# Patient Record
Sex: MALE | Race: WHITE | NOT HISPANIC OR LATINO | Employment: FULL TIME | URBAN - METROPOLITAN AREA
[De-identification: names, ages, dates, MRNs, and addresses within clinical notes are randomized per-mention and may not be internally consistent; named-entity substitution may affect disease eponyms.]

---

## 2017-01-06 ENCOUNTER — ANTICOAGULATION THERAPY VISIT (OUTPATIENT)
Dept: NURSING | Facility: CLINIC | Age: 57
End: 2017-01-06
Payer: COMMERCIAL

## 2017-01-06 DIAGNOSIS — I48.91 NEW ONSET ATRIAL FIBRILLATION (H): ICD-10-CM

## 2017-01-06 DIAGNOSIS — Z79.01 LONG-TERM (CURRENT) USE OF ANTICOAGULANTS: Primary | ICD-10-CM

## 2017-01-06 LAB — INR POINT OF CARE: 2.4 (ref 0.86–1.14)

## 2017-01-06 PROCEDURE — 99207 ZZC NO CHARGE NURSE ONLY: CPT

## 2017-01-06 PROCEDURE — 36416 COLLJ CAPILLARY BLOOD SPEC: CPT

## 2017-01-06 PROCEDURE — 85610 PROTHROMBIN TIME: CPT | Mod: QW

## 2017-01-06 NOTE — PROGRESS NOTES
ANTICOAGULATION FOLLOW-UP CLINIC VISIT    Patient Name:  Stef Mosher  Date:  1/6/2017  Contact Type:  Face to Face    SUBJECTIVE:     Patient Findings     Positives No Problem Findings           OBJECTIVE    INR PROTIME   Date Value Ref Range Status   01/06/2017 2.4* 0.86 - 1.14 Final       ASSESSMENT / PLAN  INR assessment THER    Recheck INR In: 6 WEEKS    INR Location Clinic      Anticoagulation Summary as of 1/6/2017     INR goal 2.0-3.0   Selected INR 2.4 (1/6/2017)   Maintenance plan 5 mg (2.5 mg x 2) on Mon, Fri; 2.5 mg (2.5 mg x 1) all other days   Full instructions 5 mg on Mon, Fri; 2.5 mg all other days   Weekly total 22.5 mg   No change documented Linnea Brooks RN   Plan last modified Dahiana Barrera (6/20/2016)   Next INR check 2/13/2017   Target end date     Indications   Long-term (current) use of anticoagulants [Z79.01] [Z79.01]  New onset atrial fibrillation (H) [I48.91]         Anticoagulation Episode Summary     INR check location     Preferred lab     Send INR reminders to [facility]    Comments       Anticoagulation Care Providers     Provider Role Specialty Phone number    Sivakumar Kang PA-C Responsible Physician Assistant [phone]            See the Encounter Report to view Anticoagulation Flowsheet and Dosing Calendar (Go to Encounters tab in chart review, and find the Anticoagulation Therapy Visit)        Linnea Brooks RN

## 2017-01-06 NOTE — MR AVS SNAPSHOT
Stef NEFF Nomi   1/6/2017 11:00 AM   Anticoagulation Therapy Visit    Description:  56 year old male   Provider:  VANESA ANTI COAG   Department:  [facility] Nurse           INR as of 1/6/2017     Selected INR 2.4 (1/6/2017)      Anticoagulation Summary as of 1/6/2017     INR goal 2.0-3.0   Selected INR 2.4 (1/6/2017)   Full instructions 5 mg on Mon, Fri; 2.5 mg all other days   Next INR check 2/13/2017    Indications   Long-term (current) use of anticoagulants [Z79.01] [Z79.01]  New onset atrial fibrillation (H) [I48.91]         Your next Anticoagulation Clinic appointment(s)     Feb 13, 2017 11:00 AM   Anticoagulation Visit with VANESA ANTI DIONNA   [place] [facility] ([facility] [place])    [address] [facility]  [place] [id number] [phone]              Contact Numbers     [facility]  Please call [phone] with any problems or questions regarding your therapy, or to cancel or reschedule your appointment.          January 2017 Details    Sun Mon Tue Wed Thu Fri Sat     1               2               3               4               5               6      5 mg   See details      7      2.5 mg           8      2.5 mg         9      5 mg         10      2.5 mg         11      2.5 mg         12      2.5 mg         13      5 mg         14      2.5 mg           15      2.5 mg         16      5 mg         17      2.5 mg         18      2.5 mg         19      2.5 mg         20      5 mg         21      2.5 mg           22      2.5 mg         23      5 mg         24      2.5 mg         25      2.5 mg         26      2.5 mg         27      5 mg         28      2.5 mg           29      2.5 mg         30      5 mg         31      2.5 mg              Date Details   01/06 This INR check               How to take your warfarin dose     To take:  2.5 mg Take 1 of the 2.5 mg tablets.    To take:  5 mg Take 2 of the 2.5 mg tablets.           February 2017 Details    Sun Mon Tue Wed Thu Fri Sat        1      2.5 mg          2      2.5 mg         3      5 mg         4      2.5 mg           5      2.5 mg         6      5 mg         7      2.5 mg         8      2.5 mg         9      2.5 mg         10      5 mg         11      2.5 mg           12      2.5 mg         13            14               15               16               17               18                 19               20               21               22               23               24               25                 26               27               28                    Date Details   No additional details    Date of next INR:  2/13/2017         How to take your warfarin dose     To take:  2.5 mg Take 1 of the 2.5 mg tablets.    To take:  5 mg Take 2 of the 2.5 mg tablets.

## 2017-01-09 DIAGNOSIS — I48.91 NEW ONSET ATRIAL FIBRILLATION (H): Primary | ICD-10-CM

## 2017-01-09 RX ORDER — WARFARIN SODIUM 2.5 MG/1
2.5 TABLET ORAL DAILY
Qty: 140 TABLET | Refills: 0 | Status: SHIPPED | OUTPATIENT
Start: 2017-01-09 | End: 2017-04-24

## 2017-01-09 NOTE — TELEPHONE ENCOUNTER
Looks like last filled date was actually 12/29/16    Note from pharmacy:  Could you please send patient a 90 day supply, not enough on script to make a full 90 day fill.  Thanks in advance.    Warfarin    Last Written Prescription Date: 09/02/16  Last Fill Qty: 140, # refills: 0  Last Office Visit with [facility], [facility] or [facility] prescribing provider: 04/07/14       Date and Result of Last PT/INR:   INR      2.4   1/6/2017  INR      2.5   10/28/2016  INR     2.40   8/3/2010  INR     3.60   7/8/2010

## 2017-02-15 ENCOUNTER — ANTICOAGULATION THERAPY VISIT (OUTPATIENT)
Dept: NURSING | Facility: CLINIC | Age: 57
End: 2017-02-15
Payer: COMMERCIAL

## 2017-02-15 DIAGNOSIS — I48.91 NEW ONSET ATRIAL FIBRILLATION (H): ICD-10-CM

## 2017-02-15 DIAGNOSIS — Z79.01 LONG-TERM (CURRENT) USE OF ANTICOAGULANTS: ICD-10-CM

## 2017-02-15 LAB — INR POINT OF CARE: 2.6 (ref 0.86–1.14)

## 2017-02-15 PROCEDURE — 36416 COLLJ CAPILLARY BLOOD SPEC: CPT

## 2017-02-15 PROCEDURE — 85610 PROTHROMBIN TIME: CPT | Mod: QW

## 2017-02-15 PROCEDURE — 99207 ZZC NO CHARGE NURSE ONLY: CPT

## 2017-02-15 NOTE — MR AVS SNAPSHOT
Stef Leetino   2/15/2017 11:45 AM   Anticoagulation Therapy Visit    Description:  56 year old male   Provider:  VANESA ANTI COAG   Department:  Be Nurse           INR as of 2/15/2017     Today's INR 2.6      Anticoagulation Summary as of 2/15/2017     INR goal 2.0-3.0   Today's INR 2.6   Full instructions 5 mg on Mon, Fri; 2.5 mg all other days   Next INR check 3/24/2017    Indications   Long-term (current) use of anticoagulants [Z79.01] [Z79.01]  New onset atrial fibrillation (H) [I48.91]         Your next Anticoagulation Clinic appointment(s)     Feb 15, 2017 11:45 AM CST   Anticoagulation Visit with BE ANTI COAG   [facility] [place] [facility] [place]    [address] [facility]  [place] [address]   [phone]            Mar 24, 2017 11:00 AM CDT   Anticoagulation Visit with BE ANTI COAG   [place] [facility] ([facility])    [address] [facility]  [place] [address]   [phone]              Contact Numbers     [facility]  Please call [phone] with any problems or questions regarding your therapy, or to cancel or reschedule your appointment.          February 2017 Details    Sun Mon Tue Wed Thu Fri Sat        1               2               3               4                 5               6               7               8               9               10               11                 12               13               14               15      2.5 mg   See details      16      2.5 mg         17      5 mg         18      2.5 mg           19      2.5 mg         20      5 mg         21      2.5 mg         22      2.5 mg         23      2.5 mg         24      5 mg         25      2.5 mg           26      2.5 mg         27      5 mg         28      2.5 mg              Date Details   02/15 This INR check               How to take your warfarin dose     To take:  2.5 mg Take 1 of the 2.5 mg tablets.    To take:  5 mg Take 2 of the 2.5 mg tablets.           March  2017 Details    Sun Mon Tue Wed Thu Fri Sat        1      2.5 mg         2      2.5 mg         3      5 mg         4      2.5 mg           5      2.5 mg         6      5 mg         7      2.5 mg         8      2.5 mg         9      2.5 mg         10      5 mg         11      2.5 mg           12      2.5 mg         13      5 mg         14      2.5 mg         15      2.5 mg         16      2.5 mg         17      5 mg         18      2.5 mg           19      2.5 mg         20      5 mg         21      2.5 mg         22      2.5 mg         23      2.5 mg         24            25                 26               27               28               29               30               31                 Date Details   No additional details    Date of next INR:  3/24/2017         How to take your warfarin dose     To take:  2.5 mg Take 1 of the 2.5 mg tablets.    To take:  5 mg Take 2 of the 2.5 mg tablets.

## 2017-02-15 NOTE — PROGRESS NOTES
ANTICOAGULATION FOLLOW-UP CLINIC VISIT    Patient Name:  Stef Mosher  Date:  2/15/2017  Contact Type:  Face to Face    SUBJECTIVE:     Patient Findings     Positives No Problem Findings           OBJECTIVE    INR Protime   Date Value Ref Range Status   02/15/2017 2.6 (A) 0.86 - 1.14 Final       ASSESSMENT / PLAN  INR assessment THER    Recheck INR In: 6 WEEKS    INR Location Clinic      Anticoagulation Summary as of 2/15/2017     INR goal 2.0-3.0   Today's INR 2.6   Maintenance plan 5 mg (2.5 mg x 2) on Mon, Fri; 2.5 mg (2.5 mg x 1) all other days   Full instructions 5 mg on Mon, Fri; 2.5 mg all other days   Weekly total 22.5 mg   No change documented Linnea Brooks RN   Plan last modified Dahiana Barrera (6/20/2016)   Next INR check 3/24/2017   Target end date     Indications   Long-term (current) use of anticoagulants [Z79.01] [Z79.01]  New onset atrial fibrillation (H) [I48.91]         Anticoagulation Episode Summary     INR check location     Preferred lab     Send INR reminders to [facility]    Comments       Anticoagulation Care Providers     Provider Role Specialty Phone number    Sivakumar Kang PA-C Responsible Physician Assistant [phone]            See the Encounter Report to view Anticoagulation Flowsheet and Dosing Calendar (Go to Encounters tab in chart review, and find the Anticoagulation Therapy Visit)        Linnea Brooks RN

## 2017-03-03 DIAGNOSIS — I48.91 ATRIAL FIBRILLATION (H): ICD-10-CM

## 2017-03-06 RX ORDER — LISINOPRIL 20 MG/1
20 TABLET ORAL DAILY
Qty: 90 TABLET | Refills: 1 | Status: SHIPPED | OUTPATIENT
Start: 2017-03-06 | End: 2017-04-24

## 2017-03-27 ENCOUNTER — ANTICOAGULATION THERAPY VISIT (OUTPATIENT)
Dept: NURSING | Facility: CLINIC | Age: 57
End: 2017-03-27
Payer: COMMERCIAL

## 2017-03-27 DIAGNOSIS — I48.91 NEW ONSET ATRIAL FIBRILLATION (H): ICD-10-CM

## 2017-03-27 DIAGNOSIS — Z79.01 LONG-TERM (CURRENT) USE OF ANTICOAGULANTS: ICD-10-CM

## 2017-03-27 LAB — INR POINT OF CARE: 2.4 (ref 0.86–1.14)

## 2017-03-27 PROCEDURE — 36416 COLLJ CAPILLARY BLOOD SPEC: CPT

## 2017-03-27 PROCEDURE — 85610 PROTHROMBIN TIME: CPT | Mod: QW

## 2017-03-27 PROCEDURE — 99207 ZZC NO CHARGE NURSE ONLY: CPT

## 2017-03-27 NOTE — PROGRESS NOTES
ANTICOAGULATION FOLLOW-UP CLINIC VISIT    Patient Name:  Stef Mosher  Date:  3/27/2017  Contact Type:  Face to Face    SUBJECTIVE:     Patient Findings     Positives No Problem Findings           OBJECTIVE    INR Protime   Date Value Ref Range Status   03/27/2017 2.4 (A) 0.86 - 1.14 Final       ASSESSMENT / PLAN  INR assessment THER    Recheck INR In: 2 WEEKS starting new diet, increasing vit K intake   INR Location Clinic      Anticoagulation Summary as of 3/27/2017     INR goal 2.0-3.0   Today's INR 2.4   Maintenance plan 5 mg (2.5 mg x 2) on Mon, Fri; 2.5 mg (2.5 mg x 1) all other days   Full instructions 5 mg on Mon, Fri; 2.5 mg all other days   Weekly total 22.5 mg   No change documented Lefty Shepherd   Plan last modified Lefty Shepherd (6/20/2016)   Next INR check 4/10/2017   Target end date     Indications   Long-term (current) use of anticoagulants [Z79.01] [Z79.01]  New onset atrial fibrillation (H) [I48.91]         Anticoagulation Episode Summary     INR check location     Preferred lab     Send INR reminders to [facility]    Comments       Anticoagulation Care Providers     Provider Role Specialty Phone number    Sivakumar Kang PA-C Responsible Physician Assistant [phone]            See the Encounter Report to view Anticoagulation Flowsheet and Dosing Calendar (Go to Encounters tab in chart review, and find the Anticoagulation Therapy Visit)        LEFTY SHEPHERD

## 2017-03-27 NOTE — MR AVS SNAPSHOT
Stef TAWANDA Mosher   3/27/2017 10:45 AM   Anticoagulation Therapy Visit    Description:  57 year old male   Provider:  VANESA ANTI COAG   Department:  [facility] Nurse           INR as of 3/27/2017     Today's INR 2.4      Anticoagulation Summary as of 3/27/2017     INR goal 2.0-3.0   Today's INR 2.4   Full instructions 5 mg on Mon, Fri; 2.5 mg all other days   Next INR check 4/10/2017    Indications   Long-term (current) use of anticoagulants [Z79.01] [Z79.01]  New onset atrial fibrillation (H) [I48.91]         Your next Anticoagulation Clinic appointment(s)     Apr 14, 2017 10:30 AM CDT   Anticoagulation Visit with BE ANTI COAG   [place] [facility] ([facility] [place])    [address] [facility]  [place] [address] [phone]              Contact Numbers     [facility]  Please call [phone] with any problems or questions regarding your therapy, or to cancel or reschedule your appointment.          March 2017 Details    Sun Mon Tue Wed Thu Fri Sat        1               2               3               4                 5               6               7               8               9               10               11                 12               13               14               15               16               17               18                 19               20               21               22               23               24               25                 26               27      5 mg   See details      28      2.5 mg         29      2.5 mg         30      2.5 mg         31      5 mg           Date Details   03/27 This INR check               How to take your warfarin dose     To take:  2.5 mg Take 1 of the 2.5 mg tablets.    To take:  5 mg Take 2 of the 2.5 mg tablets.           April 2017 Details    Sun Mon Tue Wed Thu Fri Sat           1      2.5 mg           2      2.5 mg         3      5 mg         4      2.5 mg         5      2.5 mg         6      2.5 mg         7      5 mg          8      2.5 mg           9      2.5 mg         10            11               12               13               14               15                 16               17               18               19               20               21               22                 23               24               25               26               27               28               29                 30                      Date Details   No additional details    Date of next INR:  4/10/2017         How to take your warfarin dose     To take:  2.5 mg Take 1 of the 2.5 mg tablets.    To take:  5 mg Take 2 of the 2.5 mg tablets.

## 2017-04-05 DIAGNOSIS — E78.5 HYPERLIPIDEMIA LDL GOAL <130: ICD-10-CM

## 2017-04-05 DIAGNOSIS — Z79.01 LONG-TERM (CURRENT) USE OF ANTICOAGULANTS: Primary | ICD-10-CM

## 2017-04-05 NOTE — TELEPHONE ENCOUNTER
ATORVASTATIN     Last Written Prescription Date: 1-7-17  Last Fill Quantity: 90, # refills: 3  [place] [facility] or  [facility] prescribing provider: ?       Lab Results   Component Value Date    CHOL 217 04/07/2014     Lab Results   Component Value Date    HDL 34 04/07/2014     Lab Results   Component Value Date     04/07/2014     Lab Results   Component Value Date    TRIG 164 04/07/2014     Lab Results   Component Value Date    CHOLHDLRATIO 6.4 04/07/2014     ASPIRIN      Last Written Prescription Date: ?  Last Fill Quantity: 90,  # refills: 3   Last [place] Visit [place] [facility] or [facility] prescribing provider: ?

## 2017-04-06 RX ORDER — ATORVASTATIN CALCIUM 10 MG/1
TABLET, FILM COATED ORAL
Qty: 30 TABLET | Refills: 0 | Status: SHIPPED | OUTPATIENT
Start: 2017-04-06 | End: 2017-04-24

## 2017-04-07 NOTE — TELEPHONE ENCOUNTER
Patient would like to schedule fasting appt with Arelis. Gave him pod hotline to call back and schedule.

## 2017-04-24 ENCOUNTER — OFFICE VISIT (OUTPATIENT)
Dept: FAMILY MEDICINE | Facility: CLINIC | Age: 57
End: 2017-04-24
Payer: COMMERCIAL

## 2017-04-24 VITALS
BODY MASS INDEX: 36.45 KG/M2 | SYSTOLIC BLOOD PRESSURE: 130 MMHG | TEMPERATURE: 98.1 F | RESPIRATION RATE: 16 BRPM | HEIGHT: 78 IN | WEIGHT: 315 LBS | OXYGEN SATURATION: 98 % | HEART RATE: 80 BPM | DIASTOLIC BLOOD PRESSURE: 82 MMHG

## 2017-04-24 DIAGNOSIS — E03.9 HYPOTHYROIDISM, UNSPECIFIED TYPE: ICD-10-CM

## 2017-04-24 DIAGNOSIS — I42.0 DILATED CARDIOMYOPATHY (H): ICD-10-CM

## 2017-04-24 DIAGNOSIS — N52.9 ERECTILE DYSFUNCTION, UNSPECIFIED ERECTILE DYSFUNCTION TYPE: ICD-10-CM

## 2017-04-24 DIAGNOSIS — Z79.01 LONG-TERM (CURRENT) USE OF ANTICOAGULANTS: ICD-10-CM

## 2017-04-24 DIAGNOSIS — I48.91 NEW ONSET ATRIAL FIBRILLATION (H): ICD-10-CM

## 2017-04-24 DIAGNOSIS — Z11.59 NEED FOR HEPATITIS C SCREENING TEST: ICD-10-CM

## 2017-04-24 DIAGNOSIS — I48.20 CHRONIC ATRIAL FIBRILLATION (H): ICD-10-CM

## 2017-04-24 DIAGNOSIS — I10 ESSENTIAL HYPERTENSION: ICD-10-CM

## 2017-04-24 DIAGNOSIS — I10 BENIGN ESSENTIAL HYPERTENSION: Primary | ICD-10-CM

## 2017-04-24 DIAGNOSIS — E78.5 HYPERLIPIDEMIA LDL GOAL <130: ICD-10-CM

## 2017-04-24 LAB
ALBUMIN SERPL-MCNC: 3.8 G/DL (ref 3.4–5)
ALP SERPL-CCNC: 96 U/L (ref 40–150)
ALT SERPL W P-5'-P-CCNC: 53 U/L (ref 0–70)
ANION GAP SERPL CALCULATED.3IONS-SCNC: 5 MMOL/L (ref 3–14)
AST SERPL W P-5'-P-CCNC: 24 U/L (ref 0–45)
BASOPHILS # BLD AUTO: 0 10E9/L (ref 0–0.2)
BASOPHILS NFR BLD AUTO: 0.3 %
BILIRUB SERPL-MCNC: 1.3 MG/DL (ref 0.2–1.3)
BUN SERPL-MCNC: 17 MG/DL (ref 7–30)
CALCIUM SERPL-MCNC: 8.8 MG/DL (ref 8.5–10.1)
CHLORIDE SERPL-SCNC: 109 MMOL/L (ref 94–109)
CHOLEST SERPL-MCNC: 157 MG/DL
CO2 SERPL-SCNC: 26 MMOL/L (ref 20–32)
CREAT SERPL-MCNC: 0.78 MG/DL (ref 0.66–1.25)
DIFFERENTIAL METHOD BLD: NORMAL
EOSINOPHIL # BLD AUTO: 0.2 10E9/L (ref 0–0.7)
EOSINOPHIL NFR BLD AUTO: 2.8 %
ERYTHROCYTE [DISTWIDTH] IN BLOOD BY AUTOMATED COUNT: 13.6 % (ref 10–15)
GFR SERPL CREATININE-BSD FRML MDRD: NORMAL ML/MIN/1.7M2
GLUCOSE SERPL-MCNC: 99 MG/DL (ref 70–99)
HCT VFR BLD AUTO: 45.5 % (ref 40–53)
HDLC SERPL-MCNC: 48 MG/DL
HGB BLD-MCNC: 15.4 G/DL (ref 13.3–17.7)
LDLC SERPL CALC-MCNC: 90 MG/DL
LYMPHOCYTES # BLD AUTO: 1 10E9/L (ref 0.8–5.3)
LYMPHOCYTES NFR BLD AUTO: 15.2 %
MCH RBC QN AUTO: 32.6 PG (ref 26.5–33)
MCHC RBC AUTO-ENTMCNC: 33.8 G/DL (ref 31.5–36.5)
MCV RBC AUTO: 96 FL (ref 78–100)
MONOCYTES # BLD AUTO: 0.8 10E9/L (ref 0–1.3)
MONOCYTES NFR BLD AUTO: 11.9 %
NEUTROPHILS # BLD AUTO: 4.7 10E9/L (ref 1.6–8.3)
NEUTROPHILS NFR BLD AUTO: 69.8 %
NONHDLC SERPL-MCNC: 109 MG/DL
PLATELET # BLD AUTO: 213 10E9/L (ref 150–450)
POTASSIUM SERPL-SCNC: 4.3 MMOL/L (ref 3.4–5.3)
PROT SERPL-MCNC: 7.2 G/DL (ref 6.8–8.8)
RBC # BLD AUTO: 4.73 10E12/L (ref 4.4–5.9)
SODIUM SERPL-SCNC: 140 MMOL/L (ref 133–144)
TRIGL SERPL-MCNC: 97 MG/DL
TSH SERPL DL<=0.005 MIU/L-ACNC: 0.54 MU/L (ref 0.4–4)
WBC # BLD AUTO: 6.8 10E9/L (ref 4–11)

## 2017-04-24 PROCEDURE — 80061 LIPID PANEL: CPT | Performed by: NURSE PRACTITIONER

## 2017-04-24 PROCEDURE — 99214 OFFICE O/P EST MOD 30 MIN: CPT | Performed by: NURSE PRACTITIONER

## 2017-04-24 PROCEDURE — 36415 COLL VENOUS BLD VENIPUNCTURE: CPT | Performed by: NURSE PRACTITIONER

## 2017-04-24 PROCEDURE — 80050 GENERAL HEALTH PANEL: CPT | Performed by: NURSE PRACTITIONER

## 2017-04-24 RX ORDER — ATORVASTATIN CALCIUM 10 MG/1
10 TABLET, FILM COATED ORAL DAILY
Qty: 90 TABLET | Refills: 1 | Status: SHIPPED | OUTPATIENT
Start: 2017-04-24 | End: 2017-10-29

## 2017-04-24 RX ORDER — WARFARIN SODIUM 2.5 MG/1
2.5 TABLET ORAL DAILY
Qty: 140 TABLET | Refills: 0 | Status: SHIPPED | OUTPATIENT
Start: 2017-04-24 | End: 2017-07-18

## 2017-04-24 RX ORDER — LISINOPRIL 20 MG/1
20 TABLET ORAL DAILY
Qty: 90 TABLET | Refills: 1 | Status: SHIPPED | OUTPATIENT
Start: 2017-04-24 | End: 2018-02-14

## 2017-04-24 RX ORDER — SILDENAFIL 50 MG/1
25-50 TABLET, FILM COATED ORAL DAILY PRN
Qty: 6 TABLET | Refills: 1 | Status: SHIPPED | OUTPATIENT
Start: 2017-04-24 | End: 2018-06-29

## 2017-04-24 RX ORDER — METOPROLOL TARTRATE 100 MG
100 TABLET ORAL 2 TIMES DAILY
Qty: 180 TABLET | Refills: 3 | Status: SHIPPED | OUTPATIENT
Start: 2017-04-24 | End: 2018-06-03

## 2017-04-24 NOTE — PATIENT INSTRUCTIONS
Follow up if you have any health care questions or concerns.  I will let you know your lab results.  Schedule with INR clinic.   Established High Blood Pressure    High blood pressure (hypertension) is a chronic disease. Often health care providers don t know what causes it. But it can be caused by certain health conditions and medicines.  If you have high blood pressure, you may not have any symptoms. If you do have symptoms, they may include headache, dizziness, changes in your vision, chest pain, and shortness of breath. But even without symptoms, high blood pressure that s not treated raises your risk for heart attack and stroke. High blood pressure is a serious health risk and shouldn t be ignored.  A blood pressure reading is made up of two numbers: a higher number over a lower number. The top number is the systolic pressure. The bottom number is the diastolic pressure. A normal blood pressure is less than 120 over less than 80.  High blood pressure is when either the top number is 140 or higher, or the bottom number is 90 or higher. This must be the result when taking your blood pressure a number of times. The blood pressures between normal and high are called prehypertension.  Home care  If you have high blood pressure, you should do what is listed below to lower your blood pressure. If you are taking medicines for high blood pressure, these methods may reduce or end your need for medicines in the future.    Begin a weight-loss program if you are overweight.    Cut back on how much salt you get in your diet. Here s how to do this:    Don t eat foods that have a lot of salt. These include olives, pickles, smoked meats, and salted potato chips.    Don t add salt to your food at the table.    Use only small amounts of salt when cooking.    Begin an exercise program. Talk with your health care provider about the type of exercise program that would be best for you. It doesn't have to be hard. Even brisk walking  for 20 minutes 3 times a week is a good form of exercise.    Don t take medicines that have heart stimulants. This includes many cold and sinus decongestant pills and sprays, as well as diet pills. Check the warnings about hypertension on the label. Stimulants such as amphetamine or cocaine could be lethal for someone with high blood pressure. Never take these.    Limit how much caffeine you get in your diet. Switch to caffeine-free products.    Stop smoking. If you are a long-time smoker, this can be hard. Enroll in a stop-smoking program to make it more likely that you will quit for good.    Learn how to handle stress. This is an important part of any program to lower blood pressure. Learn about relaxation methods like meditation, yoga, or biofeedback.    If your provider prescribed medicines, take them exactly as directed. Missing doses may cause your blood pressure get out of control.    Consider buying an automatic blood pressure machine. You can get one of these at most pharmacies. Use this to watch your blood pressure at home. Give the results to your provider.  Follow-up care  You will need to make regular visits to your health care provider. This is to check your blood pressure and to make changes to your medicines. Make a follow-up appointment as directed.  When to seek medical advice  Call your health care provider right away if any of these occur:    Chest pain or shortness of breath    Severe headache    Throbbing or rushing sound in the ears    Nosebleed    Sudden severe pain in your belly (abdomen)    Extreme drowsiness, confusion, or fainting    Dizziness or dizziness with a spinning sensation (vertigo)    Weakness of an arm or leg or one side of the face    You have problems speaking or seeing     1501-1903 [employer]. [address] [place] [address], [place] [address]. All rights reserved. This information is not intended as a substitute for professional medical care. Always follow your  healthcare professional's instructions.        Understanding Erectile Dysfunction  Erectile dysfunction (ED) is a problem getting an erection firm enough or keeping it long enough for intercourse. The problem can happen to any man at any age. But health problems that can lead to ED become more common as a man ages. Up to half of men over age 40 experience ED at some point.    Causes of ED  ED can have many causes. Most are physical. Some are emotional issues. Often, a combination of causes is involved. Causes of ED may include:    Medical conditions such as diabetes or depression    Smoking tobacco or marijuana    Drinking too much alcohol    Side effects of medications    Injury to nerves or blood vessels    Emotional issues such as stress or relationship problems  ED can be treated  Prescription medications for ED are available. They help many men who try them. Depending upon the cause of the ED, though, medications may not be enough. In these cases, other treatment options are available. These include erectile aids and surgery. Your health care provider can tell you more about the treatment that is right for you. And new treatments for ED are being studied. No matter what the treatment you decide on, stay in touch with your doctor. If your symptoms persist, he or she may be able to adjust your current treatment or try something new.    9553-0745 [employer]. [address] [place] [address], [place] [address]. All rights reserved. This information is not intended as a substitute for professional medical care. Always follow your healthcare professional's instructions.

## 2017-04-24 NOTE — MR AVS SNAPSHOT
After Visit Summary   4/24/2017    Stef Mosher    MRN: [id number]           Patient Information     Date Of Birth          1960        Visit Information        Provider Department      4/24/2017 10:20 AM Josephine Roberts NP [facility]        Today's Diagnoses     Need for hepatitis C screening test    -  1    Hypothyroidism        Essential hypertension        Long-term (current) use of anticoagulants [Z79.01]        Dilated cardiomyopathy (H)        Hyperlipidemia LDL goal <130        Atrial fibrillation (H)        New onset atrial fibrillation (H)        Hypothyroidism, unspecified type        Benign essential hypertension        Chronic atrial fibrillation (H)        Erectile dysfunction, unspecified erectile dysfunction type          Care Instructions    Follow up if you have any health care questions or concerns.  I will let you know your lab results.  Schedule with INR clinic.   Established High Blood Pressure    High blood pressure (hypertension) is a chronic disease. Often health care providers don t know what causes it. But it can be caused by certain health conditions and medicines.  If you have high blood pressure, you may not have any symptoms. If you do have symptoms, they may include headache, dizziness, changes in your vision, chest pain, and shortness of breath. But even without symptoms, high blood pressure that s not treated raises your risk for heart attack and stroke. High blood pressure is a serious health risk and shouldn t be ignored.  A blood pressure reading is made up of two numbers: a higher number over a lower number. The top number is the systolic pressure. The bottom number is the diastolic pressure. A normal blood pressure is less than 120 over less than 80.  High blood pressure is when either the top number is 140 or higher, or the bottom number is 90 or higher. This must be the result when taking your blood pressure a number of times. The blood  pressures between normal and high are called prehypertension.  Home care  If you have high blood pressure, you should do what is listed below to lower your blood pressure. If you are taking medicines for high blood pressure, these methods may reduce or end your need for medicines in the future.    Begin a weight-loss program if you are overweight.    Cut back on how much salt you get in your diet. Here s how to do this:    Don t eat foods that have a lot of salt. These include olives, pickles, smoked meats, and salted potato chips.    Don t add salt to your food at the table.    Use only small amounts of salt when cooking.    Begin an exercise program. Talk with your health care provider about the type of exercise program that would be best for you. It doesn't have to be hard. Even brisk walking for 20 minutes 3 times a week is a good form of exercise.    Don t take medicines that have heart stimulants. This includes many cold and sinus decongestant pills and sprays, as well as diet pills. Check the warnings about hypertension on the label. Stimulants such as amphetamine or cocaine could be lethal for someone with high blood pressure. Never take these.    Limit how much caffeine you get in your diet. Switch to caffeine-free products.    Stop smoking. If you are a long-time smoker, this can be hard. Enroll in a stop-smoking program to make it more likely that you will quit for good.    Learn how to handle stress. This is an important part of any program to lower blood pressure. Learn about relaxation methods like meditation, yoga, or biofeedback.    If your provider prescribed medicines, take them exactly as directed. Missing doses may cause your blood pressure get out of control.    Consider buying an automatic blood pressure machine. You can get one of these at most pharmacies. Use this to watch your blood pressure at home. Give the results to your provider.  Follow-up care  You will need to make regular visits to  your health care provider. This is to check your blood pressure and to make changes to your medicines. Make a follow-up appointment as directed.  When to seek medical advice  Call your health care provider right away if any of these occur:    Chest pain or shortness of breath    Severe headache    Throbbing or rushing sound in the ears    Nosebleed    Sudden severe pain in your belly (abdomen)    Extreme drowsiness, confusion, or fainting    Dizziness or dizziness with a spinning sensation (vertigo)    Weakness of an arm or leg or one side of the face    You have problems speaking or seeing     4358-5397 [employer]. [address] [place] [address]. All rights reserved. This information is not intended as a substitute for professional medical care. Always follow your healthcare professional's instructions.        Understanding Erectile Dysfunction  Erectile dysfunction (ED) is a problem getting an erection firm enough or keeping it long enough for intercourse. The problem can happen to any man at any age. But health problems that can lead to ED become more common as a man ages. Up to half of men over age 40 experience ED at some point.    Causes of ED  ED can have many causes. Most are physical. Some are emotional issues. Often, a combination of causes is involved. Causes of ED may include:    Medical conditions such as diabetes or depression    Smoking tobacco or marijuana    Drinking too much alcohol    Side effects of medications    Injury to nerves or blood vessels    Emotional issues such as stress or relationship problems  ED can be treated  Prescription medications for ED are available. They help many men who try them. Depending upon the cause of the ED, though, medications may not be enough. In these cases, other treatment options are available. These include erectile aids and surgery. Your health care provider can tell you more about the treatment that is right for you. And new  treatments for ED are being studied. No matter what the treatment you decide on, stay in touch with your doctor. If your symptoms persist, he or she may be able to adjust your current treatment or try something new.    8101-6752 [employer]. [address] [place] [address], [place] [address]. All rights reserved. This information is not intended as a substitute for professional medical care. Always follow your healthcare professional's instructions.              Follow-ups after your visit        Additional Services     INR CLINIC REFERRAL       Your provider has referred you to INR Services.    Please be aware that coverage of these services is subject to the terms and limitations of your health insurance plan.  Call member services at your health plan with any benefit or coverage questions.    Indication for Anticoagulation: Atrial Fibrillation  If nonstandard INR is desired, indicate goal range and explanation:   Expected Duration of Therapy: Lifetime                  Follow-up notes from your care team     Return if symptoms worsen or fail to improve.      Future tests that were ordered for you today     Open Future Orders        Priority Expected Expires Ordered    **Hepatitis C Screen Reflex to RNA FUTURE anytime Routine 4/24/2017 4/24/2018 4/24/2017            Who to contact     Normal or non-critical lab and imaging results will be communicated to you by MyChart, letter or phone within 4 business days after the clinic has received the results. If you do not hear from us within 7 days, please contact the clinic through [employer] or phone. If you have a critical or abnormal lab result, we will notify you by phone as soon as possible.  Submit refill requests through [employer] or call your pharmacy and they will forward the refill request to us. Please allow 3 business days for your refill to be completed.          If you need to speak with a  for additional information , please call:  "[phone]             Additional Information About Your Visit        [employer] Information     [employer] gives you secure access to your electronic health record. If you see a primary care provider, you can also send messages to your care team and make appointments. If you have questions, please call your primary care clinic.  If you do not have a primary care provider, please call [phone] and they will assist you.        Care EveryWhere ID     This is your Care EveryWhere ID. This could be used by other organizations to access your [facility] medical records  [id number]        Your Vitals Were     Pulse Temperature Respirations Height Pulse Oximetry BMI (Body Mass Index)    80 98.1  F (36.7  C) (Tympanic) 16 6' 5.5\" (1.969 m) 98% 40.27 kg/m2       Blood Pressure from Last 3 Encounters:   04/24/17 130/82   07/25/16 (!) 140/97   03/09/15 133/84    Weight from Last 3 Encounters:   04/24/17 (!) 344 lb (156 kg)   07/25/16 (!) 336 lb (152.4 kg)   03/09/15 (!) 329 lb (149.2 kg)              We Performed the Following     CBC with platelets and differential     Comprehensive metabolic panel (BMP + Alb, Alk Phos, ALT, AST, Total. Bili, TP)     HEART FAILURE ACTION PLAN     INR CLINIC REFERRAL     Lipid panel reflex to direct LDL     TSH with free T4 reflex          Today's Medication Changes          These changes are accurate as of: 4/24/17 10:37 AM.  If you have any questions, ask your nurse or doctor.               Start taking these medicines.        Dose/Directions    sildenafil 50 MG cap/tab   Commonly known as:  REVATIO/VIAGRA   Used for:  Erectile dysfunction, unspecified erectile dysfunction type   Started by:  Josephine Roberts NP        Dose:  25-50 mg   Take 0.5-1 tablets (25-50 mg) by mouth daily as needed for erectile dysfunction Take 30 min to 4 hours before intercourse.  Never use with nitroglycerin, terazosin or doxazosin.   Quantity:  6 tablet   Refills:  1         These medicines have changed or have " updated prescriptions.        Dose/Directions    aspirin 81 MG EC tablet   This may have changed:  Another medication with the same name was removed. Continue taking this medication, and follow the directions you see here.   Used for:  Long-term (current) use of anticoagulants   Changed by:  Sivakumar Kang PA-C        TAKE ONE TABLET BY MOUTH ONE TIME DAILY   Quantity:  90 tablet   Refills:  3       atorvastatin 10 MG tablet   Commonly known as:  LIPITOR   This may have changed:  See the new instructions.   Used for:  Hyperlipidemia LDL goal <130   Changed by:  Josephine Roberts NP        Dose:  10 mg   Take 1 tablet (10 mg) by mouth daily   Quantity:  90 tablet   Refills:  1         Stop taking these medicines if you haven't already. Please contact your care team if you have questions.     diphenhydrAMINE 25 MG capsule   Commonly known as:  BENADRYL   Stopped by:  Josephine Roberts NP                Where to get your medicines      These medications were sent to [place] [address] [facility] [place] - [address]  [address] [place] [address]     Phone:  [phone]     atorvastatin 10 MG tablet    lisinopril 20 MG tablet    metoprolol 100 MG tablet    warfarin 2.5 MG tablet         Some of these will need a paper prescription and others can be bought over the counter.  Ask your nurse if you have questions.     Bring a paper prescription for each of these medications     sildenafil 50 MG cap/tab                Primary Care Provider Office Phone # Fax #    Sivakumar Kang PA-C [phone]       [facility] [address]  [place] [address]        Thank you!     Thank you for choosing [facility]  for your care. Our goal is always to provide you with excellent care. Hearing back from our patients is one way we can continue to improve our services. Please take a few minutes to complete the written survey that you may receive in the mail after your visit with us. Thank  you!             Your Updated Medication List - Protect others around you: Learn how to safely use, store and throw away your medicines at www.disposemymeds.org.          This list is accurate as of: 4/24/17 10:37 AM.  Always use your most recent med list.                   Brand Name Dispense Instructions for use    aspirin 81 MG EC tablet     90 tablet    TAKE ONE TABLET BY MOUTH ONE TIME DAILY       atorvastatin 10 MG tablet    LIPITOR    90 tablet    Take 1 tablet (10 mg) by mouth daily       lisinopril 20 MG tablet    PRINIVIL/ZESTRIL    90 tablet    Take 1 tablet (20 mg) by mouth daily Follow up appt needed       metoprolol 100 MG tablet    LOPRESSOR    180 tablet    Take 1 tablet (100 mg) by mouth 2 times daily       OMEGA-3 FISH OIL PO      Take 1,200 mg by mouth 2 times daily (with meals)       sildenafil 50 MG cap/tab    REVATIO/VIAGRA    6 tablet    Take 0.5-1 tablets (25-50 mg) by mouth daily as needed for erectile dysfunction Take 30 min to 4 hours before intercourse.  Never use with nitroglycerin, terazosin or doxazosin.       warfarin 2.5 MG tablet    COUMADIN    140 tablet    Take 1 tablet (2.5 mg) by mouth daily OR AS DIRECTED BY INR CLINIC. Current dose is 5 mg Mon and Fri, 2.5 mg daily rest of week

## 2017-04-24 NOTE — PROGRESS NOTES
"  SUBJECTIVE:                                                    Stef Mosher is a 57 year old male who presents to clinic today for the following health issues:      Heart Failure Follow-up    Symptoms:    Shortness of breath: none    Lower extremity edema: right knee swelling with extremely busy days; hx osteoarthrits    Chest pain: No    Using more pillows than normal: No    Cough at night: No    Weight:    Checking weight daily: No    Weight change: none    Cardiology visits, ER/UC, or hospital admissions since last visit: None    Medication side effects: none         Amount of exercise or physical activity: None    Problems taking medications regularly: No    Medication side effects: none    Diet: regular (no restrictions)      Additional health concerns: requesting viagra due to ED. No chest pain or other sx.     Problem list and histories reviewed & adjusted, as indicated.  Additional history: as documented        Reviewed and updated as needed this visit by clinical staff  Tobacco  Allergies  Meds  Problems  Med Hx  Surg Hx  Fam Hx  Soc Hx        Reviewed and updated as needed this visit by Provider  Allergies  Meds  Problems         ROS:  Constitutional, HEENT, cardiovascular, pulmonary, GI, , musculoskeletal, neuro, skin, endocrine and psych systems are negative, except as otherwise noted.    OBJECTIVE:                                                    /82  Pulse 80  Temp 98.1  F (36.7  C) (Tympanic)  Resp 16  Ht 6' 5.5\" (1.969 m)  Wt (!) 344 lb (156 kg)  SpO2 98%  BMI 40.27 kg/m2  Body mass index is 40.27 kg/(m^2).  GENERAL: healthy, alert and no distress  EYES: Eyes grossly normal to inspection, PERRL and conjunctivae and sclerae normal  NECK: no adenopathy, no asymmetry, masses, or scars and thyroid normal to palpation  RESP: lungs clear to auscultation - no rales, rhonchi or wheezes  CV: regular rate and rhythm, normal S1 S2, no S3 or S4, no murmur, click or rub, no peripheral " edema and peripheral pulses strong   (male): exam deferred per pt- normal male genitalia without lesions or urethral discharge, no hernia  NEURO: A&O, mentation intact and speech normal    Diagnostic Test Results:    See orders     ASSESSMENT/PLAN:                                                          ICD-10-CM    1. Benign essential hypertension I10 Comprehensive metabolic panel (BMP + Alb, Alk Phos, ALT, AST, Total. Bili, TP)   2. Need for hepatitis C screening test Z11.59 **Hepatitis C Screen Reflex to RNA FUTURE anytime   3. Essential hypertension I10 HEART FAILURE ACTION PLAN     metoprolol (LOPRESSOR) 100 MG tablet   4. Long-term (current) use of anticoagulants [Z79.01] Z79.01 INR CLINIC REFERRAL   5. Dilated cardiomyopathy (H) I42.0 CBC with platelets and differential   6. Hyperlipidemia LDL goal <130 E78.5 Lipid panel reflex to direct LDL     atorvastatin (LIPITOR) 10 MG tablet   7. New onset atrial fibrillation (H) I48.91 warfarin (COUMADIN) 2.5 MG tablet   8. Hypothyroidism, unspecified type E03.9 TSH with free T4 reflex   9. Chronic atrial fibrillation (H) I48.2 HEART FAILURE ACTION PLAN     lisinopril (PRINIVIL/ZESTRIL) 20 MG tablet   10. Erectile dysfunction, unspecified erectile dysfunction type N52.9 sildenafil (REVATIO/VIAGRA) 50 MG cap/tab       See Patient Instructions:Follow up if you have any health care questions or concerns.  I will let you know your lab results.  Schedule with INR clinic.     Josephine Roberts, JANET  [facility] [place]

## 2017-04-24 NOTE — LETTER
"My Heart Failure Action Plan   Name: Stef Mosher    YOB: 1960   Date: 4/24/2017    My doctor: Sivakumar Kang     [address]  [place] [id number] [phone]  My Diagnosis: {HF TYPE:421727::\"Systolic Heart Failure\"}   My Ejection Fraction: {EF%:467390}    My Exercise Goal: 30 minutes daily  .     My Weight Goal: ***  Wt Readings from Last 2 Encounters:   07/25/16 (!) 336 lb (152.4 kg)   03/09/15 (!) 329 lb (149.2 kg)     Weigh yourself daily using the same scale. If you gain more than 2 pounds in 24 hours or 5 pounds in a week {HF WEIGHT GOAL:544486}    My Diet Goal: {HF DIET GOAL:946817::\"No added salt\"}    Emergency Room Visits:    Our goal is to improve your quality of life and help you avoid a visit to the emergency room or hospital.  If we work together, we can achieve this goal. But, if you feel you need to call 911 or go to the emergency room, please do so.  If you go to the emergency room, please bring your list of medicines and your daily weight chart with you.       GREEN ZONE     Doing well today    Weight gained is no more than 2 pounds a day or 5 pounds a week.    No swelling in feet, ankles, legs or stomach.    No more swelling than usual.    No more trouble breathing than usual.    No change in my sleep.    No other problems. Actions:    I am doing fine.  I will take my medicine, follow my diet, see my doctor, exercise, and watch for symptoms.           YELLOW ZONE         Having a bad day or flare up    Weight gain of more than 2 pounds in one day or 5 pounds in one week.    New swelling in ankle, leg, knee or thigh.    Bloating in belly, pants feel tighter.    Swelling in hands or face.    Coughing or trouble breathing while walking or talking.    Harder to breathe last night.    Have trouble sleeping, wake up short of breath.    Much more tired than usual.    Not eating.    Pain in my chest or bad leg cramps.    Feel weak or dizzy. " Actions:    I need to take action and call my doctor or nurse today.                 RED ZONE         Need medical care now    Weight gain of 5 pounds overnight.    Chest pain or pressure that does not go away.    Feel less alert.    Wheezing or have trouble breathing when at rest.    Cannot sleep lying down.    Cannot take my water pill.    Pass out or faint. Actions:    I need to call my doctor or nurse now!    Call 911 if I have chest pain or cannot breathe.        Electronically signed by: Vikki Rasmussen, April 24, 2017

## 2017-04-25 NOTE — PROGRESS NOTES
Ross Finch,    Thank you for your recent office visit.    Here are your recent results.  Labs look great.     Feel free to contact me via [employer] or call the clinic at [phone].    Sincerely,    VENECIA Yang, FNP-BC

## 2017-05-11 ENCOUNTER — TELEPHONE (OUTPATIENT)
Dept: FAMILY MEDICINE | Facility: CLINIC | Age: 57
End: 2017-05-11

## 2017-05-11 NOTE — TELEPHONE ENCOUNTER
Panel Management Review      Patient has the following on his problem list:     Hypertension   Last three blood pressure readings:  BP Readings from Last 3 Encounters:   04/24/17 130/82   07/25/16 (!) 140/97   03/09/15 133/84     Blood pressure: Passed    HTN Guidelines:  Age 18-59 BP range:  Less than 140/90  Age 60-85 with Diabetes:  Less than 140/90  Age 60-85 without Diabetes:  less than 150/90      Composite cancer screening  Chart review shows that this patient is due/due soon for the following Colonoscopy  Summary:    Patient is due/failing the following:   COLONOSCOPY    Type of outreach:    Sent [employer] message.    Questions for provider review:    None                                                                                                                                    Kira Dan MA       Chart routed to Care Team .

## 2017-05-18 NOTE — TELEPHONE ENCOUNTER
Left message for patient to call back pod 1 hotline([phone])  Patient also has INR appt belia for 5/19/17-added to appt note.    Patient is due/failing the following:   COLONOSCOPY

## 2017-05-19 ENCOUNTER — ANTICOAGULATION THERAPY VISIT (OUTPATIENT)
Dept: NURSING | Facility: CLINIC | Age: 57
End: 2017-05-19
Payer: COMMERCIAL

## 2017-05-19 DIAGNOSIS — I48.91 NEW ONSET ATRIAL FIBRILLATION (H): ICD-10-CM

## 2017-05-19 DIAGNOSIS — Z79.01 LONG-TERM (CURRENT) USE OF ANTICOAGULANTS: ICD-10-CM

## 2017-05-19 LAB — INR POINT OF CARE: 2.6 (ref 0.86–1.14)

## 2017-05-19 PROCEDURE — 99207 ZZC NO CHARGE NURSE ONLY: CPT

## 2017-05-19 PROCEDURE — 36416 COLLJ CAPILLARY BLOOD SPEC: CPT

## 2017-05-19 PROCEDURE — 85610 PROTHROMBIN TIME: CPT | Mod: QW

## 2017-05-19 NOTE — MR AVS SNAPSHOT
Stef TAWANDA Mosher   5/19/2017 11:00 AM   Anticoagulation Therapy Visit    Description:  57 year old male   Provider:  [facility]   Department:  [facility]           INR as of 5/19/2017     Today's INR 2.6      Anticoagulation Summary as of 5/19/2017     INR goal 2.0-3.0   Today's INR 2.6   Full instructions 5 mg on Mon, Fri; 2.5 mg all other days   Next INR check 6/26/2017    Indications   Long-term (current) use of anticoagulants [Z79.01] [Z79.01]  New onset atrial fibrillation (H) [I48.91]         Your next Anticoagulation Clinic appointment(s)     Jun 26, 2017 11:00 AM CDT   Anticoagulation Visit with [facility]   [facility] Kassie Huggins ([facility] [place])    [address] [facility]  [place] [address] [phone]              Contact Numbers     [facility]  Please call [phone] with any problems or questions regarding your therapy, or to cancel or reschedule your appointment.          May 2017 Details    Sun Mon Tue Wed Thu Fri Sat      1               2               3               4               5               6                 7               8               9               10               11               12               13                 14               15               16               17               18               19      5 mg   See details      20      2.5 mg           21      2.5 mg         22      5 mg         23      2.5 mg         24      2.5 mg         25      2.5 mg         26      5 mg         27      2.5 mg           28      2.5 mg         29      5 mg         30      2.5 mg         31      2.5 mg             Date Details   05/19 This INR check               How to take your warfarin dose     To take:  2.5 mg Take 1 of the 2.5 mg tablets.    To take:  5 mg Take 2 of the 2.5 mg tablets.           June 2017 Details    Sun Mon Tue Wed Thu Fri Sat         1      2.5 mg         2      5 mg         3      2.5 mg           4      2.5 mg         5      5 mg         6       2.5 mg         7      2.5 mg         8      2.5 mg         9      5 mg         10      2.5 mg           11      2.5 mg         12      5 mg         13      2.5 mg         14      2.5 mg         15      2.5 mg         16      5 mg         17      2.5 mg           18      2.5 mg         19      5 mg         20      2.5 mg         21      2.5 mg         22      2.5 mg         23      5 mg         24      2.5 mg           25      2.5 mg         26            27               28               29               30                 Date Details   No additional details    Date of next INR:  6/26/2017         How to take your warfarin dose     To take:  2.5 mg Take 1 of the 2.5 mg tablets.    To take:  5 mg Take 2 of the 2.5 mg tablets.

## 2017-05-19 NOTE — PROGRESS NOTES
ANTICOAGULATION FOLLOW-UP CLINIC VISIT    Patient Name:  Stef Mosher  Date:  5/19/2017  Contact Type:  Face to Face    SUBJECTIVE:     Patient Findings     Comments Patient's INR is consistent even with the increase in diet Vit K.             OBJECTIVE    INR Protime   Date Value Ref Range Status   05/19/2017 2.6 (A) 0.86 - 1.14 Final       ASSESSMENT / PLAN  INR assessment THER    Recheck INR In: 6 WEEKS    INR Location Clinic      Anticoagulation Summary as of 5/19/2017     INR goal 2.0-3.0   Today's INR 2.6   Maintenance plan 5 mg (2.5 mg x 2) on Mon, Fri; 2.5 mg (2.5 mg x 1) all other days   Full instructions 5 mg on Mon, Fri; 2.5 mg all other days   Weekly total 22.5 mg   No change documented Linnea Brooks RN   Plan last modified Dahiana Barrera (6/20/2016)   Next INR check 6/26/2017   Target end date     Indications   Long-term (current) use of anticoagulants [Z79.01] [Z79.01]  New onset atrial fibrillation (H) [I48.91]         Anticoagulation Episode Summary     INR check location     Preferred lab     Send INR reminders to [facility]    Comments       Anticoagulation Care Providers     Provider Role Specialty Phone number    Sivakumar Kang PA-C Responsible Physician Assistant [phone]            See the Encounter Report to view Anticoagulation Flowsheet and Dosing Calendar (Go to Encounters tab in chart review, and find the Anticoagulation Therapy Visit)        Linnea Brooks RN

## 2017-06-26 ENCOUNTER — ANTICOAGULATION THERAPY VISIT (OUTPATIENT)
Dept: NURSING | Facility: CLINIC | Age: 57
End: 2017-06-26
Payer: COMMERCIAL

## 2017-06-26 DIAGNOSIS — Z79.01 LONG-TERM (CURRENT) USE OF ANTICOAGULANTS: ICD-10-CM

## 2017-06-26 DIAGNOSIS — I48.91 NEW ONSET ATRIAL FIBRILLATION (H): ICD-10-CM

## 2017-06-26 LAB — INR POINT OF CARE: 2.1 (ref 0.86–1.14)

## 2017-06-26 PROCEDURE — 99207 ZZC NO CHARGE NURSE ONLY: CPT

## 2017-06-26 PROCEDURE — 85610 PROTHROMBIN TIME: CPT | Mod: QW

## 2017-06-26 PROCEDURE — 36416 COLLJ CAPILLARY BLOOD SPEC: CPT

## 2017-06-26 NOTE — PROGRESS NOTES
ANTICOAGULATION FOLLOW-UP CLINIC VISIT    Patient Name:  Stef Mosher  Date:  6/26/2017  Contact Type:  Face to Face    SUBJECTIVE:     Patient Findings     Positives No Problem Findings           OBJECTIVE    INR Protime   Date Value Ref Range Status   06/26/2017 2.1 (A) 0.86 - 1.14 Final       ASSESSMENT / PLAN  INR assessment THER    Recheck INR In: 6 WEEKS    INR Location Clinic      Anticoagulation Summary as of 6/26/2017     INR goal 2.0-3.0   Today's INR 2.1   Maintenance plan 5 mg (2.5 mg x 2) on Mon, Fri; 2.5 mg (2.5 mg x 1) all other days   Full instructions 5 mg on Mon, Fri; 2.5 mg all other days   Weekly total 22.5 mg   No change documented Lefty Shepherd   Plan last modified Lefty Shepherd (6/20/2016)   Next INR check 8/11/2017   Target end date     Indications   Long-term (current) use of anticoagulants [Z79.01] [Z79.01]  New onset atrial fibrillation (H) [I48.91]         Anticoagulation Episode Summary     INR check location     Preferred lab     Send INR reminders to [facility]    Comments       Anticoagulation Care Providers     Provider Role Specialty Phone number    Sivakumar Kang PA-C Responsible Physician Assistant [phone]            See the Encounter Report to view Anticoagulation Flowsheet and Dosing Calendar (Go to Encounters tab in chart review, and find the Anticoagulation Therapy Visit)        LEFTY SHEPHERD

## 2017-06-26 NOTE — MR AVS SNAPSHOT
Stef TAWANDA Mosher   6/26/2017 11:00 AM   Anticoagulation Therapy Visit    Description:  57 year old male   Provider:  VANESA ANTI COAG   Department:  Be Nurse           INR as of 6/26/2017     Today's INR 2.1      Anticoagulation Summary as of 6/26/2017     INR goal 2.0-3.0   Today's INR 2.1   Full instructions 5 mg on Mon, Fri; 2.5 mg all other days   Next INR check 8/11/2017    Indications   Long-term (current) use of anticoagulants [Z79.01] [Z79.01]  New onset atrial fibrillation (H) [I48.91]         Your next Anticoagulation Clinic appointment(s)     Aug 11, 2017 11:00 AM CDT   Anticoagulation Visit with [facility] ANTI [facility]   [place] Kassie Huggins ([facility] [place])    [address]  [place] [address] [phone]              Contact Numbers     [facility]  Please call [phone] with any problems or questions regarding your therapy, or to cancel or reschedule your appointment.          June 2017 Details    Sun Mon Tue Wed Thu Fri Sat         1               2               3                 4               5               6               7               8               9               10                 11               12               13               14               15               16               17                 18               19               20               21               22               23               24                 25               26      5 mg   See details      27      2.5 mg         28      2.5 mg         29      2.5 mg         30      5 mg           Date Details   06/26 This INR check               How to take your warfarin dose     To take:  2.5 mg Take 1 of the 2.5 mg tablets.    To take:  5 mg Take 2 of the 2.5 mg tablets.           July 2017 Details    Sun Mon Tue Wed Thu Fri Sat           1      2.5 mg           2      2.5 mg         3      5 mg         4      2.5 mg         5      2.5 mg         6      2.5 mg         7      5 mg         8      2.5 mg            9      2.5 mg         10      5 mg         11      2.5 mg         12      2.5 mg         13      2.5 mg         14      5 mg         15      2.5 mg           16      2.5 mg         17      5 mg         18      2.5 mg         19      2.5 mg         20      2.5 mg         21      5 mg         22      2.5 mg           23      2.5 mg         24      5 mg         25      2.5 mg         26      2.5 mg         27      2.5 mg         28      5 mg         29      2.5 mg           30      2.5 mg         31      5 mg               Date Details   No additional details            How to take your warfarin dose     To take:  2.5 mg Take 1 of the 2.5 mg tablets.    To take:  5 mg Take 2 of the 2.5 mg tablets.           August 2017 Details    Sun Mon Tue Wed Thu Fri Sat       1      2.5 mg         2      2.5 mg         3      2.5 mg         4      5 mg         5      2.5 mg           6      2.5 mg         7      5 mg         8      2.5 mg         9      2.5 mg         10      2.5 mg         11            12                 13               14               15               16               17               18               19                 20               21               22               23               24               25               26                 27               28               29               30               31                  Date Details   No additional details    Date of next INR:  8/11/2017         How to take your warfarin dose     To take:  2.5 mg Take 1 of the 2.5 mg tablets.    To take:  5 mg Take 2 of the 2.5 mg tablets.

## 2017-07-18 DIAGNOSIS — I48.91 NEW ONSET ATRIAL FIBRILLATION (H): ICD-10-CM

## 2017-07-18 RX ORDER — WARFARIN SODIUM 2.5 MG/1
TABLET ORAL
Qty: 140 TABLET | Refills: 0 | Status: SHIPPED | OUTPATIENT
Start: 2017-07-18 | End: 2017-10-12

## 2017-07-18 NOTE — TELEPHONE ENCOUNTER
WARFARIN    Last Written Prescription Date: 7-18-77  Last Fill Qty: 140, # refills: 0  Last Office Visit with [facility], [facility] or [facility] prescribing provider: 4-24-17       Date and Result of Last PT/INR:   Lab Results   Component Value Date    INR 2.1 06/26/2017    INR 2.6 05/19/2017    INR 2.40 08/03/2010    INR 3.60 07/08/2010

## 2017-08-11 ENCOUNTER — ANTICOAGULATION THERAPY VISIT (OUTPATIENT)
Dept: NURSING | Facility: CLINIC | Age: 57
End: 2017-08-11
Payer: COMMERCIAL

## 2017-08-11 DIAGNOSIS — I48.91 NEW ONSET ATRIAL FIBRILLATION (H): ICD-10-CM

## 2017-08-11 DIAGNOSIS — Z79.01 LONG-TERM (CURRENT) USE OF ANTICOAGULANTS: ICD-10-CM

## 2017-08-11 LAB — INR POINT OF CARE: 2.6 (ref 0.86–1.14)

## 2017-08-11 PROCEDURE — 36416 COLLJ CAPILLARY BLOOD SPEC: CPT

## 2017-08-11 PROCEDURE — 85610 PROTHROMBIN TIME: CPT | Mod: QW

## 2017-08-11 PROCEDURE — 99207 ZZC NO CHARGE NURSE ONLY: CPT

## 2017-08-11 NOTE — PROGRESS NOTES
ANTICOAGULATION FOLLOW-UP CLINIC VISIT    Patient Name:  Stef Mosher  Date:  8/11/2017  Contact Type:  Face to Face    SUBJECTIVE:     Patient Findings     Positives No Problem Findings           OBJECTIVE    INR Protime   Date Value Ref Range Status   08/11/2017 2.6 (A) 0.86 - 1.14 Final       ASSESSMENT / PLAN  INR assessment THER    Recheck INR In: 6 WEEKS    INR Location Clinic      Anticoagulation Summary as of 8/11/2017     INR goal 2.0-3.0   Today's INR 2.6   Maintenance plan 5 mg (2.5 mg x 2) on Mon, Fri; 2.5 mg (2.5 mg x 1) all other days   Full instructions 5 mg on Mon, Fri; 2.5 mg all other days   Weekly total 22.5 mg   No change documented Lefty Shepherd   Plan last modified Lefty Shepherd (6/20/2016)   Next INR check 9/25/2017   Target end date     Indications   Long-term (current) use of anticoagulants [Z79.01] [Z79.01]  New onset atrial fibrillation (H) [I48.91]         Anticoagulation Episode Summary     INR check location     Preferred lab     Send INR reminders to [facility]    Comments       Anticoagulation Care Providers     Provider Role Specialty Phone number    Sivakumar Kang PA-C Responsible Physician Assistant [phone]            See the Encounter Report to view Anticoagulation Flowsheet and Dosing Calendar (Go to Encounters tab in chart review, and find the Anticoagulation Therapy Visit)        LEFTY SHEPHERD

## 2017-08-11 NOTE — MR AVS SNAPSHOT
Stef NEFF Nomi   8/11/2017 1:30 PM   Anticoagulation Therapy Visit    Description:  57 year old male   Provider:  VANESA ANTI COAG   Department:  Be Nurse           INR as of 8/11/2017     Today's INR 2.6      Anticoagulation Summary as of 8/11/2017     INR goal 2.0-3.0   Today's INR 2.6   Full instructions 5 mg on Mon, Fri; 2.5 mg all other days   Next INR check 9/25/2017    Indications   Long-term (current) use of anticoagulants [Z79.01] [Z79.01]  New onset atrial fibrillation (H) [I48.91]         Your next Anticoagulation Clinic appointment(s)     Sep 25, 2017 11:00 AM CDT   Anticoagulation Visit with BE ANTI COAG   [place] Kassie Huggins ([facility] [place])    [address]  [place] [address] [phone]              Contact Numbers     [facility]  Please call [phone] with any problems or questions regarding your therapy, or to cancel or reschedule your appointment.          August 2017 Details    Sun Mon Tue Wed Thu Fri Sat       1               2               3               4               5                 6               7               8               9               10               11      5 mg   See details      12      2.5 mg           13      2.5 mg         14      5 mg         15      2.5 mg         16      2.5 mg         17      2.5 mg         18      5 mg         19      2.5 mg           20      2.5 mg         21      5 mg         22      2.5 mg         23      2.5 mg         24      2.5 mg         25      5 mg         26      2.5 mg           27      2.5 mg         28      5 mg         29      2.5 mg         30      2.5 mg         31      2.5 mg            Date Details   08/11 This INR check               How to take your warfarin dose     To take:  2.5 mg Take 1 of the 2.5 mg tablets.    To take:  5 mg Take 2 of the 2.5 mg tablets.           September 2017 Details    Sun Mon Tue Wed Thu Fri Sat          1      5 mg         2      2.5 mg           3      2.5 mg          4      5 mg         5      2.5 mg         6      2.5 mg         7      2.5 mg         8      5 mg         9      2.5 mg           10      2.5 mg         11      5 mg         12      2.5 mg         13      2.5 mg         14      2.5 mg         15      5 mg         16      2.5 mg           17      2.5 mg         18      5 mg         19      2.5 mg         20      2.5 mg         21      2.5 mg         22      5 mg         23      2.5 mg           24      2.5 mg         25            26               27               28               29               30                Date Details   No additional details    Date of next INR:  9/25/2017         How to take your warfarin dose     To take:  2.5 mg Take 1 of the 2.5 mg tablets.    To take:  5 mg Take 2 of the 2.5 mg tablets.

## 2017-08-22 ENCOUNTER — TELEPHONE (OUTPATIENT)
Dept: FAMILY MEDICINE | Facility: CLINIC | Age: 57
End: 2017-08-22

## 2017-08-22 NOTE — TELEPHONE ENCOUNTER
Panel Management Review      Patient has the following on his problem list:     Hypertension   Last three blood pressure readings:  BP Readings from Last 3 Encounters:   04/24/17 130/82   07/25/16 (!) 140/97   03/09/15 133/84     Blood pressure: Passed    HTN Guidelines:  Age 18-59 BP range:  Less than 140/90  Age 60-85 with Diabetes:  Less than 140/90  Age 60-85 without Diabetes:  less than 150/90        Composite cancer screening  Chart review shows that this patient is due/due soon for the following Colonoscopy  Summary:    Patient is due/failing the following:   COLONOSCOPY    Type of outreach:    Sent [employer] message.    Questions for provider review:    None                                                                                                                                    Kira Dan MA       Chart routed to Care Team .

## 2017-08-22 NOTE — LETTER
September 25, 2017      Stef Mosher  [address] [address]        Dear Stef,       We at [facility] are trying to provide the best care for our patients.     Upon your doctor reviewing your chart, it appears that you are due for your colonoscopy.    We have taken the liberty of ordering this for you.      Please call the clinic at [phone] to schedule your colonoscopy appointment    If you have already had the above completed, please contact the clinic to have your chart updated.      Thank You,    [facility]

## 2017-08-27 ENCOUNTER — TRANSFERRED RECORDS (OUTPATIENT)
Dept: HEALTH INFORMATION MANAGEMENT | Facility: CLINIC | Age: 57
End: 2017-08-27

## 2017-08-27 LAB — EJECTION FRACTION: 33

## 2017-08-28 ENCOUNTER — TELEPHONE (OUTPATIENT)
Dept: FAMILY MEDICINE | Facility: CLINIC | Age: 57
End: 2017-08-28

## 2017-08-28 NOTE — TELEPHONE ENCOUNTER
Note from pharmacy:  Would it be possible for patient to do the sildenafil 20 MG tablets?  This would be a much cheaper choice over Viagra.  Please advise

## 2017-09-06 ENCOUNTER — ANTICOAGULATION THERAPY VISIT (OUTPATIENT)
Dept: NURSING | Facility: CLINIC | Age: 57
End: 2017-09-06
Payer: COMMERCIAL

## 2017-09-06 DIAGNOSIS — Z79.01 LONG-TERM (CURRENT) USE OF ANTICOAGULANTS: ICD-10-CM

## 2017-09-06 DIAGNOSIS — I48.91 NEW ONSET ATRIAL FIBRILLATION (H): ICD-10-CM

## 2017-09-06 LAB — INR POINT OF CARE: 2.6 (ref 0.86–1.14)

## 2017-09-06 PROCEDURE — 85610 PROTHROMBIN TIME: CPT | Mod: QW

## 2017-09-06 PROCEDURE — 99207 ZZC NO CHARGE NURSE ONLY: CPT

## 2017-09-06 PROCEDURE — 36416 COLLJ CAPILLARY BLOOD SPEC: CPT

## 2017-09-06 NOTE — MR AVS SNAPSHOT
Stef NEFF Nomi   9/6/2017 2:30 PM   Anticoagulation Therapy Visit    Description:  57 year old male   Provider:  VANESA ANTI COAG   Department:  Be Nurse           INR as of 9/6/2017     Today's INR 2.6      Anticoagulation Summary as of 9/6/2017     INR goal 2.0-3.0   Today's INR 2.6   Full instructions 5 mg on Mon, Fri; 2.5 mg all other days   Next INR check 10/13/2017    Indications   Long-term (current) use of anticoagulants [Z79.01] [Z79.01]  New onset atrial fibrillation (H) [I48.91]         Your next Anticoagulation Clinic appointment(s)     Oct 13, 2017 11:00 AM CDT   Anticoagulation Visit with BE ANTI COAG   [place] Kassie Huggins ([facility] [place])    [address]  [place] [address] [phone]              Contact Numbers     [facility]  Please call [phone] with any problems or questions regarding your therapy, or to cancel or reschedule your appointment.          September 2017 Details    Sun Mon Tue Wed Thu Fri Sat          1               2                 3               4               5               6      2.5 mg   See details      7      2.5 mg         8      5 mg         9      2.5 mg           10      2.5 mg         11      5 mg         12      2.5 mg         13      2.5 mg         14      2.5 mg         15      5 mg         16      2.5 mg           17      2.5 mg         18      5 mg         19      2.5 mg         20      2.5 mg         21      2.5 mg         22      5 mg         23      2.5 mg           24      2.5 mg         25      5 mg         26      2.5 mg         27      2.5 mg         28      2.5 mg         29      5 mg         30      2.5 mg          Date Details   09/06 This INR check               How to take your warfarin dose     To take:  2.5 mg Take 1 of the 2.5 mg tablets.    To take:  5 mg Take 2 of the 2.5 mg tablets.           October 2017 Details    Sun Mon Tue Wed Thu Fri Sat     1      2.5 mg         2      5 mg         3      2.5 mg          4      2.5 mg         5      2.5 mg         6      5 mg         7      2.5 mg           8      2.5 mg         9      5 mg         10      2.5 mg         11      2.5 mg         12      2.5 mg         13            14                 15               16               17               18               19               20               21                 22               23               24               25               26               27               28                 29               30               31                    Date Details   No additional details    Date of next INR:  10/13/2017         How to take your warfarin dose     To take:  2.5 mg Take 1 of the 2.5 mg tablets.    To take:  5 mg Take 2 of the 2.5 mg tablets.

## 2017-09-06 NOTE — PROGRESS NOTES
ANTICOAGULATION FOLLOW-UP CLINIC VISIT    Patient Name:  Stef Mosher  Date:  9/6/2017  Contact Type:  Face to Face    SUBJECTIVE:     Patient Findings     Positives No Problem Findings           OBJECTIVE    INR Protime   Date Value Ref Range Status   09/06/2017 2.6 (A) 0.86 - 1.14 Final       ASSESSMENT / PLAN  INR assessment THER    Recheck INR In: 5 WEEKS    INR Location Clinic      Anticoagulation Summary as of 9/6/2017     INR goal 2.0-3.0   Today's INR 2.6   Maintenance plan 5 mg (2.5 mg x 2) on Mon, Fri; 2.5 mg (2.5 mg x 1) all other days   Full instructions 5 mg on Mon, Fri; 2.5 mg all other days   Weekly total 22.5 mg   No change documented Linnea Brooks RN   Plan last modified Dahiana Barrera (6/20/2016)   Next INR check 10/13/2017   Target end date     Indications   Long-term (current) use of anticoagulants [Z79.01] [Z79.01]  New onset atrial fibrillation (H) [I48.91]         Anticoagulation Episode Summary     INR check location     Preferred lab     Send INR reminders to [facility]    Comments       Anticoagulation Care Providers     Provider Role Specialty Phone number    Sivakumar Kang PA-C Responsible Physician Assistant [phone]            See the Encounter Report to view Anticoagulation Flowsheet and Dosing Calendar (Go to Encounters tab in chart review, and find the Anticoagulation Therapy Visit)        Linnea Brooks RN

## 2017-09-14 ENCOUNTER — PRE VISIT (OUTPATIENT)
Dept: CARDIOLOGY | Facility: CLINIC | Age: 57
End: 2017-09-14

## 2017-09-14 DIAGNOSIS — I48.91 NEW ONSET ATRIAL FIBRILLATION (H): Primary | ICD-10-CM

## 2017-09-14 DIAGNOSIS — I10 ESSENTIAL HYPERTENSION: ICD-10-CM

## 2017-09-14 NOTE — TELEPHONE ENCOUNTER
Prior Appointments:  None at this time    Labs( lisinopril):  Sent task to schedule patient for lab appointment and added orders.

## 2017-09-18 ENCOUNTER — OFFICE VISIT (OUTPATIENT)
Dept: CARDIOLOGY | Facility: CLINIC | Age: 57
End: 2017-09-18
Attending: INTERNAL MEDICINE
Payer: COMMERCIAL

## 2017-09-18 VITALS
BODY MASS INDEX: 36.45 KG/M2 | HEIGHT: 78 IN | SYSTOLIC BLOOD PRESSURE: 146 MMHG | DIASTOLIC BLOOD PRESSURE: 102 MMHG | OXYGEN SATURATION: 96 % | WEIGHT: 315 LBS | HEART RATE: 98 BPM

## 2017-09-18 DIAGNOSIS — Z11.59 NEED FOR HEPATITIS C SCREENING TEST: ICD-10-CM

## 2017-09-18 DIAGNOSIS — I42.9 CARDIOMYOPATHY, UNSPECIFIED (H): Primary | ICD-10-CM

## 2017-09-18 DIAGNOSIS — I48.91 NEW ONSET ATRIAL FIBRILLATION (H): ICD-10-CM

## 2017-09-18 DIAGNOSIS — I10 ESSENTIAL HYPERTENSION: ICD-10-CM

## 2017-09-18 DIAGNOSIS — I48.21 PERMANENT ATRIAL FIBRILLATION (H): ICD-10-CM

## 2017-09-18 PROBLEM — I48.0 PAROXYSMAL ATRIAL FIBRILLATION (H): Status: ACTIVE | Noted: 2017-09-18

## 2017-09-18 LAB
ANION GAP SERPL CALCULATED.3IONS-SCNC: 6 MMOL/L (ref 3–14)
BUN SERPL-MCNC: 16 MG/DL (ref 7–30)
CALCIUM SERPL-MCNC: 8.8 MG/DL (ref 8.5–10.1)
CHLORIDE SERPL-SCNC: 107 MMOL/L (ref 94–109)
CO2 SERPL-SCNC: 26 MMOL/L (ref 20–32)
CREAT SERPL-MCNC: 0.77 MG/DL (ref 0.66–1.25)
GFR SERPL CREATININE-BSD FRML MDRD: >90 ML/MIN/1.7M2
GLUCOSE SERPL-MCNC: 91 MG/DL (ref 70–99)
POTASSIUM SERPL-SCNC: 4.5 MMOL/L (ref 3.4–5.3)
SODIUM SERPL-SCNC: 140 MMOL/L (ref 133–144)

## 2017-09-18 PROCEDURE — 99214 OFFICE O/P EST MOD 30 MIN: CPT | Mod: GC | Performed by: INTERNAL MEDICINE

## 2017-09-18 PROCEDURE — 93005 ELECTROCARDIOGRAM TRACING: CPT | Mod: ZF

## 2017-09-18 PROCEDURE — 80048 BASIC METABOLIC PNL TOTAL CA: CPT | Performed by: NURSE PRACTITIONER

## 2017-09-18 PROCEDURE — 99213 OFFICE O/P EST LOW 20 MIN: CPT | Mod: ZF

## 2017-09-18 PROCEDURE — 36415 COLL VENOUS BLD VENIPUNCTURE: CPT | Performed by: NURSE PRACTITIONER

## 2017-09-18 PROCEDURE — 86803 HEPATITIS C AB TEST: CPT | Performed by: NURSE PRACTITIONER

## 2017-09-18 RX ORDER — GLUCOSAM/CHONDRO/HERB 149/HYAL 750-100 MG
1 TABLET ORAL 2 TIMES DAILY
COMMUNITY
End: 2021-07-09

## 2017-09-18 RX ORDER — FUROSEMIDE 20 MG
20 TABLET ORAL DAILY
Qty: 7 TABLET | Refills: 0 | Status: SHIPPED | OUTPATIENT
Start: 2017-09-18 | End: 2017-10-23

## 2017-09-18 ASSESSMENT — PAIN SCALES - GENERAL: PAINLEVEL: NO PAIN (0)

## 2017-09-18 NOTE — LETTER
"9/18/2017      RE: Stef Mosher  [address] [facility] [address]       Dear Colleague,    Thank you for the opportunity to participate in the care of your patient, Stef Mosher, at the [facility]. Please see a copy of my visit note below.    HPI: Stef Mosher is a 57 y.o. male with a history of atrial fibrillation, HTN and hyperlipidemia who presented to the emergency department on 8/26 for evaluation of a confusional state and slurred speech. The patient's wife reports that around 12:45pm, they went to [facility] and while he was trying to order, his wife states that he was whispering and had difficulty forming words. His wife states that after she ordered for him, she asked him how old he was, to which he responded \"18 years old.\" She states that he was \"totally disoriented,\" and that he could not remember how many children they had, where he worked, or where they were going. She says that this resolved around 1:00pm, however he had another episode of this confusional state and slurred speech at 1:15pm that lasted for 30 minutes. His wife denies observing any focal weaknesses. Of note, his wife also states that he has had two similar episodes in the past six months that were very brief in duration, however this episode was more severe and prolonged. By the time he got back to the ED, patient had gotten better.    EKG in ED showed atrial fibrillation with rapid ventricular response. Moderate intraventricular conduction delay. Nonspecific ST & T-wave abnormality. MRI showed no evidence of acute ischemic injury, extensive old ischemic changes in both cerebral and cerebellar hemispheres. EEG done the next day was negative.     Of note, the morning of his ED visit he did not take any medicines.       PAST MEDICAL HISTORY:  Past Medical History:   Diagnosis Date     Arthritis      Atrial fibrillation (H)      Hayfever      Hypercholesterolemia      " Unspecified hypothyroidism        CURRENT MEDICATIONS:  Current Outpatient Prescriptions   Medication Sig Dispense Refill     Misc Natural Products (GLUCOSAMINE CHOND COMPLEX/MSM) TABS Take 1 tablet by mouth 2 times daily       warfarin (COUMADIN) 2.5 MG tablet TAKE 2 TABS BY MOUTH DAILY ON MON. AND FRI. TAKE 1 TAB DAILY ON ALL OTHER DAYS AS DIRECTED BY  tablet 0     atorvastatin (LIPITOR) 10 MG tablet Take 1 tablet (10 mg) by mouth daily 90 tablet 1     lisinopril (PRINIVIL/ZESTRIL) 20 MG tablet Take 1 tablet (20 mg) by mouth daily Follow up appt needed 90 tablet 1     metoprolol (LOPRESSOR) 100 MG tablet Take 1 tablet (100 mg) by mouth 2 times daily 180 tablet 3     aspirin 81 MG EC tablet TAKE ONE TABLET BY MOUTH ONE TIME DAILY 90 tablet 3     Omega-3 Fatty Acids (OMEGA-3 FISH OIL PO) Take 1,200 mg by mouth 2 times daily (with meals)       sildenafil (REVATIO/VIAGRA) 50 MG cap/tab Take 0.5-1 tablets (25-50 mg) by mouth daily as needed for erectile dysfunction Take 30 min to 4 hours before intercourse.  Never use with nitroglycerin, terazosin or doxazosin. (Patient not taking: Reported on 9/18/2017) 6 tablet 1       PAST SURGICAL HISTORY:  Past Surgical History:   Procedure Laterality Date     C THYROIDECTOMY         ALLERGIES:   No Known Allergies    FAMILY HISTORY:  - Premature coronary artery disease  - Atrial fibrillation  - Sudden cardiac death     SOCIAL HISTORY:  Social History   Substance Use Topics     Smoking status: Never Smoker     Smokeless tobacco: Not on file      Comment: Lives in smoke free household     Alcohol use No      Comment: None       ROS:   Constitutional: No fever, chills, or sweats. Weight stable.   ENT: No visual disturbance, ear ache, epistaxis, sore throat.   Cardiovascular: As per HPI.   Respiratory: No cough, hemoptysis.    GI: No nausea, vomiting, hematemesis, melena, or hematochezia.   : No hematuria.   Integument: Negative.   Psychiatric: Negative.   Hematologic:  Easy  "bruising, no easy bleeding.  Neuro: Negative.   Endocrinology: No significant heat or cold intolerance   Musculoskeletal: No myalgia.    Exam:  BP (!) 146/102 (BP Location: Left arm, Patient Position: Chair, Cuff Size: Adult Large)  Pulse 98  Ht 1.981 m (6' 6\")  Wt (!) 155.1 kg (342 lb)  SpO2 96%  BMI 39.52 kg/m2  GENERAL APPEARANCE: healthy, alert and no distress  HEENT: no icterus, no xanthelasmas, normal pupil size and reaction, normal palate, mucosa moist, no central cyanosis  NECK: no adenopathy, no asymmetry, masses, or scars, thyroid normal to palpation and no bruits, JVP not elevated  RESPIRATORY: lungs clear to auscultation - no rales, rhonchi or wheezes, no use of accessory muscles, no retractions, respirations are unlabored, normal respiratory rate  CARDIOVASCULAR: irregular rhythm.  ABDOMEN: soft, non tender, without hepatosplenomegaly, no masses palpable, bowel sounds normal, aorta not enlarged by palpation, no abdominal bruits  EXTREMITIES: peripheral pulses normal, 2+ edema, no bruits  NEURO: alert and oriented to person/place/time, normal speech, gait and affect  VASC: Radial, femoral, dorsalis pedis and posterior tibialis pulses are normal in volumes and symmetric bilaterally. No bruits are heard.  SKIN: no ecchymoses, no rashes    Labs:  CBC RESULTS:   Lab Results   Component Value Date    WBC 6.8 04/24/2017    RBC 4.73 04/24/2017    HGB 15.4 04/24/2017    HCT 45.5 04/24/2017    MCV 96 04/24/2017    MCH 32.6 04/24/2017    MCHC 33.8 04/24/2017    RDW 13.6 04/24/2017     04/24/2017       BMP RESULTS:  Lab Results   Component Value Date     09/18/2017    POTASSIUM 4.5 09/18/2017    CHLORIDE 107 09/18/2017    CO2 26 09/18/2017    ANIONGAP 6 09/18/2017    GLC 91 09/18/2017    BUN 16 09/18/2017    CR 0.77 09/18/2017    GFRESTIMATED >90 09/18/2017    GFRESTBLACK >90 09/18/2017    EUGENIA 8.8 09/18/2017        INR RESULTS:  Lab Results   Component Value Date    INR 2.6 (A) 09/06/2017    INR " 2.6 (A) 08/11/2017    INR 2.1 (A) 06/26/2017    INR 2.6 (A) 05/19/2017    INR 2.40 (H) 08/03/2010    INR 3.60 (H) 07/08/2010    INR 2.90 (H) 07/06/2010       Procedures:    Assessment and Plan: Stef Mosher is a 57 y.o. male with a history of atrial fibrillation, HTN and hyperlipidemia who presented to the emergency department on 8/26 for evaluation of a confusional state and slurred speech. This event was concerning for a TIA    #Afib  -Zio Patch to assess ventricular rate control .   -will also order echocardiogram for Friday to see if systolic function has changed. If patient's EF has indeed fallen from 45% to 30-35%, will need to consider more advanced therapies for controlling atrial fibrillation.  -Patient currently appears somewhat volume overloaded (although not having any symptoms currently) - will order lasix 20 mg daily for 7 days.   -bmp lab draw on Friday.      Thank you for allowing me to care for this patient. This has been discussed with the attending physician.    Adrián Henry MD  Cardiology Fellow, PGY-4    I have seen, interviewed, and examined patient. I have reviewed the laboratory tests, imaging, and other investigations. I have reviewed the management plan with the patient. I discussed with the team and agree with the findings and plan in this resident/fellow/nurse practitioner's note. In addition, changes in the physical examination, assessment and plan have been incorporated into the note by myself, as to make it a single cohesive document.     Leanna Turner MD, MS  Cardiology/Cardiac EP Attending Staff        CC  Patient Care Team:  Sivakumar Kang PA-C as PCP - General (Physician Assistant)  Leanna Turner MD as MD (Cardiology)  Stella Allison, HOLLY as Nurse Coordinator (Clinical Cardiac Electrophysiology)  SELF, REFERRED

## 2017-09-18 NOTE — NURSING NOTE
Chief Complaint   Patient presents with     Follow Up For     year follow up .Permanent atrial fibrillation and also a history of a nonischemic cardiomyopathy. EKG     Vitals were taken and medication were reconciled. EKG performed.    Dorcas Zhao CMA    3:30 PM

## 2017-09-18 NOTE — PATIENT INSTRUCTIONS
Cardiology Provider you saw in clinic today: Dr. Turner    Medication Changes:     1. Lasix 20 mg daily for 1 week    Labs/Tests needed:     1. Echocardiogram on Friday in [place]    2. BMP on Friday in [place]   3. Ziopatch placed in [place] after echocardiogram     Follow-up Visit:  4 weeks in [place]            Please contact us via [employer] for questions or concerns.    Stella Allison RN   Electrophysiology Nurse Coordinator.  [phone]    If your question concerns the schedule/appointment times, contact:  [facility]   [phone]    Device Clinic (Pacemakers, ICD, Loop Recorders)   [phone]      You will receive all normal lab and testing results via [employer] or mail if not reviewed in clinic today.   If you need a medication refill please contact your pharmacy.    As always, thank you for trusting us with your health care needs!

## 2017-09-18 NOTE — PROGRESS NOTES
"HPI: Stef Mosher is a 57 y.o. male with a history of atrial fibrillation, HTN and hyperlipidemia who presented to the emergency department on 8/26 for evaluation of a confusional state and slurred speech. The patient's wife reports that around 12:45pm, they went to [facility] and while he was trying to order, his wife states that he was whispering and had difficulty forming words. His wife states that after she ordered for him, she asked him how old he was, to which he responded \"18 years old.\" She states that he was \"totally disoriented,\" and that he could not remember how many children they had, where he worked, or where they were going. She says that this resolved around 1:00pm, however he had another episode of this confusional state and slurred speech at 1:15pm that lasted for 30 minutes. His wife denies observing any focal weaknesses. Of note, his wife also states that he has had two similar episodes in the past six months that were very brief in duration, however this episode was more severe and prolonged. By the time he got back to the ED, patient had gotten better.    EKG in ED showed atrial fibrillation with rapid ventricular response. Moderate intraventricular conduction delay. Nonspecific ST & T-wave abnormality. MRI showed no evidence of acute ischemic injury, extensive old ischemic changes in both cerebral and cerebellar hemispheres. EEG done the next day was negative.     Of note, the morning of his ED visit he did not take any medicines.       PAST MEDICAL HISTORY:  Past Medical History:   Diagnosis Date     Arthritis      Atrial fibrillation (H)      Hayfever      Hypercholesterolemia      Unspecified hypothyroidism        CURRENT MEDICATIONS:  Current Outpatient Prescriptions   Medication Sig Dispense Refill     Misc Natural Products (GLUCOSAMINE CHOND COMPLEX/MSM) TABS Take 1 tablet by mouth 2 times daily       warfarin (COUMADIN) 2.5 MG tablet TAKE 2 TABS BY MOUTH DAILY ON MON. AND FRI. TAKE 1 " "TAB DAILY ON ALL OTHER DAYS AS DIRECTED BY  tablet 0     atorvastatin (LIPITOR) 10 MG tablet Take 1 tablet (10 mg) by mouth daily 90 tablet 1     lisinopril (PRINIVIL/ZESTRIL) 20 MG tablet Take 1 tablet (20 mg) by mouth daily Follow up appt needed 90 tablet 1     metoprolol (LOPRESSOR) 100 MG tablet Take 1 tablet (100 mg) by mouth 2 times daily 180 tablet 3     aspirin 81 MG EC tablet TAKE ONE TABLET BY MOUTH ONE TIME DAILY 90 tablet 3     Omega-3 Fatty Acids (OMEGA-3 FISH OIL PO) Take 1,200 mg by mouth 2 times daily (with meals)       sildenafil (REVATIO/VIAGRA) 50 MG cap/tab Take 0.5-1 tablets (25-50 mg) by mouth daily as needed for erectile dysfunction Take 30 min to 4 hours before intercourse.  Never use with nitroglycerin, terazosin or doxazosin. (Patient not taking: Reported on 9/18/2017) 6 tablet 1       PAST SURGICAL HISTORY:  Past Surgical History:   Procedure Laterality Date     C THYROIDECTOMY         ALLERGIES:   No Known Allergies    FAMILY HISTORY:  - Premature coronary artery disease  - Atrial fibrillation  - Sudden cardiac death     SOCIAL HISTORY:  Social History   Substance Use Topics     Smoking status: Never Smoker     Smokeless tobacco: Not on file      Comment: Lives in smoke free household     Alcohol use No      Comment: None       ROS:   Constitutional: No fever, chills, or sweats. Weight stable.   ENT: No visual disturbance, ear ache, epistaxis, sore throat.   Cardiovascular: As per HPI.   Respiratory: No cough, hemoptysis.    GI: No nausea, vomiting, hematemesis, melena, or hematochezia.   : No hematuria.   Integument: Negative.   Psychiatric: Negative.   Hematologic:  Easy bruising, no easy bleeding.  Neuro: Negative.   Endocrinology: No significant heat or cold intolerance   Musculoskeletal: No myalgia.    Exam:  BP (!) 146/102 (BP Location: Left arm, Patient Position: Chair, Cuff Size: Adult Large)  Pulse 98  Ht 1.981 m (6' 6\")  Wt (!) 155.1 kg (342 lb)  SpO2 96%  BMI " 39.52 kg/m2  GENERAL APPEARANCE: healthy, alert and no distress  HEENT: no icterus, no xanthelasmas, normal pupil size and reaction, normal palate, mucosa moist, no central cyanosis  NECK: no adenopathy, no asymmetry, masses, or scars, thyroid normal to palpation and no bruits, JVP not elevated  RESPIRATORY: lungs clear to auscultation - no rales, rhonchi or wheezes, no use of accessory muscles, no retractions, respirations are unlabored, normal respiratory rate  CARDIOVASCULAR: irregular rhythm.  ABDOMEN: soft, non tender, without hepatosplenomegaly, no masses palpable, bowel sounds normal, aorta not enlarged by palpation, no abdominal bruits  EXTREMITIES: peripheral pulses normal, 2+ edema, no bruits  NEURO: alert and oriented to person/place/time, normal speech, gait and affect  VASC: Radial, femoral, dorsalis pedis and posterior tibialis pulses are normal in volumes and symmetric bilaterally. No bruits are heard.  SKIN: no ecchymoses, no rashes    Labs:  CBC RESULTS:   Lab Results   Component Value Date    WBC 6.8 04/24/2017    RBC 4.73 04/24/2017    HGB 15.4 04/24/2017    HCT 45.5 04/24/2017    MCV 96 04/24/2017    MCH 32.6 04/24/2017    MCHC 33.8 04/24/2017    RDW 13.6 04/24/2017     04/24/2017       BMP RESULTS:  Lab Results   Component Value Date     09/18/2017    POTASSIUM 4.5 09/18/2017    CHLORIDE 107 09/18/2017    CO2 26 09/18/2017    ANIONGAP 6 09/18/2017    GLC 91 09/18/2017    BUN 16 09/18/2017    CR 0.77 09/18/2017    GFRESTIMATED >90 09/18/2017    GFRESTBLACK >90 09/18/2017    EUGENIA 8.8 09/18/2017        INR RESULTS:  Lab Results   Component Value Date    INR 2.6 (A) 09/06/2017    INR 2.6 (A) 08/11/2017    INR 2.1 (A) 06/26/2017    INR 2.6 (A) 05/19/2017    INR 2.40 (H) 08/03/2010    INR 3.60 (H) 07/08/2010    INR 2.90 (H) 07/06/2010       Procedures:      Assessment and Plan: Stef Mosher is a 57 y.o. male with a history of atrial fibrillation, HTN and hyperlipidemia who presented to the  emergency department on 8/26 for evaluation of a confusional state and slurred speech. This event was concerning for a TIA    #Afib  -Zio Patch to assess ventricular rate control .   -will also order echocardiogram for Friday to see if systolic function has changed. If patient's EF has indeed fallen from 45% to 30-35%, will need to consider more advanced therapies for controlling atrial fibrillation.  -Patient currently appears somewhat volume overloaded (although not having any symptoms currently) - will order lasix 20 mg daily for 7 days.   -bmp lab draw on Friday.      Thank you for allowing me to care for this patient. This has been discussed with the attending physician.    Adrián Henry MD  Cardiology Fellow, PGY-4    I have seen, interviewed, and examined patient. I have reviewed the laboratory tests, imaging, and other investigations. I have reviewed the management plan with the patient. I discussed with the team and agree with the findings and plan in this resident/fellow/nurse practitioner's note. In addition, changes in the physical examination, assessment and plan have been incorporated into the note by myself, as to make it a single cohesive document.       Leanna Turner MD, MS  Cardiology/Cardiac EP Attending Staff        CC  Patient Care Team:  Sivakumar Kang PA-C as PCP - General (Physician Assistant)  Leanna Turner MD as MD (Cardiology)  Stella Allison, HOLLY as Nurse Coordinator (Clinical Cardiac Electrophysiology)  SELF, REFERRED

## 2017-09-18 NOTE — MR AVS SNAPSHOT
After Visit Summary   9/18/2017    Stef Mosher    MRN: [id number]           Patient Information     Date Of Birth          1960        Visit Information        Provider Department      9/18/2017 3:40 PM Leanna Turner MD [facility]        Today's Diagnoses     Paroxysmal atrial fibrillation (H)    -  1    Cardiomyopathy, unspecified (H)          Care Instructions    Cardiology Provider you saw in clinic today: Dr. Turner    Medication Changes:     1. Lasix 20 mg daily for 1 week    Labs/Tests needed:     1. Echocardiogram on Friday in [place]    2. BMP on Friday in [place]   3. Ziopatch placed in [place] after echocardiogram     Follow-up Visit:  4 weeks in [place]            Please contact us via [employer] for questions or concerns.    Stella Allison RN   Electrophysiology Nurse Coordinator.  [phone]    If your question concerns the schedule/appointment times, contact:  [facility]   [phone]    Device Clinic (Pacemakers, ICD, Loop Recorders)   [phone]      You will receive all normal lab and testing results via [employer] or mail if not reviewed in clinic today.   If you need a medication refill please contact your pharmacy.    As always, thank you for trusting us with your health care needs!            Follow-ups after your visit        Follow-up notes from your care team     Return in about 4 weeks (around 10/16/2017) for Dr. Turner.      Your next 10 appointments already scheduled     Oct 09, 2017  2:00 PM CDT   Return Visit with Leanna Turner MD   [facility] ([facility])    [address] [place] [id number] [phone]            Oct 13, 2017 11:00 AM CDT   Anticoagulation Visit with VANESA ANTI COAG   [facility] [place] [facility] ([facility])    [id number] [place] [id number] [phone]              Future tests that were ordered for you today      "Open Future Orders        Priority Expected Expires Ordered    Basic metabolic panel STAT 9/22/2017 9/18/2018 9/18/2017    Zio Patch Holter Routine 9/22/2017 9/18/2018 9/18/2017    Echocardiogram Routine 9/22/2017 9/18/2018 9/18/2017            Who to contact     If you have questions or need follow up information about today's clinic visit or your schedule please contact [facility] directly at [phone].  Normal or non-critical lab and imaging results will be communicated to you by [employer], letter or phone within 4 business days after the clinic has received the results. If you do not hear from us within 7 days, please contact the clinic through [employer] or phone. If you have a critical or abnormal lab result, we will notify you by phone as soon as possible.  Submit refill requests through [employer] or call your pharmacy and they will forward the refill request to us. Please allow 3 business days for your refill to be completed.          Additional Information About Your Visit        [employer] Information     [employer] gives you secure access to your electronic health record. If you see a primary care provider, you can also send messages to your care team and make appointments. If you have questions, please call your primary care clinic.  If you do not have a primary care provider, please call [phone] and they will assist you.        Care EveryWhere ID     This is your Care EveryWhere ID. This could be used by other organizations to access your [facility] medical records  [id number]        Your Vitals Were     Pulse Height Pulse Oximetry BMI (Body Mass Index)          98 1.981 m (6' 6\") 96% 39.52 kg/m2         Blood Pressure from Last 3 Encounters:   09/18/17 (!) 146/102   04/24/17 130/82   07/25/16 (!) 140/97    Weight from Last 3 Encounters:   09/18/17 (!) 155.1 kg (342 lb)   04/24/17 (!) 156 kg (344 lb)   07/25/16 (!) 152.4 kg (336 lb)                 Today's Medication Changes          These changes " are accurate as of: 9/18/17  4:32 PM.  If you have any questions, ask your nurse or doctor.               Start taking these medicines.        Dose/Directions    furosemide 20 MG tablet   Commonly known as:  LASIX   Used for:  Cardiomyopathy, unspecified (H)   Started by:  Leanna Turner MD        Dose:  20 mg   Take 1 tablet (20 mg) by mouth daily   Quantity:  7 tablet   Refills:  0            Where to get your medicines      These medications were sent to [place] [address] [facility] [place] - [address]  [address] [place] [address]     Phone:  [phone]     furosemide 20 MG tablet                Primary Care Provider Office Phone # Fax #    Sivakumar Kang PA-C [phone]       [address] [place] [address]        Equal Access to Services     [facility]: Hadii maye ku hadasho Soomaali, waaxda luqadaha, qaybta kaalmada adeegyada, syeda blum haychristelle terrazas . So [facility] [phone].    ATENCIÓN: Si habla español, tiene a bill disposición servicios gratuitos de asistencia lingüística. [facility] [phone].    We comply with applicable federal civil rights laws and [place] laws. We do not discriminate on the basis of race, color, national origin, age, disability sex, sexual orientation or gender identity.            Thank you!     Thank you for choosing [facility]  for your care. Our goal is always to provide you with excellent care. Hearing back from our patients is one way we can continue to improve our services. Please take a few minutes to complete the written survey that you may receive in the mail after your visit with us. Thank you!             Your Updated Medication List - Protect others around you: Learn how to safely use, store and throw away your medicines at www.disposemymeds.org.          This list is accurate as of: 9/18/17  4:32 PM.  Always use your most recent med list.                   Brand Name Dispense Instructions for use Diagnosis     aspirin 81 MG EC tablet     90 tablet    TAKE ONE TABLET BY MOUTH ONE TIME DAILY    Long-term (current) use of anticoagulants       atorvastatin 10 MG tablet    LIPITOR    90 tablet    Take 1 tablet (10 mg) by mouth daily    Hyperlipidemia LDL goal <130       furosemide 20 MG tablet    LASIX    7 tablet    Take 1 tablet (20 mg) by mouth daily    Cardiomyopathy, unspecified (H)       GLUCOSAMINE CHOND COMPLEX/MSM Tabs      Take 1 tablet by mouth 2 times daily        lisinopril 20 MG tablet    PRINIVIL/ZESTRIL    90 tablet    Take 1 tablet (20 mg) by mouth daily Follow up appt needed    Chronic atrial fibrillation (H)       metoprolol 100 MG tablet    LOPRESSOR    180 tablet    Take 1 tablet (100 mg) by mouth 2 times daily    Essential hypertension       OMEGA-3 FISH OIL PO      Take 1,200 mg by mouth 2 times daily (with meals)        sildenafil 50 MG tablet    VIAGRA    6 tablet    Take 0.5-1 tablets (25-50 mg) by mouth daily as needed for erectile dysfunction Take 30 min to 4 hours before intercourse.  Never use with nitroglycerin, terazosin or doxazosin.    Erectile dysfunction, unspecified erectile dysfunction type       warfarin 2.5 MG tablet    COUMADIN    140 tablet    TAKE 2 TABS BY MOUTH DAILY ON MON. AND FRI. TAKE 1 TAB DAILY ON ALL OTHER DAYS AS DIRECTED BY INR    New onset atrial fibrillation (H)

## 2017-09-19 ENCOUNTER — TELEPHONE (OUTPATIENT)
Dept: CARDIOLOGY | Facility: CLINIC | Age: 57
End: 2017-09-19

## 2017-09-19 LAB — HCV AB SERPL QL IA: NONREACTIVE

## 2017-09-19 NOTE — TELEPHONE ENCOUNTER
----- Message from Stella Allison RN sent at 9/18/2017  4:30 PM CDT -----  Regarding: Can you help with [place] scheduling? Yue Barnett,    [place] patient that we saw today. Here is the plan... Wondering if you can help to schedule these things for him since it is all out in [place].    Thanks!  Stella    Medication Changes:     1. Lasix 20 mg daily for 1 week    Labs/Tests needed:     1. Echocardiogram on Friday 9/22 in [place]    2. BMP on Friday in [place]   3. Ziopatch placed in [place] after echocardiogram     Follow-up Visit:  4 weeks in [place]

## 2017-09-19 NOTE — TELEPHONE ENCOUNTER
Left message for patient to call clinic to schedule echo, labs, zio for 9/22 and a 4 week f/u with Dr. Turner.      Mahnaz Reynoso, RN  Care Coordinator  [facility]  [phone]

## 2017-09-20 NOTE — TELEPHONE ENCOUNTER
Patient is scheduled for the following appointments on 9/22/2017:    1. Labs @ 12:45pm. [address] lab.    2. ECHO @ 1:00pm. [address].    3. 2 week Zio patch (heart Monitor) after the ECHO. 2:15pm [address].      Kate Vasquez CMA.

## 2017-09-22 ENCOUNTER — RADIANT APPOINTMENT (OUTPATIENT)
Dept: CARDIOLOGY | Facility: CLINIC | Age: 57
End: 2017-09-22
Attending: INTERNAL MEDICINE
Payer: COMMERCIAL

## 2017-09-22 ENCOUNTER — ALLIED HEALTH/NURSE VISIT (OUTPATIENT)
Dept: CARDIOLOGY | Facility: CLINIC | Age: 57
End: 2017-09-22
Payer: COMMERCIAL

## 2017-09-22 DIAGNOSIS — I48.91 NEW ONSET ATRIAL FIBRILLATION (H): ICD-10-CM

## 2017-09-22 DIAGNOSIS — I10 ESSENTIAL HYPERTENSION: ICD-10-CM

## 2017-09-22 DIAGNOSIS — I42.9 CARDIOMYOPATHY, UNSPECIFIED (H): ICD-10-CM

## 2017-09-22 DIAGNOSIS — I48.20 CHRONIC ATRIAL FIBRILLATION (H): Primary | ICD-10-CM

## 2017-09-22 LAB
ANION GAP SERPL CALCULATED.3IONS-SCNC: 4 MMOL/L (ref 3–14)
BUN SERPL-MCNC: 14 MG/DL (ref 7–30)
CALCIUM SERPL-MCNC: 8.9 MG/DL (ref 8.5–10.1)
CHLORIDE SERPL-SCNC: 106 MMOL/L (ref 94–109)
CO2 SERPL-SCNC: 29 MMOL/L (ref 20–32)
CREAT SERPL-MCNC: 0.73 MG/DL (ref 0.66–1.25)
GFR SERPL CREATININE-BSD FRML MDRD: >90 ML/MIN/1.7M2
GLUCOSE SERPL-MCNC: 121 MG/DL (ref 70–99)
POTASSIUM SERPL-SCNC: 4.1 MMOL/L (ref 3.4–5.3)
SODIUM SERPL-SCNC: 139 MMOL/L (ref 133–144)

## 2017-09-22 PROCEDURE — 0296T ZZHC  EXT ECG > 48HR TO 21 DAY RCRD W/CONECT INTL RCRD: CPT

## 2017-09-22 PROCEDURE — 40000264 ZZHC STATISTIC IV PUSH SINGLE INITIAL SUBSTANCE: Performed by: INTERNAL MEDICINE

## 2017-09-22 PROCEDURE — 0296T ZIO PATCH HOLTER: CPT | Performed by: INTERNAL MEDICINE

## 2017-09-22 PROCEDURE — 0298T ZZC EXT ECG > 48HR TO 21 DAY REVIEW AND INTERPRETATN: CPT | Performed by: INTERNAL MEDICINE

## 2017-09-22 PROCEDURE — 93306 TTE W/DOPPLER COMPLETE: CPT | Performed by: INTERNAL MEDICINE

## 2017-09-22 PROCEDURE — 36415 COLL VENOUS BLD VENIPUNCTURE: CPT | Performed by: INTERNAL MEDICINE

## 2017-09-22 PROCEDURE — 80048 BASIC METABOLIC PNL TOTAL CA: CPT | Performed by: INTERNAL MEDICINE

## 2017-09-22 RX ADMIN — Medication 3 ML: at 13:45

## 2017-09-22 NOTE — MR AVS SNAPSHOT
After Visit Summary   9/22/2017    Stef Mosher    MRN: [id number]           Patient Information     Date Of Birth          1960        Visit Information        Provider Department      9/22/2017 2:15 PM FK ANCILLARY CARDS [facility]        Today's Diagnoses     Chronic atrial fibrillation (H)    -  1       Follow-ups after your visit        Your next 10 appointments already scheduled     Sep 22, 2017  2:15 PM CDT   Nurse Only with FK ANCILLARY CARDS   [facility] AT [facility] ([facility])    [address] [place] [address]   [phone]            Oct 13, 2017 11:00 AM CDT   Anticoagulation Visit with BE ANTI COAG   [facility] [place] ([facility])    [address]  [place] [id number] [phone]            Oct 23, 2017  2:20 PM CDT   Return Visit with Leanna Turner MD   [facility] ([facility])    [address] [place] [address]   [phone]              Who to contact     If you have questions or need follow up information about today's clinic visit or your schedule please contact [facility] directly at [phone].  Normal or non-critical lab and imaging results will be communicated to you by MyChart, letter or phone within 4 business days after the clinic has received the results. If you do not hear from us within 7 days, please contact the clinic through MyChart or phone. If you have a critical or abnormal lab result, we will notify you by phone as soon as possible.  Submit refill requests through [employer] or call your pharmacy and they will forward the refill request to us. Please allow 3 business days for your refill to be completed.          Additional Information About Your Visit        MyChart Information      2 Minutes gives you secure access to your electronic health record. If you see a primary care provider, you can also send messages to your care team and make appointments. If you have questions, please call your primary care clinic.  If you do not have a primary care provider, please call [phone] and they will assist you.        Care EveryWhere ID     This is your Care EveryWhere ID. This could be used by other organizations to access your [facility] medical records  [id number]         Blood Pressure from Last 3 Encounters:   09/18/17 (!) 146/102   04/24/17 130/82   07/25/16 (!) 140/97    Weight from Last 3 Encounters:   09/18/17 (!) 155.1 kg (342 lb)   04/24/17 (!) 156 kg (344 lb)   07/25/16 (!) 152.4 kg (336 lb)              We Performed the Following     Zio Patch Holter        Primary Care Provider Office Phone # Fax #    Sivakumar Kang PA-C [phone]       [address]  [place] [address]        Equal Access to Services     [facility]: Hadii aad ku hadasho Soomaali, waaxda luqadaha, qaybta kaalmada adeegyada, syeda terrazas . So [facility] [phone].    ATENCIÓN: Si habla español, tiene a bill disposición servicios gratuitos de asistencia lingüística. [facility] [phone].    We comply with applicable federal civil rights laws and [place] laws. We do not discriminate on the basis of race, color, national origin, age, disability sex, sexual orientation or gender identity.            Thank you!     Thank you for choosing [facility] AT [facility]  for your care. Our goal is always to provide you with excellent care. Hearing back from our patients is one way we can continue to improve our services. Please take a few minutes to complete the written survey that you may receive in the mail after your visit with us. Thank you!             Your Updated Medication List - Protect others around you: Learn how to safely use, store and  throw away your medicines at www.disposemymeds.org.          This list is accurate as of: 9/22/17  1:57 PM.  Always use your most recent med list.                   Brand Name Dispense Instructions for use Diagnosis    aspirin 81 MG EC tablet     90 tablet    TAKE ONE TABLET BY MOUTH ONE TIME DAILY    Long-term (current) use of anticoagulants       atorvastatin 10 MG tablet    LIPITOR    90 tablet    Take 1 tablet (10 mg) by mouth daily    Hyperlipidemia LDL goal <130       furosemide 20 MG tablet    LASIX    7 tablet    Take 1 tablet (20 mg) by mouth daily    Cardiomyopathy, unspecified (H)       GLUCOSAMINE CHOND COMPLEX/MSM Tabs      Take 1 tablet by mouth 2 times daily        lisinopril 20 MG tablet    PRINIVIL/ZESTRIL    90 tablet    Take 1 tablet (20 mg) by mouth daily Follow up appt needed    Chronic atrial fibrillation (H)       metoprolol 100 MG tablet    LOPRESSOR    180 tablet    Take 1 tablet (100 mg) by mouth 2 times daily    Essential hypertension       OMEGA-3 FISH OIL PO      Take 1,200 mg by mouth 2 times daily (with meals)        sildenafil 50 MG tablet    VIAGRA    6 tablet    Take 0.5-1 tablets (25-50 mg) by mouth daily as needed for erectile dysfunction Take 30 min to 4 hours before intercourse.  Never use with nitroglycerin, terazosin or doxazosin.    Erectile dysfunction, unspecified erectile dysfunction type       warfarin 2.5 MG tablet    COUMADIN    140 tablet    TAKE 2 TABS BY MOUTH DAILY ON MON. AND FRI. TAKE 1 TAB DAILY ON ALL OTHER DAYS AS DIRECTED BY INR    New onset atrial fibrillation (H)

## 2017-09-22 NOTE — NURSING NOTE
2 week ZioXT applied to patient today. Instructions on use and removal given and mail back instructions discussed. All questions answered. Consent form signed. Device registered. Form faxed to [email or web address].  Device number: [id number].    Kate Vasquez CMA.

## 2017-09-22 NOTE — NURSING NOTE
Patient presents today for resting echo ordered by MD.     Echo Tech provided patient education about resting echo. IV started in right hand.   IV start documented in  [employer].  Echo technician completed resting echo. Patient monitored according to Optison protocol. Data transferred to [facility] for final interpretation through [employer].     Optison medication:  Amount used 3ml Optison mixed with 6ml Saline - [id number].  6ml Wasted.   After completion of resting echo, IV removed.    Patient education provided about cardiology interpretation and primary provider will be notified of results.    Cecilia Kilgore RDCS

## 2017-10-12 DIAGNOSIS — I48.91 NEW ONSET ATRIAL FIBRILLATION (H): ICD-10-CM

## 2017-10-12 RX ORDER — WARFARIN SODIUM 2.5 MG/1
TABLET ORAL
Qty: 140 TABLET | Refills: 0 | Status: SHIPPED | OUTPATIENT
Start: 2017-10-12 | End: 2018-01-07

## 2017-10-12 NOTE — TELEPHONE ENCOUNTER
WARFARIN    Last Written Prescription Date: 10-12-17  Last Fill Qty: 140, # refills: 0  [facility] Office [facility] with [facility] or [facility] prescribing provider: 9-18-17  Next 5 appointments (look out 90 days)     Oct 23, 2017  2:20 PM CDT   Return Visit with Leanna Turner MD   [facility] AT [facility] ([facility])    [address] [place] [id number] [phone]                   Date and Result of Last PT/INR:   Lab Results   Component Value Date    INR 2.6 09/06/2017    INR 2.6 08/11/2017    INR 2.40 08/03/2010    INR 3.60 07/08/2010

## 2017-10-13 ENCOUNTER — TELEPHONE (OUTPATIENT)
Dept: CARDIOLOGY | Facility: CLINIC | Age: 57
End: 2017-10-13

## 2017-10-13 ENCOUNTER — ANTICOAGULATION THERAPY VISIT (OUTPATIENT)
Dept: NURSING | Facility: CLINIC | Age: 57
End: 2017-10-13
Payer: COMMERCIAL

## 2017-10-13 DIAGNOSIS — Z79.01 LONG-TERM (CURRENT) USE OF ANTICOAGULANTS: ICD-10-CM

## 2017-10-13 DIAGNOSIS — I48.91 NEW ONSET ATRIAL FIBRILLATION (H): ICD-10-CM

## 2017-10-13 LAB — INR POINT OF CARE: 2.3 (ref 0.86–1.14)

## 2017-10-13 PROCEDURE — 85610 PROTHROMBIN TIME: CPT | Mod: QW

## 2017-10-13 PROCEDURE — 36416 COLLJ CAPILLARY BLOOD SPEC: CPT

## 2017-10-13 PROCEDURE — 99207 ZZC NO CHARGE NURSE ONLY: CPT

## 2017-10-13 NOTE — MR AVS SNAPSHOT
Stef NEFF Nomi   10/13/2017 1:30 PM   Anticoagulation Therapy Visit    Description:  57 year old male   Provider:  VANESA ANTI COAG   Department:  Be Nurse           INR as of 10/13/2017     Today's INR 2.3      Anticoagulation Summary as of 10/13/2017     INR goal 2.0-3.0   Today's INR 2.3   Full instructions 5 mg on Mon, Fri; 2.5 mg all other days   Next INR check 11/27/2017    Indications   Long-term (current) use of anticoagulants [Z79.01] [Z79.01]  New onset atrial fibrillation (H) [I48.91]         Your next Anticoagulation Clinic appointment(s)     Nov 27, 2017 11:00 AM CST   Anticoagulation Visit with BE ANTI COAG   [facility] [place] ([facility] [place])    [address]  [place] [address] [phone]              Contact Numbers     [facility]  Please call [phone] with any problems or questions regarding your therapy, or to cancel or reschedule your appointment.          October 2017 Details    Sun Mon Tue Wed Thu Fri Sat     1               2               3               4               5               6               7                 8               9               10               11               12               13      5 mg   See details      14      2.5 mg           15      2.5 mg         16      5 mg         17      2.5 mg         18      2.5 mg         19      2.5 mg         20      5 mg         21      2.5 mg           22      2.5 mg         23      5 mg         24      2.5 mg         25      2.5 mg         26      2.5 mg         27      5 mg         28      2.5 mg           29      2.5 mg         30      5 mg         31      2.5 mg              Date Details   10/13 This INR check               How to take your warfarin dose     To take:  2.5 mg Take 1 of the 2.5 mg tablets.    To take:  5 mg Take 2 of the 2.5 mg tablets.           November 2017 Details    Sun Mon Tue Wed Thu Fri Sat        1      2.5 mg         2      2.5 mg         3      5 mg         4       2.5 mg           5      2.5 mg         6      5 mg         7      2.5 mg         8      2.5 mg         9      2.5 mg         10      5 mg         11      2.5 mg           12      2.5 mg         13      5 mg         14      2.5 mg         15      2.5 mg         16      2.5 mg         17      5 mg         18      2.5 mg           19      2.5 mg         20      5 mg         21      2.5 mg         22      2.5 mg         23      2.5 mg         24      5 mg         25      2.5 mg           26      2.5 mg         27            28               29               30                  Date Details   No additional details    Date of next INR:  11/27/2017         How to take your warfarin dose     To take:  2.5 mg Take 1 of the 2.5 mg tablets.    To take:  5 mg Take 2 of the 2.5 mg tablets.

## 2017-10-13 NOTE — PROGRESS NOTES
ANTICOAGULATION FOLLOW-UP CLINIC VISIT    Patient Name:  Stef Mosher  Date:  10/13/2017  Contact Type:  Face to Face    SUBJECTIVE:     Patient Findings     Positives No Problem Findings           OBJECTIVE    INR Protime   Date Value Ref Range Status   10/13/2017 2.3 (A) 0.86 - 1.14 Final       ASSESSMENT / PLAN  INR assessment THER    Recheck INR In: 6 WEEKS    INR Location Clinic      Anticoagulation Summary as of 10/13/2017     INR goal 2.0-3.0   Today's INR 2.3   Maintenance plan 5 mg (2.5 mg x 2) on Mon, Fri; 2.5 mg (2.5 mg x 1) all other days   Full instructions 5 mg on Mon, Fri; 2.5 mg all other days   Weekly total 22.5 mg   No change documented Linnea Brooks RN   Plan last modified Dahiana Barrera (6/20/2016)   Next INR check 11/27/2017   Target end date     Indications   Long-term (current) use of anticoagulants [Z79.01] [Z79.01]  New onset atrial fibrillation (H) [I48.91]         Anticoagulation Episode Summary     INR check location     Preferred lab     Send INR reminders to [facility] CL[facility]IC    Comments       Anticoagulation Care Providers     Provider Role Specialty Phone number    Sivakumar Kang PA-C Responsible Physician Assistant [phone]            See the Encounter Report to view Anticoagulation Flowsheet and Dosing Calendar (Go to Encounters tab in chart review, and find the Anticoagulation Therapy Visit)        Linnea Brooks RN

## 2017-10-14 NOTE — TELEPHONE ENCOUNTER
TELEPHONE ENCOUNTER  October 13, 2017   8:00 PM    Received a call from [employer] monitoring company regarding this patient who has recently worn a 2-week Ziopatch requested by Dr. Turner to assess Afib burden. Ziopatch report was generated today, and revealed 100% AFib burden. On 9/30/17 an automated alert was generated for heart rate >180 bpm, lasting for >60 seconds. The remainder of the time HR <180 bpm.  A full report will be available for Dr. Turner tomorrow. Given the chronicity of this finding, it is not necessary to alert the patient overnight tonight.     FYI to Dr. Turner to expect full Ziopatch report.    Bee Hernandez MD  Cardiology Fellow  [phone]

## 2017-10-23 ENCOUNTER — OFFICE VISIT (OUTPATIENT)
Dept: CARDIOLOGY | Facility: CLINIC | Age: 57
End: 2017-10-23
Payer: COMMERCIAL

## 2017-10-23 VITALS — SYSTOLIC BLOOD PRESSURE: 140 MMHG | HEART RATE: 78 BPM | OXYGEN SATURATION: 96 % | DIASTOLIC BLOOD PRESSURE: 99 MMHG

## 2017-10-23 DIAGNOSIS — I42.9 CARDIOMYOPATHY, UNSPECIFIED TYPE (H): ICD-10-CM

## 2017-10-23 DIAGNOSIS — R94.30 EJECTION FRACTION < 50%: ICD-10-CM

## 2017-10-23 DIAGNOSIS — I48.21 PERMANENT ATRIAL FIBRILLATION (H): Primary | ICD-10-CM

## 2017-10-23 PROCEDURE — 99214 OFFICE O/P EST MOD 30 MIN: CPT | Mod: GC | Performed by: INTERNAL MEDICINE

## 2017-10-23 RX ORDER — FUROSEMIDE 20 MG
20 TABLET ORAL DAILY
Qty: 30 TABLET | Refills: 1 | Status: SHIPPED | OUTPATIENT
Start: 2017-10-23 | End: 2017-12-18

## 2017-10-23 ASSESSMENT — PAIN SCALES - GENERAL: PAINLEVEL: NO PAIN (0)

## 2017-10-23 NOTE — PATIENT INSTRUCTIONS
1.  Dr. Leanna Turner has ordered a nuclear stress test to be performed on you.  We have scheduled this test for Friday, November 3rd at 9:00 am at the [address], \[facility]\"" Imaging Department.  Please arrive 15 minutes early (8:45 am) to the Radiology Department to allow enough time for registration.      The Cardiology Nurse will contact you regarding the results of your stress test via (please see result notification details at bottom of summary).  Please see attached Nuclear Stress Test informational sheet regarding instructions for your nuclear stress test.    2.  Restart Lasix 20 mg daily.  This prescription was sent to your preferred pharmacy.    3.  Labs to check potassium and kidneys-this is scheduled for Monday 10/30 at 11:00 am.    . Dr. Leanna Turner would like for you to follow up in 6 months with Bee. We will call you to schedule this appointment as time draws closer to the date.            [facility] - [place] Location    If you have any questions regarding to your visit please contact your care team:     Cardiology  Telephone Number   Lorie Catalan  Cardiology RN's.    Judith Vasquez CMA [phone]    After hours: [phone].  [phone]   For scheduling appts:     [phone] or  [phone]    After hours: [phone]   For the Device Clinic (Pacemakers and ICD's)  RN's :  Vale Cartagena   During business hours: [phone]  After business hours:  [phone]- select option 4.      If you need a medication refill please contact your pharmacy.  Please allow 3 business days for your refill to be completed.    As always, Thank you for trusting us with your health care needs!  _____________________________________________________________________

## 2017-10-23 NOTE — NURSING NOTE
Cardiac Testing: Patient given instructions regarding  nuclear pharmacologic thallium . Discussed purpose, preparation, procedure and when to expect results reported back to the patient. Patient demonstrated understanding of this information and agreed to call with further questions or concerns.  lexiscan  Labs: BMP in 7-10 days . Patient demonstrated understanding of this information and agreed to call with further questions or concerns.   Med Reconcile: Reviewed and verified all current medications with the patient. The updated medication list was printed and given to the patient.  New Medication: Patient was educated regarding newly prescribed medication, including discussion of  the indication, administration, side effects, and when to report to MD or RN. Patient demonstrated understanding of this information and agreed to call with further questions or concerns.  Lasix 20 mg daily    Mahnaz Reynoso RN  Care Coordinator  [facility] [place] [facility]  [phone]

## 2017-10-23 NOTE — NURSING NOTE
"Chief Complaint   Patient presents with     RECHECK     1 month follow up with Dr. Leanna Turner for HTN, Cardiomyopathy, Afib, edema. ECHO and Zio 9/22/17. States his edema is still there and worse on days when at work and on feet all day, will need a refill. States he does not have any  cp, sob, fluttering, syncope or abnormal fatigue.       Initial BP (!) 140/99 (BP Location: Left arm, Patient Position: Chair, Cuff Size: Adult Large)  Pulse 78  SpO2 96% Estimated body mass index is 39.52 kg/(m^2) as calculated from the following:    Height as of 9/18/17: 1.981 m (6' 6\").    Weight as of 9/18/17: 155.1 kg (342 lb)..  BP completed using cuff size: shell Vasquez CMA    "

## 2017-10-23 NOTE — LETTER
10/23/2017      RE: Stef Mosher  [address] [place] [address]       Dear Colleague,    Thank you for the opportunity to participate in the care of your patient, Stef Mosher, at the [facility] HEART AT [facility] at [facility]. Please see a copy of my visit note below.    HPI: Stef Mosher is a 57 y.o. male with a history of atrial fibrillation, HTN and hyperlipidemia who presented to the emergency department on 8/26 for evaluation of a confusional state and slurred speech. His wife denies observing any focal weaknesses. Of note, his wife also states that he has had two similar episodes in the past six months that were very brief in duration, however this episode was more severe and prolonged. By the time he got back to the ED, patient had gotten better.     EKG in ED showed atrial fibrillation with rapid ventricular response. Moderate intraventricular conduction delay. Nonspecific ST & T-wave abnormality. MRI showed no evidence of acute ischemic injury, extensive old ischemic changes in both cerebral and cerebellar hemispheres. EEG done the next day was negative. Of note, the morning of his ED visit he did not take any medicines.     We saw him on 9/18 - at that time, we ordered a Ziopatch who shows he is in atrial fibrillation 100% of the time. His average HR was 88. Also of note, his echocardiogram was also unchanged - Mildly (EF 45-50%) reduced left ventricular function is present, biplane traced at 46%.    He would like some more lasix for his lower extremity edema, as the 20 mg of PO lasix really helped him last time.       PAST MEDICAL HISTORY:  Past Medical History:   Diagnosis Date     Arthritis      Atrial fibrillation (H)      Hayfever      Hypercholesterolemia      Unspecified hypothyroidism        CURRENT MEDICATIONS:  Current Outpatient Prescriptions   Medication Sig Dispense Refill     warfarin (COUMADIN) 2.5 MG tablet TAKE 2  TABS BY MOUTH DAILY ON MON. AND FRI. TAKE 1 TAB DAILY ON ALL OTHER DAYS AS DIRECTED BY  tablet 0     Misc Natural Products (GLUCOSAMINE CHOND COMPLEX/MSM) TABS Take 1 tablet by mouth 2 times daily       atorvastatin (LIPITOR) 10 MG tablet Take 1 tablet (10 mg) by mouth daily 90 tablet 1     lisinopril (PRINIVIL/ZESTRIL) 20 MG tablet Take 1 tablet (20 mg) by mouth daily Follow up appt needed 90 tablet 1     metoprolol (LOPRESSOR) 100 MG tablet Take 1 tablet (100 mg) by mouth 2 times daily 180 tablet 3     aspirin 81 MG EC tablet TAKE ONE TABLET BY MOUTH ONE TIME DAILY 90 tablet 3     Omega-3 Fatty Acids (OMEGA-3 FISH OIL PO) Take 1,200 mg by mouth 2 times daily (with meals)       furosemide (LASIX) 20 MG tablet Take 1 tablet (20 mg) by mouth daily (Patient not taking: Reported on 10/23/2017) 7 tablet 0     sildenafil (REVATIO/VIAGRA) 50 MG cap/tab Take 0.5-1 tablets (25-50 mg) by mouth daily as needed for erectile dysfunction Take 30 min to 4 hours before intercourse.  Never use with nitroglycerin, terazosin or doxazosin. (Patient not taking: Reported on 9/18/2017) 6 tablet 1       PAST SURGICAL HISTORY:  Past Surgical History:   Procedure Laterality Date     C THYROIDECTOMY         ALLERGIES:   No Known Allergies    FAMILY HISTORY:  - Premature coronary artery disease  - Atrial fibrillation  - Sudden cardiac death     SOCIAL HISTORY:  Social History   Substance Use Topics     Smoking status: Never Smoker     Smokeless tobacco: Never Used      Comment: Lives in smoke free household     Alcohol use No      Comment: None       ROS:   Constitutional: No fever, chills, or sweats. Weight stable.   ENT: No visual disturbance, ear ache, epistaxis, sore throat.   Cardiovascular: As per HPI.   Respiratory: No cough, hemoptysis.    GI: No nausea, vomiting, hematemesis, melena, or hematochezia.   : No hematuria.   Integument: Negative.   Psychiatric: Negative.   Hematologic:  Easy bruising, no easy bleeding.  Neuro:  Negative.   Endocrinology: No significant heat or cold intolerance   Musculoskeletal: No myalgia.    Exam:  BP (!) 140/99 (BP Location: Left arm, Patient Position: Chair, Cuff Size: Adult Large)  Pulse 78  SpO2 96%  GENERAL APPEARANCE: healthy, alert and no distress  HEENT: no icterus, no xanthelasmas, normal pupil size and reaction, normal palate, mucosa moist, no central cyanosis  NECK: no adenopathy, no asymmetry, masses, or scars, thyroid normal to palpation and no bruits, JVP not elevated  RESPIRATORY: lungs clear to auscultation - no rales, rhonchi or wheezes, no use of accessory muscles, no retractions, respirations are unlabored, normal respiratory rate  CARDIOVASCULAR: irregular rhythm.  ABDOMEN: soft, non tender, without hepatosplenomegaly, no masses palpable, bowel sounds normal, aorta not enlarged by palpation, no abdominal bruits  EXTREMITIES: peripheral pulses normal, no edema, no bruits  NEURO: alert and oriented to person/place/time, normal speech, gait and affect  VASC: Radial, femoral, dorsalis pedis and posterior tibialis pulses are normal in volumes and symmetric bilaterally. No bruits are heard.  SKIN: no ecchymoses, no rashes    Labs:  CBC RESULTS:   Lab Results   Component Value Date    WBC 6.8 04/24/2017    RBC 4.73 04/24/2017    HGB 15.4 04/24/2017    HCT 45.5 04/24/2017    MCV 96 04/24/2017    MCH 32.6 04/24/2017    MCHC 33.8 04/24/2017    RDW 13.6 04/24/2017     04/24/2017       BMP RESULTS:  Lab Results   Component Value Date     09/22/2017    POTASSIUM 4.1 09/22/2017    CHLORIDE 106 09/22/2017    CO2 29 09/22/2017    ANIONGAP 4 09/22/2017     (H) 09/22/2017    BUN 14 09/22/2017    CR 0.73 09/22/2017    GFRESTIMATED >90 09/22/2017    GFRESTBLACK >90 09/22/2017    EUGENIA 8.9 09/22/2017        INR RESULTS:  Lab Results   Component Value Date    INR 2.3 (A) 10/13/2017    INR 2.6 (A) 09/06/2017    INR 2.6 (A) 08/11/2017    INR 2.1 (A) 06/26/2017    INR 2.40 (H) 08/03/2010     INR 3.60 (H) 07/08/2010    INR 2.90 (H) 07/06/2010       Procedures:    Assessment and Plan:   Stef Mosher is a 57 y.o. male with a history of atrial fibrillation, HTN and hyperlipidemia who presented to the emergency department on 8/26 for evaluation of a confusional state and slurred speech. This event was concerning for a TIA. Now s/p Ziopatch that showed 100% afib burden with a controlled HR (88 bpm), and his LV function remain unchanged from 2016.    In light of these findings, we recommend the following:    -continue rate control with metop 100 BID   -continue anticoagulation with coumadin  -will prescribe 20 mg of lasix PO daily, with labs (bmp) checked in one week. He can indefinitely stay on this lasix provided his K doesn't become too low. This will help better control his lower extremity edema and help his blood pressures as well.  -will order nuclear lexiscan as the cause of his mild cardiomyopathy has never been investigated.     Thank you for allowing me to care for this patient. This has been discussed with the attending physician.    Adrián Henry MD  Cardiology Fellow, PGY-4    I have seen, interviewed, and examined patient. I have reviewed the laboratory tests, imaging, and other investigations. I have reviewed the management plan with the patient. I discussed with the team and agree with the findings and plan in this resident/fellow/nurse practitioner's note. In addition, changes in the physical examination, assessment and plan have been incorporated into the note by myself, as to make it a single cohesive document.     Leanna Turner MD, MS  Cardiology/Cardiac EP Attending Staff    CC  Patient Care Team:  Sivakumar Kang PA-C as PCP - General (Physician Assistant)  Leanna Turner MD as MD (Cardiology)  Stella Allison, HOLLY as Nurse Coordinator (Clinical Cardiac Electrophysiology)

## 2017-10-23 NOTE — MR AVS SNAPSHOT
After Visit Summary   10/23/2017    Stef Mosher    MRN: [id number]           Patient Information     Date Of Birth          1960        Visit Information        Provider Department      10/23/2017 2:20 PM Leanna Turner MD [facility] PHYSICIANS HEART AT [facility]        Today's Diagnoses     Permanent atrial fibrillation (H)    -  1    Cardiomyopathy, unspecified type (H)          Care Instructions    1.  Dr. Leanna Turner has ordered a nuclear stress test to be performed on you.  We have scheduled this test for Friday, November 3rd at 9:00 am at the [address], [facility] Imaging Department.  Please arrive 15 minutes early (8:45 am) to the Radiology Department to allow enough time for registration.      The Cardiology Nurse will contact you regarding the results of your stress test via (please see result notification details at bottom of summary).  Please see attached Nuclear Stress Test informational sheet regarding instructions for your nuclear stress test.    2.  Restart Lasix 20 mg daily.  This prescription was sent to your preferred pharmacy.    3.  Labs to check potassium and kidneys-this is scheduled for Monday 10/30 at 11:00 am.    . Dr. Leanna Turner would like for you to follow up in 6 months with Bee. We will call you to schedule this appointment as time draws closer to the date.            [facility] Cardiology - [place] [facility]    If you have any questions regarding to your visit please contact your care team:     Cardiology  Telephone Number   Lorie Catalan  Cardiology RN's.    Judith Vasquez CMA [phone]    After hours: [phone].  [phone]   For scheduling appts:     [phone] or  [phone]    After hours: [phone]   For the Device Clinic (Pacemakers and ICD's)  RN's :  Vale Cartagena   During business hours: [phone]  After business hours:  [phone]- select option 4.      If you need a  medication refill please contact your pharmacy.  Please allow 3 business days for your refill to be completed.    As always, Thank you for trusting us with your health care needs!  _____________________________________________________________________              Follow-ups after your visit        Your next 10 appointments already scheduled     Oct 30, 2017 11:00 AM CDT   LAB with BE LAB   [facility] ([facility])    [address] [address] [phone]           Patient must bring picture ID. Patient should be prepared to give a urine specimen  Please do not eat 10-12 hours before your appointment if you are coming in fasting for labs on lipids, cholesterol, or glucose (sugar). Pregnant women should follow their Care Team instructions. Water with medications is okay. Do not drink coffee or other fluids. If you have concerns about taking  your medications, please ask at office or if scheduling via [employer], send a message by clicking on Secure Messaging, Message Your Care Team.            Nov 03, 2017  9:00 AM CDT   NM INJECTION with UUNMINJ2   [facility], [facility] ([facility])    [address] [address] [phone]            Nov 03, 2017  9:45 AM CDT   NM SCAN3 with UUNM1   [facility] ([facility])    [address] [address] [phone]            Nov 03, 2017 10:15 AM CDT   Ekg Stress Nm Exercise with UUEKGNMS   [facility] ([facility])    [address] [place] [address]               Nov 03, 2017 11:15 AM CDT   NM MPI TREADMILL with UUNM1   [facility], Nuclear Medicine ([facility])    [address] [place] [address] [phone]           For a ONE day exam: Allow  3-4 hours for test. For a TWO day exam: Allow 2 hours PER day for test.  You may need to stop some medicines before the test. Follow your doctor s orders. - If you take a beta blocker: Follow your doctor s specific instructions on taking it prior to and on the day of your exam. - If you take Aggrenox or dipyridamole (Persantine, Permole), stop taking it 48 hours before your test. - If you take Viagra, Cialis or Levitra, stop taking it 48 hours before your test. - If you take theophylline or aminophylline, stop taking it 12 hours before your test.  For patients with diabetes: - If you take insulin, call your diabetes care team. Ask if you should take a 1/2 dose the morning of your test. - If you take diabetes medicine by mouth, don t take it on the morning of your test. Bring it with you to take after the test. (If you have questions, call your diabetes care team.)  Do not take nitrates on the day of your test. Do not wear your Nitro-Patch.  Stop all caffeine 12 hours before the test. This includes coffee, tea, soda pop, chocolate and certain medicines (such as Anacin, Excedrin and NoDoz). Also avoid decaf coffee and tea, as these contain small amounts of caffeine.  No alcohol, smoking or other tobacco for 12 hours before the test.  Stop eating 3 hours before the test. You may drink water.  Please wear a loose two-piece outfit. If you will have an exercise test, bring rubber-soled walking shoes.  When you arrive, please tell us if you: - Have diabetes - Are breastfeeding - May be pregnant - Have a pacemaker of ICD (implantable defibrillator).  Please call your Imaging Department at your exam site with any questions.            Nov 27, 2017 11:00 AM CST   Anticoagulation Visit with BE ANTI COAG   [facility] [place] ([facility] [place])    [address]  [place] [address] [phone]              Future tests that were ordered for you today     Open Future Orders        Priority Expected  Expires Ordered    NM MPI Treadmill Routine  10/24/2018 10/23/2017    Basic metabolic panel Routine  10/23/2018 10/23/2017            Who to contact     If you have questions or need follow up information about today's clinic visit or your schedule please contact [facility] PHYSICIANS HEART AT [facility] directly at [phone].  Normal or non-critical lab and imaging results will be communicated to you by MyChart, letter or phone within 4 business days after the clinic has received the results. If you do not hear from us within 7 days, please contact the clinic through [employer] or phone. If you have a critical or abnormal lab result, we will notify you by phone as soon as possible.  Submit refill requests through [employer] or call your pharmacy and they will forward the refill request to us. Please allow 3 business days for your refill to be completed.          Additional Information About Your Visit        [employer] Information     [employer] gives you secure access to your electronic health record. If you see a primary care provider, you can also send messages to your care team and make appointments. If you have questions, please call your primary care clinic.  If you do not have a primary care provider, please call [phone] and they will assist you.        Care EveryWhere ID     This is your Care EveryWhere ID. This could be used by other organizations to access your [place] medical records  [id number]        Your Vitals Were     Pulse Pulse Oximetry                78 96%           Blood Pressure from Last 3 Encounters:   10/23/17 (!) 140/99   09/18/17 (!) 146/102   04/24/17 130/82    Weight from Last 3 Encounters:   09/18/17 (!) 155.1 kg (342 lb)   04/24/17 (!) 156 kg (344 lb)   07/25/16 (!) 152.4 kg (336 lb)                 Where to get your medicines      These medications [facility] to [id number] [place] - [address]  [address] [place] [address]     Phone:   [phone]     furosemide 20 MG tablet          Primary Care Provider Office Phone # Fax #    Sivakumar Kang PA-C [phone]       [address]  [place] [address]        Equal Access to Services     FELIX GAY : Hadii aad ku hadasho Soomaali, waaxda luqadaha, qaybta kaalmada adeegyada, waxay idiin hayaan adeeg khceferino laezequieln ah. So [facility] [phone].    ATENCIÓN: Si habla español, tiene a bill disposición servicios gratuitos de asistencia lingüística. Llame al [phone].    We comply with applicable federal civil rights laws and [place] laws. We do not discriminate on the basis of race, color, national origin, age, disability, sex, sexual orientation, or gender identity.            Thank you!     Thank you for choosing [facility] PHYSIC[facility]ANS HE[facility] AT [facility]  for your care. Our goal is always to provide you with excellent care. Hearing back from our patients is one way we can continue to improve our services. Please take a few minutes to complete the written survey that you may receive in the mail after your visit with us. Thank you!             Your Updated Medication List - Protect others around you: Learn how to safely use, store and throw away your medicines at www.disposemymeds.org.          This list is accurate as of: 10/23/17  3:19 PM.  Always use your most recent med list.                   Brand Name Dispense Instructions for use Diagnosis    aspirin 81 MG EC tablet     90 tablet    TAKE ONE TABLET BY MOUTH ONE TIME DAILY    Long-term (current) use of anticoagulants       atorvastatin 10 MG tablet    LIPITOR    90 tablet    Take 1 tablet (10 mg) by mouth daily    Hyperlipidemia LDL goal <130       furosemide 20 MG tablet    LASIX    30 tablet    Take 1 tablet (20 mg) by mouth daily    Cardiomyopathy, unspecified type (H)       GLUCOSAMINE CHOND COMPLEX/MSM Tabs      Take 1 tablet by mouth 2 times daily        lisinopril 20 MG tablet    PRINIVIL/ZESTRIL    90  tablet    Take 1 tablet (20 mg) by mouth daily Follow up appt needed    Chronic atrial fibrillation (H)       metoprolol 100 MG tablet    LOPRESSOR    180 tablet    Take 1 tablet (100 mg) by mouth 2 times daily    Essential hypertension       OMEGA-3 FISH OIL PO      Take 1,200 mg by mouth 2 times daily (with meals)        sildenafil 50 MG tablet    VIAGRA    6 tablet    Take 0.5-1 tablets (25-50 mg) by mouth daily as needed for erectile dysfunction Take 30 min to 4 hours before intercourse.  Never use with nitroglycerin, terazosin or doxazosin.    Erectile dysfunction, unspecified erectile dysfunction type       warfarin 2.5 MG tablet    COUMADIN    140 tablet    TAKE 2 TABS BY MOUTH DAILY ON MON. AND FRI. TAKE 1 TAB DAILY ON ALL OTHER DAYS AS DIRECTED BY INR    New onset atrial fibrillation (H)

## 2017-10-23 NOTE — PROGRESS NOTES
HPI: Stef Mosher is a 57 y.o. male with a history of atrial fibrillation, HTN and hyperlipidemia who presented to the emergency department on 8/26 for evaluation of a confusional state and slurred speech. His wife denies observing any focal weaknesses. Of note, his wife also states that he has had two similar episodes in the past six months that were very brief in duration, however this episode was more severe and prolonged. By the time he got back to the ED, patient had gotten better.     EKG in ED showed atrial fibrillation with rapid ventricular response. Moderate intraventricular conduction delay. Nonspecific ST & T-wave abnormality. MRI showed no evidence of acute ischemic injury, extensive old ischemic changes in both cerebral and cerebellar hemispheres. EEG done the next day was negative. Of note, the morning of his ED visit he did not take any medicines.     We saw him on 9/18 - at that time, we ordered a Ziopatch who shows he is in atrial fibrillation 100% of the time. His average HR was 88. Also of note, his echocardiogram was also unchanged - Mildly (EF 45-50%) reduced left ventricular function is present, biplane traced at 46%.    He would like some more lasix for his lower extremity edema, as the 20 mg of PO lasix really helped him last time.       PAST MEDICAL HISTORY:  Past Medical History:   Diagnosis Date     Arthritis      Atrial fibrillation (H)      Hayfever      Hypercholesterolemia      Unspecified hypothyroidism        CURRENT MEDICATIONS:  Current Outpatient Prescriptions   Medication Sig Dispense Refill     warfarin (COUMADIN) 2.5 MG tablet TAKE 2 TABS BY MOUTH DAILY ON MON. AND FRI. TAKE 1 TAB DAILY ON ALL OTHER DAYS AS DIRECTED BY  tablet 0     Misc Natural Products (GLUCOSAMINE CHOND COMPLEX/MSM) TABS Take 1 tablet by mouth 2 times daily       atorvastatin (LIPITOR) 10 MG tablet Take 1 tablet (10 mg) by mouth daily 90 tablet 1     lisinopril (PRINIVIL/ZESTRIL) 20 MG tablet Take 1  tablet (20 mg) by mouth daily Follow up appt needed 90 tablet 1     metoprolol (LOPRESSOR) 100 MG tablet Take 1 tablet (100 mg) by mouth 2 times daily 180 tablet 3     aspirin 81 MG EC tablet TAKE ONE TABLET BY MOUTH ONE TIME DAILY 90 tablet 3     Omega-3 Fatty Acids (OMEGA-3 FISH OIL PO) Take 1,200 mg by mouth 2 times daily (with meals)       furosemide (LASIX) 20 MG tablet Take 1 tablet (20 mg) by mouth daily (Patient not taking: Reported on 10/23/2017) 7 tablet 0     sildenafil (REVATIO/VIAGRA) 50 MG cap/tab Take 0.5-1 tablets (25-50 mg) by mouth daily as needed for erectile dysfunction Take 30 min to 4 hours before intercourse.  Never use with nitroglycerin, terazosin or doxazosin. (Patient not taking: Reported on 9/18/2017) 6 tablet 1       PAST SURGICAL HISTORY:  Past Surgical History:   Procedure Laterality Date     C THYROIDECTOMY         ALLERGIES:   No Known Allergies    FAMILY HISTORY:  - Premature coronary artery disease  - Atrial fibrillation  - Sudden cardiac death     SOCIAL HISTORY:  Social History   Substance Use Topics     Smoking status: Never Smoker     Smokeless tobacco: Never Used      Comment: Lives in smoke free household     Alcohol use No      Comment: None       ROS:   Constitutional: No fever, chills, or sweats. Weight stable.   ENT: No visual disturbance, ear ache, epistaxis, sore throat.   Cardiovascular: As per HPI.   Respiratory: No cough, hemoptysis.    GI: No nausea, vomiting, hematemesis, melena, or hematochezia.   : No hematuria.   Integument: Negative.   Psychiatric: Negative.   Hematologic:  Easy bruising, no easy bleeding.  Neuro: Negative.   Endocrinology: No significant heat or cold intolerance   Musculoskeletal: No myalgia.    Exam:  BP (!) 140/99 (BP Location: Left arm, Patient Position: Chair, Cuff Size: Adult Large)  Pulse 78  SpO2 96%  GENERAL APPEARANCE: healthy, alert and no distress  HEENT: no icterus, no xanthelasmas, normal pupil size and reaction, normal  palate, mucosa moist, no central cyanosis  NECK: no adenopathy, no asymmetry, masses, or scars, thyroid normal to palpation and no bruits, JVP not elevated  RESPIRATORY: lungs clear to auscultation - no rales, rhonchi or wheezes, no use of accessory muscles, no retractions, respirations are unlabored, normal respiratory rate  CARDIOVASCULAR: irregular rhythm.  ABDOMEN: soft, non tender, without hepatosplenomegaly, no masses palpable, bowel sounds normal, aorta not enlarged by palpation, no abdominal bruits  EXTREMITIES: peripheral pulses normal, no edema, no bruits  NEURO: alert and oriented to person/place/time, normal speech, gait and affect  VASC: Radial, femoral, dorsalis pedis and posterior tibialis pulses are normal in volumes and symmetric bilaterally. No bruits are heard.  SKIN: no ecchymoses, no rashes    Labs:  CBC RESULTS:   Lab Results   Component Value Date    WBC 6.8 04/24/2017    RBC 4.73 04/24/2017    HGB 15.4 04/24/2017    HCT 45.5 04/24/2017    MCV 96 04/24/2017    MCH 32.6 04/24/2017    MCHC 33.8 04/24/2017    RDW 13.6 04/24/2017     04/24/2017       BMP RESULTS:  Lab Results   Component Value Date     09/22/2017    POTASSIUM 4.1 09/22/2017    CHLORIDE 106 09/22/2017    CO2 29 09/22/2017    ANIONGAP 4 09/22/2017     (H) 09/22/2017    BUN 14 09/22/2017    CR 0.73 09/22/2017    GFRESTIMATED >90 09/22/2017    GFRESTBLACK >90 09/22/2017    EUGENIA 8.9 09/22/2017        INR RESULTS:  Lab Results   Component Value Date    INR 2.3 (A) 10/13/2017    INR 2.6 (A) 09/06/2017    INR 2.6 (A) 08/11/2017    INR 2.1 (A) 06/26/2017    INR 2.40 (H) 08/03/2010    INR 3.60 (H) 07/08/2010    INR 2.90 (H) 07/06/2010       Procedures:      Assessment and Plan:     Stef Mosher is a 57 y.o. male with a history of atrial fibrillation, HTN and hyperlipidemia who presented to the emergency department on 8/26 for evaluation of a confusional state and slurred speech. This event was concerning for a TIA. Now s/p  Jose Danielopatch that showed 100% afib burden with a controlled HR (88 bpm), and his LV function remain unchanged from 2016.    In light of these findings, we recommend the following:    -continue rate control with metop 100 BID   -continue anticoagulation with coumadin  -will prescribe 20 mg of lasix PO daily, with labs (bmp) checked in one week. He can indefinitely stay on this lasix provided his K doesn't become too low. This will help better control his lower extremity edema and help his blood pressures as well.  -will order nuclear lexiscan as the cause of his mild cardiomyopathy has never been investigated.     Thank you for allowing me to care for this patient. This has been discussed with the attending physician.    Adrián Henry MD  Cardiology Fellow, PGY-4    I have seen, interviewed, and examined patient. I have reviewed the laboratory tests, imaging, and other investigations. I have reviewed the management plan with the patient. I discussed with the team and agree with the findings and plan in this resident/fellow/nurse practitioner's note. In addition, changes in the physical examination, assessment and plan have been incorporated into the note by myself, as to make it a single cohesive document.       Leanna Turner MD, MS  Cardiology/Cardiac EP Attending Staff              CC  Patient Care Team:  Sivakumar Kang PA-C as PCP - General (Physician Assistant)  Leanna Turner MD as MD (Cardiology)  Stella Allison, HOLLY as Nurse Coordinator (Clinical Cardiac Electrophysiology)

## 2017-10-29 DIAGNOSIS — E78.5 HYPERLIPIDEMIA LDL GOAL <130: ICD-10-CM

## 2017-10-30 DIAGNOSIS — I48.21 PERMANENT ATRIAL FIBRILLATION (H): ICD-10-CM

## 2017-10-30 DIAGNOSIS — I42.9 CARDIOMYOPATHY, UNSPECIFIED TYPE (H): ICD-10-CM

## 2017-10-30 LAB
ANION GAP SERPL CALCULATED.3IONS-SCNC: 5 MMOL/L (ref 3–14)
BUN SERPL-MCNC: 20 MG/DL (ref 7–30)
CALCIUM SERPL-MCNC: 9.2 MG/DL (ref 8.5–10.1)
CHLORIDE SERPL-SCNC: 105 MMOL/L (ref 94–109)
CO2 SERPL-SCNC: 30 MMOL/L (ref 20–32)
CREAT SERPL-MCNC: 0.71 MG/DL (ref 0.66–1.25)
GFR SERPL CREATININE-BSD FRML MDRD: >90 ML/MIN/1.7M2
GLUCOSE SERPL-MCNC: 126 MG/DL (ref 70–99)
POTASSIUM SERPL-SCNC: 4.3 MMOL/L (ref 3.4–5.3)
SODIUM SERPL-SCNC: 140 MMOL/L (ref 133–144)

## 2017-10-30 PROCEDURE — 80048 BASIC METABOLIC PNL TOTAL CA: CPT | Performed by: INTERNAL MEDICINE

## 2017-10-30 PROCEDURE — 36415 COLL VENOUS BLD VENIPUNCTURE: CPT | Performed by: INTERNAL MEDICINE

## 2017-10-30 RX ORDER — ATORVASTATIN CALCIUM 10 MG/1
TABLET, FILM COATED ORAL
Qty: 90 TABLET | Refills: 1 | Status: SHIPPED | OUTPATIENT
Start: 2017-10-30 | End: 2018-04-28

## 2017-11-03 ENCOUNTER — TELEPHONE (OUTPATIENT)
Dept: CARDIOLOGY | Facility: CLINIC | Age: 57
End: 2017-11-03

## 2017-11-03 NOTE — TELEPHONE ENCOUNTER
Pt called this afternoon to ask why there was confusion with his scheduled treadmill test. Pt states that while he was in clinic, he was told that his test was at 3:00pm at the . However, upon arriving for his appt, he was told that it was scheduled for 9:00am today and that they would have to cancel his test. Pt's AVS also says 9:00am. Writer informed pt of this and he said he swears it was 3:00pm. 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 Pt requested to talk to Mahnaz, who is out of clinic today but will call pt on Monday. Pt verbalized understanding.    Lorie Gonzalez RN

## 2017-11-06 NOTE — TELEPHONE ENCOUNTER
Spoke to patient who is rescheduled for Monday, Nov. 13th at 9:00 am.  All instructions reviewed with patient.  Mahnaz Reynoso, RN  Care Coordinator  [facility]  [phone]

## 2017-11-13 ENCOUNTER — HOSPITAL ENCOUNTER (OUTPATIENT)
Dept: NUCLEAR MEDICINE | Facility: CLINIC | Age: 57
Setting detail: NUCLEAR MEDICINE
Discharge: HOME | End: 2017-11-13
Attending: INTERNAL MEDICINE | Admitting: INTERNAL MEDICINE
Payer: COMMERCIAL

## 2017-11-13 ENCOUNTER — HOSPITAL ENCOUNTER (OUTPATIENT)
Dept: NUCLEAR MEDICINE | Facility: CLINIC | Age: 57
Setting detail: NUCLEAR MEDICINE
End: 2017-11-13
Attending: INTERNAL MEDICINE
Payer: COMMERCIAL

## 2017-11-13 ENCOUNTER — HOSPITAL ENCOUNTER (OUTPATIENT)
Dept: CARDIOLOGY | Facility: CLINIC | Age: 57
End: 2017-11-13
Attending: INTERNAL MEDICINE
Payer: COMMERCIAL

## 2017-11-13 DIAGNOSIS — I42.9 CARDIOMYOPATHY, UNSPECIFIED TYPE (H): ICD-10-CM

## 2017-11-13 DIAGNOSIS — R94.30 EJECTION FRACTION < 50%: ICD-10-CM

## 2017-11-13 PROCEDURE — 93016 CV STRESS TEST SUPVJ ONLY: CPT | Performed by: INTERNAL MEDICINE

## 2017-11-13 PROCEDURE — 25000128 H RX IP 250 OP 636: Performed by: INTERNAL MEDICINE

## 2017-11-13 PROCEDURE — A9502 TC99M TETROFOSMIN: HCPCS | Performed by: INTERNAL MEDICINE

## 2017-11-13 PROCEDURE — 78452 HT MUSCLE IMAGE SPECT MULT: CPT

## 2017-11-13 PROCEDURE — 34300033 ZZH RX 343: Performed by: INTERNAL MEDICINE

## 2017-11-13 PROCEDURE — 78452 HT MUSCLE IMAGE SPECT MULT: CPT | Mod: 26 | Performed by: INTERNAL MEDICINE

## 2017-11-13 PROCEDURE — 93018 CV STRESS TEST I&R ONLY: CPT | Performed by: INTERNAL MEDICINE

## 2017-11-13 PROCEDURE — 93017 CV STRESS TEST TRACING ONLY: CPT

## 2017-11-13 RX ORDER — REGADENOSON 0.08 MG/ML
0.4 INJECTION, SOLUTION INTRAVENOUS ONCE
Status: COMPLETED | OUTPATIENT
Start: 2017-11-13 | End: 2017-11-13

## 2017-11-13 RX ORDER — AMINOPHYLLINE 25 MG/ML
50-100 INJECTION, SOLUTION INTRAVENOUS
Status: DISCONTINUED | OUTPATIENT
Start: 2017-11-13 | End: 2017-11-14 | Stop reason: HOSPADM

## 2017-11-13 RX ORDER — ACYCLOVIR 200 MG/1
0-1 CAPSULE ORAL
Status: DISCONTINUED | OUTPATIENT
Start: 2017-11-13 | End: 2017-11-14 | Stop reason: HOSPADM

## 2017-11-13 RX ORDER — ALBUTEROL SULFATE 90 UG/1
2 AEROSOL, METERED RESPIRATORY (INHALATION) EVERY 5 MIN PRN
Status: DISCONTINUED | OUTPATIENT
Start: 2017-11-13 | End: 2017-11-14 | Stop reason: HOSPADM

## 2017-11-13 RX ADMIN — REGADENOSON 0.4 MG: 0.08 INJECTION, SOLUTION INTRAVENOUS at 10:14

## 2017-11-13 RX ADMIN — TETROFOSMIN 37.1 MCI.: 1.38 INJECTION, POWDER, LYOPHILIZED, FOR SOLUTION INTRAVENOUS at 12:12

## 2017-11-13 RX ADMIN — TETROFOSMIN 9.9 MCI.: 1.38 INJECTION, POWDER, LYOPHILIZED, FOR SOLUTION INTRAVENOUS at 08:59

## 2017-12-11 ENCOUNTER — ANTICOAGULATION THERAPY VISIT (OUTPATIENT)
Dept: NURSING | Facility: CLINIC | Age: 57
End: 2017-12-11
Payer: COMMERCIAL

## 2017-12-11 DIAGNOSIS — Z79.01 LONG-TERM (CURRENT) USE OF ANTICOAGULANTS: ICD-10-CM

## 2017-12-11 DIAGNOSIS — I48.91 NEW ONSET ATRIAL FIBRILLATION (H): ICD-10-CM

## 2017-12-11 LAB — INR POINT OF CARE: 3.1 (ref 0.86–1.14)

## 2017-12-11 PROCEDURE — 85610 PROTHROMBIN TIME: CPT | Mod: QW

## 2017-12-11 PROCEDURE — 99207 ZZC NO CHARGE NURSE ONLY: CPT

## 2017-12-11 PROCEDURE — 36416 COLLJ CAPILLARY BLOOD SPEC: CPT

## 2017-12-11 NOTE — MR AVS SNAPSHOT
Stef NEFF Nomi   12/11/2017 10:15 AM   Anticoagulation Therapy Visit    Description:  57 year old male   Provider:  VANESA ANTI COAG   Department:  Be Nurse           INR as of 12/11/2017     Today's INR 3.1!      Anticoagulation Summary as of 12/11/2017     INR goal 2.0-3.0   Today's INR 3.1!   Full instructions 5 mg on Mon, Fri; 2.5 mg all other days   Next INR check 1/22/2018    Indications   Long-term (current) use of anticoagulants [Z79.01] [Z79.01]  New onset atrial fibrillation (H) [I48.91]         Your next Anticoagulation Clinic appointment(s)     Dec 11, 2017 10:15 AM CST   Anticoagulation Visit with BE ANTI COAG   [facility] ([facility])    [address]  [place] [id number] [phone]            Jan 26, 2018 11:00 AM CST   Anticoagulation Visit with BE ANTI COAG   [facility] ([facility])    [address]  [place] [address]   [phone]              Contact Numbers     [facility]  Please call [phone] with any problems or questions regarding your therapy, or to cancel or reschedule your appointment.          December 2017 Details    Sun Mon Tue Wed Thu Fri Sat          1               2                 3               4               5               6               7               8               9                 10               11      5 mg   See details      12      2.5 mg         13      2.5 mg         14      2.5 mg         15      5 mg         16      2.5 mg           17      2.5 mg         18      5 mg         19      2.5 mg         20      2.5 mg         21      2.5 mg         22      5 mg         23      2.5 mg           24      2.5 mg         25      5 mg         26      2.5 mg         27      2.5 mg         28      2.5 mg         29      5 mg         30      2.5 mg           31      2.5 mg                Date Details   12/11 This INR check               How to take your warfarin dose     To take:  2.5  mg Take 1 of the 2.5 mg tablets.    To take:  5 mg Take 2 of the 2.5 mg tablets.           January 2018 Details    Sun Mon Tue Wed Thu Fri Sat      1      5 mg         2      2.5 mg         3      2.5 mg         4      2.5 mg         5      5 mg         6      2.5 mg           7      2.5 mg         8      5 mg         9      2.5 mg         10      2.5 mg         11      2.5 mg         12      5 mg         13      2.5 mg           14      2.5 mg         15      5 mg         16      2.5 mg         17      2.5 mg         18      2.5 mg         19      5 mg         20      2.5 mg           21      2.5 mg         22            23               24               25               26               27                 28               29               30               31                   Date Details   No additional details    Date of next INR:  1/22/2018         How to take your warfarin dose     To take:  2.5 mg Take 1 of the 2.5 mg tablets.    To take:  5 mg Take 2 of the 2.5 mg tablets.

## 2017-12-11 NOTE — PROGRESS NOTES
ANTICOAGULATION FOLLOW-UP CLINIC VISIT    Patient Name:  Stef Mosher  Date:  12/11/2017  Contact Type:  Face to Face    SUBJECTIVE:     Patient Findings     Positives No Problem Findings           OBJECTIVE    INR Protime   Date Value Ref Range Status   12/11/2017 3.1 (A) 0.86 - 1.14 Final       ASSESSMENT / PLAN  INR assessment THER    Recheck INR In: 6 WEEKS    INR Location Clinic      Anticoagulation Summary as of 12/11/2017     INR goal 2.0-3.0   Today's INR 3.1!   Maintenance plan 5 mg (2.5 mg x 2) on Mon, Fri; 2.5 mg (2.5 mg x 1) all other days   Full instructions 5 mg on Mon, Fri; 2.5 mg all other days   Weekly total 22.5 mg   No change documented Lefty Shepherd   Plan last modified Lefty Shepherd (6/20/2016)   Next INR check 1/22/2018   Target end date     Indications   Long-term (current) use of anticoagulants [Z79.01] [Z79.01]  New onset atrial fibrillation (H) [I48.91]         Anticoagulation Episode Summary     INR check location     Preferred lab     Send INR reminders to [facility]    Comments       Anticoagulation Care Providers     Provider Role Specialty Phone number    Sivakumar Kang PA-C Responsible Physician Assistant [phone]            See the Encounter Report to view Anticoagulation Flowsheet and Dosing Calendar (Go to Encounters tab in chart review, and find the Anticoagulation Therapy Visit)        LEFTY SHEPHERD

## 2017-12-18 DIAGNOSIS — I42.9 CARDIOMYOPATHY, UNSPECIFIED TYPE (H): ICD-10-CM

## 2017-12-27 RX ORDER — FUROSEMIDE 20 MG
TABLET ORAL
Qty: 90 TABLET | Refills: 3 | Status: SHIPPED | OUTPATIENT
Start: 2017-12-27 | End: 2018-06-29

## 2017-12-27 NOTE — TELEPHONE ENCOUNTER
Signed Prescriptions:                        Disp   Refills    furosemide (LASIX) 20 MG tablet            90 tab*3        Sig: TAKE 1 TABLET (20 MG) BY MOUTH DAILY  Authorizing Provider: KAITLYNN STRATTON  Ordering User: MELODY YOUNG

## 2018-01-07 DIAGNOSIS — I48.91 NEW ONSET ATRIAL FIBRILLATION (H): ICD-10-CM

## 2018-01-08 RX ORDER — WARFARIN SODIUM 2.5 MG/1
TABLET ORAL
Qty: 140 TABLET | Refills: 3 | Status: SHIPPED | OUTPATIENT
Start: 2018-01-08 | End: 2018-06-29

## 2018-01-08 NOTE — TELEPHONE ENCOUNTER
Last Written Prescription Date:  10-13-17  Last Fill Quantity: 140,  # refills: 0   Last Office Visit with [facility], P or [facility] prescribing provider:  ?   Future Office Visit:

## 2018-01-26 ENCOUNTER — ANTICOAGULATION THERAPY VISIT (OUTPATIENT)
Dept: NURSING | Facility: CLINIC | Age: 58
End: 2018-01-26
Payer: COMMERCIAL

## 2018-01-26 DIAGNOSIS — I48.91 NEW ONSET ATRIAL FIBRILLATION (H): ICD-10-CM

## 2018-01-26 DIAGNOSIS — Z79.01 LONG-TERM (CURRENT) USE OF ANTICOAGULANTS: ICD-10-CM

## 2018-01-26 LAB — INR POINT OF CARE: 2.4 (ref 0.86–1.14)

## 2018-01-26 PROCEDURE — 99207 ZZC NO CHARGE NURSE ONLY: CPT

## 2018-01-26 PROCEDURE — 85610 PROTHROMBIN TIME: CPT | Mod: QW

## 2018-01-26 PROCEDURE — 36416 COLLJ CAPILLARY BLOOD SPEC: CPT

## 2018-01-26 NOTE — MR AVS SNAPSHOT
Stef TAWANDA Mosher   1/26/2018 11:00 AM   Anticoagulation Therapy Visit    Description:  57 year old male   Provider:  VANESA ANTI COAG   Department:  Be Nurse           INR as of 1/26/2018     Today's INR 2.4      Anticoagulation Summary as of 1/26/2018     INR goal 2.0-3.0   Today's INR 2.4   Full instructions 5 mg on Mon, Fri; 2.5 mg all other days   Next INR check 3/5/2018    Indications   Long-term (current) use of anticoagulants [Z79.01] [Z79.01]  New onset atrial fibrillation (H) [I48.91]         Your next Anticoagulation Clinic appointment(s)     Mar 05, 2018 11:00 AM CST   Anticoagulation Visit with BE ANTI COAG   [facility] [place] ([facility] [place])    [address]  [place] [id number] [phone]              Contact Numbers     [facility]  Please call [phone] with any problems or questions regarding your therapy, or to cancel or reschedule your appointment.          January 2018 Details    Sun Mon Tue Wed Thu Fri Sat      1               2               3               4               5               6                 7               8               9               10               11               12               13                 14               15               16               17               18               19               20                 21               22               23               24               25               26      5 mg   See details      27      2.5 mg           28      2.5 mg         29      5 mg         30      2.5 mg         31      2.5 mg             Date Details   01/26 This INR check               How to take your warfarin dose     To take:  2.5 mg Take 1 of the 2.5 mg tablets.    To take:  5 mg Take 2 of the 2.5 mg tablets.           February 2018 Details    Sun Mon Tue Wed Thu Fri Sat         1      2.5 mg         2      5 mg         3      2.5 mg           4      2.5 mg         5      5 mg         6      2.5 mg         7      2.5 mg          8      2.5 mg         9      5 mg         10      2.5 mg           11      2.5 mg         12      5 mg         13      2.5 mg         14      2.5 mg         15      2.5 mg         16      5 mg         17      2.5 mg           18      2.5 mg         19      5 mg         20      2.5 mg         21      2.5 mg         22      2.5 mg         23      5 mg         24      2.5 mg           25      2.5 mg         26      5 mg         27      2.5 mg         28      2.5 mg             Date Details   No additional details            How to take your warfarin dose     To take:  2.5 mg Take 1 of the 2.5 mg tablets.    To take:  5 mg Take 2 of the 2.5 mg tablets.           March 2018 Details    Sun Mon Tue Wed Thu Fri Sat         1      2.5 mg         2      5 mg         3      2.5 mg           4      2.5 mg         5            6               7               8               9               10                 11               12               13               14               15               16               17                 18               19               20               21               22               23               24                 25               26               27               28               29               30               31                Date Details   No additional details    Date of next INR:  3/5/2018         How to take your warfarin dose     To take:  2.5 mg Take 1 of the 2.5 mg tablets.    To take:  5 mg Take 2 of the 2.5 mg tablets.

## 2018-01-26 NOTE — PROGRESS NOTES
ANTICOAGULATION FOLLOW-UP CLINIC VISIT    Patient Name:  Stef Mosher  Date:  1/26/2018  Contact Type:  Face to Face    SUBJECTIVE:     Patient Findings     Positives No Problem Findings           OBJECTIVE    INR Protime   Date Value Ref Range Status   01/26/2018 2.4 (A) 0.86 - 1.14 Final       ASSESSMENT / PLAN  INR assessment THER    Recheck INR In: 6 WEEKS    INR Location Clinic      Anticoagulation Summary as of 1/26/2018     INR goal 2.0-3.0   Today's INR 2.4   Maintenance plan 5 mg (2.5 mg x 2) on Mon, Fri; 2.5 mg (2.5 mg x 1) all other days   Full instructions 5 mg on Mon, Fri; 2.5 mg all other days   Weekly total 22.5 mg   No change documented Linnea Brooks RN   Plan last modified Dahiana Barrera (6/20/2016)   Next INR check 3/5/2018   Target end date     Indications   Long-term (current) use of anticoagulants [Z79.01] [Z79.01]  New onset atrial fibrillation (H) [I48.91]         Anticoagulation Episode Summary     INR check location     Preferred lab     Send INR reminders to [facility]    Comments       Anticoagulation Care Providers     Provider Role Specialty Phone number    Sivakumar Kang PA-C Responsible Physician Assistant [phone]            See the Encounter Report to view Anticoagulation Flowsheet and Dosing Calendar (Go to Encounters tab in chart review, and find the Anticoagulation Therapy Visit)        Linnea Brooks RN

## 2018-03-05 ENCOUNTER — ANTICOAGULATION THERAPY VISIT (OUTPATIENT)
Dept: NURSING | Facility: CLINIC | Age: 58
End: 2018-03-05
Payer: COMMERCIAL

## 2018-03-05 DIAGNOSIS — Z79.01 LONG-TERM (CURRENT) USE OF ANTICOAGULANTS: ICD-10-CM

## 2018-03-05 DIAGNOSIS — I48.91 NEW ONSET ATRIAL FIBRILLATION (H): ICD-10-CM

## 2018-03-05 LAB — INR POINT OF CARE: 3.9 (ref 0.86–1.14)

## 2018-03-05 PROCEDURE — 85610 PROTHROMBIN TIME: CPT | Mod: QW

## 2018-03-05 PROCEDURE — 99207 ZZC NO CHARGE NURSE ONLY: CPT

## 2018-03-05 PROCEDURE — 36416 COLLJ CAPILLARY BLOOD SPEC: CPT

## 2018-03-05 NOTE — PROGRESS NOTES
ANTICOAGULATION FOLLOW-UP CLINIC VISIT    Patient Name:  Stef Mosher  Date:  3/5/2018  Contact Type:  Face to Face    SUBJECTIVE:     Patient Findings     Comments Patient stated he had Cortizone shots in dina knee and hips last week.  INR 3.9 will do hold and bring back in 2 weeks.           OBJECTIVE    INR Protime   Date Value Ref Range Status   03/05/2018 3.9 (A) 0.86 - 1.14 Final       ASSESSMENT / PLAN  INR assessment SUPRA    Recheck INR In: 2 WEEKS    INR Location Clinic      Anticoagulation Summary as of 3/5/2018     INR goal 2.0-3.0   Today's INR 3.9!   Maintenance plan 5 mg (2.5 mg x 2) on Mon, Fri; 2.5 mg (2.5 mg x 1) all other days   Full instructions 3/6: Hold; Otherwise 5 mg on Mon, Fri; 2.5 mg all other days   Weekly total 22.5 mg   Plan last modified Dahiana Barrera (6/20/2016)   Next INR check 3/19/2018   Target end date     Indications   Long-term (current) use of anticoagulants [Z79.01] [Z79.01]  New onset atrial fibrillation (H) [I48.91]         Anticoagulation Episode Summary     INR check location     Preferred lab     Send INR reminders to [facility]    Comments       Anticoagulation Care Providers     Provider Role Specialty Phone number    Sivakumar Kang PA-C Responsible Physician Assistant [phone]            See the Encounter Report to view Anticoagulation Flowsheet and Dosing Calendar (Go to Encounters tab in chart review, and find the Anticoagulation Therapy Visit)        Linnea Brooks RN

## 2018-03-05 NOTE — MR AVS SNAPSHOT
Stef Leetino   3/5/2018 11:00 AM   Anticoagulation Therapy Visit    Description:  58 year old male   Provider:  VANESA ANTI COAG   Department:  Be Nurse           INR as of 3/5/2018     Today's INR 3.9!      Anticoagulation Summary as of 3/5/2018     INR goal 2.0-3.0   Today's INR 3.9!   Full instructions 3/6: Hold; Otherwise 5 mg on Mon, Fri; 2.5 mg all other days   Next INR check 3/19/2018    Indications   Long-term (current) use of anticoagulants [Z79.01] [Z79.01]  New onset atrial fibrillation (H) [I48.91]         Description     Already took today's dose of coumadin will hold tomorrows      Your next Anticoagulation Clinic appointment(s)     Mar 19, 2018  1:30 PM CDT   Anticoagulation Visit with BE ANTI COAG   [facility] ([facility] [place])    [address]  [place] [id number] [phone]              Contact Numbers     [facility]  Please call [phone] with any problems or questions regarding your therapy, or to cancel or reschedule your appointment.          March 2018 Details    Sun Mon Tue Wed Thu Fri Sat         1               2               3                 4               5      5 mg   See details      6      Hold         7      2.5 mg         8      2.5 mg         9      5 mg         10      2.5 mg           11      2.5 mg         12      5 mg         13      2.5 mg         14      2.5 mg         15      2.5 mg         16      5 mg         17      2.5 mg           18      2.5 mg         19            20               21               22               23               24                 25               26               27               28               29               30               31                Date Details   03/05 This INR check       Date of next INR:  3/19/2018         How to take your warfarin dose     To take:  2.5 mg Take 1 of the 2.5 mg tablets.    To take:  5 mg Take 2 of the 2.5 mg tablets.    Hold Do not take your warfarin dose. See the  Details table to the right for additional instructions.

## 2018-03-26 ENCOUNTER — ANTICOAGULATION THERAPY VISIT (OUTPATIENT)
Dept: NURSING | Facility: CLINIC | Age: 58
End: 2018-03-26
Payer: COMMERCIAL

## 2018-03-26 DIAGNOSIS — I48.91 NEW ONSET ATRIAL FIBRILLATION (H): ICD-10-CM

## 2018-03-26 DIAGNOSIS — Z79.01 LONG-TERM (CURRENT) USE OF ANTICOAGULANTS: ICD-10-CM

## 2018-03-26 LAB — INR POINT OF CARE: 3 (ref 0.86–1.14)

## 2018-03-26 PROCEDURE — 85610 PROTHROMBIN TIME: CPT | Mod: QW

## 2018-03-26 PROCEDURE — 99207 ZZC NO CHARGE NURSE ONLY: CPT

## 2018-03-26 PROCEDURE — 36416 COLLJ CAPILLARY BLOOD SPEC: CPT

## 2018-03-26 NOTE — MR AVS SNAPSHOT
Stef TAWANDA Mosher   3/26/2018 11:00 AM   Anticoagulation Therapy Visit    Description:  58 year old male   Provider:  VANESA ANTI COAG   Department:  Be Nurse           INR as of 3/26/2018     Today's INR 3.0      Anticoagulation Summary as of 3/26/2018     INR goal 2.0-3.0   Today's INR 3.0   Full instructions 5 mg on Mon, Fri; 2.5 mg all other days   Next INR check 5/4/2018    Indications   Long-term (current) use of anticoagulants [Z79.01] [Z79.01]  New onset atrial fibrillation (H) [I48.91]         Your next Anticoagulation Clinic appointment(s)     May 04, 2018 11:00 AM CDT   Anticoagulation Visit with BE ANTI COAG   [place] Kassie Huggins ([facility] [place])    [address]  [place] [address] [phone]              Contact Numbers     [facility]  Please call [phone] with any problems or questions regarding your therapy, or to cancel or reschedule your appointment.          March 2018 Details    Sun Mon Tue Wed Thu Fri Sat         1               2               3                 4               5               6               7               8               9               10                 11               12               13               14               15               16               17                 18               19               20               21               22               23               24                 25               26      5 mg   See details      27      2.5 mg         28      2.5 mg         29      2.5 mg         30      5 mg         31      2.5 mg          Date Details   03/26 This INR check               How to take your warfarin dose     To take:  2.5 mg Take 1 of the 2.5 mg tablets.    To take:  5 mg Take 2 of the 2.5 mg tablets.           April 2018 Details    Sun Mon Tue Wed Thu Fri Sat     1      2.5 mg         2      5 mg         3      2.5 mg         4      2.5 mg         5      2.5 mg         6      5 mg         7      2.5 mg            8      2.5 mg         9      5 mg         10      2.5 mg         11      2.5 mg         12      2.5 mg         13      5 mg         14      2.5 mg           15      2.5 mg         16      5 mg         17      2.5 mg         18      2.5 mg         19      2.5 mg         20      5 mg         21      2.5 mg           22      2.5 mg         23      5 mg         24      2.5 mg         25      2.5 mg         26      2.5 mg         27      5 mg         28      2.5 mg           29      2.5 mg         30      5 mg               Date Details   No additional details            How to take your warfarin dose     To take:  2.5 mg Take 1 of the 2.5 mg tablets.    To take:  5 mg Take 2 of the 2.5 mg tablets.           May 2018 Details    Sun Mon Tue Wed Thu Fri Sat       1      2.5 mg         2      2.5 mg         3      2.5 mg         4            5                 6               7               8               9               10               11               12                 13               14               15               16               17               18               19                 20               21               22               23               24               25               26                 27               28               29               30               31                  Date Details   No additional details    Date of next INR:  5/4/2018         How to take your warfarin dose     To take:  2.5 mg Take 1 of the 2.5 mg tablets.    To take:  5 mg Take 2 of the 2.5 mg tablets.

## 2018-03-26 NOTE — PROGRESS NOTES
ANTICOAGULATION FOLLOW-UP CLINIC VISIT    Patient Name:  Stef Mosher  Date:  3/26/2018  Contact Type:  Face to Face    SUBJECTIVE:     Patient Findings     Positives No Problem Findings           OBJECTIVE    INR Protime   Date Value Ref Range Status   03/26/2018 3.0 (A) 0.86 - 1.14 Final       ASSESSMENT / PLAN  INR assessment THER    Recheck INR In: 6 WEEKS    INR Location Clinic      Anticoagulation Summary as of 3/26/2018     INR goal 2.0-3.0   Today's INR 3.0   Maintenance plan 5 mg (2.5 mg x 2) on Mon, Fri; 2.5 mg (2.5 mg x 1) all other days   Full instructions 5 mg on Mon, Fri; 2.5 mg all other days   Weekly total 22.5 mg   No change documented Lefty Shepherd   Plan last modified Lefty Shepherd (6/20/2016)   Next INR check 5/4/2018   Target end date     Indications   Long-term (current) use of anticoagulants [Z79.01] [Z79.01]  New onset atrial fibrillation (H) [I48.91]         Anticoagulation Episode Summary     INR check location     Preferred lab     Send INR reminders to [facility]    Comments       Anticoagulation Care Providers     Provider Role Specialty Phone number    Sivakumar Kang PA-C Responsible Physician Assistant [phone]            See the Encounter Report to view Anticoagulation Flowsheet and Dosing Calendar (Go to Encounters tab in chart review, and find the Anticoagulation Therapy Visit)        LEFTY SHEPHERD

## 2018-04-24 ENCOUNTER — TELEPHONE (OUTPATIENT)
Dept: FAMILY MEDICINE | Facility: CLINIC | Age: 58
End: 2018-04-24

## 2018-04-24 DIAGNOSIS — E03.9 HYPOTHYROIDISM: Primary | ICD-10-CM

## 2018-04-24 DIAGNOSIS — Z12.11 COLON CANCER SCREENING: ICD-10-CM

## 2018-04-24 DIAGNOSIS — E78.5 HYPERLIPIDEMIA LDL GOAL <130: ICD-10-CM

## 2018-04-24 NOTE — TELEPHONE ENCOUNTER
Panel Management Review  Summary:    Patient is due/failing the following:   Lipid  tsh  fit  Type of outreach:    had appt 5/4-added to appt notes    Questions for provider review:    None                                                                                                                                    Kira Dan MA       Chart routed to Care Team .

## 2018-04-24 NOTE — LETTER
June 25, 2018      Stef Mosher  [address] [address]        Dear Stef,     You have not shown for your last 2 appointments for lab and blood pressure check.    Please call [phone] to reschedule these appointments      Sincerely,        [place] [facility]

## 2018-04-24 NOTE — LETTER
April 24, 2018      Stef Mosher  [address] [address]        Dear Stef,       We at [facility] are trying to provide the best care for our patients.     Upon your doctor reviewing your chart, it appears that you are due for your FIT test (colon cancer screen).    We have taken the liberty of ordering this for you and is enclosed.    You are also due for a blood pressure check and fasting lab work.           Thank You,    [facility]

## 2018-04-28 DIAGNOSIS — E78.5 HYPERLIPIDEMIA LDL GOAL <130: ICD-10-CM

## 2018-04-30 ENCOUNTER — TELEPHONE (OUTPATIENT)
Dept: NURSING | Facility: CLINIC | Age: 58
End: 2018-04-30

## 2018-04-30 DIAGNOSIS — I48.91 NEW ONSET ATRIAL FIBRILLATION (H): Primary | ICD-10-CM

## 2018-04-30 RX ORDER — ATORVASTATIN CALCIUM 10 MG/1
TABLET, FILM COATED ORAL
Qty: 60 TABLET | Refills: 0 | Status: SHIPPED | OUTPATIENT
Start: 2018-04-30 | End: 2018-06-29

## 2018-04-30 NOTE — TELEPHONE ENCOUNTER
"Requested Prescriptions   Pending Prescriptions Disp Refills     atorvastatin (LIPITOR) 10 MG tablet [Pharmacy Med Name: ATORVASTATIN 10 MG TABLET] 90 tablet 1    Last Written Prescription Date:  01/27/18  Last Fill Quantity: 90,  # refills: 1   Last office visit: 4/24/2017 with prescribing provider:  ?   Future Office Visit:     Sig: TAKE 1 TABLET (10 MG) BY MOUTH DAILY    Statins Protocol Failed    4/28/2018  8:59 AM       Failed - LDL on file in past 12 months    Recent Labs   Lab Test  04/24/17   1041   LDL  90            Passed - No abnormal creatine kinase in past 12 months    No lab results found.            Passed - Recent (12 mo) or future (30 days) visit within the authorizing provider's specialty    Patient had office visit in the last 12 months or has a visit in the next 30 days with authorizing provider or within the authorizing provider's specialty.  See \"Patient Info\" tab in inbasket, or \"Choose Columns\" in Meds & Orders section of the refill encounter.           Passed - Patient is age 18 or older          "

## 2018-04-30 NOTE — TELEPHONE ENCOUNTER
Patient due for annual INR Clinic Referral.  Referral pended for provider to review , complete, and sign.

## 2018-05-04 ENCOUNTER — ANTICOAGULATION THERAPY VISIT (OUTPATIENT)
Dept: NURSING | Facility: CLINIC | Age: 58
End: 2018-05-04
Payer: COMMERCIAL

## 2018-05-04 DIAGNOSIS — I48.91 NEW ONSET ATRIAL FIBRILLATION (H): ICD-10-CM

## 2018-05-04 DIAGNOSIS — Z79.01 LONG-TERM (CURRENT) USE OF ANTICOAGULANTS: ICD-10-CM

## 2018-05-04 LAB — INR POINT OF CARE: 2.1 (ref 0.86–1.14)

## 2018-05-04 PROCEDURE — 85610 PROTHROMBIN TIME: CPT | Mod: QW

## 2018-05-04 PROCEDURE — 36416 COLLJ CAPILLARY BLOOD SPEC: CPT

## 2018-05-04 PROCEDURE — 99207 ZZC NO CHARGE NURSE ONLY: CPT

## 2018-05-04 NOTE — MR AVS SNAPSHOT
Stef TAWANDA Mosher   5/4/2018 11:00 AM   Anticoagulation Therapy Visit    Description:  58 year old male   Provider:  VANESA ANTI COAG   Department:  Be Nurse           INR as of 5/4/2018     Today's INR 2.1      Anticoagulation Summary as of 5/4/2018     INR goal 2.0-3.0   Today's INR 2.1   Full instructions 5 mg on Mon, Fri; 2.5 mg all other days   Next INR check 6/22/2018    Indications   Long-term (current) use of anticoagulants [Z79.01] [Z79.01]  New onset atrial fibrillation (H) [I48.91]         Your next Anticoagulation Clinic appointment(s)     Jun 22, 2018 11:00 AM CDT   Anticoagulation Visit with BE ANTI COAG   [place] Kassie Huggins ([facility] [place])    [address]  [place] [id number] [phone]              Contact Numbers     [place]  Please call [phone] with any problems or questions regarding your therapy, or to cancel or reschedule your appointment.          May 2018 Details    Sun Mon Tue Wed Thu Fri Sat       1               2               3               4      5 mg   See details      5      2.5 mg           6      2.5 mg         7      5 mg         8      2.5 mg         9      2.5 mg         10      2.5 mg         11      5 mg         12      2.5 mg           13      2.5 mg         14      5 mg         15      2.5 mg         16      2.5 mg         17      2.5 mg         18      5 mg         19      2.5 mg           20      2.5 mg         21      5 mg         22      2.5 mg         23      2.5 mg         24      2.5 mg         25      5 mg         26      2.5 mg           27      2.5 mg         28      5 mg         29      2.5 mg         30      2.5 mg         31      2.5 mg            Date Details   05/04 This INR check               How to take your warfarin dose     To take:  2.5 mg Take 1 of the 2.5 mg tablets.    To take:  5 mg Take 2 of the 2.5 mg tablets.           June 2018 Details    Sun Mon Tue Wed Thu Fri Sat          1      5 mg         2      2.5  mg           3      2.5 mg         4      5 mg         5      2.5 mg         6      2.5 mg         7      2.5 mg         8      5 mg         9      2.5 mg           10      2.5 mg         11      5 mg         12      2.5 mg         13      2.5 mg         14      2.5 mg         15      5 mg         16      2.5 mg           17      2.5 mg         18      5 mg         19      2.5 mg         20      2.5 mg         21      2.5 mg         22            23                 24               25               26               27               28               29               30                Date Details   No additional details    Date of next INR:  6/22/2018         How to take your warfarin dose     To take:  2.5 mg Take 1 of the 2.5 mg tablets.    To take:  5 mg Take 2 of the 2.5 mg tablets.

## 2018-05-04 NOTE — PROGRESS NOTES
ANTICOAGULATION FOLLOW-UP CLINIC VISIT    Patient Name:  Stef Mosher  Date:  5/4/2018  Contact Type:  Face to Face    SUBJECTIVE:     Patient Findings     Positives No Problem Findings           OBJECTIVE    INR Protime   Date Value Ref Range Status   05/04/2018 2.1 (A) 0.86 - 1.14 Final       ASSESSMENT / PLAN  INR assessment THER    Recheck INR In: 6 WEEKS    INR Location Clinic      Anticoagulation Summary as of 5/4/2018     INR goal 2.0-3.0   Today's INR 2.1   Maintenance plan 5 mg (2.5 mg x 2) on Mon, Fri; 2.5 mg (2.5 mg x 1) all other days   Full instructions 5 mg on Mon, Fri; 2.5 mg all other days   Weekly total 22.5 mg   No change documented Linnea Brooks RN   Plan last modified Dahiana Barrera (6/20/2016)   Next INR check 6/22/2018   Target end date     Indications   Long-term (current) use of anticoagulants [Z79.01] [Z79.01]  New onset atrial fibrillation (H) [I48.91]         Anticoagulation Episode Summary     INR check location     Preferred lab     Send INR reminders to [facility]    Comments       Anticoagulation Care Providers     Provider Role Specialty Phone number    Sivakumar Kang PA-C Responsible Physician Assistant [phone]            See the Encounter Report to view Anticoagulation Flowsheet and Dosing Calendar (Go to Encounters tab in chart review, and find the Anticoagulation Therapy Visit)        Linnea Brooks RN

## 2018-05-19 DIAGNOSIS — Z79.01 LONG-TERM (CURRENT) USE OF ANTICOAGULANTS: ICD-10-CM

## 2018-05-19 NOTE — LETTER
May 22, 2018    Stef Mosher  [address] [address]      Dear Stef,      We at [facility] are trying to provide the best care for our patients.     Upon your doctor reviewing your chart, it appears that you are due for your Physical exam.    Please schedule appointment at your earliest convenience.         Thank You,    [facility]

## 2018-05-21 NOTE — TELEPHONE ENCOUNTER
Routing refill request to provider for review/approval because:  Drug interaction warning- pt on warfarin  Patient needs to be seen because it has been more than 1 year since last office visit.- 4/24/17    Med pended for approval, 30 days supply with reminder.

## 2018-05-21 NOTE — TELEPHONE ENCOUNTER
"Requested Prescriptions   Pending Prescriptions Disp Refills     ASPIR-LOW 81 MG EC tablet [Pharmacy Med Name: ASPIR-LOW EC 81 MG TABLET] 90 tablet 2    Last Written Prescription Date:  02/19/18  Last Fill Quantity: 90,  # refills: 2   Last office visit: 4/24/2017 with prescribing provider:  ?   Future Office Visit:   Next 5 appointments (look out 90 days)     May 25, 2018 11:00 AM CDT   [facility]    [address] [address]   [phone]                  Sig: TAKE ONE TABLET BY MOUTH ONE TIME DAILY    Analgesics (Non-Narcotic Tylenol and ASA Only) Passed    5/19/2018  9:55 AM       Passed - Recent (12 mo) or future (30 days) visit within the authorizing provider's specialty    Patient had office visit in the last 12 months or has a visit in the next 30 days with authorizing provider or within the authorizing provider's specialty.  See \"Patient Info\" tab in inbasket, or \"Choose Columns\" in Meds & Orders section of the refill encounter.           Passed - Patient is age 20 years or older    If ASA is flagged for ages under 20 years old. Forward to provider for confirmation Ryes Syndrome is not a concern.                  "

## 2018-05-22 RX ORDER — ASPIRIN 81 MG/1
TABLET ORAL
Qty: 30 TABLET | Refills: 0 | Status: SHIPPED | OUTPATIENT
Start: 2018-05-22 | End: 2018-06-29

## 2018-05-29 NOTE — TELEPHONE ENCOUNTER
Pt did not show for 5/25/18 appts. Left message for patient to call back pod 1 hotline([phone])

## 2018-05-31 NOTE — TELEPHONE ENCOUNTER
Left message for patient to call back pod 1 hotline([phone])    Ancillary bp  Fasting labs  Fit test from lab-colon cancer screen

## 2018-06-28 NOTE — PATIENT INSTRUCTIONS
Preventive Health Recommendations  Male Ages 50   64    Yearly exam:             See your health care provider every year in order to  o   Review health changes.   o   Discuss preventive care.    o   Review your medicines if your doctor has prescribed any.     Have a cholesterol test every 5 years, or more frequently if you are at risk for high cholesterol/heart disease.     Have a diabetes test (fasting glucose) every three years. If you are at risk for diabetes, you should have this test more often.     Have a colonoscopy at age 50, or have a yearly FIT test (stool test). These exams will check for colon cancer.      Talk with your health care provider about whether or not a prostate cancer screening test (PSA) is right for you.    You should be tested each year for STDs (sexually transmitted diseases), if you re at risk.     Shots: Get a flu shot each year. Get a tetanus shot every 10 years.     Nutrition:    Eat at least 5 servings of fruits and vegetables daily.     Eat whole-grain bread, whole-wheat pasta and brown rice instead of white grains and rice.     Get adequate Calcium and Vitamin D.     Lifestyle    Exercise for at least 150 minutes a week (30 minutes a day, 5 days a week). This will help you control your weight and prevent disease.     Limit alcohol to one drink per day.     No smoking.     Wear sunscreen to prevent skin cancer.     See your dentist every six months for an exam and cleaning.     See your eye doctor every 1 to 2 years.     Admission medication history interview status for this patient is complete. See [facility] admission navigator for allergy information, prior to admission medications and immunization status.     Medication history interview source(s):Patient  Medication history resources (including written lists, pill bottles, clinic record):None    Changes made to PTA medication list:  Added: probiotic qpm, ranitidine, phentermine prn   Deleted: duplicate Norco  Changed: hydrocortisone cream to prn, ativan to prn, acyclovir to prn, nicotine patch to prn    Actions taken by pharmacist (provider contacted, etc):None     Additional medication history information:pt uses nicotine patch prn when she is stressed.     Medication reconciliation/reorder completed by provider prior to medication history? No    For patients on insulin therapy: No (Yes/No)    Time spent in this activity: 10 min    Prior to Admission medications    Medication Sig Last Dose Taking? Auth Provider   Glucosamine-Chondroit-Vit C-Mn (GLUCOSAMINE 1500 COMPLEX) CAPS Take 3,000 mg by mouth every evening 3/2/2017 at pm Yes Unknown, Entered By History   Probiotic Product (PROBIOTIC DAILY PO) Take 1 capsule by mouth every evening 3/2/2017 at pm Yes Unknown, Entered By History   RANITIDINE HCL PO Take 75 mg by mouth daily 3/3/2017 at am Yes Unknown, Entered By History   phentermine 30 MG capsule Take 30 mg by mouth daily as needed Past Month at Unknown time Yes Unknown, Entered By History   LORazepam (ATIVAN PO) Take 0.5 mg by mouth daily as needed  Past Month at Unknown time Yes Reported, Patient   HYDROcodone-acetaminophen (NORCO) 5-325 MG per tablet Take 1-2 tablets by mouth every 6 hours as needed for moderate to severe pain 3/3/2017 at am Yes Adriano Hernandez MD   aspirin 81 MG EC tablet Take 1 tablet (81 mg) by mouth daily 2/28/2017 Yes Junior Gutierrez MD   Ipilimumab (YERVOY IV) Every three weeks.  at Last dose Feb 11th, next dose April 27th 2017 Yes  Reported, Patient   atorvastatin (LIPITOR) 10 MG tablet Take 1 tablet (10 mg) by mouth daily  Patient taking differently: Take 10 mg by mouth every evening  3/2/2017 at pm Yes Rainer Onofre MD   calcium-vitamin D (CALTRATE) 600-400 MG-UNIT per tablet Take 1 tablet by mouth daily 3/2/2017 at pm Yes Reported, Patient   DOXAZOSIN MESYLATE PO Take 4 mg by mouth daily  3/3/2017 at am Yes Reported, Patient   Estradiol (ESTRACE PO) Take 1 mg by mouth every evening  3/2/2017 at pm Yes Reported, Patient   hydrocortisone (ANUSOL-HC) 2.5 % rectal cream Place rectally daily as needed  Past Week at Unknown time Yes Reported, Patient   nicotine (NICODERM CQ) 21 MG/24HR patch 2h hr Place 1 patch onto the skin daily as needed  3/3/2017 at am Yes Reported, Patient   SUMAtriptan Succinate (IMITREX PO) Take 100 mg by mouth once Past Week at Unknown time Yes Reported, Patient   Zolpidem Tartrate (AMBIEN PO) Take 5 mg by mouth At Bedtime  3/2/2017 at pm Yes Reported, Patient   ACYCLOVIR PO Take 800 mg by mouth 5 times daily PRN More than a month at PRN  Reported, Patient

## 2018-06-28 NOTE — PROGRESS NOTES
"    SUBJECTIVE:   CC: Stef Mosher is an 58 year old male who presents for preventative health visit.     Healthy Habits:    Do you get at least three servings of calcium containing foods daily (dairy, green leafy vegetables, etc.)? yes    Amount of exercise or daily activities, outside of work: 0 day(s) per week    Problems taking medications regularly No    Medication side effects: No    Have you had an eye exam in the past two years? yes    Do you see a dentist twice per year? yes    Do you have sleep apnea, excessive snoring or daytime drowsiness?no    Patient/Parent of patient informed that anything we discuss that is not related to preventative medicine, may be billed for; patient verbalizes understanding.    Concern(s):  1. Knees-arthritis  2. Refills          Pt presents today for a physical exam with the main complaint of bilateral knee pain. He states he has arthritis and has been told his right knee especially is \"bone on bone\". He has received cortisone shots in the past from a chiropractor. The first one worked, and the second one did not last very long. He is disappointed by the short duration of the second cortisone shot. Patient reports the majority of his pain in his knees occurs at night. He wakes up once during the night to \"give his knees a walk\". Icing, ibuprofen, aleve, and tylenol are the meds he uses for relief. He has also tried various topical creams, which he says seem to help the most. He wants to avoid knee surgery at all costs. He has never had an ortho consult.     Today's PHQ-2 Score:   PHQ-2 ( 1999 [employer]) 6/29/2018 4/7/2014   Q1: Little interest or pleasure in doing things 0 0   Q2: Feeling down, depressed or hopeless 0 0   PHQ-2 Score 0 0       Abuse: Current or Past(Physical, Sexual or Emotional)- No  Do you feel safe in your environment - Yes    Social History   Substance Use Topics     Smoking status: Never Smoker     Smokeless tobacco: Never Used      Comment: Lives in smoke " free household     Alcohol use No      Comment: None      If you drink alcohol do you typically have >3 drinks per day or >7 drinks per week? No                      Last PSA:   PSA   Date Value Ref Range Status   11/19/2012 0.74 0 - 4 ug/L Final       Reviewed orders with patient. Reviewed health maintenance and updated orders accordingly - Yes  Labs reviewed in EPIC  BP Readings from Last 3 Encounters:   06/29/18 124/84   10/23/17 (!) 140/99   09/18/17 (!) 146/102    Wt Readings from Last 3 Encounters:   06/29/18 (!) 337 lb (152.9 kg)   09/18/17 (!) 342 lb (155.1 kg)   04/24/17 (!) 344 lb (156 kg)                  Patient Active Problem List   Diagnosis     Hypothyroidism     New onset atrial fibrillation (H)     Dilated cardiomyopathy (H)     Seasonal allergies     Hyperlipidemia LDL goal <130     Long-term (current) use of anticoagulants [Z79.01]     Essential hypertension     Paroxysmal atrial fibrillation (H)     CHF (congestive heart failure) (H)     Morbid obesity (H)     Past Surgical History:   Procedure Laterality Date     C THYROIDECTOMY         Social History   Substance Use Topics     Smoking status: Never Smoker     Smokeless tobacco: Never Used      Comment: Lives in smoke free household     Alcohol use No      Comment: None     Family History   Problem Relation Age of Onset     Cancer - colorectal Mother      Blood Disease Mother      Cancer Mother      Arthritis Father      Hypertension Father      Breast Cancer Sister      Allergies Sister      Cancer Sister      Thyroid Disease Sister      Blood Disease Sister      Diabetes No family hx of      Cerebrovascular Disease No family hx of      Glaucoma No family hx of      Macular Degeneration No family hx of          Current Outpatient Prescriptions   Medication Sig Dispense Refill     aspirin (ASPIR-LOW) 81 MG EC tablet Take 1 tablet (81 mg) by mouth daily 90 tablet 3     atorvastatin (LIPITOR) 10 MG tablet TAKE 1 TABLET (10 MG) BY MOUTH DAILY 90  tablet 3     furosemide (LASIX) 20 MG tablet TAKE 1 TABLET (20 MG) BY MOUTH DAILY 90 tablet 3     HYDROcodone-acetaminophen (NORCO) 5-325 MG per tablet Take 1-2 tablets by mouth nightly as needed for severe pain 30 tablet 0     lisinopril (PRINIVIL/ZESTRIL) 20 MG tablet Take 1 tablet (20 mg) by mouth daily 90 tablet 3     Menthol, Topical Analgesic, (BIOFREEZE) 4 % GEL Externally apply topically 3 times daily as needed 118 mL 11     metoprolol tartrate (LOPRESSOR) 100 MG tablet TAKE 1 TABLET (100 MG) BY MOUTH 2 TIMES DAILY 180 tablet 3     Misc Natural Products (GLUCOSAMINE CHOND COMPLEX/MSM) TABS Take 1 tablet by mouth 2 times daily       Omega-3 Fatty Acids (OMEGA-3 FISH OIL PO) Take 1,200 mg by mouth 2 times daily (with meals)       order for DME Equipment being ordered: cryo knee cuff and supplies for cold therapy of knees- bilateral 1 Device 0     sildenafil (VIAGRA) 50 MG tablet Take 0.5-1 tablets (25-50 mg) by mouth daily as needed Take 30 min to 4 hours before intercourse.  Never use with nitroglycerin, terazosin or doxazosin. 12 tablet 1     warfarin (COUMADIN) 2.5 MG tablet TAKE 2 TABS BY MOUTH DAILY ON MON. AND FRI. TAKE 1 TAB DAILY ON ALL OTHER DAYS AS DIRECTED BY  tablet 3     [DISCONTINUED] atorvastatin (LIPITOR) 10 MG tablet TAKE 1 TABLET (10 MG) BY MOUTH DAILY 60 tablet 0     [DISCONTINUED] furosemide (LASIX) 20 MG tablet TAKE 1 TABLET (20 MG) BY MOUTH DAILY 90 tablet 3     [DISCONTINUED] lisinopril (PRINIVIL/ZESTRIL) 20 MG tablet TAKE 1 TABLET BY MOUTH EVERY DAY 14 tablet 0     [DISCONTINUED] metoprolol tartrate (LOPRESSOR) 100 MG tablet TAKE 1 TABLET (100 MG) BY MOUTH 2 TIMES DAILY 60 tablet 0     [DISCONTINUED] sildenafil (REVATIO/VIAGRA) 50 MG cap/tab Take 0.5-1 tablets (25-50 mg) by mouth daily as needed for erectile dysfunction Take 30 min to 4 hours before intercourse.  Never use with nitroglycerin, terazosin or doxazosin. 6 tablet 1     [DISCONTINUED] warfarin (COUMADIN) 2.5 MG tablet  TAKE 2 TABS BY MOUTH DAILY ON MON. AND FRI. TAKE 1 TAB DAILY ON ALL OTHER DAYS AS DIRECTED BY  tablet 3     No Known Allergies  Recent Labs   Lab Test  06/29/18   1129  10/30/17   1057  09/22/17   1239   04/24/17   1041  04/07/14   1008  11/19/12   0952   06/29/10   1551   A1C  5.4   --    --    --    --    --    --    --    --    LDL   --    --    --    --   90  150*  155*   < >   --    HDL   --    --    --    --   48  34*  35*   < >   --    TRIG   --    --    --    --   97  164*  123   < >   --    ALT   --    --    --    --   53   --   47   --   82*   CR   --   0.71  0.73   < >  0.78  0.82  0.84   < >   --    GFRESTIMATED   --   >90  >90   < >  >90  Non  GFR Calc    >90  >90   < >   --    GFRESTBLACK   --   >90  >90   < >  >90   GFR Calc    >90  >90   < >   --    POTASSIUM   --   4.3  4.1   < >  4.3  4.0  5.2   < >   --    TSH   --    --    --    --   0.54  0.80  0.86   < >   --     < > = values in this interval not displayed.        Reviewed and updated as needed this visit by clinical staff  Tobacco  Allergies  Meds  Med Hx  Surg Hx  Fam Hx  Soc Hx        Reviewed and updated as needed this visit by Provider            ROS:  CONSTITUTIONAL: NEGATIVE for fever, chills, change in weight  INTEGUMENTARY/SKIN: NEGATIVE for worrisome rashes, moles or lesions  EYES: NEGATIVE for vision changes or irritation  ENT: NEGATIVE for ear, mouth and throat problems  RESP: NEGATIVE for significant cough or SOB  CV: NEGATIVE for chest pain, palpitations or peripheral edema  GI: NEGATIVE for nausea, abdominal pain, heartburn, or change in bowel habits   male: negative for dysuria, hematuria, decreased urinary stream, erectile dysfunction, urethral discharge  MUSCULOSKELETAL: NEGATIVE for significant myalgia POSITIVE for bilateral knee pain at night. Varies between knees. Has been told he has bone on bone arthritis. Seems to be worse with humidity. No pain during day.   NEURO: NEGATIVE  "for weakness, dizziness or paresthesias  ENDOCRINE:  PSYCHIATRIC: NEGATIVE for changes in mood or affect    OBJECTIVE:   /84  Pulse 101  Temp 97.5  F (36.4  C) (Oral)  Resp 20  Ht 6' 5\" (1.956 m)  Wt (!) 337 lb (152.9 kg)  SpO2 96%  BMI 39.96 kg/m2  EXAM:  GENERAL: healthy, alert and no distress  EYES: Eyes grossly normal to inspection, PERRL and conjunctivae and sclerae normal  HENT: ear canals and TM's normal, nose and mouth without ulcers or lesions  NECK: no adenopathy, no asymmetry, masses, or scars POSITIVE thyroid feels enlarged  RESP: lungs clear to auscultation - no rales, rhonchi or wheezes  CV: regular rate and rhythm, normal S1 S2, no S3 or S4, no murmur, click or rub, no peripheral edema and peripheral pulses strong  ABDOMEN: soft, nontender, no hepatosplenomegaly, no masses and bowel sounds normal  : deferred, no concerns.   MS: no gross musculoskeletal defects noted, no edema  SKIN: no suspicious lesions or rashes  NEURO: Normal strength and tone, mentation intact and speech normal  PSYCH: mentation appears normal, affect normal/bright  LYMPH: no cervical, supraclavicular, axillary adenopathy    Diagnostic Test Results:  See orders    ASSESSMENT/PLAN:       ICD-10-CM    1. Encounter for general adult medical examination with abnormal findings Z00.01    2. CHF (congestive heart failure) (H) I50.9    3. Special screening for malignant neoplasms, colon Z12.11 Fecal colorectal cancer screen (FIT)   4. Morbid obesity (H) E66.01 NUTRITION REFERRAL   5. Atrial fibrillation, unspecified type (H) I48.91 INR CLINIC REFERRAL     furosemide (LASIX) 20 MG tablet   6. Encounter for screening for HIV Z11.4 HIV Antigen Antibody Combo   7. Nonspecific abnormal results of liver function study R94.5 ALT   8. Screening for thyroid disorder Z13.29 TSH with free T4 reflex   9. Benign essential hypertension I10 Basic metabolic panel  (Ca, Cl, CO2, Creat, Gluc, K, Na, BUN)   10. Lipid screening Z13.220 Lipid " "panel reflex to direct LDL Fasting   11. Screening for diabetes mellitus Z13.1 Hemoglobin A1c   12. Screening for prostate cancer Z12.5 PSA, screen   13. Primary osteoarthritis of both knees M17.0 order for DME     HYDROcodone-acetaminophen (NORCO) 5-325 MG per tablet     ORTHOPEDICS ADULT REFERRAL     CRP inflammation     Lyme Disease Yolanda with reflex to WB Serum     Menthol, Topical Analgesic, (BIOFREEZE) 4 % GEL   14. Visit for eye and vision exam Z01.00 OPTOMETRY REFERRAL   15. Long-term (current) use of anticoagulants [Z79.01] Z79.01 aspirin (ASPIR-LOW) 81 MG EC tablet   16. Hyperlipidemia LDL goal <130 E78.5 atorvastatin (LIPITOR) 10 MG tablet   17. Cardiomyopathy, unspecified type (H) I42.9 furosemide (LASIX) 20 MG tablet   18. Chronic atrial fibrillation (H) I48.2 lisinopril (PRINIVIL/ZESTRIL) 20 MG tablet   19. Essential hypertension I10 lisinopril (PRINIVIL/ZESTRIL) 20 MG tablet     metoprolol tartrate (LOPRESSOR) 100 MG tablet   20. Erectile dysfunction, unspecified erectile dysfunction type N52.9 sildenafil (VIAGRA) 50 MG tablet   21. New onset atrial fibrillation (H) I48.91 warfarin (COUMADIN) 2.5 MG tablet   22. Enlarged thyroid E04.9 US Thyroid       COUNSELING:  Reviewed preventive health counseling, as reflected in patient instructions    BP Readings from Last 1 Encounters:   06/29/18 124/84     Estimated body mass index is 39.96 kg/(m^2) as calculated from the following:    Height as of this encounter: 6' 5\" (1.956 m).    Weight as of this encounter: 337 lb (152.9 kg).      Weight management plan: Discussed healthy diet and exercise guidelines and patient will follow up in 12 months in clinic to re-evaluate.     reports that he has never smoked. He has never used smokeless tobacco.      Counseling Resources:  ATP IV Guidelines  Pooled Cohorts Equation Calculator  FRAX Risk Assessment  ICSI Preventive Guidelines  Dietary Guidelines for Americans, 2010  USDA's MyPlate  ASA Prophylaxis  Lung CA " Jeremiah Roberts, DAIJA  [facility] JENNIFER

## 2018-06-29 ENCOUNTER — OFFICE VISIT (OUTPATIENT)
Dept: FAMILY MEDICINE | Facility: CLINIC | Age: 58
End: 2018-06-29
Payer: COMMERCIAL

## 2018-06-29 VITALS
RESPIRATION RATE: 20 BRPM | OXYGEN SATURATION: 96 % | TEMPERATURE: 97.5 F | SYSTOLIC BLOOD PRESSURE: 124 MMHG | HEART RATE: 101 BPM | HEIGHT: 77 IN | BODY MASS INDEX: 37.19 KG/M2 | WEIGHT: 315 LBS | DIASTOLIC BLOOD PRESSURE: 84 MMHG

## 2018-06-29 DIAGNOSIS — E66.01 MORBID OBESITY (H): ICD-10-CM

## 2018-06-29 DIAGNOSIS — I48.20 CHRONIC ATRIAL FIBRILLATION (H): ICD-10-CM

## 2018-06-29 DIAGNOSIS — I10 ESSENTIAL HYPERTENSION: ICD-10-CM

## 2018-06-29 DIAGNOSIS — M17.0 PRIMARY OSTEOARTHRITIS OF BOTH KNEES: ICD-10-CM

## 2018-06-29 DIAGNOSIS — Z00.01 ENCOUNTER FOR GENERAL ADULT MEDICAL EXAMINATION WITH ABNORMAL FINDINGS: Primary | ICD-10-CM

## 2018-06-29 DIAGNOSIS — I48.91 NEW ONSET ATRIAL FIBRILLATION (H): ICD-10-CM

## 2018-06-29 DIAGNOSIS — I48.91 ATRIAL FIBRILLATION, UNSPECIFIED TYPE (H): ICD-10-CM

## 2018-06-29 DIAGNOSIS — R94.5 NONSPECIFIC ABNORMAL RESULTS OF LIVER FUNCTION STUDY: ICD-10-CM

## 2018-06-29 DIAGNOSIS — Z13.1 SCREENING FOR DIABETES MELLITUS: ICD-10-CM

## 2018-06-29 DIAGNOSIS — I50.9 CONGESTIVE HEART FAILURE, UNSPECIFIED CONGESTIVE HEART FAILURE CHRONICITY, UNSPECIFIED CONGESTIVE HEART FAILURE TYPE: ICD-10-CM

## 2018-06-29 DIAGNOSIS — Z13.220 LIPID SCREENING: ICD-10-CM

## 2018-06-29 DIAGNOSIS — Z13.29 SCREENING FOR THYROID DISORDER: ICD-10-CM

## 2018-06-29 DIAGNOSIS — Z79.01 LONG-TERM (CURRENT) USE OF ANTICOAGULANTS: ICD-10-CM

## 2018-06-29 DIAGNOSIS — E04.9 ENLARGED THYROID: ICD-10-CM

## 2018-06-29 DIAGNOSIS — Z01.00 VISIT FOR EYE AND VISION EXAM: ICD-10-CM

## 2018-06-29 DIAGNOSIS — Z12.11 SPECIAL SCREENING FOR MALIGNANT NEOPLASMS, COLON: ICD-10-CM

## 2018-06-29 DIAGNOSIS — N52.9 ERECTILE DYSFUNCTION, UNSPECIFIED ERECTILE DYSFUNCTION TYPE: ICD-10-CM

## 2018-06-29 DIAGNOSIS — Z11.4 ENCOUNTER FOR SCREENING FOR HIV: ICD-10-CM

## 2018-06-29 DIAGNOSIS — Z12.5 SCREENING FOR PROSTATE CANCER: ICD-10-CM

## 2018-06-29 DIAGNOSIS — E78.5 HYPERLIPIDEMIA LDL GOAL <130: ICD-10-CM

## 2018-06-29 DIAGNOSIS — I10 BENIGN ESSENTIAL HYPERTENSION: ICD-10-CM

## 2018-06-29 DIAGNOSIS — I42.9 CARDIOMYOPATHY, UNSPECIFIED TYPE (H): ICD-10-CM

## 2018-06-29 LAB
ALT SERPL W P-5'-P-CCNC: 40 U/L (ref 0–70)
ANION GAP SERPL CALCULATED.3IONS-SCNC: 5 MMOL/L (ref 3–14)
BUN SERPL-MCNC: 16 MG/DL (ref 7–30)
CALCIUM SERPL-MCNC: 9 MG/DL (ref 8.5–10.1)
CHLORIDE SERPL-SCNC: 107 MMOL/L (ref 94–109)
CHOLEST SERPL-MCNC: 176 MG/DL
CO2 SERPL-SCNC: 29 MMOL/L (ref 20–32)
CREAT SERPL-MCNC: 0.91 MG/DL (ref 0.66–1.25)
CRP SERPL-MCNC: 3.6 MG/L (ref 0–8)
GFR SERPL CREATININE-BSD FRML MDRD: 86 ML/MIN/1.7M2
GLUCOSE SERPL-MCNC: 106 MG/DL (ref 70–99)
HBA1C MFR BLD: 5.4 % (ref 0–5.6)
HDLC SERPL-MCNC: 49 MG/DL
LDLC SERPL CALC-MCNC: 107 MG/DL
NONHDLC SERPL-MCNC: 127 MG/DL
POTASSIUM SERPL-SCNC: 4.3 MMOL/L (ref 3.4–5.3)
PSA SERPL-ACNC: 0.59 UG/L (ref 0–4)
SODIUM SERPL-SCNC: 141 MMOL/L (ref 133–144)
TRIGL SERPL-MCNC: 98 MG/DL
TSH SERPL DL<=0.005 MIU/L-ACNC: 0.62 MU/L (ref 0.4–4)

## 2018-06-29 PROCEDURE — 80061 LIPID PANEL: CPT | Performed by: NURSE PRACTITIONER

## 2018-06-29 PROCEDURE — 80048 BASIC METABOLIC PNL TOTAL CA: CPT | Performed by: NURSE PRACTITIONER

## 2018-06-29 PROCEDURE — 86618 LYME DISEASE ANTIBODY: CPT | Performed by: NURSE PRACTITIONER

## 2018-06-29 PROCEDURE — 99396 PREV VISIT EST AGE 40-64: CPT | Performed by: NURSE PRACTITIONER

## 2018-06-29 PROCEDURE — 36415 COLL VENOUS BLD VENIPUNCTURE: CPT | Performed by: NURSE PRACTITIONER

## 2018-06-29 PROCEDURE — 99213 OFFICE O/P EST LOW 20 MIN: CPT | Mod: 25 | Performed by: NURSE PRACTITIONER

## 2018-06-29 PROCEDURE — 87389 HIV-1 AG W/HIV-1&-2 AB AG IA: CPT | Performed by: NURSE PRACTITIONER

## 2018-06-29 PROCEDURE — 86140 C-REACTIVE PROTEIN: CPT | Performed by: NURSE PRACTITIONER

## 2018-06-29 PROCEDURE — 84460 ALANINE AMINO (ALT) (SGPT): CPT | Performed by: NURSE PRACTITIONER

## 2018-06-29 PROCEDURE — 83036 HEMOGLOBIN GLYCOSYLATED A1C: CPT | Performed by: NURSE PRACTITIONER

## 2018-06-29 PROCEDURE — G0103 PSA SCREENING: HCPCS | Performed by: NURSE PRACTITIONER

## 2018-06-29 PROCEDURE — 84443 ASSAY THYROID STIM HORMONE: CPT | Performed by: NURSE PRACTITIONER

## 2018-06-29 RX ORDER — HYDROCODONE BITARTRATE AND ACETAMINOPHEN 5; 325 MG/1; MG/1
1-2 TABLET ORAL
Qty: 30 TABLET | Refills: 0 | Status: SHIPPED | OUTPATIENT
Start: 2018-06-29 | End: 2021-07-09

## 2018-06-29 RX ORDER — METOPROLOL TARTRATE 100 MG
TABLET ORAL
Qty: 180 TABLET | Refills: 3 | Status: SHIPPED | OUTPATIENT
Start: 2018-06-29 | End: 2019-07-13

## 2018-06-29 RX ORDER — FUROSEMIDE 20 MG
TABLET ORAL
Qty: 90 TABLET | Refills: 3 | Status: SHIPPED | OUTPATIENT
Start: 2018-06-29 | End: 2019-09-07

## 2018-06-29 RX ORDER — SILDENAFIL 50 MG/1
25-50 TABLET, FILM COATED ORAL DAILY PRN
Qty: 12 TABLET | Refills: 1 | Status: SHIPPED | OUTPATIENT
Start: 2018-06-29 | End: 2021-07-09

## 2018-06-29 RX ORDER — ATORVASTATIN CALCIUM 10 MG/1
TABLET, FILM COATED ORAL
Qty: 90 TABLET | Refills: 3 | Status: SHIPPED | OUTPATIENT
Start: 2018-06-29 | End: 2019-07-13

## 2018-06-29 RX ORDER — LISINOPRIL 20 MG/1
20 TABLET ORAL DAILY
Qty: 90 TABLET | Refills: 3 | Status: SHIPPED | OUTPATIENT
Start: 2018-06-29 | End: 2019-06-21

## 2018-06-29 RX ORDER — WARFARIN SODIUM 2.5 MG/1
TABLET ORAL
Qty: 140 TABLET | Refills: 3 | Status: SHIPPED | OUTPATIENT
Start: 2018-06-29 | End: 2018-09-21

## 2018-06-29 NOTE — MR AVS SNAPSHOT
After Visit Summary   6/29/2018    Stef Mosher    MRN: [id number]           Patient Information     Date Of Birth          1960        Visit Information        Provider Department      6/29/2018 11:00 AM Josephine Roberts NP [facility]        Today's Diagnoses     Special screening for malignant neoplasms, colon    -  1    CHF (congestive heart failure) (H)        Morbid obesity (H)        Atrial fibrillation, unspecified type (H)        Encounter for screening for HIV        Nonspecific abnormal results of liver function study        Screening for thyroid disorder        Benign essential hypertension        Lipid screening        Screening for diabetes mellitus        Screening for prostate cancer        Primary osteoarthritis of both knees        Visit for eye and vision exam        Long-term (current) use of anticoagulants [Z79.01]        Hyperlipidemia LDL goal <130        Cardiomyopathy, unspecified type (H)        Chronic atrial fibrillation (H)        Essential hypertension        Erectile dysfunction, unspecified erectile dysfunction type        New onset atrial fibrillation (H)          Care Instructions      Preventive Health Recommendations  Male Ages 50 - 64    Yearly exam:             See your health care provider every year in order to  o   Review health changes.   o   Discuss preventive care.    o   Review your medicines if your doctor has prescribed any.     Have a cholesterol test every 5 years, or more frequently if you are at risk for high cholesterol/heart disease.     Have a diabetes test (fasting glucose) every three years. If you are at risk for diabetes, you should have this test more often.     Have a colonoscopy at age 50, or have a yearly FIT test (stool test). These exams will check for colon cancer.      Talk with your health care provider about whether or not a prostate cancer screening test (PSA) is right for you.    You should be tested each year for  STDs (sexually transmitted diseases), if you re at risk.     Shots: Get a flu shot each year. Get a tetanus shot every 10 years.     Nutrition:    Eat at least 5 servings of fruits and vegetables daily.     Eat whole-grain bread, whole-wheat pasta and brown rice instead of white grains and rice.     Get adequate Calcium and Vitamin D.     Lifestyle    Exercise for at least 150 minutes a week (30 minutes a day, 5 days a week). This will help you control your weight and prevent disease.     Limit alcohol to one drink per day.     No smoking.     Wear sunscreen to prevent skin cancer.     See your dentist every six months for an exam and cleaning.     See your eye doctor every 1 to 2 years.            Follow-ups after your visit        Additional Services     INR CLINIC REFERRAL       Your provider has referred you to INR Services.    Please be aware that coverage of these services is subject to the terms and limitations of your health insurance plan.  Call member services at your health plan with any benefit or coverage questions.    Indication for Anticoagulation: Atrial Fibrillation  If nonstandard INR is desired, indicate goal range and explanation:   Expected Duration of Therapy: Lifetime            OPTOMETRY REFERRAL       Your provider has referred you to: [facility]: [facility] [phone]   [email or web address]  [facility]: [facility] [phone]    [email or web address]  N: [facility] [place] [phone]   [email or web address]    Please be aware that coverage of these services is subject to the terms and limitations of your health insurance plan.  Call member services at your health plan with any benefit or coverage questions.      Please bring the following with you to your appointment:    (1) Any X-Rays, CTs or MRIs which have been performed.  Contact the facility where they were done to  arrange for  prior to your scheduled appointment.    (2) List of current medications  (3) This referral request   (4) Any documents/labs given to you for this referral            ORTHOPEDICS ADULT REFERRAL       Your provider has referred you to: [facility]: [facility] [phone]   [email or web address]  [facility]: [place] [facility] [place] [phone]   [email or web address]  [facility]: [place] [facility] [phone]    [email or web address]  [facility]: [facility] [phone]   [email or web address]    Please be aware that coverage of these services is subject to the terms and limitations of your health insurance plan.  Call member services at your health plan with any benefit or coverage questions.      Please bring the following to your appointment:    >>   Any x-rays, CTs or MRIs which have been performed.  Contact the facility where they were done to arrange for  prior to your scheduled appointment.    >>   List of current medications   >>   This referral request   >>   Any documents/labs given to you for this referral                  Follow-up notes from your care team     Return if symptoms worsen or fail to improve.      Your next 10 appointments already scheduled     Jul 20, 2018 11:00 AM CDT   Anticoagulation Visit with BE ANTI COAG   [facility] [place] ([facility])    [address] [facility] [place] [id number] [phone]              Future tests that were ordered for you today     Open Future Orders        Priority Expected Expires Ordered    Fecal colorectal cancer screen (FIT) Routine 7/19/2018 9/20/2018 6/29/2018            Who to contact     Normal or non-critical lab and imaging results will be communicated to you by  "MyChart, letter or phone within 4 business days after the clinic has received the results. If you do not hear from us within 7 days, please contact the clinic through [employer] or phone. If you have a critical or abnormal lab result, we will notify you by phone as soon as possible.  Submit refill requests through [employer] or call your pharmacy and they will forward the refill request to us. Please allow 3 business days for your refill to be completed.          If you need to speak with a  for additional information , please call: [phone]             Additional Information About Your Visit        [employer] Information     [employer] gives you secure access to your electronic health record. If you see a primary care provider, you can also send messages to your care team and make appointments. If you have questions, please call your primary care clinic.  If you do not have a primary care provider, please call [phone] and they will assist you.        Care EveryWhere ID     This is your Care EveryWhere ID. This could be used by other organizations to access your [place] medical records  [id number]        Your Vitals Were     Pulse Temperature Respirations Height Pulse Oximetry BMI (Body Mass Index)    101 97.5  F (36.4  C) (Oral) 20 6' 5\" (1.956 m) 96% 39.96 kg/m2       Blood Pressure from Last 3 Encounters:   06/29/18 (!) 151/95   10/23/17 (!) 140/99   09/18/17 (!) 146/102    Weight from Last 3 Encounters:   06/29/18 (!) 337 lb (152.9 kg)   09/18/17 (!) 342 lb (155.1 kg)   04/24/17 (!) 344 lb (156 kg)              We Performed the Following     ALT     Basic metabolic panel  (Ca, Cl, CO2, Creat, Gluc, K, Na, BUN)     CRP inflammation     Hemoglobin A1c     HIV Antigen Antibody Combo     INR CLINIC REFERRAL     Lipid panel reflex to direct LDL Fasting     Lyme Disease Yolanda with reflex to WB Serum     OPTOMETRY REFERRAL     ORTHOPEDICS ADULT REFERRAL     PSA, screen     TSH with free T4 reflex        "   Today's Medication Changes          These changes are accurate as of 6/29/18 11:24 AM.  If you have any questions, ask your nurse or doctor.               Start taking these medicines.        Dose/Directions    HYDROcodone-acetaminophen 5-325 MG per tablet   Commonly known as:  NORCO   Used for:  Primary osteoarthritis of both knees   Started by:  Josephine Roberts NP        Dose:  1-2 tablet   Take 1-2 tablets by mouth nightly as needed for severe pain   Quantity:  30 tablet   Refills:  0       order for DME   Used for:  Primary osteoarthritis of both knees   Started by:  Josephine Roberts NP        Equipment being ordered: cryo knee cuff and supplies for cold therapy of knees- bilateral   Quantity:  1 Device   Refills:  0         These medicines have changed or have updated prescriptions.        Dose/Directions    aspirin 81 MG EC tablet   Commonly known as:  ASPIR-LOW   This may have changed:  See the new instructions.   Used for:  Long-term (current) use of anticoagulants   Changed by:  Josephine Roberts NP        Dose:  81 mg   Take 1 tablet (81 mg) by mouth daily   Quantity:  90 tablet   Refills:  3       lisinopril 20 MG tablet   Commonly known as:  PRINIVIL/ZESTRIL   This may have changed:  See the new instructions.   Used for:  Chronic atrial fibrillation (H), Essential hypertension   Changed by:  Josephine Roberts NP        Dose:  20 mg   Take 1 tablet (20 mg) by mouth daily   Quantity:  90 tablet   Refills:  3       sildenafil 50 MG tablet   Commonly known as:  VIAGRA   This may have changed:  reasons to take this   Used for:  Erectile dysfunction, unspecified erectile dysfunction type   Changed by:  Josephine Roberts NP        Dose:  25-50 mg   Take 0.5-1 tablets (25-50 mg) by mouth daily as needed Take 30 min to 4 hours before intercourse.  Never use with nitroglycerin, terazosin or doxazosin.   Quantity:  12 tablet   Refills:  1            Where to get your medicines      These medications [id number] [place] [address]  [address] [place] [address]     Phone:  [phone]     aspirin 81 MG EC tablet    atorvastatin 10 MG tablet    furosemide 20 MG tablet    lisinopril 20 MG tablet    metoprolol tartrate 100 MG tablet    warfarin 2.5 MG tablet         Some of these will need a paper prescription and others can be bought over the counter.  Ask your nurse if you have questions.     Bring a paper prescription for each of these medications     HYDROcodone-acetaminophen 5-325 MG per tablet    order for DME    sildenafil 50 MG tablet               Information about OPIOIDS     PRESCRIPTION OPIOIDS: WHAT YOU NEED TO KNOW   We gave you an opioid (narcotic) pain medicine. It is important to manage your pain, but opioids are not always the best choice. You should first try all the other options your care team gave you. Take this medicine for as short a time (and as few doses) as possible.     These medicines have risks:    DO NOT drive when on new or higher doses of pain medicine. These medicines can affect your alertness and reaction times, and you could be arrested for driving under the influence (DUI). If you need to use opioids long-term, talk to your care team about driving.    DO NOT operate heave machinery    DO NOT do any other dangerous activities while taking these medicines.     DO NOT drink any alcohol while taking these medicines.      If the opioid prescribed includes acetaminophen, DO NOT take with any other medicines that contain acetaminophen. Read all labels carefully. Look for the word  acetaminophen  or  Tylenol.  Ask your pharmacist if you have questions or are unsure.    You can get addicted to pain medicines, especially if you have a history of addiction (chemical, alcohol or substance dependence). Talk to your care team about ways to reduce this risk.    Store your pills in a secure place, locked if possible. We will not replace any lost or stolen medicine.  If you don t finish your medicine, please throw away (dispose) as directed by your pharmacist. The [place] Pollution Control Agency has more information about safe disposal: https://www.pca.state.[place]/living-green/managing-unwanted-medications.     All opioids tend to cause constipation. Drink plenty of water and eat foods that have a lot of fiber, such as fruits, vegetables, prune juice, apple juice and high-fiber cereal. Take a laxative (Miralax, milk of magnesia, Colace, Senna) if you don t move your bowels at least every other day.          Primary Care Provider Office Phone # Fax #    Sivakumar Kang PA-C [phone]       [address] [place] [address]        Equal Access to Services     VALENTINO GAY : Hadii maye galvez hadasho Soomaali, waaxda luqadaha, qaybta kaalmada adeegyada, syeda terrazas . So [facility] [phone].    ATENCIÓN: Si habla español, tiene a bill disposición servicios gratuitos de asistencia lingüística. Yumiko al [phone].    We comply with applicable federal civil rights laws and [place] laws. We do not discriminate on the basis of race, color, national origin, age, disability, sex, sexual orientation, or gender identity.            Thank you!     Thank you for choosing [facility]  for your care. Our goal is always to provide you with excellent care. Hearing back from our patients is one way we can continue to improve our services. Please take a few minutes to complete the written survey that you may receive in the mail after your visit with us. Thank you!             Your Updated Medication List - Protect others around you: Learn how to safely use, store and throw away your medicines at www.disposemymeds.org.          This list is accurate as of 6/29/18 11:24 AM.  Always use your most recent med list.                   Brand Name Dispense Instructions for use Diagnosis    aspirin 81 MG EC tablet    ASPIR-LOW    90 tablet    Take  1 tablet (81 mg) by mouth daily    Long-term (current) use of anticoagulants       atorvastatin 10 MG tablet    LIPITOR    90 tablet    TAKE 1 TABLET (10 MG) BY MOUTH DAILY    Hyperlipidemia LDL goal <130       furosemide 20 MG tablet    LASIX    90 tablet    TAKE 1 TABLET (20 MG) BY MOUTH DAILY    Cardiomyopathy, unspecified type (H), Atrial fibrillation, unspecified type (H)       GLUCOSAMINE CHOND COMPLEX/MSM Tabs      Take 1 tablet by mouth 2 times daily        HYDROcodone-acetaminophen 5-325 MG per tablet    NORCO    30 tablet    Take 1-2 tablets by mouth nightly as needed for severe pain    Primary osteoarthritis of both knees       lisinopril 20 MG tablet    PRINIVIL/ZESTRIL    90 tablet    Take 1 tablet (20 mg) by mouth daily    Chronic atrial fibrillation (H), Essential hypertension       metoprolol tartrate 100 MG tablet    LOPRESSOR    180 tablet    TAKE 1 TABLET (100 MG) BY MOUTH 2 TIMES DAILY    Essential hypertension       OMEGA-3 FISH OIL PO      Take 1,200 mg by mouth 2 times daily (with meals)        order for [facility]     1 Device    Equipment being ordered: cryo knee cuff and supplies for cold therapy of knees- bilateral    Primary osteoarthritis of both knees       sildenafil 50 MG tablet    VIAGRA    12 tablet    Take 0.5-1 tablets (25-50 mg) by mouth daily as needed Take 30 min to 4 hours before intercourse.  Never use with nitroglycerin, terazosin or doxazosin.    Erectile dysfunction, unspecified erectile dysfunction type       warfarin 2.5 MG tablet    COUMADIN    140 tablet    TAKE 2 TABS BY MOUTH DAILY ON MON. AND FRI. TAKE 1 TAB DAILY ON ALL OTHER DAYS AS DIRECTED BY INR    New onset atrial fibrillation (H)

## 2018-06-29 NOTE — NURSING NOTE
"Chief Complaint   Patient presents with     Physical       Initial BP (!) 151/95  Pulse 101  Temp 97.5  F (36.4  C) (Oral)  Resp 20  Ht 6' 5\" (1.956 m)  Wt (!) 337 lb (152.9 kg)  SpO2 96%  BMI 39.96 kg/m2 Estimated body mass index is 39.96 kg/(m^2) as calculated from the following:    Height as of this encounter: 6' 5\" (1.956 m).    Weight as of this encounter: 337 lb (152.9 kg).  Medication Reconciliation: complete     Kira Dan MA  "

## 2018-07-01 LAB — B BURGDOR IGG+IGM SER QL: 0.04 (ref 0–0.89)

## 2018-07-02 LAB — HIV 1+2 AB+HIV1 P24 AG SERPL QL IA: NONREACTIVE

## 2018-07-06 NOTE — PROGRESS NOTES
Ross Finch,    Thank you for your recent office visit.    Here are your recent results.  Normal lab results. Please schedule thyroid ultrasound. Follow up as needed.     Feel free to contact me via [employer] or call the clinic at [phone].    Sincerely,    VENECIA Yang, FNP-BC

## 2018-07-20 ENCOUNTER — ANTICOAGULATION THERAPY VISIT (OUTPATIENT)
Dept: NURSING | Facility: CLINIC | Age: 58
End: 2018-07-20
Payer: COMMERCIAL

## 2018-07-20 DIAGNOSIS — I48.91 NEW ONSET ATRIAL FIBRILLATION (H): ICD-10-CM

## 2018-07-20 DIAGNOSIS — Z79.01 LONG-TERM (CURRENT) USE OF ANTICOAGULANTS: ICD-10-CM

## 2018-07-20 LAB — INR POINT OF CARE: 1.6 (ref 0.86–1.14)

## 2018-07-20 PROCEDURE — 36416 COLLJ CAPILLARY BLOOD SPEC: CPT

## 2018-07-20 PROCEDURE — 99207 ZZC NO CHARGE NURSE ONLY: CPT

## 2018-07-20 PROCEDURE — 85610 PROTHROMBIN TIME: CPT | Mod: QW

## 2018-07-20 NOTE — MR AVS SNAPSHOT
Stef NEFF Nomi   7/20/2018 11:00 AM   Anticoagulation Therapy Visit    Description:  58 year old male   Provider:  VANESA ANTI COAG   Department:  Be Nurse           INR as of 7/20/2018     Today's INR 1.6!      Anticoagulation Summary as of 7/20/2018     INR goal 2.0-3.0   Today's INR 1.6!   Full warfarin instructions 7/20: 7.5 mg; Otherwise 5 mg on Mon, Fri; 2.5 mg all other days   Next INR check 8/3/2018    Indications   Long-term (current) use of anticoagulants [Z79.01] [Z79.01]  New onset atrial fibrillation (H) [I48.91]         Your next Anticoagulation Clinic appointment(s)     Aug 03, 2018 11:15 AM CDT   Anticoagulation Visit with BE ANTI COAG   [place] [facility] ([facility] [place])    [address]  [place] [address] [phone]              Contact Numbers     [facility]  Please call [phone] with any problems or questions regarding your therapy, or to cancel or reschedule your appointment.          July 2018 Details    Sun Mon Tue Wed Thu Fri Sat     1               2               3               4               5               6               7                 8               9               10               11               12               13               14                 15               16               17               18               19               20      7.5 mg   See details      21      2.5 mg           22      2.5 mg         23      5 mg         24      2.5 mg         25      2.5 mg         26      2.5 mg         27      5 mg         28      2.5 mg           29      2.5 mg         30      5 mg         31      2.5 mg              Date Details   07/20 This INR check               How to take your warfarin dose     To take:  2.5 mg Take 1 of the 2.5 mg tablets.    To take:  5 mg Take 2 of the 2.5 mg tablets.    To take:  7.5 mg Take 3 of the 2.5 mg tablets.           August 2018 Details    Sun Mon Tue Wed Thu Fri Sat        1      2.5 mg         2       2.5 mg         3            4                 5               6               7               8               9               10               11                 12               13               14               15               16               17               18                 19               20               21               22               23               24               25                 26               27               28               29               30               31                 Date Details   No additional details    Date of next INR:  8/3/2018         How to take your warfarin dose     To take:  2.5 mg Take 1 of the 2.5 mg tablets.    To take:  5 mg Take 2 of the 2.5 mg tablets.

## 2018-07-20 NOTE — PROGRESS NOTES
ANTICOAGULATION FOLLOW-UP CLINIC VISIT    Patient Name:  Stef Mosher  Date:  7/20/2018  Contact Type:  Face to Face    SUBJECTIVE:     Patient Findings     Positives Change in diet/appetite    Comments Patient has been on a [place] diet which is a low carb diet.  INR 1.6 will due bump and bring back in 2 weeks. If INR still low, will need to change maintenance.           OBJECTIVE    INR Protime   Date Value Ref Range Status   07/20/2018 1.6 (A) 0.86 - 1.14 Final       ASSESSMENT / PLAN  INR assessment SUB diet change   Recheck INR In: 2 WEEKS    INR Location Clinic      Anticoagulation Summary as of 7/20/2018     INR goal 2.0-3.0   Today's INR 1.6!   Warfarin maintenance plan 5 mg (2.5 mg x 2) on Mon, Fri; 2.5 mg (2.5 mg x 1) all other days   Full warfarin instructions 7/20: 7.5 mg; Otherwise 5 mg on Mon, Fri; 2.5 mg all other days   Weekly warfarin total 22.5 mg   Plan last modified Dahiana Barrera (6/20/2016)   Next INR check 8/3/2018   Target end date     Indications   Long-term (current) use of anticoagulants [Z79.01] [Z79.01]  New onset atrial fibrillation (H) [I48.91]         Anticoagulation Episode Summary     INR check location     Preferred lab     Send INR reminders to [facility]    Comments       Anticoagulation Care Providers     Provider Role Specialty Phone number    Sivakumar Kang PA-C Responsible Physician Assistant [phone]            See the Encounter Report to view Anticoagulation Flowsheet and Dosing Calendar (Go to Encounters tab in chart review, and find the Anticoagulation Therapy Visit)        Linnea Brooks RN

## 2018-08-03 ENCOUNTER — ANTICOAGULATION THERAPY VISIT (OUTPATIENT)
Dept: NURSING | Facility: CLINIC | Age: 58
End: 2018-08-03
Payer: COMMERCIAL

## 2018-08-03 DIAGNOSIS — I48.91 NEW ONSET ATRIAL FIBRILLATION (H): ICD-10-CM

## 2018-08-03 DIAGNOSIS — Z79.01 LONG-TERM (CURRENT) USE OF ANTICOAGULANTS: ICD-10-CM

## 2018-08-03 LAB — INR POINT OF CARE: 1.6 (ref 0.86–1.14)

## 2018-08-03 PROCEDURE — 85610 PROTHROMBIN TIME: CPT | Mod: QW

## 2018-08-03 PROCEDURE — 99207 ZZC NO CHARGE NURSE ONLY: CPT

## 2018-08-03 PROCEDURE — 36416 COLLJ CAPILLARY BLOOD SPEC: CPT

## 2018-08-03 NOTE — PROGRESS NOTES
ANTICOAGULATION FOLLOW-UP CLINIC VISIT    Patient Name:  Stef Mosher  Date:  8/3/2018  Contact Type:  Face to Face    SUBJECTIVE:     Patient Findings     Comments Two low INR's in row.  Will increase maintenance.           OBJECTIVE    INR Protime   Date Value Ref Range Status   08/03/2018 1.6 (A) 0.86 - 1.14 Final       ASSESSMENT / PLAN  INR assessment SUB maintenance changed   Recheck INR In: 10 DAYS    INR Location Clinic      Anticoagulation Summary as of 8/3/2018     INR goal 2.0-3.0   Today's INR 1.6!   Warfarin maintenance plan 5 mg (2.5 mg x 2) on Mon, Wed, Fri; 2.5 mg (2.5 mg x 1) all other days   Full warfarin instructions 8/3: 7.5 mg; Otherwise 5 mg on Mon, Wed, Fri; 2.5 mg all other days   Weekly warfarin total 25 mg   Plan last modified Linnea Brooks RN (8/3/2018)   Next INR check 8/14/2018   Target end date     Indications   Long-term (current) use of anticoagulants [Z79.01] [Z79.01]  New onset atrial fibrillation (H) [I48.91]         Anticoagulation Episode Summary     INR check location     Preferred lab     Send INR reminders to [facility]    Comments       Anticoagulation Care Providers     Provider Role Specialty Phone number    Sivakumar Kang PA-C Responsible Physician Assistant [phone]            See the Encounter Report to view Anticoagulation Flowsheet and Dosing Calendar (Go to Encounters tab in chart review, and find the Anticoagulation Therapy Visit)        Linnea Brooks RN

## 2018-08-03 NOTE — MR AVS SNAPSHOT
Stef Mosher   8/3/2018 11:15 AM   Anticoagulation Therapy Visit    Description:  58 year old male   Provider:  VANESA ANTI COAG   Department:  Be Nurse           INR as of 8/3/2018     Today's INR 1.6!      Anticoagulation Summary as of 8/3/2018     INR goal 2.0-3.0   Today's INR 1.6!   Full warfarin instructions 8/3: 7.5 mg; Otherwise 5 mg on Mon, Wed, Fri; 2.5 mg all other days   Next INR check 8/14/2018    Indications   Long-term (current) use of anticoagulants [Z79.01] [Z79.01]  New onset atrial fibrillation (H) [I48.91]         Your next Anticoagulation Clinic appointment(s)     Aug 14, 2018  1:30 PM CDT   Anticoagulation Visit with BE ANTI COAG   [facility] [place] ([facility])    [address]  [place] [address] [phone]              Contact Numbers     [facility]  Please call [phone] with any problems or questions regarding your therapy, or to cancel or reschedule your appointment.          August 2018 Details    Sun Mon Tue Wed Thu Fri Sat        1               2               3      7.5 mg   See details      4      2.5 mg           5      2.5 mg         6      5 mg         7      2.5 mg         8      5 mg         9      2.5 mg         10      5 mg         11      2.5 mg           12      2.5 mg         13      5 mg         14            15               16               17               18                 19               20               21               22               23               24               25                 26               27               28               29               30               31                 Date Details   08/03 This INR check       Date of next INR:  8/14/2018         How to take your warfarin dose     To take:  2.5 mg Take 1 of the 2.5 mg tablets.    To take:  5 mg Take 2 of the 2.5 mg tablets.    To take:  7.5 mg Take 3 of the 2.5 mg tablets.

## 2018-09-21 ENCOUNTER — TELEPHONE (OUTPATIENT)
Dept: NURSING | Facility: CLINIC | Age: 58
End: 2018-09-21

## 2018-09-21 ENCOUNTER — ANTICOAGULATION THERAPY VISIT (OUTPATIENT)
Dept: NURSING | Facility: CLINIC | Age: 58
End: 2018-09-21
Payer: COMMERCIAL

## 2018-09-21 DIAGNOSIS — I48.91 NEW ONSET ATRIAL FIBRILLATION (H): ICD-10-CM

## 2018-09-21 DIAGNOSIS — Z79.01 LONG-TERM (CURRENT) USE OF ANTICOAGULANTS: ICD-10-CM

## 2018-09-21 LAB — INR POINT OF CARE: 1.7 (ref 0.86–1.14)

## 2018-09-21 PROCEDURE — 85610 PROTHROMBIN TIME: CPT | Mod: QW

## 2018-09-21 PROCEDURE — 99207 ZZC NO CHARGE NURSE ONLY: CPT

## 2018-09-21 PROCEDURE — 36416 COLLJ CAPILLARY BLOOD SPEC: CPT

## 2018-09-21 RX ORDER — WARFARIN SODIUM 2.5 MG/1
TABLET ORAL
Qty: 140 TABLET | Refills: 3 | Status: SHIPPED | OUTPATIENT
Start: 2018-09-21 | End: 2019-09-13

## 2018-09-21 NOTE — MR AVS SNAPSHOT
Stef Mosher   9/21/2018 9:45 AM   Anticoagulation Therapy Visit    Description:  58 year old male   Provider:  VANESA ANTI COAG   Department:  Be Nurse           INR as of 9/21/2018     Today's INR 1.7!      Anticoagulation Summary as of 9/21/2018     INR goal 2.0-3.0   Today's INR 1.7!   Full warfarin instructions 2.5 mg on Sun, Tue, Thu; 5 mg all other days   Next INR check 10/12/2018    Indications   Long-term (current) use of anticoagulants [Z79.01] [Z79.01]  New onset atrial fibrillation (H) [I48.91]         Your next Anticoagulation Clinic appointment(s)     Sep 21, 2018  9:45 AM CDT   Anticoagulation Visit with BE ANTI COAG   [place] [facility] ([facility] [place])    [address]  [place] [address]   [phone]            Oct 12, 2018  1:00 PM CDT   Anticoagulation Visit with BE ANTI COAG   [place] [facility] ([facility] [place])    [address]  [place] [address]   [phone]              Contact Numbers     [facility]  Please call [phone] with any problems or questions regarding your therapy, or to cancel or reschedule your appointment.          September 2018 Details    Sun Mon Tue Wed Thu Fri Sat           1                 2               3               4               5               6               7               8                 9               10               11               12               13               14               15                 16               17               18               19               20               21      5 mg   See details      22      5 mg           23      2.5 mg         24      5 mg         25      2.5 mg         26      5 mg         27      2.5 mg         28      5 mg         29      5 mg           30      2.5 mg                Date Details   09/21 This INR check               How to take your warfarin dose     To take:  2.5 mg Take 1 of the 2.5 mg tablets.    To take:  5 mg Take 2 of the 2.5  mg tablets.           October 2018 Details    Sun Mon Tue Wed Thu Fri Sat      1      5 mg         2      2.5 mg         3      5 mg         4      2.5 mg         5      5 mg         6      5 mg           7      2.5 mg         8      5 mg         9      2.5 mg         10      5 mg         11      2.5 mg         12            13                 14               15               16               17               18               19               20                 21               22               23               24               25               26               27                 28               29               30               31                   Date Details   No additional details    Date of next INR:  10/12/2018         How to take your warfarin dose     To take:  2.5 mg Take 1 of the 2.5 mg tablets.    To take:  5 mg Take 2 of the 2.5 mg tablets.

## 2018-09-21 NOTE — PROGRESS NOTES
ANTICOAGULATION FOLLOW-UP CLINIC VISIT    Patient Name:  Stef Mosher  Date:  9/21/2018  Contact Type:  Face to Face    SUBJECTIVE:     Patient Findings     Comments INR 1.7.  Patient states the keto diet is a permanent change.  Will increase maintenance again and have him return in 2 weeks.  Patient's schedule is very busy so unable to return until 3 weeks.           OBJECTIVE    INR Protime   Date Value Ref Range Status   09/21/2018 1.7 (A) 0.86 - 1.14 Final       ASSESSMENT / PLAN  INR assessment SUB    Recheck INR In: 3 WEEKS advised 2 weeks   INR Location Clinic      Anticoagulation Summary as of 9/21/2018     INR goal 2.0-3.0   Today's INR 1.7!   Warfarin maintenance plan 2.5 mg (2.5 mg x 1) on Sun, Tue, Thu; 5 mg (2.5 mg x 2) all other days   Full warfarin instructions 2.5 mg on Sun, Tue, Thu; 5 mg all other days   Weekly warfarin total 27.5 mg   Plan last modified Linnea Brooks RN (9/21/2018)   Next INR check 10/12/2018   Target end date     Indications   Long-term (current) use of anticoagulants [Z79.01] [Z79.01]  New onset atrial fibrillation (H) [I48.91]         Anticoagulation Episode Summary     INR check location     Preferred lab     Send INR reminders to [facility]    Comments       Anticoagulation Care Providers     Provider Role Specialty Phone number    Sivakumar Kang PA-C Responsible Physician Assistant [phone]            See the Encounter Report to view Anticoagulation Flowsheet and Dosing Calendar (Go to Encounters tab in chart review, and find the Anticoagulation Therapy Visit)        Linnea Brooks RN

## 2018-09-21 NOTE — TELEPHONE ENCOUNTER
Patient's coumadin maintenance has changed.  Updated coumadin RX to reflex current maintenance and sent to pharmacy.

## 2018-09-25 ENCOUNTER — TELEPHONE (OUTPATIENT)
Dept: FAMILY MEDICINE | Facility: CLINIC | Age: 58
End: 2018-09-25

## 2018-09-25 NOTE — LETTER
October 29, 2018      Stef Mosher  [address] [address]        Dear Stef,       We have tried to contact you by phone several times.    We at [facility] are trying to provide the best care for our patients.     Upon your doctor reviewing your chart, it appears that you are due for your colonoscopy or FIT test(colon cancer screen).    We have taken the liberty of ordering these for you. The FIT test is enclosed with a return envelope.      Please call the clinic at [phone] to schedule your colonoscopy appointment or complete the enclosed FIT test and mail in.    If you have already had the above completed, please contact the clinic to have your chart updated.      Thank You,    [facility]

## 2018-09-25 NOTE — TELEPHONE ENCOUNTER
Panel Management Review  Summary:    Patient is due/failing the following:   Fit test    Type of outreach:    Sent MeetBall message.    Questions for provider review:    None                                                                                                                                    Kira Dan MA       Chart routed to Care Team .

## 2018-10-02 NOTE — TELEPHONE ENCOUNTER
Spoke with pt. He has it at home and will try to complete this week. Kira Dan MA      Patient is due/failing the following:   Fit test

## 2018-10-12 ENCOUNTER — ANTICOAGULATION THERAPY VISIT (OUTPATIENT)
Dept: NURSING | Facility: CLINIC | Age: 58
End: 2018-10-12
Payer: COMMERCIAL

## 2018-10-12 DIAGNOSIS — I48.91 NEW ONSET ATRIAL FIBRILLATION (H): ICD-10-CM

## 2018-10-12 LAB — INR POINT OF CARE: 2.2 (ref 0.86–1.14)

## 2018-10-12 PROCEDURE — 85610 PROTHROMBIN TIME: CPT | Mod: QW

## 2018-10-12 PROCEDURE — 36416 COLLJ CAPILLARY BLOOD SPEC: CPT

## 2018-10-12 PROCEDURE — 99207 ZZC NO CHARGE NURSE ONLY: CPT

## 2018-10-12 NOTE — PATIENT INSTRUCTIONS
[phone] INR clinic phone      Some signs and symptoms of bleeding include: Nose bleed or cut that does not stop bleeding in 10 minutes, bleeding of the gums, vomiting (will look like coffee grounds) or coughing up blood, unusual, easy or large areas of bruising, increased or unexpected  Vaginal bleeding or increased menstrual flow, red or black stools, red or orange urine, prolonged or severe headache, pale skin, unusual or constant tiredness.  If you have these please call 911 or seek medical care immediately.       Some signs and symptoms of clots include: pain or tenderness in arm or leg, swelling in arm or leg, changes in skin color, or area is warm to touch, shortness or breath, trouble breathing.  Numbness or weakness especially on 1 side of the body, sudden trouble speaking or swallowing, sudden trouble seeing, sudden confusion, dizzy spells or headache.  If you have these please call 911 or seek medical care immediately.

## 2018-10-12 NOTE — MR AVS SNAPSHOT
Stef NEFF Nomi   10/12/2018 1:00 PM   Anticoagulation Therapy Visit    Description:  58 year old male   Provider:  VANESA ANTI COADANIELLE   Department:  Be Nurse           INR as of 10/12/2018     Today's INR 2.2      Anticoagulation Summary as of 10/12/2018     INR goal 2.0-3.0   Today's INR 2.2   Full warfarin instructions 2.5 mg on Sun, Tue, Thu; 5 mg all other days   Next INR check 10/26/2018    Indications   Long-term (current) use of anticoagulants [Z79.01] [Z79.01]  New onset atrial fibrillation (H) [I48.91]         Instructions    [phone] INR clinic phone      Some signs and symptoms of bleeding include: Nose bleed or cut that does not stop bleeding in 10 minutes, bleeding of the gums, vomiting (will look like coffee grounds) or coughing up blood, unusual, easy or large areas of bruising, increased or unexpected  Vaginal bleeding or increased menstrual flow, red or black stools, red or orange urine, prolonged or severe headache, pale skin, unusual or constant tiredness.  If you have these please call 911 or seek medical care immediately.       Some signs and symptoms of clots include: pain or tenderness in arm or leg, swelling in arm or leg, changes in skin color, or area is warm to touch, shortness or breath, trouble breathing.  Numbness or weakness especially on 1 side of the body, sudden trouble speaking or swallowing, sudden trouble seeing, sudden confusion, dizzy spells or headache.  If you have these please call 911 or seek medical care immediately.           Your next Anticoagulation Clinic appointment(s)     Oct 26, 2018  3:15 PM CDT   Anticoagulation Visit with VANESA ANTI COAG   [place] [facility] ([facility] [place])    [address]  [place] [address] [phone]              Contact Numbers     [facility]  Please call [phone] with any problems or questions regarding your therapy, or to cancel or reschedule your appointment.          October 2018 Details    Sun Mon  Tue Wed Thu Fri Sat      1               2               3               4               5               6                 7               8               9               10               11               12      5 mg   See details      13      5 mg           14      2.5 mg         15      5 mg         16      2.5 mg         17      5 mg         18      2.5 mg         19      5 mg         20      5 mg           21      2.5 mg         22      5 mg         23      2.5 mg         24      5 mg         25      2.5 mg         26            27                 28               29               30               31                   Date Details   10/12 This INR check       Date of next INR:  10/26/2018         How to take your warfarin dose     To take:  2.5 mg Take 1 of the 2.5 mg tablets.    To take:  5 mg Take 2 of the 2.5 mg tablets.

## 2018-10-12 NOTE — PROGRESS NOTES
ANTICOAGULATION FOLLOW-UP CLINIC VISIT    Patient Name:  Stef Mosher  Date:  10/12/2018  Contact Type:  Face to Face    SUBJECTIVE:     Patient Findings     Positives Change in diet/appetite, No Problem Findings    Comments Still doing Keto diet, working well for him. INR 2.2, will continue same dosing and recheck in 2 weeks.           OBJECTIVE    INR Protime   Date Value Ref Range Status   10/12/2018 2.2 (A) 0.86 - 1.14 Final       ASSESSMENT / PLAN  No question data found.  Anticoagulation Summary as of 10/12/2018     INR goal 2.0-3.0   Today's INR 2.2   Warfarin maintenance plan 2.5 mg (2.5 mg x 1) on Sun, Tue, Thu; 5 mg (2.5 mg x 2) all other days   Full warfarin instructions 2.5 mg on Sun, Tue, Thu; 5 mg all other days   Weekly warfarin total 27.5 mg   No change documented Sandee Cormier RN   Plan last modified Linnea Brooks RN (9/21/2018)   Next INR check 10/26/2018   Target end date     Indications   Long-term (current) use of anticoagulants [Z79.01] [Z79.01]  New onset atrial fibrillation (H) [I48.91]         Anticoagulation Episode Summary     INR check location     Preferred lab     Send INR reminders to [facility]    Comments       Anticoagulation Care Providers     Provider Role Specialty Phone number    Sivakumar Kang PA-C Responsible Physician Assistant [phone]            See the Encounter Report to view Anticoagulation Flowsheet and Dosing Calendar (Go to Encounters tab in chart review, and find the Anticoagulation Therapy Visit)        Sandee Cormier RN

## 2018-11-02 ENCOUNTER — ANTICOAGULATION THERAPY VISIT (OUTPATIENT)
Dept: NURSING | Facility: CLINIC | Age: 58
End: 2018-11-02
Payer: COMMERCIAL

## 2018-11-02 DIAGNOSIS — I48.91 NEW ONSET ATRIAL FIBRILLATION (H): ICD-10-CM

## 2018-11-02 LAB — INR POINT OF CARE: 3.1 (ref 0.86–1.14)

## 2018-11-02 PROCEDURE — 36416 COLLJ CAPILLARY BLOOD SPEC: CPT

## 2018-11-02 PROCEDURE — 85610 PROTHROMBIN TIME: CPT | Mod: QW

## 2018-11-02 PROCEDURE — 99207 ZZC NO CHARGE NURSE ONLY: CPT

## 2018-11-02 NOTE — PROGRESS NOTES
ANTICOAGULATION FOLLOW-UP CLINIC VISIT    Patient Name:  Stef Mosher  Date:  11/2/2018  Contact Type:  Face to Face    SUBJECTIVE:     Patient Findings     Positives No Problem Findings           OBJECTIVE    INR Protime   Date Value Ref Range Status   11/02/2018 3.1 (A) 0.86 - 1.14 Final       ASSESSMENT / PLAN  INR assessment THER    Recheck INR In: 4 WEEKS    INR Location Clinic      Anticoagulation Summary as of 11/2/2018     INR goal 2.0-3.0   Today's INR 3.1!   Warfarin maintenance plan 2.5 mg (2.5 mg x 1) on Sun, Tue, Thu; 5 mg (2.5 mg x 2) all other days   Full warfarin instructions 2.5 mg on Sun, Tue, Thu; 5 mg all other days   Weekly warfarin total 27.5 mg   No change documented Dahiana Barrera last modified Linnea Brooks, RN (9/21/2018)   Next INR check 11/30/2018   Target end date     Indications   Long-term (current) use of anticoagulants [Z79.01] [Z79.01]  New onset atrial fibrillation (H) [I48.91]         Anticoagulation Episode Summary     INR check location     Preferred lab     Send INR reminders to [facility]    Comments       Anticoagulation Care Providers     Provider Role Specialty Phone number    Sivakumar Kang PA-C Responsible Physician Assistant [phone]            See the Encounter Report to view Anticoagulation Flowsheet and Dosing Calendar (Go to Encounters tab in chart review, and find the Anticoagulation Therapy Visit)    Dosage adjustment made based on physician directed care plan.    DAHIANA BARRERA

## 2018-11-02 NOTE — MR AVS SNAPSHOT
Stef Mosher   11/2/2018 10:45 AM   Anticoagulation Therapy Visit    Description:  58 year old male   Provider:  VANESA ANTI COAG   Department:  Be Nurse           INR as of 11/2/2018     Today's INR 3.1!      Anticoagulation Summary as of 11/2/2018     INR goal 2.0-3.0   Today's INR 3.1!   Full warfarin instructions 2.5 mg on Sun, Tue, Thu; 5 mg all other days   Next INR check 11/30/2018    Indications   Long-term (current) use of anticoagulants [Z79.01] [Z79.01]  New onset atrial fibrillation (H) [I48.91]         Your next Anticoagulation Clinic appointment(s)     Nov 30, 2018 11:00 AM CST   Anticoagulation Visit with BE ANTI COAG   [place] [facility]ass[facility] ([facility] [place])    [address]  [place] [address] [phone]              Contact Numbers     [facility]  Please call [phone] with any problems or questions regarding your therapy, or to cancel or reschedule your appointment.          November 2018 Details    Sun Mon Tue Wed Thu Fri Sat         1               2      5 mg   See details      3      5 mg           4      2.5 mg         5      5 mg         6      2.5 mg         7      5 mg         8      2.5 mg         9      5 mg         10      5 mg           11      2.5 mg         12      5 mg         13      2.5 mg         14      5 mg         15      2.5 mg         16      5 mg         17      5 mg           18      2.5 mg         19      5 mg         20      2.5 mg         21      5 mg         22      2.5 mg         23      5 mg         24      5 mg           25      2.5 mg         26      5 mg         27      2.5 mg         28      5 mg         29      2.5 mg         30              Date Details   11/02 This INR check       Date of next INR:  11/30/2018         How to take your warfarin dose     To take:  2.5 mg Take 1 of the 2.5 mg tablets.    To take:  5 mg Take 2 of the 2.5 mg tablets.

## 2018-11-30 ENCOUNTER — ANTICOAGULATION THERAPY VISIT (OUTPATIENT)
Dept: NURSING | Facility: CLINIC | Age: 58
End: 2018-11-30
Payer: COMMERCIAL

## 2018-11-30 DIAGNOSIS — I48.91 NEW ONSET ATRIAL FIBRILLATION (H): ICD-10-CM

## 2018-11-30 LAB — INR POINT OF CARE: 4 (ref 0.86–1.14)

## 2018-11-30 PROCEDURE — 85610 PROTHROMBIN TIME: CPT | Mod: QW

## 2018-11-30 PROCEDURE — 36416 COLLJ CAPILLARY BLOOD SPEC: CPT

## 2018-11-30 PROCEDURE — 99207 ZZC NO CHARGE NURSE ONLY: CPT

## 2018-11-30 NOTE — MR AVS SNAPSHOT
Stef Leetino   11/30/2018 3:00 PM   Anticoagulation Therapy Visit    Description:  58 year old male   Provider:  VANESA ANTI COAG   Department:  Be Nurse           INR as of 11/30/2018     Today's INR 4.0!      Anticoagulation Summary as of 11/30/2018     INR goal 2.0-3.0   Today's INR 4.0!   Full warfarin instructions 12/1: Hold; 12/8: 2.5 mg; Otherwise 5 mg on Mon, Wed, Fri; 2.5 mg all other days   Next INR check 12/19/2018    Indications   Long-term (current) use of anticoagulants [Z79.01] [Z79.01]  New onset atrial fibrillation (H) [I48.91]         Description     Patient already took today's dose of coumadin will hold tomorrow's dose,      Your next Anticoagulation Clinic appointment(s)     Dec 19, 2018 10:30 AM CST   Anticoagulation Visit with BE ANTI COAG   [facility] [place] ([facility])    [address]  [place] [address] [phone]              Contact Numbers     [facility]  Please call [phone] with any problems or questions regarding your therapy, or to cancel or reschedule your appointment.          November 2018 Details    Sun Mon Tue Wed Thu Fri Sat         1               2               3                 4               5               6               7               8               9               10                 11               12               13               14               15               16               17                 18               19               20               21               22               23               24                 25               26               27               28               29               30      5 mg   See details        Date Details   11/30 This INR check               How to take your warfarin dose     To take:  5 mg Take 2 of the 2.5 mg tablets.           December 2018 Details    Sun Mon Tue Wed Thu Fri Sat           1      Hold           2      2.5 mg         3      5 mg         4      2.5 mg          5      5 mg         6      2.5 mg         7      5 mg         8      2.5 mg           9      2.5 mg         10      5 mg         11      2.5 mg         12      5 mg         13      2.5 mg         14      5 mg         15      2.5 mg           16      2.5 mg         17      5 mg         18      2.5 mg         19            20               21               22                 23               24               25               26               27               28               29                 30               31                     Date Details   No additional details    Date of next INR:  12/19/2018         How to take your warfarin dose     To take:  2.5 mg Take 1 of the 2.5 mg tablets.    To take:  5 mg Take 2 of the 2.5 mg tablets.    Hold Do not take your warfarin dose. See the Details table to the right for additional instructions.

## 2018-11-30 NOTE — PROGRESS NOTES
ANTICOAGULATION FOLLOW-UP CLINIC VISIT    Patient Name:  Stef Mosher  Date:  11/30/2018  Contact Type:  Face to Face    SUBJECTIVE:     Patient Findings     Positives OTC meds, Inflammation    Comments INR 4.0 today.  Last check was 3.1.  Patient still doing the Keto diet.  Patient stated he has been using a lot of Ibuprofen due to dina knee arthritic pain and will be using more Ibuprofen than usual throughout the winter.   Will do hold and change maintenance and bring pt back in 2 weeks.           OBJECTIVE    INR Protime   Date Value Ref Range Status   11/30/2018 4.0 (A) 0.86 - 1.14 Final       ASSESSMENT / PLAN  INR assessment SUPRA    Recheck INR In: 2 WEEKS    INR Location Clinic      Anticoagulation Summary as of 11/30/2018     INR goal 2.0-3.0   Today's INR 4.0!   Warfarin maintenance plan 5 mg (2.5 mg x 2) on Mon, Wed, Fri; 2.5 mg (2.5 mg x 1) all other days   Full warfarin instructions 12/1: Hold; 12/8: 2.5 mg; Otherwise 5 mg on Mon, Wed, Fri; 2.5 mg all other days   Weekly warfarin total 25 mg   Plan last modified Linnea Brooks RN (11/30/2018)   Next INR check 12/19/2018   Target end date     Indications   Long-term (current) use of anticoagulants [Z79.01] [Z79.01]  New onset atrial fibrillation (H) [I48.91]         Anticoagulation Episode Summary     INR check location     Preferred lab     Send INR reminders to [facility]    Comments       Anticoagulation Care Providers     Provider Role Specialty Phone number    Sivakumar Kang PA-C Responsible Physician Assistant [phone]            See the Encounter Report to view Anticoagulation Flowsheet and Dosing Calendar (Go to Encounters tab in chart review, and find the Anticoagulation Therapy Visit)    Dosage adjustment made based on physician directed care plan.    Linnea Brooks RN

## 2018-12-19 ENCOUNTER — ANTICOAGULATION THERAPY VISIT (OUTPATIENT)
Dept: NURSING | Facility: CLINIC | Age: 58
End: 2018-12-19
Payer: COMMERCIAL

## 2018-12-19 DIAGNOSIS — I48.91 NEW ONSET ATRIAL FIBRILLATION (H): ICD-10-CM

## 2018-12-19 LAB — INR POINT OF CARE: 3.1 (ref 0.86–1.14)

## 2018-12-19 PROCEDURE — 85610 PROTHROMBIN TIME: CPT | Mod: QW

## 2018-12-19 PROCEDURE — 99207 ZZC NO CHARGE NURSE ONLY: CPT

## 2018-12-19 PROCEDURE — 36416 COLLJ CAPILLARY BLOOD SPEC: CPT

## 2018-12-19 NOTE — PROGRESS NOTES
ANTICOAGULATION FOLLOW-UP CLINIC VISIT    Patient Name:  Stef Mosher  Date:  12/19/2018  Contact Type:  Face to Face    SUBJECTIVE:     Patient Findings     Positives:   No Problem Findings, Unexplained INR or factor level change    Comments:   INR 3.1, second high in a row.  Decreased maintenance down by 10% and recheck due in 2 weeks. Pt declining stating that he cannot come back until January 9th.           OBJECTIVE    INR Protime   Date Value Ref Range Status   12/19/2018 3.1 (A) 0.86 - 1.14 Final       ASSESSMENT / PLAN  INR assessment SUPRA    Recheck INR In: 2 WEEKS patient unable to return until 1/9   INR Location Clinic      Anticoagulation Summary  As of 12/19/2018    INR goal:   2.0-3.0   TTR:   68.6 % (2.9 y)   INR used for dosing:   3.1! (12/19/2018)   Warfarin maintenance plan:   5 mg (2.5 mg x 2) every Mon, Wed; 2.5 mg (2.5 mg x 1) all other days   Full warfarin instructions:   5 mg every Mon, Wed; 2.5 mg all other days   Weekly warfarin total:   22.5 mg   Plan last modified:   Sandee Cormier RN (12/19/2018)   Next INR check:   1/9/2019   Target end date:       Indications    Long-term (current) use of anticoagulants [Z79.01] [Z79.01]  New onset atrial fibrillation (H) [I48.91]             Anticoagulation Episode Summary     INR check location:       Preferred lab:       Send INR reminders to:   [facility]    Comments:         Anticoagulation Care Providers     Provider Role Specialty Phone number    Sivakumar Kang PA-C Responsible Physician Assistant [phone]            See the Encounter Report to view Anticoagulation Flowsheet and Dosing Calendar (Go to Encounters tab in chart review, and find the Anticoagulation Therapy Visit)    Dosage adjustment made based on physician directed care plan.    Sandee Cormier RN

## 2019-01-09 ENCOUNTER — ANTICOAGULATION THERAPY VISIT (OUTPATIENT)
Dept: NURSING | Facility: CLINIC | Age: 59
End: 2019-01-09
Payer: COMMERCIAL

## 2019-01-09 DIAGNOSIS — I48.91 NEW ONSET ATRIAL FIBRILLATION (H): ICD-10-CM

## 2019-01-09 LAB — INR POINT OF CARE: 2.5 (ref 0.86–1.14)

## 2019-01-09 PROCEDURE — 99207 ZZC NO CHARGE NURSE ONLY: CPT

## 2019-01-09 PROCEDURE — 85610 PROTHROMBIN TIME: CPT | Mod: QW

## 2019-01-09 PROCEDURE — 36416 COLLJ CAPILLARY BLOOD SPEC: CPT

## 2019-01-09 NOTE — PROGRESS NOTES
ANTICOAGULATION FOLLOW-UP CLINIC VISIT    Patient Name:  Stef Mosher  Date:  2019  Contact Type:  Face to Face    SUBJECTIVE:     Patient Findings     Positives:   No Problem Findings           OBJECTIVE    INR Protime   Date Value Ref Range Status   2019 2.5 (A) 0.86 - 1.14 Final       ASSESSMENT / PLAN  INR assessment THER    Recheck INR In: 4 WEEKS    INR Location Clinic      Anticoagulation Summary  As of 2019    INR goal:   2.0-3.0   TTR:   68.9 % (2.9 y)   INR used for dosin.5 (2019)   Warfarin maintenance plan:   5 mg (2.5 mg x 2) every Mon, Wed; 2.5 mg (2.5 mg x 1) all other days   Full warfarin instructions:   5 mg every Mon, Wed; 2.5 mg all other days   Weekly warfarin total:   22.5 mg   No change documented:   Dahiana Barrera   Plan last modified:   Sandee Cormier RN (2018)   Next INR check:   2019   Target end date:       Indications    Long-term (current) use of anticoagulants [Z79.01] [Z79.01]  New onset atrial fibrillation (H) [I48.91]             Anticoagulation Episode Summary     INR check location:       Preferred lab:       Send INR reminders to:   [facility]    Comments:         Anticoagulation Care Providers     Provider Role Specialty Phone number    Sivakumar Kang PA-C Responsible Physician Assistant [phone]            See the Encounter Report to view Anticoagulation Flowsheet and Dosing Calendar (Go to Encounters tab in chart review, and find the Anticoagulation Therapy Visit)        DAHIANA BARRERA

## 2019-02-08 ENCOUNTER — ANTICOAGULATION THERAPY VISIT (OUTPATIENT)
Dept: NURSING | Facility: CLINIC | Age: 59
End: 2019-02-08
Payer: COMMERCIAL

## 2019-02-08 DIAGNOSIS — I48.91 NEW ONSET ATRIAL FIBRILLATION (H): ICD-10-CM

## 2019-02-08 LAB — INR POINT OF CARE: 2.3 (ref 0.86–1.14)

## 2019-02-08 PROCEDURE — 85610 PROTHROMBIN TIME: CPT | Mod: QW

## 2019-02-08 PROCEDURE — 99207 ZZC NO CHARGE NURSE ONLY: CPT

## 2019-02-08 PROCEDURE — 36416 COLLJ CAPILLARY BLOOD SPEC: CPT

## 2019-02-08 NOTE — PROGRESS NOTES
ANTICOAGULATION FOLLOW-UP CLINIC VISIT    Patient Name:  Stef Mosher  Date:  2019  Contact Type:  Face to Face    SUBJECTIVE:     Patient Findings     Positives:   No Problem Findings           OBJECTIVE    INR Protime   Date Value Ref Range Status   2019 2.3 (A) 0.86 - 1.14 Final       ASSESSMENT / PLAN  INR assessment THER    Recheck INR In: 5 WEEKS    INR Location Clinic      Anticoagulation Summary  As of 2019    INR goal:   2.0-3.0   TTR:   69.8 % (3 y)   INR used for dosin.3 (2019)   Warfarin maintenance plan:   5 mg (2.5 mg x 2) every Mon, Wed; 2.5 mg (2.5 mg x 1) all other days   Full warfarin instructions:   5 mg every Mon, Wed; 2.5 mg all other days   Weekly warfarin total:   22.5 mg   No change documented:   Linnea Brooks RN   Plan last modified:   Sandee Cormier RN (2018)   Next INR check:   3/15/2019   Target end date:       Indications    Long-term (current) use of anticoagulants [Z79.01] [Z79.01]  New onset atrial fibrillation (H) [I48.91]             Anticoagulation Episode Summary     INR check location:       Preferred lab:       Send INR reminders to:   [facility]    Comments:         Anticoagulation Care Providers     Provider Role Specialty Phone number    Sivakumar Kang PA-C Responsible Physician Assistant [phone]            See the Encounter Report to view Anticoagulation Flowsheet and Dosing Calendar (Go to Encounters tab in chart review, and find the Anticoagulation Therapy Visit)    Dosage adjustment made based on physician directed care plan.    Linnea Brooks RN

## 2019-03-26 ENCOUNTER — TELEPHONE (OUTPATIENT)
Dept: FAMILY MEDICINE | Facility: CLINIC | Age: 59
End: 2019-03-26

## 2019-03-26 DIAGNOSIS — Z12.11 SPECIAL SCREENING FOR MALIGNANT NEOPLASMS, COLON: Primary | ICD-10-CM

## 2019-03-26 NOTE — LETTER
April 15, 2019      Stef Mosher  [address] [address] [facility] [id number]        Dear Stef,     We at [facility] are trying to provide the best care for our patients.     Upon your doctor reviewing your chart, it appears that you are due for your colonoscopy or FIT test(colon cancer screen).    We have taken the liberty of ordering these for you. The FIT test is enclosed with a return envelope.      Please call the clinic at [phone] to schedule your colonoscopy appointment or complete the enclosed FIT test and mail in.    If you have already had the above completed, please contact the clinic to have your chart updated.      Thank You,    [facility]

## 2019-03-26 NOTE — TELEPHONE ENCOUNTER
Panel Management Review  Summary:    Patient is due/failing the following:   Fit test    Physical and fasting labs in June    Type of outreach:    Sent [employer] message. and pt also has INR appt 3/27/19=added to appointment note    Questions for provider review:    None                                                                                                                                    Kira Dan MA       Chart routed to Care Team .

## 2019-03-27 ENCOUNTER — ANTICOAGULATION THERAPY VISIT (OUTPATIENT)
Dept: NURSING | Facility: CLINIC | Age: 59
End: 2019-03-27
Payer: COMMERCIAL

## 2019-03-27 DIAGNOSIS — I48.91 NEW ONSET ATRIAL FIBRILLATION (H): ICD-10-CM

## 2019-03-27 LAB — INR POINT OF CARE: 1.3 (ref 0.86–1.14)

## 2019-03-27 PROCEDURE — 99207 ZZC NO CHARGE NURSE ONLY: CPT

## 2019-03-27 PROCEDURE — 36416 COLLJ CAPILLARY BLOOD SPEC: CPT

## 2019-03-27 PROCEDURE — 85610 PROTHROMBIN TIME: CPT | Mod: QW

## 2019-03-27 NOTE — PATIENT INSTRUCTIONS
Take one extra pill today (since you have already taken two pills today) and tomorrow take 2 pills then resume your normal maintenance: 5 mg every Monday, Wednesday and 2.5 mg all other days

## 2019-03-27 NOTE — PROGRESS NOTES
ANTICOAGULATION FOLLOW-UP CLINIC VISIT    Patient Name:  Stef Mosher  Date:  3/27/2019  Contact Type:  Face to Face    SUBJECTIVE:     Patient Findings     Positives:   Change in diet/appetite (patient reporting eating spring mix salad lately)    Comments:   INR 1.3    Discussed keeping greens intake consistent and that we modify Coumadin dosing based on INR. Given moderate bump over 2 days with recheck of INR in 2 weeks.  Patient denies any signs or symptoms of bleeding or clotting. Patient denies any medication changes or changes to health.            OBJECTIVE    INR Protime   Date Value Ref Range Status   2019 1.3 (A) 0.86 - 1.14 Final       ASSESSMENT / PLAN  INR assessment SUB    Recheck INR In: 2 WEEKS    INR Location Clinic      Anticoagulation Summary  As of 3/27/2019    INR goal:   2.0-3.0   TTR:   68.1 % (3.1 y)   INR used for dosin.3! (3/27/2019)   Warfarin maintenance plan:   5 mg (2.5 mg x 2) every Mon, Wed; 2.5 mg (2.5 mg x 1) all other days   Full warfarin instructions:   3/27: 7.5 mg; 3/28: 5 mg; Otherwise 5 mg every Mon, Wed; 2.5 mg all other days   Weekly warfarin total:   22.5 mg   Plan last modified:   Sandee Cormier RN (2018)   Next INR check:   2019   Target end date:       Indications    Long-term (current) use of anticoagulants [Z79.01] [Z79.01]  New onset atrial fibrillation (H) [I48.91]             Anticoagulation Episode Summary     INR check location:       Preferred lab:       Send INR reminders to:   [facility]    Comments:         Anticoagulation Care Providers     Provider Role Specialty Phone number    Sivakumar Kang PA-C Responsible Physician Assistant [phone]            See the Encounter Report to view Anticoagulation Flowsheet and Dosing Calendar (Go to Encounters tab in chart review, and find the Anticoagulation Therapy Visit)    Dosage adjustment made based on physician directed care plan.    Sandee Cormier RN

## 2019-04-12 ENCOUNTER — ANTICOAGULATION THERAPY VISIT (OUTPATIENT)
Dept: NURSING | Facility: CLINIC | Age: 59
End: 2019-04-12
Payer: COMMERCIAL

## 2019-04-12 DIAGNOSIS — I48.91 NEW ONSET ATRIAL FIBRILLATION (H): ICD-10-CM

## 2019-04-12 LAB — INR POINT OF CARE: 1.9 (ref 0.86–1.14)

## 2019-04-12 PROCEDURE — 36416 COLLJ CAPILLARY BLOOD SPEC: CPT

## 2019-04-12 PROCEDURE — 85610 PROTHROMBIN TIME: CPT | Mod: QW

## 2019-04-12 PROCEDURE — 99207 ZZC NO CHARGE NURSE ONLY: CPT

## 2019-04-12 NOTE — PROGRESS NOTES
ANTICOAGULATION FOLLOW-UP CLINIC VISIT    Patient Name:  Stef Mosher  Date:  2019  Contact Type:  Face to Face    SUBJECTIVE:     Patient Findings     Comments:   INR 1.9    Patient denies any signs or symptoms of bleeding or clotting. Patient denies any diet changes, medication changes or changes to health. No change made to Coumadin medication regimen and follow up due in 4 weeks, pt stating unable to return until 5 weeks.             OBJECTIVE    INR Protime   Date Value Ref Range Status   2019 1.9 (A) 0.86 - 1.14 Final       ASSESSMENT / PLAN  INR assessment SUB    Recheck INR In: 4 WEEKS pt stating unable to return until 5 weeks   INR Location Clinic      Anticoagulation Summary  As of 2019    INR goal:   2.0-3.0   TTR:   67.2 % (3.2 y)   INR used for dosin.9! (2019)   Warfarin maintenance plan:   5 mg (2.5 mg x 2) every Mon, Wed; 2.5 mg (2.5 mg x 1) all other days   Full warfarin instructions:   5 mg every Mon, Wed; 2.5 mg all other days   Weekly warfarin total:   22.5 mg   No change documented:   Sandee Cormier RN   Plan last modified:   Sandee Cormier RN (2018)   Next INR check:   2019   Target end date:       Indications    Long-term (current) use of anticoagulants [Z79.01] [Z79.01]  New onset atrial fibrillation (H) [I48.91]             Anticoagulation Episode Summary     INR check location:       Preferred lab:       Send INR reminders to:   [facility]    Comments:         Anticoagulation Care Providers     Provider Role Specialty Phone number    Sivakumar Kang PA-C Responsible Physician Assistant [phone]            See the Encounter Report to view Anticoagulation Flowsheet and Dosing Calendar (Go to Encounters tab in chart review, and find the Anticoagulation Therapy Visit)        Sandee Cormier RN

## 2019-05-07 NOTE — TELEPHONE ENCOUNTER
Left message for patient to call back [employer] hotline([phone])    Patient is due/failing the following:   Fit test

## 2019-05-17 ENCOUNTER — ANTICOAGULATION THERAPY VISIT (OUTPATIENT)
Dept: NURSING | Facility: CLINIC | Age: 59
End: 2019-05-17
Payer: COMMERCIAL

## 2019-05-17 DIAGNOSIS — I48.91 NEW ONSET ATRIAL FIBRILLATION (H): ICD-10-CM

## 2019-05-17 LAB — INR POINT OF CARE: 2.1 (ref 0.86–1.14)

## 2019-05-17 PROCEDURE — 36416 COLLJ CAPILLARY BLOOD SPEC: CPT

## 2019-05-17 PROCEDURE — 85610 PROTHROMBIN TIME: CPT | Mod: QW

## 2019-05-17 PROCEDURE — 99207 ZZC NO CHARGE NURSE ONLY: CPT

## 2019-05-17 NOTE — PROGRESS NOTES
ANTICOAGULATION FOLLOW-UP CLINIC VISIT    Patient Name:  Stef Mosher  Date:  2019  Contact Type:  Face to Face    SUBJECTIVE:  Patient Findings     Comments:   No problems        Clinical Outcomes     Comments:   No problems           OBJECTIVE    INR Protime   Date Value Ref Range Status   2019 2.1 (A) 0.86 - 1.14 Final       ASSESSMENT / PLAN  INR assessment THER    Recheck INR In: 3 WEEKS    INR Location Clinic      Anticoagulation Summary  As of 2019    INR goal:   2.0-3.0   TTR:   66.7 % (3.3 y)   INR used for dosin.1 (2019)   Warfarin maintenance plan:   5 mg (2.5 mg x 2) every Mon, Wed; 2.5 mg (2.5 mg x 1) all other days   Full warfarin instructions:   5 mg every Mon, Wed; 2.5 mg all other days   Weekly warfarin total:   22.5 mg   No change documented:   Dahiana Barrera   Plan last modified:   Sandee Cormier RN (2018)   Next INR check:   2019   Target end date:       Indications    Long-term (current) use of anticoagulants [Z79.01] [Z79.01]  New onset atrial fibrillation (H) [I48.91]             Anticoagulation Episode Summary     INR check location:       Preferred lab:       Send INR reminders to:   [facility]    Comments:         Anticoagulation Care Providers     Provider Role Specialty Phone number    Sivakumar Kang PA-C Responsible Physician Assistant [phone]            See the Encounter Report to view Anticoagulation Flowsheet and Dosing Calendar (Go to Encounters tab in chart review, and find the Anticoagulation Therapy Visit)    Will return in 3 weeks due to his schedule    DAHIANA BARRERA

## 2019-06-07 ENCOUNTER — ANTICOAGULATION THERAPY VISIT (OUTPATIENT)
Dept: NURSING | Facility: CLINIC | Age: 59
End: 2019-06-07
Payer: COMMERCIAL

## 2019-06-07 DIAGNOSIS — I48.91 NEW ONSET ATRIAL FIBRILLATION (H): ICD-10-CM

## 2019-06-07 LAB — INR POINT OF CARE: 2.5 (ref 0.86–1.14)

## 2019-06-07 PROCEDURE — 85610 PROTHROMBIN TIME: CPT | Mod: QW

## 2019-06-07 PROCEDURE — 99207 ZZC NO CHARGE NURSE ONLY: CPT

## 2019-06-07 PROCEDURE — 36416 COLLJ CAPILLARY BLOOD SPEC: CPT

## 2019-06-07 NOTE — PROGRESS NOTES
ANTICOAGULATION FOLLOW-UP CLINIC VISIT    Patient Name:  Stef Mosher  Date:  2019  Contact Type:  Face to Face    SUBJECTIVE:  Patient Findings     Comments:   No Problems        Clinical Outcomes     Comments:   No Problems           OBJECTIVE    INR Protime   Date Value Ref Range Status   2019 2.5 (A) 0.86 - 1.14 Final       ASSESSMENT / PLAN  INR assessment THER    Recheck INR In: 4 WEEKS    INR Location Clinic      Anticoagulation Summary  As of 2019    INR goal:   2.0-3.0   TTR:   67.3 % (3.3 y)   INR used for dosin.5 (2019)   Warfarin maintenance plan:   5 mg (2.5 mg x 2) every Mon, Wed; 2.5 mg (2.5 mg x 1) all other days   Full warfarin instructions:   5 mg every Mon, Wed; 2.5 mg all other days   Weekly warfarin total:   22.5 mg   No change documented:   Linnea Brooks RN   Plan last modified:   Sandee Cormier RN (2018)   Next INR check:   7/10/2019   Target end date:       Indications    Long-term (current) use of anticoagulants [Z79.01] [Z79.01]  New onset atrial fibrillation (H) [I48.91]             Anticoagulation Episode Summary     INR check location:       Preferred lab:       Send INR reminders to:   [facility]    Comments:         Anticoagulation Care Providers     Provider Role Specialty Phone number    Sivakumar Kang PA-C Responsible Physician Assistant [phone]            See the Encounter Report to view Anticoagulation Flowsheet and Dosing Calendar (Go to Encounters tab in chart review, and find the Anticoagulation Therapy Visit)    Dosage adjustment made based on physician directed care plan.    Linnea Brooks RN

## 2019-06-21 DIAGNOSIS — Z79.01 LONG TERM CURRENT USE OF ANTICOAGULANT THERAPY: ICD-10-CM

## 2019-06-21 DIAGNOSIS — I10 ESSENTIAL HYPERTENSION: ICD-10-CM

## 2019-06-21 DIAGNOSIS — I48.20 CHRONIC ATRIAL FIBRILLATION (H): ICD-10-CM

## 2019-06-21 RX ORDER — LISINOPRIL 20 MG/1
TABLET ORAL
Qty: 30 TABLET | Refills: 0 | Status: SHIPPED | OUTPATIENT
Start: 2019-06-21 | End: 2019-08-07

## 2019-06-21 NOTE — TELEPHONE ENCOUNTER
"Requested Prescriptions   Pending Prescriptions Disp Refills     lisinopril (PRINIVIL/ZESTRIL) 20 MG tablet [Pharmacy Med Name: LISINOPRIL 20 MG TABLET]  Last Written Prescription Date:  03/28/19  Last Fill Quantity: 90,  # refills: 3   Last office visit: 6/29/2018 with prescribing provider:  MALIA Roberts   Future Office Visit:     90 tablet 3     Sig: TAKE 1 TABLET BY MOUTH EVERY DAY       ACE Inhibitors (Including Combos) Protocol Passed - 6/21/2019  1:04 AM        Passed - Blood pressure under 140/90 in past 12 months     BP Readings from Last 3 Encounters:   06/29/18 124/84   10/23/17 (!) 140/99   09/18/17 (!) 146/102                 Passed - Recent (12 mo) or future (30 days) visit within the authorizing provider's specialty     Patient had office visit in the last 12 months or has a visit in the next 30 days with authorizing provider or within the authorizing provider's specialty.  See \"Patient Info\" tab in inbasket, or \"Choose Columns\" in Meds & Orders section of the refill encounter.              Passed - Medication is active on med list        Passed - Patient is age 18 or older        Passed - Normal serum creatinine on file in past 12 months     Recent Labs   Lab Test 06/29/18  1129   CR 0.91             Passed - Normal serum potassium on file in past 12 months     Recent Labs   Lab Test 06/29/18  1129   POTASSIUM 4.3             aspirin (ASA) 81 MG EC tablet [Pharmacy Med Name: ASPIRIN EC 81 MG TABLET]  Last Written Prescription Date:  03/28/19  Last Fill Quantity: 90,  # refills: 3   Last office visit: 6/29/2018 with prescribing provider:  MALIA Roberts   Future Office Visit:     90 tablet 3     Sig: TAKE 1 TABLET BY MOUTH EVERY DAY       Analgesics (Non-Narcotic Tylenol and ASA Only) Passed - 6/21/2019  1:04 AM        Passed - Recent (12 mo) or future (30 days) visit within the authorizing provider's specialty     Patient had office visit in the last 12 months or has a visit in the next 30 days with " "authorizing provider or within the authorizing provider's specialty.  See \"Patient Info\" tab in inbasket, or \"Choose Columns\" in Meds & Orders section of the refill encounter.              Passed - Patient is age 20 years or older     If ASA is flagged for ages under 20 years old. Forward to provider for confirmation Ryes Syndrome is not a concern.              Passed - Medication is active on med list          "

## 2019-06-21 NOTE — TELEPHONE ENCOUNTER
Patient due for annual physical.   30 day larissa period fill pended for provider approval with reminder to patient to schedule appointment for further refills.   Please advise on larissa refill.     Anny Medley RN, BSN, PHN

## 2019-07-10 ENCOUNTER — TELEPHONE (OUTPATIENT)
Dept: FAMILY MEDICINE | Facility: CLINIC | Age: 59
End: 2019-07-10

## 2019-07-10 ENCOUNTER — ANTICOAGULATION THERAPY VISIT (OUTPATIENT)
Dept: NURSING | Facility: CLINIC | Age: 59
End: 2019-07-10
Payer: COMMERCIAL

## 2019-07-10 DIAGNOSIS — I48.91 NEW ONSET ATRIAL FIBRILLATION (H): ICD-10-CM

## 2019-07-10 DIAGNOSIS — I48.91 ATRIAL FIBRILLATION, UNSPECIFIED TYPE (H): ICD-10-CM

## 2019-07-10 DIAGNOSIS — Z79.01 LONG TERM CURRENT USE OF ANTICOAGULANT THERAPY: Primary | ICD-10-CM

## 2019-07-10 DIAGNOSIS — Z79.01 LONG TERM CURRENT USE OF ANTICOAGULANT THERAPY: ICD-10-CM

## 2019-07-10 LAB — INR POINT OF CARE: 3.8 (ref 0.86–1.14)

## 2019-07-10 PROCEDURE — 36416 COLLJ CAPILLARY BLOOD SPEC: CPT

## 2019-07-10 PROCEDURE — 85610 PROTHROMBIN TIME: CPT | Mod: QW

## 2019-07-10 PROCEDURE — 99207 ZZC NO CHARGE NURSE ONLY: CPT

## 2019-07-10 NOTE — TELEPHONE ENCOUNTER
Annual INR referral due.   RN pended referral for provider approval.     Anny Medley, RN, BSN, PHN

## 2019-07-10 NOTE — PROGRESS NOTES
ANTICOAGULATION FOLLOW-UP CLINIC VISIT    Patient Name:  Stef Mosher  Date:  7/10/2019  Contact Type:  Face to Face    SUBJECTIVE:  Patient Findings     Comments:   Patient denies any identifiable changes that caused the supra therapeutic INR.   Patient states he maybe has cut back on greens when he is traveling - but no changes to medication or diet.   RN educated on food to avoid (garlic, ginseng, diogenes & alphalpha) advised to eat more cabbage, salads, and green vegetables.   Will recheck INR in 1 week.            Clinical Outcomes     Negatives:   Major bleeding event, Thromboembolic event, Anticoagulation-related hospital admission, Anticoagulation-related ED visit, Anticoagulation-related fatality    Comments:   Patient denies any identifiable changes that caused the supra therapeutic INR.   Patient states he maybe has cut back on greens when he is traveling - but no changes to medication or diet.   RN educated on food to avoid (garlic, ginseng, diogenes & alphalpha) advised to eat more cabbage, salads, and green vegetables.   Will recheck INR in 1 week.               OBJECTIVE    INR Protime   Date Value Ref Range Status   07/10/2019 3.8 (A) 0.86 - 1.14 Final       ASSESSMENT / PLAN  INR assessment SUPRA    Recheck INR In: 1 WEEK    INR Location Clinic      Anticoagulation Summary  As of 7/10/2019    INR goal:   2.0-3.0   TTR:   66.5 % (3.4 y)   INR used for dosing:   3.8! (7/10/2019)   Warfarin maintenance plan:   5 mg (2.5 mg x 2) every Mon, Wed; 2.5 mg (2.5 mg x 1) all other days   Full warfarin instructions:   7/11: Hold; Otherwise 5 mg every Mon, Wed; 2.5 mg all other days   Weekly warfarin total:   22.5 mg   Plan last modified:   Sandee Cormier RN (12/19/2018)   Next INR check:   7/19/2019   Target end date:       Indications    Long-term (current) use of anticoagulants [Z79.01] [Z79.01]  New onset atrial fibrillation (H) [I48.91]             Anticoagulation Episode Summary     INR check location:        Preferred lab:       Send INR reminders to:   BRIANA RODRIGUEZ    Comments:         Anticoagulation Care Providers     Provider Role Specialty Phone number    Sivakumar Kang PA-C Responsible Physician Assistant [phone]            See the Encounter Report to view Anticoagulation Flowsheet and Dosing Calendar (Go to Encounters tab in chart review, and find the Anticoagulation Therapy Visit)    Dosage adjustment made based on physician directed care plan.    Anny Medley RN

## 2019-07-13 DIAGNOSIS — E78.5 HYPERLIPIDEMIA LDL GOAL <130: ICD-10-CM

## 2019-07-13 DIAGNOSIS — I10 ESSENTIAL HYPERTENSION: ICD-10-CM

## 2019-07-15 RX ORDER — METOPROLOL TARTRATE 100 MG
TABLET ORAL
Qty: 60 TABLET | Refills: 0 | Status: SHIPPED | OUTPATIENT
Start: 2019-07-15 | End: 2019-08-15

## 2019-07-15 RX ORDER — ATORVASTATIN CALCIUM 10 MG/1
TABLET, FILM COATED ORAL
Qty: 30 TABLET | Refills: 0 | Status: SHIPPED | OUTPATIENT
Start: 2019-07-15 | End: 2019-08-15

## 2019-07-15 NOTE — TELEPHONE ENCOUNTER
Routing refill request to provider for review/approval because:  Patient needs to be seen because it has been more than 1 year since last office visit.    Failed Protocol.    Both medications pended for 30 day supply with an appointment reminder.

## 2019-08-02 ENCOUNTER — ANTICOAGULATION THERAPY VISIT (OUTPATIENT)
Dept: NURSING | Facility: CLINIC | Age: 59
End: 2019-08-02
Payer: COMMERCIAL

## 2019-08-02 DIAGNOSIS — Z79.01 LONG TERM CURRENT USE OF ANTICOAGULANT THERAPY: ICD-10-CM

## 2019-08-02 DIAGNOSIS — I48.91 NEW ONSET ATRIAL FIBRILLATION (H): ICD-10-CM

## 2019-08-02 LAB — INR POINT OF CARE: 2.7 (ref 0.86–1.14)

## 2019-08-02 PROCEDURE — 85610 PROTHROMBIN TIME: CPT | Mod: QW

## 2019-08-02 PROCEDURE — 99207 ZZC NO CHARGE NURSE ONLY: CPT

## 2019-08-02 PROCEDURE — 36416 COLLJ CAPILLARY BLOOD SPEC: CPT

## 2019-08-02 NOTE — PROGRESS NOTES
ANTICOAGULATION FOLLOW-UP CLINIC VISIT    Patient Name:  Stef Mosher  Date:  2019  Contact Type:  Face to Face    SUBJECTIVE:  Patient Findings     Comments:   No problems.          Clinical Outcomes     Comments:   No problems.             OBJECTIVE    INR Protime   Date Value Ref Range Status   2019 2.7 (A) 0.86 - 1.14 Final       ASSESSMENT / PLAN  INR assessment THER    Recheck INR In: 5 WEEKS unable to do 4 weeks out of town   INR Location Clinic      Anticoagulation Summary  As of 2019    INR goal:   2.0-3.0   TTR:   65.8 % (3.5 y)   INR used for dosin.7 (2019)   Warfarin maintenance plan:   5 mg (2.5 mg x 2) every Mon, Wed; 2.5 mg (2.5 mg x 1) all other days   Full warfarin instructions:   5 mg every Mon, Wed; 2.5 mg all other days   Weekly warfarin total:   22.5 mg   No change documented:   Linnea Brooks RN   Plan last modified:   Sandee Cormier RN (2018)   Next INR check:   2019   Target end date:       Indications    Long-term (current) use of anticoagulants [Z79.01] [Z79.01]  New onset atrial fibrillation (H) [I48.91]             Anticoagulation Episode Summary     INR check location:       Preferred lab:       Send INR reminders to:   BRIANA RODRIGUEZ    Comments:         Anticoagulation Care Providers     Provider Role Specialty Phone number    Sivakumar Kang PA-C Responsible Physician Assistant [phone]            See the Encounter Report to view Anticoagulation Flowsheet and Dosing Calendar (Go to Encounters tab in chart review, and find the Anticoagulation Therapy Visit)    Dosage adjustment made based on physician directed care plan.    Linnea Brooks RN

## 2019-08-05 DIAGNOSIS — Z79.01 LONG TERM CURRENT USE OF ANTICOAGULANT THERAPY: ICD-10-CM

## 2019-08-05 DIAGNOSIS — I48.20 CHRONIC ATRIAL FIBRILLATION (H): ICD-10-CM

## 2019-08-05 DIAGNOSIS — I10 ESSENTIAL HYPERTENSION: ICD-10-CM

## 2019-08-05 NOTE — TELEPHONE ENCOUNTER
Requested Prescriptions   Pending Prescriptions Disp Refills     lisinopril (PRINIVIL/ZESTRIL) 20 MG tablet 30 tablet 0     Sig: Take 1 tablet (20 mg) by mouth daily   Last Written Prescription Date:  6-21-19  Last Fill Quantity: 30,  # refills: 0   Last office visit: 6/29/2018 with prescribing provider:  6-29-18   Future Office Visit:      There is no refill protocol information for this order        aspirin (ASA) 81 MG EC tablet 30 tablet 0     Sig: Take 1 tablet (81 mg) by mouth daily   Last Written Prescription Date:  6-21-19  Last Fill Quantity: 30,  # refills: 0   Last office visit: 6/29/2018 with prescribing provider:  6-29-18   Future Office Visit:      There is no refill protocol information for this order

## 2019-08-06 NOTE — TELEPHONE ENCOUNTER
Routing refill request to provider for review/approval because:  Dorcas given x1 and patient did not follow up  Labs out of range/not current:  Blood pressures not <140/90  Patient needs to be seen because:  Last OV 6/29/18.    Anny Medley, RN, BSN, PHN

## 2019-08-07 RX ORDER — LISINOPRIL 20 MG/1
20 TABLET ORAL DAILY
Qty: 30 TABLET | Refills: 0 | Status: SHIPPED | OUTPATIENT
Start: 2019-08-07 | End: 2019-08-31

## 2019-08-31 DIAGNOSIS — I10 ESSENTIAL HYPERTENSION: ICD-10-CM

## 2019-08-31 DIAGNOSIS — Z79.01 LONG TERM CURRENT USE OF ANTICOAGULANT THERAPY: ICD-10-CM

## 2019-08-31 DIAGNOSIS — I48.20 CHRONIC ATRIAL FIBRILLATION (H): ICD-10-CM

## 2019-09-03 NOTE — TELEPHONE ENCOUNTER
Routing refill request to provider for review/approval because:  Dorcas given x2 and patient did not follow up  Labs out of range/not current:  Blood pressures not <140/90  Patient needs to be seen because:  Last OV 6/29/18.     Anny Medley, RN, BSN, PHN

## 2019-09-03 NOTE — TELEPHONE ENCOUNTER
"Requested Prescriptions   Pending Prescriptions Disp Refills     aspirin (ASA) 81 MG chewable tablet [Pharmacy Med Name: ASPIRIN 81 MG CHEWABLE TABLET] 30 tablet 0     Sig: TAKE 1 TABLET BY MOUTH EVERY DAY       Analgesics (Non-Narcotic Tylenol and ASA Only) Passed - 8/31/2019 10:33 AM        Passed - Recent (12 mo) or future (30 days) visit within the authorizing provider's specialty     Patient had office visit in the last 12 months or has a visit in the next 30 days with authorizing provider or within the authorizing provider's specialty.  See \"Patient Info\" tab in inbasket, or \"Choose Columns\" in Meds & Orders section of the refill encounter.              Passed - Patient is age 20 years or older     If ASA is flagged for ages under 20 years old. Forward to provider for confirmation Ryes Syndrome is not a concern.              Passed - Medication is active on med list        lisinopril (PRINIVIL/ZESTRIL) 20 MG tablet [Pharmacy Med Name: LISINOPRIL 20 MG TABLET] 30 tablet 0     Sig: TAKE 1 TABLET BY MOUTH EVERY DAY       ACE Inhibitors (Including Combos) Protocol Failed - 8/31/2019 10:33 AM        Failed - Blood pressure under 140/90 in past 12 months     BP Readings from Last 3 Encounters:   06/29/18 124/84   10/23/17 (!) 140/99   09/18/17 (!) 146/102                 Failed - Normal serum creatinine on file in past 12 months     Recent Labs   Lab Test 06/29/18  1129   CR 0.91             Failed - Normal serum potassium on file in past 12 months     Recent Labs   Lab Test 06/29/18  1129   POTASSIUM 4.3             Passed - Recent (12 mo) or future (30 days) visit within the authorizing provider's specialty     Patient had office visit in the last 12 months or has a visit in the next 30 days with authorizing provider or within the authorizing provider's specialty.  See \"Patient Info\" tab in inbasket, or \"Choose Columns\" in Meds & Orders section of the refill encounter.              Passed - Medication is active on " med list        Passed - Patient is age 18 or older        Last Written Prescription Date:  8/7/19  Last Fill Quantity: 30,  # refills: 0   Last office visit: 6/29/2018 with prescribing provider:  Josephine Roberts     Future Office Visit:

## 2019-09-05 RX ORDER — ASPIRIN 81 MG/1
TABLET, CHEWABLE ORAL
Qty: 30 TABLET | Refills: 0 | Status: SHIPPED | OUTPATIENT
Start: 2019-09-05 | End: 2019-10-06

## 2019-09-05 RX ORDER — LISINOPRIL 20 MG/1
TABLET ORAL
Qty: 30 TABLET | Refills: 0 | Status: SHIPPED | OUTPATIENT
Start: 2019-09-05 | End: 2019-09-13

## 2019-09-06 ENCOUNTER — ANTICOAGULATION THERAPY VISIT (OUTPATIENT)
Dept: NURSING | Facility: CLINIC | Age: 59
End: 2019-09-06
Payer: COMMERCIAL

## 2019-09-06 DIAGNOSIS — I48.91 NEW ONSET ATRIAL FIBRILLATION (H): ICD-10-CM

## 2019-09-06 DIAGNOSIS — Z79.01 LONG TERM CURRENT USE OF ANTICOAGULANT THERAPY: ICD-10-CM

## 2019-09-06 LAB — INR POINT OF CARE: 2.7 (ref 0.86–1.14)

## 2019-09-06 PROCEDURE — 36416 COLLJ CAPILLARY BLOOD SPEC: CPT

## 2019-09-06 PROCEDURE — 85610 PROTHROMBIN TIME: CPT | Mod: QW

## 2019-09-06 PROCEDURE — 99207 ZZC NO CHARGE NURSE ONLY: CPT

## 2019-09-06 NOTE — PROGRESS NOTES
ANTICOAGULATION FOLLOW-UP CLINIC VISIT    Patient Name:  Stef Mosher  Date:  2019  Contact Type:  Face to Face    SUBJECTIVE:  Patient Findings     Comments:   The patient was assessed for diet, medication, and activity level changes, missed or extra doses, bruising or bleeding, with no problem findings.          Clinical Outcomes     Negatives:   Major bleeding event, Thromboembolic event, Anticoagulation-related hospital admission, Anticoagulation-related ED visit, Anticoagulation-related fatality    Comments:   The patient was assessed for diet, medication, and activity level changes, missed or extra doses, bruising or bleeding, with no problem findings.             OBJECTIVE    INR Protime   Date Value Ref Range Status   2019 2.7 (A) 0.86 - 1.14 Final       ASSESSMENT / PLAN  INR assessment THER    Recheck INR In: 6 WEEKS    INR Location Clinic      Anticoagulation Summary  As of 2019    INR goal:   2.0-3.0   TTR:   66.7 % (3.6 y)   INR used for dosin.7 (2019)   Warfarin maintenance plan:   5 mg (2.5 mg x 2) every Mon, Wed; 2.5 mg (2.5 mg x 1) all other days   Full warfarin instructions:   5 mg every Mon, Wed; 2.5 mg all other days   Weekly warfarin total:   22.5 mg   No change documented:   Anny Medley RN   Plan last modified:   Sandee Cormier RN (2018)   Next INR check:   10/18/2019   Target end date:       Indications    Long-term (current) use of anticoagulants [Z79.01] [Z79.01]  New onset atrial fibrillation (H) [I48.91]             Anticoagulation Episode Summary     INR check location:       Preferred lab:       Send INR reminders to:   BRIANA RODRIGUEZ    Comments:         Anticoagulation Care Providers     Provider Role Specialty Phone number    Sivakumar Kang PA-C Responsible Physician Assistant [phone]            See the Encounter Report to view Anticoagulation Flowsheet and Dosing Calendar (Go to Encounters tab in chart review, and find the  Anticoagulation Therapy Visit)    Dosage adjustment made based on physician directed care plan.    Anny Rome, RN, BSN, PHN

## 2019-09-09 DIAGNOSIS — Z79.01 LONG TERM CURRENT USE OF ANTICOAGULANT THERAPY: ICD-10-CM

## 2019-09-09 DIAGNOSIS — I48.20 CHRONIC ATRIAL FIBRILLATION (H): ICD-10-CM

## 2019-09-09 DIAGNOSIS — I10 ESSENTIAL HYPERTENSION: ICD-10-CM

## 2019-09-09 NOTE — TELEPHONE ENCOUNTER
"Requested Prescriptions   Pending Prescriptions Disp Refills     lisinopril (PRINIVIL/ZESTRIL) 20 MG tablet [Pharmacy Med Name: LISINOPRIL 20 MG TABLET] 30 tablet 0     Sig: TAKE 1 TABLET BY MOUTH EVERY DAY   Last Written Prescription Date:  8-8-19  Last Fill Quantity: 30,  # refills: 0   Last office visit: 6/29/2018 with prescribing provider:  6-29-18   Future Office Visit:   Next 5 appointments (look out 90 days)    Sep 13, 2019  1:00 PM CDT  Office Visit with Sivakumar Kang PA-C  [facility] ([facility]) [address] [facility] [address]  [phone]           ACE Inhibitors (Including Combos) Protocol Failed - 9/9/2019 10:03 AM        Failed - Blood pressure under 140/90 in past 12 months     BP Readings from Last 3 Encounters:   06/29/18 124/84   10/23/17 (!) 140/99   09/18/17 (!) 146/102                 Failed - Normal serum creatinine on file in past 12 months     Recent Labs   Lab Test 06/29/18  1129   CR 0.91             Failed - Normal serum potassium on file in past 12 months     Recent Labs   Lab Test 06/29/18  1129   POTASSIUM 4.3             Passed - Recent (12 mo) or future (30 days) visit within the authorizing provider's specialty     Patient had office visit in the last 12 months or has a visit in the next 30 days with authorizing provider or within the authorizing provider's specialty.  See \"Patient Info\" tab in inbasket, or \"Choose Columns\" in Meds & Orders section of the refill encounter.              Passed - Medication is active on med list        Passed - Patient is age 18 or older        aspirin (ASA) 81 MG chewable tablet [Pharmacy Med Name: ASPIRIN 81 MG CHEWABLE TABLET] 30 tablet 0     Sig: TAKE 1 TABLET BY MOUTH EVERY DAY   Last Written Prescription Date:  8-8-19  Last Fill Quantity: 30,  # refills: 0   Last office visit: 6/29/2018 with prescribing provider:  6-29-18   Future Office Visit:   Next 5 appointments (look out 90 days)    Sep 13, " "2019  1:00 PM CDT  Office Visit with Sivakumar Kang PA-C  [facility] ([facility]) [address]  [place] [address] [phone]           Analgesics (Non-Narcotic Tylenol and ASA Only) Passed - 9/9/2019 10:03 AM        Passed - Recent (12 mo) or future (30 days) visit within the authorizing provider's specialty     Patient had office visit in the last 12 months or has a visit in the next 30 days with authorizing provider or within the authorizing provider's specialty.  See \"Patient Info\" tab in inbasket, or \"Choose Columns\" in Meds & Orders section of the refill encounter.              Passed - Patient is age 20 years or older     If ASA is flagged for ages under 20 years old. Forward to provider for confirmation Ryes Syndrome is not a concern.              Passed - Medication is active on med list        "

## 2019-09-10 RX ORDER — ASPIRIN 81 MG/1
TABLET, CHEWABLE ORAL
Qty: 30 TABLET | Refills: 0 | OUTPATIENT
Start: 2019-09-10

## 2019-09-10 RX ORDER — LISINOPRIL 20 MG/1
TABLET ORAL
Qty: 30 TABLET | Refills: 0 | OUTPATIENT
Start: 2019-09-10

## 2019-09-13 ENCOUNTER — OFFICE VISIT (OUTPATIENT)
Dept: FAMILY MEDICINE | Facility: CLINIC | Age: 59
End: 2019-09-13
Payer: COMMERCIAL

## 2019-09-13 VITALS
BODY MASS INDEX: 44.1 KG/M2 | SYSTOLIC BLOOD PRESSURE: 130 MMHG | DIASTOLIC BLOOD PRESSURE: 84 MMHG | WEIGHT: 291 LBS | HEART RATE: 92 BPM | OXYGEN SATURATION: 94 % | RESPIRATION RATE: 18 BRPM | HEIGHT: 68 IN | TEMPERATURE: 97.8 F

## 2019-09-13 DIAGNOSIS — I42.9 CARDIOMYOPATHY, UNSPECIFIED TYPE (H): ICD-10-CM

## 2019-09-13 DIAGNOSIS — I48.91 NEW ONSET ATRIAL FIBRILLATION (H): ICD-10-CM

## 2019-09-13 DIAGNOSIS — I48.20 CHRONIC ATRIAL FIBRILLATION (H): Primary | ICD-10-CM

## 2019-09-13 DIAGNOSIS — E66.01 MORBID OBESITY (H): ICD-10-CM

## 2019-09-13 DIAGNOSIS — E04.1 THYROID NODULE: ICD-10-CM

## 2019-09-13 DIAGNOSIS — Z12.11 SPECIAL SCREENING FOR MALIGNANT NEOPLASMS, COLON: ICD-10-CM

## 2019-09-13 DIAGNOSIS — I10 ESSENTIAL HYPERTENSION: ICD-10-CM

## 2019-09-13 DIAGNOSIS — E78.5 HYPERLIPIDEMIA LDL GOAL <130: ICD-10-CM

## 2019-09-13 LAB
ALBUMIN SERPL-MCNC: 3.8 G/DL (ref 3.4–5)
ALP SERPL-CCNC: 97 U/L (ref 40–150)
ALT SERPL W P-5'-P-CCNC: 31 U/L (ref 0–70)
ANION GAP SERPL CALCULATED.3IONS-SCNC: 5 MMOL/L (ref 3–14)
AST SERPL W P-5'-P-CCNC: 17 U/L (ref 0–45)
BASOPHILS # BLD AUTO: 0 10E9/L (ref 0–0.2)
BASOPHILS NFR BLD AUTO: 0.3 %
BILIRUB SERPL-MCNC: 0.9 MG/DL (ref 0.2–1.3)
BUN SERPL-MCNC: 15 MG/DL (ref 7–30)
CALCIUM SERPL-MCNC: 9 MG/DL (ref 8.5–10.1)
CHLORIDE SERPL-SCNC: 109 MMOL/L (ref 94–109)
CO2 SERPL-SCNC: 29 MMOL/L (ref 20–32)
CREAT SERPL-MCNC: 0.77 MG/DL (ref 0.66–1.25)
DIFFERENTIAL METHOD BLD: NORMAL
EOSINOPHIL # BLD AUTO: 0.1 10E9/L (ref 0–0.7)
EOSINOPHIL NFR BLD AUTO: 1.5 %
ERYTHROCYTE [DISTWIDTH] IN BLOOD BY AUTOMATED COUNT: 13.5 % (ref 10–15)
GFR SERPL CREATININE-BSD FRML MDRD: >90 ML/MIN/{1.73_M2}
GLUCOSE SERPL-MCNC: 107 MG/DL (ref 70–99)
HCT VFR BLD AUTO: 43.9 % (ref 40–53)
HGB BLD-MCNC: 14.8 G/DL (ref 13.3–17.7)
LYMPHOCYTES # BLD AUTO: 0.9 10E9/L (ref 0.8–5.3)
LYMPHOCYTES NFR BLD AUTO: 11.8 %
MCH RBC QN AUTO: 33 PG (ref 26.5–33)
MCHC RBC AUTO-ENTMCNC: 33.7 G/DL (ref 31.5–36.5)
MCV RBC AUTO: 98 FL (ref 78–100)
MONOCYTES # BLD AUTO: 0.7 10E9/L (ref 0–1.3)
MONOCYTES NFR BLD AUTO: 9.8 %
NEUTROPHILS # BLD AUTO: 5.8 10E9/L (ref 1.6–8.3)
NEUTROPHILS NFR BLD AUTO: 76.6 %
PLATELET # BLD AUTO: 211 10E9/L (ref 150–450)
POTASSIUM SERPL-SCNC: 4.7 MMOL/L (ref 3.4–5.3)
PROT SERPL-MCNC: 7.2 G/DL (ref 6.8–8.8)
RBC # BLD AUTO: 4.49 10E12/L (ref 4.4–5.9)
SODIUM SERPL-SCNC: 143 MMOL/L (ref 133–144)
TSH SERPL DL<=0.005 MIU/L-ACNC: 0.77 MU/L (ref 0.4–4)
WBC # BLD AUTO: 7.5 10E9/L (ref 4–11)

## 2019-09-13 PROCEDURE — 85025 COMPLETE CBC W/AUTO DIFF WBC: CPT | Performed by: PHYSICIAN ASSISTANT

## 2019-09-13 PROCEDURE — 99214 OFFICE O/P EST MOD 30 MIN: CPT | Performed by: PHYSICIAN ASSISTANT

## 2019-09-13 PROCEDURE — 80053 COMPREHEN METABOLIC PANEL: CPT | Performed by: PHYSICIAN ASSISTANT

## 2019-09-13 PROCEDURE — 84443 ASSAY THYROID STIM HORMONE: CPT | Performed by: PHYSICIAN ASSISTANT

## 2019-09-13 PROCEDURE — 36415 COLL VENOUS BLD VENIPUNCTURE: CPT | Performed by: PHYSICIAN ASSISTANT

## 2019-09-13 RX ORDER — METOPROLOL TARTRATE 100 MG
TABLET ORAL
Qty: 180 TABLET | Refills: 1 | Status: SHIPPED | OUTPATIENT
Start: 2019-09-13 | End: 2020-03-18

## 2019-09-13 RX ORDER — WARFARIN SODIUM 2.5 MG/1
TABLET ORAL
Qty: 140 TABLET | Refills: 3 | Status: SHIPPED | OUTPATIENT
Start: 2019-09-13 | End: 2020-09-15

## 2019-09-13 RX ORDER — LISINOPRIL 20 MG/1
20 TABLET ORAL DAILY
Qty: 90 TABLET | Refills: 1 | Status: SHIPPED | OUTPATIENT
Start: 2019-09-13 | End: 2020-03-11

## 2019-09-13 RX ORDER — ATORVASTATIN CALCIUM 10 MG/1
TABLET, FILM COATED ORAL
Qty: 90 TABLET | Refills: 1 | Status: SHIPPED | OUTPATIENT
Start: 2019-09-13 | End: 2020-03-18

## 2019-09-13 ASSESSMENT — MIFFLIN-ST. JEOR: SCORE: 2109.47

## 2019-09-13 NOTE — PROGRESS NOTES
Subjective     Stef Mosher is a 59 year old male who presents to clinic today for the following health issues:    HPI   Hyperlipidemia Follow-Up      Are you having any of the following symptoms? (Select all that apply)  No complaints of shortness of breath, chest pain or pressure.  No increased sweating or nausea with activity.  No left-sided neck or arm pain.  No complaints of pain in calves when walking 1-2 blocks.    Are you regularly taking any medication or supplement to lower your cholesterol?   Yes- lipitor 10mg daily    Are you having muscle aches or other side effects that you think could be caused by your cholesterol lowering medication?  No    Recheck of a fib: asymptomatic   Hypertension Follow-up      Do you check your blood pressure regularly outside of the clinic? No     Are you following a low salt diet? No    Are your blood pressures ever more than 140 on the top number (systolic) OR more   than 90 on the bottom number (diastolic), for example 140/90? Yes      How many servings of fruits and vegetables do you eat daily?  0-1    On average, how many sweetened beverages do you drink each day (soda, juice, sweet tea, etc)?   1  How many days per week do you miss taking your medication? 1    What makes it hard for you to take your medications?  remembering to take      No chest pain/sob/palps. No dizziness.   no irritative/obst voiding symptoms.       No Known Allergies  BP Readings from Last 3 Encounters:   09/13/19 130/84   06/29/18 124/84   10/23/17 (!) 140/99    Wt Readings from Last 3 Encounters:   09/13/19 132 kg (291 lb)   06/29/18 (!) 152.9 kg (337 lb)   09/18/17 (!) 155.1 kg (342 lb)                      Reviewed and updated as needed this visit by Provider         Review of Systems   ROS COMP: Constitutional, HEENT, cardiovascular, pulmonary, GI, , musculoskeletal, neuro, skin, endocrine and psych systems are negative, except as otherwise noted.      Objective    /84   Pulse 92    "Temp 97.8  F (36.6  C) (Tympanic)   Resp 18   Ht 1.727 m (5' 8\")   Wt 132 kg (291 lb)   SpO2 94%   BMI 44.25 kg/m    Body mass index is 44.25 kg/m .  Physical Exam   Eye exam - right eye normal lid, conjunctiva, cornea, pupil and fundus, left eye normal lid, conjunctiva, cornea, pupil and fundus.  Thyroid exam reveals uninodular goiter not involving the left lobe according to patient his right low has been removed due to a large benign nodule. .    CHEST:chest clear to IPPA, no tachypnea, retractions or cyanosis and Heart exam detail:S1, S2 normal, no murmur, click, rub or gallop, , irregularly irregular rhythm, chest is clear without rales or wheezing, no pedal edema, no JVD, no hepatosplenomegaly.    Foot exam - both sides normal; no swelling, tenderness or skin or vascular lesions. Color and temperature is normal. Sensation is intact. Peripheral pulses are palpable. Toenails are normal.    Stef was seen today for hypertension.    Diagnoses and all orders for this visit:    CHF (congestive heart failure) (H)    Chronic atrial fibrillation (H)  -     lisinopril (PRINIVIL/ZESTRIL) 20 MG tablet; Take 1 tablet (20 mg) by mouth daily  -     Comprehensive metabolic panel (BMP + Alb, Alk Phos, ALT, AST, Total. Bili, TP)    Essential hypertension  -     lisinopril (PRINIVIL/ZESTRIL) 20 MG tablet; Take 1 tablet (20 mg) by mouth daily  -     CBC with platelets and differential  -     metoprolol tartrate (LOPRESSOR) 100 MG tablet; TAKE 1 TABLET (100 MG) BY MOUTH 2 TIMES DAILY    Hyperlipidemia LDL goal <130  -     atorvastatin (LIPITOR) 10 MG tablet; TAKE 1 TABLET BY MOUTH EVERY DAY    Special screening for malignant neoplasms, colon  -     Fecal colorectal cancer screen (FIT); Future    New onset atrial fibrillation (H)  -     TSH  -     warfarin ANTICOAGULANT (COUMADIN) 2.5 MG tablet; 2.5 mg on Sun, Tue, Thu; 5 mg all other days or as directed by INR clinic.    Cardiomyopathy, unspecified type (H)      work on " lifestyle modification  Recheck in 6 mos

## 2019-10-05 DIAGNOSIS — I42.9 CARDIOMYOPATHY, UNSPECIFIED TYPE (H): ICD-10-CM

## 2019-10-05 DIAGNOSIS — I48.91 ATRIAL FIBRILLATION, UNSPECIFIED TYPE (H): ICD-10-CM

## 2019-10-06 DIAGNOSIS — Z79.01 LONG TERM CURRENT USE OF ANTICOAGULANT THERAPY: ICD-10-CM

## 2019-10-07 RX ORDER — ASPIRIN 81 MG
TABLET,CHEWABLE ORAL
Qty: 90 TABLET | Refills: 3 | Status: SHIPPED | OUTPATIENT
Start: 2019-10-07 | End: 2020-09-17

## 2019-10-07 NOTE — TELEPHONE ENCOUNTER
"Requested Prescriptions   Pending Prescriptions Disp Refills     ASPIRIN LOW DOSE 81 MG chewable tablet [Pharmacy Med Name: ASPIRIN 81 MG CHEWABLE TABLET] 30 tablet 0     Sig: TAKE 1 TABLET BY MOUTH EVERY DAY   Last Written Prescription Date:  9-10-19  Last Fill Quantity: 30,  # refills: 0   Last office visit: 9/13/2019 with prescribing provider:  9-13-19   Future Office Visit:      Analgesics (Non-Narcotic Tylenol and ASA Only) Passed - 10/6/2019  8:58 AM        Passed - Recent (12 mo) or future (30 days) visit within the authorizing provider's specialty     Patient has had an office visit with the authorizing provider or a provider within the authorizing providers department within the previous 12 mos or has a future within next 30 days. See \"Patient Info\" tab in inbasket, or \"Choose Columns\" in Meds & Orders section of the refill encounter.              Passed - Patient is age 20 years or older     If ASA is flagged for ages under 20 years old. Forward to provider for confirmation Ryes Syndrome is not a concern.              Passed - Medication is active on med list        "

## 2019-10-07 NOTE — TELEPHONE ENCOUNTER
Routing refill request to provider for review/approval because:  Drug interaction warning - warfarin  Patient seen on 9/13/19 for follow up.     Anny Rome, RN, BSN, PHN

## 2019-10-07 NOTE — TELEPHONE ENCOUNTER
"Requested Prescriptions   Pending Prescriptions Disp Refills     furosemide (LASIX) 20 MG tablet [Pharmacy Med Name: FUROSEMIDE 20 MG TABLET]  Last Written Prescription Date:  09/10/19  Last Fill Quantity: 30,  # refills: 0   Last office visit: 9/13/2019 with prescribing provider:  FERNANDO Kang   Future Office Visit:     30 tablet 0     Sig: TAKE 1 TABLET BY MOUTH EVERY DAY NEED OFFICE VISIT FOR MORE REFILLS.       Diuretics (Including Combos) Protocol Passed - 10/5/2019  8:44 AM        Passed - Blood pressure under 140/90 in past 12 months     BP Readings from Last 3 Encounters:   09/13/19 130/84   06/29/18 124/84   10/23/17 (!) 140/99                 Passed - Recent (12 mo) or future (30 days) visit within the authorizing provider's specialty     Patient has had an office visit with the authorizing provider or a provider within the authorizing providers department within the previous 12 mos or has a future within next 30 days. See \"Patient Info\" tab in inbasket, or \"Choose Columns\" in Meds & Orders section of the refill encounter.              Passed - Medication is active on med list        Passed - Patient is age 18 or older        Passed - Normal serum creatinine on file in past 12 months     Recent Labs   Lab Test 09/13/19  1336   CR 0.77              Passed - Normal serum potassium on file in past 12 months     Recent Labs   Lab Test 09/13/19  1336   POTASSIUM 4.7                    Passed - Normal serum sodium on file in past 12 months     Recent Labs   Lab Test 09/13/19  1336                   "

## 2019-10-08 NOTE — TELEPHONE ENCOUNTER
Patient seen on 9-13-19.  No mention of continuing Lasix's and med was not refilled.  Continue?    9-1319 OV  Essential hypertension  -     lisinopril (PRINIVIL/ZESTRIL) 20 MG tablet; Take 1 tablet (20 mg) by mouth daily  -     CBC with platelets and differential  -     metoprolol tartrate (LOPRESSOR) 100 MG tablet; TAKE 1 TABLET (100 MG) BY MOUTH 2 TIMES DAILY

## 2019-10-10 NOTE — TELEPHONE ENCOUNTER
Find out if patient is taking this daily. If so he may continue. If not, would recommend he take it only if edema,sob or weight gain of more than 3-5 lbs is noted.

## 2019-10-11 RX ORDER — FUROSEMIDE 20 MG
20 TABLET ORAL DAILY
Qty: 90 TABLET | Refills: 1 | Status: SHIPPED | OUTPATIENT
Start: 2019-10-11 | End: 2020-06-02

## 2019-10-11 NOTE — TELEPHONE ENCOUNTER
"Spoke with pt, yes he takes it every day.   Per emir's note below, he will continue taking it every day.   Will send a 6 month supply per last visit note on 9/13/19  Prescription approved per [facility] Refill Protocol.        Requested Prescriptions   Pending Prescriptions Disp Refills     furosemide (LASIX) 20 MG tablet [Pharmacy Med Name: FUROSEMIDE 20 MG TABLET]       Sig: TAKE 1 TABLET BY MOUTH EVERY DAY NEED OFFICE VISIT FOR MORE REFILLS.       Diuretics (Including Combos) Protocol Passed - 10/11/2019  3:10 PM        Passed - Blood pressure under 140/90 in past 12 months     BP Readings from Last 3 Encounters:   09/13/19 130/84   06/29/18 124/84   10/23/17 (!) 140/99                 Passed - Recent (12 mo) or future (30 days) visit within the authorizing provider's specialty     Patient has had an office visit with the authorizing provider or a provider within the authorizing providers department within the previous 12 mos or has a future within next 30 days. See \"Patient Info\" tab in inbasket, or \"Choose Columns\" in Meds & Orders section of the refill encounter.              Passed - Medication is active on med list        Passed - Patient is age 18 or older        Passed - Normal serum creatinine on file in past 12 months     Recent Labs   Lab Test 09/13/19  1336   CR 0.77              Passed - Normal serum potassium on file in past 12 months     Recent Labs   Lab Test 09/13/19  1336   POTASSIUM 4.7                    Passed - Normal serum sodium on file in past 12 months     Recent Labs   Lab Test 09/13/19  1336                   "

## 2019-10-11 NOTE — TELEPHONE ENCOUNTER
Reason for Call:  Other call back    Detailed comments: would like a call back. Called the number provided he said that he got a voice mail.     Phone Number Patient can be reached at: Cell number on file:    No relevant phone numbers on file.       Best Time: any time     Can we leave a detailed message on this number? Not Applicable    Call taken on 10/11/2019 at 3:02 PM by Marisa Romero

## 2019-10-25 ENCOUNTER — ANTICOAGULATION THERAPY VISIT (OUTPATIENT)
Dept: NURSING | Facility: CLINIC | Age: 59
End: 2019-10-25
Payer: COMMERCIAL

## 2019-10-25 DIAGNOSIS — Z79.01 LONG TERM CURRENT USE OF ANTICOAGULANT THERAPY: ICD-10-CM

## 2019-10-25 DIAGNOSIS — I48.91 NEW ONSET ATRIAL FIBRILLATION (H): ICD-10-CM

## 2019-10-25 LAB — INR POINT OF CARE: 2.9 (ref 0.86–1.14)

## 2019-10-25 PROCEDURE — 85610 PROTHROMBIN TIME: CPT | Mod: QW

## 2019-10-25 PROCEDURE — 36416 COLLJ CAPILLARY BLOOD SPEC: CPT

## 2019-10-25 PROCEDURE — 99207 ZZC NO CHARGE NURSE ONLY: CPT

## 2019-10-25 NOTE — PROGRESS NOTES
ANTICOAGULATION FOLLOW-UP CLINIC VISIT    Patient Name:  Stef Mosher  Date:  10/25/2019  Contact Type:  Face to Face    SUBJECTIVE:  Patient Findings     Comments:   No Problems        Clinical Outcomes     Comments:   No Problems           OBJECTIVE    INR Protime   Date Value Ref Range Status   10/25/2019 2.9 (A) 0.86 - 1.14 Final       ASSESSMENT / PLAN  INR assessment THER    Recheck INR In: 6 WEEKS    INR Location Clinic      Anticoagulation Summary  As of 10/25/2019    INR goal:   2.0-3.0   TTR:   67.9 % (3.7 y)   INR used for dosin.9 (10/25/2019)   Warfarin maintenance plan:   5 mg (2.5 mg x 2) every Mon, Wed; 2.5 mg (2.5 mg x 1) all other days   Full warfarin instructions:   5 mg every Mon, Wed; 2.5 mg all other days   Weekly warfarin total:   22.5 mg   No change documented:   Linnea Brooks RN   Plan last modified:   Sandee Cormier RN (2018)   Next INR check:   2019   Target end date:       Indications    Long-term (current) use of anticoagulants [Z79.01] [Z79.01]  New onset atrial fibrillation (H) [I48.91]             Anticoagulation Episode Summary     INR check location:       Preferred lab:       Send INR reminders to:   BRIANA RODRIGUEZ    Comments:         Anticoagulation Care Providers     Provider Role Specialty Phone number    Sivakumar Kang PA-C Responsible Physician Assistant [phone]            See the Encounter Report to view Anticoagulation Flowsheet and Dosing Calendar (Go to Encounters tab in chart review, and find the Anticoagulation Therapy Visit)    Dosage adjustment made based on physician directed care plan.    Linnea Brooks RN

## 2019-11-01 ENCOUNTER — OFFICE VISIT (OUTPATIENT)
Dept: OPHTHALMOLOGY | Facility: CLINIC | Age: 59
End: 2019-11-01
Payer: COMMERCIAL

## 2019-11-01 DIAGNOSIS — H52.13 MYOPIA OF BOTH EYES: ICD-10-CM

## 2019-11-01 DIAGNOSIS — Z01.00 EXAMINATION OF EYES AND VISION: Primary | ICD-10-CM

## 2019-11-01 DIAGNOSIS — H52.223 REGULAR ASTIGMATISM OF BOTH EYES: ICD-10-CM

## 2019-11-01 DIAGNOSIS — H02.66 XANTHELASMA OF EYELID, BILATERAL: ICD-10-CM

## 2019-11-01 DIAGNOSIS — H02.63 XANTHELASMA OF EYELID, BILATERAL: ICD-10-CM

## 2019-11-01 DIAGNOSIS — H52.4 PRESBYOPIA: ICD-10-CM

## 2019-11-01 DIAGNOSIS — D31.31 CHOROIDAL NEVUS OF RIGHT EYE: ICD-10-CM

## 2019-11-01 PROCEDURE — 92004 COMPRE OPH EXAM NEW PT 1/>: CPT | Performed by: STUDENT IN AN ORGANIZED HEALTH CARE EDUCATION/TRAINING PROGRAM

## 2019-11-01 PROCEDURE — 92015 DETERMINE REFRACTIVE STATE: CPT | Performed by: STUDENT IN AN ORGANIZED HEALTH CARE EDUCATION/TRAINING PROGRAM

## 2019-11-01 ASSESSMENT — VISUAL ACUITY
METHOD: SNELLEN - LINEAR
OS_CC: 20/20
OD_CC: 20/20
CORRECTION_TYPE: GLASSES

## 2019-11-01 ASSESSMENT — REFRACTION_MANIFEST
OS_ADD: +2.50
OD_AXIS: 175
OD_SPHERE: -1.50
OS_CYLINDER: +3.00
OS_AXIS: 010
OD_ADD: +2.50
OS_SPHERE: -1.75
OD_CYLINDER: +3.00

## 2019-11-01 ASSESSMENT — REFRACTION_WEARINGRX
OD_ADD: +2.75
OD_CYLINDER: +3.50
OD_AXIS: 175
OD_SPHERE: -2.25
OS_CYLINDER: +3.50
OS_SPHERE: -2.25
OS_ADD: +2.75
SPECS_TYPE: PAL
OS_AXIS: 180

## 2019-11-01 ASSESSMENT — TONOMETRY
IOP_METHOD: APPLANATION
OS_IOP_MMHG: 15
OD_IOP_MMHG: 14

## 2019-11-01 ASSESSMENT — CUP TO DISC RATIO
OD_RATIO: 0.3
OS_RATIO: 0.3

## 2019-11-01 ASSESSMENT — CONF VISUAL FIELD
OS_NORMAL: 1
OD_NORMAL: 1

## 2019-11-01 NOTE — PROGRESS NOTES
Current Eye Medications:  none     Subjective:  Here for complete. Interested in Blue tech lenses.  Eyes are feeling more strained more up close with glasses. Has been a really long time since last eye exam, 7 years     Objective:  See Ophthalmology Exam.       Assessment:  Stef Mosher is a 59 year old male who presents with:   Encounter Diagnoses   Name Primary?     Examination of eyes and vision      Myopia of both eyes      Regular astigmatism of both eyes      Presbyopia        Choroidal nevus of right eye      Xanthelasma of eyelid, bilateral        Plan:  Glasses prescription given     Renee Washington MD  [phone]

## 2019-11-01 NOTE — LETTER
11/1/2019         RE: Stef Mosher  [address] [place] [address]        Dear Colleague,    Thank you for referring your patient, Stef Mosher, to the [facility]. Please see a copy of my visit note below.     Current Eye Medications:  none     Subjective:  Here for complete. Interested in Blue tech lenses.  Eyes are feeling more strained more up close with glasses. Has been a really long time since last eye exam, 7 years     Objective:  See Ophthalmology Exam.       Assessment:  Stef Mosher is a 59 year old male who presents with:   Encounter Diagnoses   Name Primary?     Examination of eyes and vision      Myopia of both eyes      Regular astigmatism of both eyes      Presbyopia        Choroidal nevus of right eye      Xanthelasma of eyelid, bilateral        Plan:  Glasses prescription given     Renee Washington MD  [phone]        Again, thank you for allowing me to participate in the care of your patient.        Sincerely,        Renee Washington MD

## 2019-11-07 ENCOUNTER — HEALTH MAINTENANCE LETTER (OUTPATIENT)
Age: 59
End: 2019-11-07

## 2019-12-11 ENCOUNTER — ANTICOAGULATION THERAPY VISIT (OUTPATIENT)
Dept: NURSING | Facility: CLINIC | Age: 59
End: 2019-12-11
Payer: COMMERCIAL

## 2019-12-11 DIAGNOSIS — I48.91 NEW ONSET ATRIAL FIBRILLATION (H): ICD-10-CM

## 2019-12-11 DIAGNOSIS — Z79.01 LONG TERM CURRENT USE OF ANTICOAGULANT THERAPY: ICD-10-CM

## 2019-12-11 LAB — INR POINT OF CARE: 3.5 (ref 0.86–1.14)

## 2019-12-11 PROCEDURE — 85610 PROTHROMBIN TIME: CPT | Mod: QW

## 2019-12-11 PROCEDURE — 36416 COLLJ CAPILLARY BLOOD SPEC: CPT

## 2019-12-11 PROCEDURE — 99207 ZZC NO CHARGE NURSE ONLY: CPT

## 2019-12-11 NOTE — PROGRESS NOTES
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  Patient visibly looks tired today.   Eating habits have changed due to this change in schedule recently.   RN advised holding tomorrows dose as patient already took today's dose and rechecking in 2 weeks.   Patient stated he would be unable to come into appointment until the 2nd week in January due to churches busy schedule and prior obligations.   RN educated on risk factors of not coming in until then - patient verbalized understanding.   INR recheck scheduled for January 10th 2020.          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  Patient visibly looks tired today.   Eating habits have changed due to this change in schedule recently.   RN advised holding tomorrows dose as patient already took today's dose and rechecking in 2 weeks.   Patient stated he would be unable to come into appointment until the 2nd week in January due to [employer] busy schedule and prior obligations.   RN educated on risk factors of not coming in until then - patient verbalized understanding.   INR recheck scheduled for January 10th 2020.             OBJECTIVE    INR Protime   Date Value Ref Range Status   12/11/2019 3.5 (A) 0.86 - 1.14 Final       ASSESSMENT / PLAN  INR assessment SUPRA    Recheck INR In: 2 WEEKS    INR Location Clinic      Anticoagulation Summary  As of 12/11/2019    INR goal:   2.0-3.0   TTR:   57.8 % (1 y)   INR used for dosing:   3.5! (12/11/2019)   Warfarin maintenance plan:   5 mg (2.5 mg  x 2) every Mon, Wed; 2.5 mg (2.5 mg x 1) all other days   Full warfarin instructions:   12/12: Hold; Otherwise 5 mg every Mon, Wed; 2.5 mg all other days   Weekly warfarin total:   22.5 mg   Plan last modified:   Sandee Cormier RN (12/19/2018)   Next INR check:   1/8/2020   Priority:   Maintenance   Target end date:       Indications    Long-term (current) use of anticoagulants [Z79.01] [Z79.01]  New onset atrial fibrillation (H) [I48.91]             Anticoagulation Episode Summary     INR check location:       Preferred lab:       Send INR reminders to:   BRIANA RODRIGUEZ    Comments:         Anticoagulation Care Providers     Provider Role Specialty Phone number    Sivakumar Kang PA-C Responsible Physician Assistant [phone]            See the Encounter Report to view Anticoagulation Flowsheet and Dosing Calendar (Go to Encounters tab in chart review, and find the Anticoagulation Therapy Visit)    Dosage adjustment made based on physician directed care plan.    Anny Rome, RN, BSN, PHN

## 2020-01-17 ENCOUNTER — ANTICOAGULATION THERAPY VISIT (OUTPATIENT)
Dept: NURSING | Facility: CLINIC | Age: 60
End: 2020-01-17
Payer: COMMERCIAL

## 2020-01-17 DIAGNOSIS — Z79.01 LONG TERM CURRENT USE OF ANTICOAGULANT THERAPY: ICD-10-CM

## 2020-01-17 DIAGNOSIS — I48.91 NEW ONSET ATRIAL FIBRILLATION (H): ICD-10-CM

## 2020-01-17 LAB — INR POINT OF CARE: 3.3 (ref 0.86–1.14)

## 2020-01-17 PROCEDURE — 99207 ZZC NO CHARGE NURSE ONLY: CPT

## 2020-01-17 PROCEDURE — 85610 PROTHROMBIN TIME: CPT | Mod: QW

## 2020-01-17 PROCEDURE — 36416 COLLJ CAPILLARY BLOOD SPEC: CPT

## 2020-01-17 NOTE — PROGRESS NOTES
ANTICOAGULATION FOLLOW-UP CLINIC VISIT    Patient Name:  Stef Mosher  Date:  1/17/2020  Contact Type:  Face to Face    SUBJECTIVE:  Patient Findings     Comments:   INR 3.3.  2 high INR's in row.  Will decrease maintenance.  Patient unable to return until 3 weeks        Clinical Outcomes     Comments:   INR 3.3.  2 high INR's in row.  Will decrease maintenance.  Patient unable to return until 3 weeks           OBJECTIVE    INR Protime   Date Value Ref Range Status   01/17/2020 3.3 (A) 0.86 - 1.14 Final       ASSESSMENT / PLAN  INR assessment SUPRA maintenance decreased   Recheck INR In: 3 WEEKS recommended 2 weeks   INR Location Clinic      Anticoagulation Summary  As of 1/17/2020    INR goal:   2.0-3.0   TTR:   50.8 % (1 y)   INR used for dosing:   3.3! (1/17/2020)   Warfarin maintenance plan:   5 mg (2.5 mg x 2) every Wed; 2.5 mg (2.5 mg x 1) all other days   Full warfarin instructions:   5 mg every Wed; 2.5 mg all other days   Weekly warfarin total:   20 mg   Plan last modified:   Linnea Brooks RN (1/17/2020)   Next INR check:   2/7/2020   Priority:   High   Target end date:       Indications    Long-term (current) use of anticoagulants [Z79.01] [Z79.01]  New onset atrial fibrillation (H) [I48.91]             Anticoagulation Episode Summary     INR check location:       Preferred lab:       Send INR reminders to:   BRIANA RODRIGUEZ    Comments:         Anticoagulation Care Providers     Provider Role Specialty Phone number    Sivakumar Kang PA-C Responsible Physician Assistant [phone]            See the Encounter Report to view Anticoagulation Flowsheet and Dosing Calendar (Go to Encounters tab in chart review, and find the Anticoagulation Therapy Visit)    Dosage adjustment made based on physician directed care plan.    Linnea Brooks RN

## 2020-02-07 ENCOUNTER — ANTICOAGULATION THERAPY VISIT (OUTPATIENT)
Dept: NURSING | Facility: CLINIC | Age: 60
End: 2020-02-07
Payer: COMMERCIAL

## 2020-02-07 DIAGNOSIS — I48.91 NEW ONSET ATRIAL FIBRILLATION (H): ICD-10-CM

## 2020-02-07 DIAGNOSIS — Z79.01 LONG TERM CURRENT USE OF ANTICOAGULANT THERAPY: ICD-10-CM

## 2020-02-07 LAB — INR POINT OF CARE: 3.1 (ref 0.86–1.14)

## 2020-02-07 PROCEDURE — 85610 PROTHROMBIN TIME: CPT | Mod: QW

## 2020-02-07 PROCEDURE — 36416 COLLJ CAPILLARY BLOOD SPEC: CPT

## 2020-02-07 PROCEDURE — 99207 ZZC NO CHARGE NURSE ONLY: CPT

## 2020-02-07 NOTE — PROGRESS NOTES
ANTICOAGULATION FOLLOW-UP CLINIC VISIT    Patient Name:  Stef Mosher  Date:  2/7/2020  Contact Type:  Face to Face    SUBJECTIVE:  Patient Findings     Comments:   INR 3.1 on new maintenance.  Will decrease maintenance slightly again due to ongoing high INR.  Recheck one week.        Clinical Outcomes     Comments:   INR 3.1 on new maintenance.  Will decrease maintenance slightly again due to ongoing high INR.  Recheck one week.           OBJECTIVE    INR Protime   Date Value Ref Range Status   02/07/2020 3.1 (A) 0.86 - 1.14 Final       ASSESSMENT / PLAN  INR assessment SUPRA maintenance decreased   Recheck INR In: 1 WEEK    INR Location Clinic      Anticoagulation Summary  As of 2/7/2020    INR goal:   2.0-3.0   TTR:   45.0 % (1 y)   INR used for dosing:   3.1! (2/7/2020)   Warfarin maintenance plan:   2.5 mg (2.5 mg x 1) every day   Full warfarin instructions:   2.5 mg every day   Weekly warfarin total:   17.5 mg   Plan last modified:   Linnea Brooks RN (2/7/2020)   Next INR check:   2/14/2020   Priority:   High   Target end date:       Indications    Long-term (current) use of anticoagulants [Z79.01] [Z79.01]  New onset atrial fibrillation (H) [I48.91]             Anticoagulation Episode Summary     INR check location:       Preferred lab:       Send INR reminders to:   BRIANA RODRIGUEZ    Comments:         Anticoagulation Care Providers     Provider Role Specialty Phone number    Sivakumar Kang PA-C Responsible Physician Assistant [phone]            See the Encounter Report to view Anticoagulation Flowsheet and Dosing Calendar (Go to Encounters tab in chart review, and find the Anticoagulation Therapy Visit)    Dosage adjustment made based on physician directed care plan.    Linnea Brooks RN

## 2020-02-28 ENCOUNTER — TELEPHONE (OUTPATIENT)
Dept: NURSING | Facility: CLINIC | Age: 60
End: 2020-02-28

## 2020-03-06 ENCOUNTER — ANTICOAGULATION THERAPY VISIT (OUTPATIENT)
Dept: NURSING | Facility: CLINIC | Age: 60
End: 2020-03-06
Payer: COMMERCIAL

## 2020-03-06 DIAGNOSIS — Z79.01 LONG TERM CURRENT USE OF ANTICOAGULANT THERAPY: ICD-10-CM

## 2020-03-06 DIAGNOSIS — I48.91 NEW ONSET ATRIAL FIBRILLATION (H): ICD-10-CM

## 2020-03-06 LAB — INR POINT OF CARE: 2.1 (ref 0.86–1.14)

## 2020-03-06 PROCEDURE — 85610 PROTHROMBIN TIME: CPT | Mod: QW

## 2020-03-06 PROCEDURE — 36416 COLLJ CAPILLARY BLOOD SPEC: CPT

## 2020-03-06 PROCEDURE — 99207 ZZC NO CHARGE NURSE ONLY: CPT

## 2020-03-06 NOTE — PROGRESS NOTES
ANTICOAGULATION FOLLOW-UP CLINIC VISIT    Patient Name:  Stef Mosher  Date:  3/6/2020  Contact Type:  Face to Face    SUBJECTIVE:  Patient Findings     Comments:   No problems        Clinical Outcomes     Comments:   No problems           OBJECTIVE    INR Protime   Date Value Ref Range Status   2020 2.1 (A) 0.86 - 1.14 Final       ASSESSMENT / PLAN  INR assessment THER    Recheck INR In: 4 WEEKS    INR Location Clinic      Anticoagulation Summary  As of 3/6/2020    INR goal:   2.0-3.0   TTR:   47.8 % (1 y)   INR used for dosin.1 (3/6/2020)   Warfarin maintenance plan:   2.5 mg (2.5 mg x 1) every day   Full warfarin instructions:   2.5 mg every day   Weekly warfarin total:   17.5 mg   No change documented:   Linnea Brooks RN   Plan last modified:   Linnea Brooks RN (2020)   Next INR check:   4/3/2020   Priority:   High   Target end date:       Indications    Long-term (current) use of anticoagulants [Z79.01] [Z79.01]  New onset atrial fibrillation (H) [I48.91]             Anticoagulation Episode Summary     INR check location:       Preferred lab:       Send INR reminders to:   BRIANA RODRIGUEZ    Comments:         Anticoagulation Care Providers     Provider Role Specialty Phone number    Sivakumar Kang PA-C Responsible Physician Assistant [phone]            See the Encounter Report to view Anticoagulation Flowsheet and Dosing Calendar (Go to Encounters tab in chart review, and find the Anticoagulation Therapy Visit)    Dosage adjustment made based on physician directed care plan.    Linnea Brooks RN

## 2020-03-09 DIAGNOSIS — I10 ESSENTIAL HYPERTENSION: ICD-10-CM

## 2020-03-09 DIAGNOSIS — I48.20 CHRONIC ATRIAL FIBRILLATION (H): ICD-10-CM

## 2020-03-09 NOTE — TELEPHONE ENCOUNTER
"Requested Prescriptions   Pending Prescriptions Disp Refills     lisinopril (ZESTRIL) 20 MG tablet 90 tablet 1     Sig: Take 1 tablet (20 mg) by mouth daily   Last Written Prescription Date:  12/16/19  Last Fill Quantity: 90,  # refills: 1   Last office visit: 9/13/2019 with prescribing provider:  FERNANDO Kang   Future Office Visit:        ACE Inhibitors (Including Combos) Protocol Passed - 3/9/2020 10:08 AM        Passed - Blood pressure under 140/90 in past 12 months     BP Readings from Last 3 Encounters:   09/13/19 130/84   06/29/18 124/84   10/23/17 (!) 140/99                 Passed - Recent (12 mo) or future (30 days) visit within the authorizing provider's specialty     Patient has had an office visit with the authorizing provider or a provider within the authorizing providers department within the previous 12 mos or has a future within next 30 days. See \"Patient Info\" tab in inbasket, or \"Choose Columns\" in Meds & Orders section of the refill encounter.              Passed - Medication is active on med list        Passed - Patient is age 18 or older        Passed - Normal serum creatinine on file in past 12 months     Recent Labs   Lab Test 09/13/19  1336   CR 0.77             Passed - Normal serum potassium on file in past 12 months     Recent Labs   Lab Test 09/13/19  1336   POTASSIUM 4.7                  "

## 2020-03-11 RX ORDER — LISINOPRIL 20 MG/1
20 TABLET ORAL DAILY
Qty: 90 TABLET | Refills: 0 | Status: SHIPPED | OUTPATIENT
Start: 2020-03-11 | End: 2020-05-01

## 2020-03-27 ENCOUNTER — ANCILLARY PROCEDURE (OUTPATIENT)
Dept: ULTRASOUND IMAGING | Facility: CLINIC | Age: 60
End: 2020-03-27
Attending: PHYSICIAN ASSISTANT
Payer: COMMERCIAL

## 2020-03-27 DIAGNOSIS — E04.1 THYROID NODULE: ICD-10-CM

## 2020-03-27 PROCEDURE — 76536 US EXAM OF HEAD AND NECK: CPT

## 2020-03-31 DIAGNOSIS — Z79.01 LONG TERM CURRENT USE OF ANTICOAGULANT THERAPY: Primary | ICD-10-CM

## 2020-03-31 DIAGNOSIS — I48.91 NEW ONSET ATRIAL FIBRILLATION (H): ICD-10-CM

## 2020-04-03 ENCOUNTER — ANTICOAGULATION THERAPY VISIT (OUTPATIENT)
Dept: NURSING | Facility: CLINIC | Age: 60
End: 2020-04-03

## 2020-04-03 DIAGNOSIS — I48.91 NEW ONSET ATRIAL FIBRILLATION (H): ICD-10-CM

## 2020-04-03 DIAGNOSIS — Z79.01 LONG TERM CURRENT USE OF ANTICOAGULANT THERAPY: ICD-10-CM

## 2020-04-03 LAB
CAPILLARY BLOOD COLLECTION: NORMAL
INR PPP: 1.7 (ref 0.86–1.14)

## 2020-04-03 PROCEDURE — 99207 ZZC NO CHARGE NURSE ONLY: CPT | Performed by: NURSE PRACTITIONER

## 2020-04-03 PROCEDURE — 85610 PROTHROMBIN TIME: CPT | Performed by: NURSE PRACTITIONER

## 2020-04-03 PROCEDURE — 36416 COLLJ CAPILLARY BLOOD SPEC: CPT | Performed by: NURSE PRACTITIONER

## 2020-04-03 NOTE — PROGRESS NOTES
ANTICOAGULATION FOLLOW-UP CLINIC VISIT    Patient Name:  Stef Mosher  Date:  4/3/2020  Contact Type:  Telephone/ Stef Mosher    SUBJECTIVE:  Patient Findings     Comments:   INR 1.7.  Will increase maintenance and recheck in 2 weeks        Clinical Outcomes     Comments:   INR 1.7.  Will increase maintenance and recheck in 2 weeks           OBJECTIVE    INR   Date Value Ref Range Status   2020 1.70 (H) 0.86 - 1.14 Final     Comment:     This test is intended for monitoring Coumadin therapy.  Results are not   accurate in patients with prolonged INR due to factor deficiency.         ASSESSMENT / PLAN  INR assessment SUB increase maintenance   Recheck INR In: 2 WEEKS    INR Location Clinic      Anticoagulation Summary  As of 4/3/2020    INR goal:   2.0-3.0   TTR:   49.7 % (1 y)   INR used for dosin.70! (4/3/2020)   Warfarin maintenance plan:   5 mg (2.5 mg x 2) every Fri; 2.5 mg (2.5 mg x 1) all other days   Full warfarin instructions:   5 mg every Fri; 2.5 mg all other days   Weekly warfarin total:   20 mg   Plan last modified:   Linnea Brooks RN (4/3/2020)   Next INR check:   2020   Priority:   High   Target end date:       Indications    Long-term (current) use of anticoagulants [Z79.01] [Z79.01]  New onset atrial fibrillation (H) [I48.91]             Anticoagulation Episode Summary     INR check location:       Preferred lab:       Send INR reminders to:   BRIANA RODRIGUEZ    Comments:         Anticoagulation Care Providers     Provider Role Specialty Phone number    Sivakumar Kang PA-C Responsible Physician Assistant [phone]            See the Encounter Report to view Anticoagulation Flowsheet and Dosing Calendar (Go to Encounters tab in chart review, and find the Anticoagulation Therapy Visit)    Dosage adjustment made based on physician directed care plan.    Linnea Brooks RN

## 2020-04-17 ENCOUNTER — ANTICOAGULATION THERAPY VISIT (OUTPATIENT)
Dept: FAMILY MEDICINE | Facility: CLINIC | Age: 60
End: 2020-04-17

## 2020-04-17 DIAGNOSIS — I48.91 NEW ONSET ATRIAL FIBRILLATION (H): ICD-10-CM

## 2020-04-17 DIAGNOSIS — Z79.01 LONG TERM CURRENT USE OF ANTICOAGULANT THERAPY: ICD-10-CM

## 2020-04-17 LAB
CAPILLARY BLOOD COLLECTION: NORMAL
INR PPP: 2.4 (ref 0.86–1.14)

## 2020-04-17 PROCEDURE — 85610 PROTHROMBIN TIME: CPT | Performed by: NURSE PRACTITIONER

## 2020-04-17 PROCEDURE — 99207 ZZC NO CHARGE NURSE ONLY: CPT | Performed by: NURSE PRACTITIONER

## 2020-04-17 PROCEDURE — 36416 COLLJ CAPILLARY BLOOD SPEC: CPT | Performed by: NURSE PRACTITIONER

## 2020-04-17 NOTE — PROGRESS NOTES
ANTICOAGULATION FOLLOW-UP CLINIC VISIT    Patient Name:  Stef Mosher  Date:  2020  Contact Type:  Telephone/ Stef Mosher    SUBJECTIVE:  Patient Findings     Comments:   INR 2.4.  Will continue with current maintenance and recheck in 5 weeks.  Patient read back instructions correctly.        Clinical Outcomes     Comments:   INR 2.4.  Will continue with current maintenance and recheck in 5 weeks.  Patient read back instructions correctly.           OBJECTIVE    INR   Date Value Ref Range Status   2020 2.40 (H) 0.86 - 1.14 Final     Comment:     This test is intended for monitoring Coumadin therapy.  Results are not   accurate in patients with prolonged INR due to factor deficiency.         ASSESSMENT / PLAN  INR assessment THER    Recheck INR In: 5 WEEKS    INR Location Clinic Lab     Anticoagulation Summary  As of 2020    INR goal:   2.0-3.0   TTR:   51.9 % (1 y)   INR used for dosin.40 (2020)   Warfarin maintenance plan:   5 mg (2.5 mg x 2) every Fri; 2.5 mg (2.5 mg x 1) all other days   Full warfarin instructions:   5 mg every Fri; 2.5 mg all other days   Weekly warfarin total:   20 mg   No change documented:   Linnea Brooks, RN   Plan last modified:   Linnea Brooks RN (4/3/2020)   Next INR check:      Priority:   High   Target end date:       Indications    Long-term (current) use of anticoagulants [Z79.01] [Z79.01]  New onset atrial fibrillation (H) [I48.91]             Anticoagulation Episode Summary     INR check location:       Preferred lab:       Send INR reminders to:   BRIANA RODRIGUEZ    Comments:         Anticoagulation Care Providers     Provider Role Specialty Phone number    Sivakumar Kang PA-C Responsible Physician Assistant [phone]            See the Encounter Report to view Anticoagulation Flowsheet and Dosing Calendar (Go to Encounters tab in chart review, and find the Anticoagulation Therapy Visit)    Dosage adjustment made based on physician directed care  plan.    Linnea Brooks RN

## 2020-04-26 ENCOUNTER — MYC MEDICAL ADVICE (OUTPATIENT)
Dept: FAMILY MEDICINE | Facility: CLINIC | Age: 60
End: 2020-04-26

## 2020-04-28 NOTE — TELEPHONE ENCOUNTER
Stef is due for a med recheck with me . Have him scheduled for a phone visit with me this week. Not sure what he is referring to when he is asking about his thyroid labs. He has had no recent thyroid tests performed at our clinic since I saw him 8 months ago.

## 2020-05-01 ENCOUNTER — VIRTUAL VISIT (OUTPATIENT)
Dept: FAMILY MEDICINE | Facility: CLINIC | Age: 60
End: 2020-05-01
Payer: COMMERCIAL

## 2020-05-01 DIAGNOSIS — I10 ESSENTIAL HYPERTENSION: ICD-10-CM

## 2020-05-01 DIAGNOSIS — M17.12 PRIMARY OSTEOARTHRITIS OF LEFT KNEE: Primary | ICD-10-CM

## 2020-05-01 DIAGNOSIS — I48.20 CHRONIC ATRIAL FIBRILLATION (H): ICD-10-CM

## 2020-05-01 DIAGNOSIS — E78.5 HYPERLIPIDEMIA LDL GOAL <130: ICD-10-CM

## 2020-05-01 PROCEDURE — 99214 OFFICE O/P EST MOD 30 MIN: CPT | Mod: TEL | Performed by: PHYSICIAN ASSISTANT

## 2020-05-01 RX ORDER — LISINOPRIL 20 MG/1
20 TABLET ORAL DAILY
Qty: 90 TABLET | Refills: 0 | Status: SHIPPED | OUTPATIENT
Start: 2020-05-01 | End: 2020-08-18

## 2020-05-01 NOTE — PROGRESS NOTES
"Stef Mosher is a 60 year old male who is being evaluated via a billable telephone visit.      The patient has been notified of following:     \"This telephone visit will be conducted via a call between you and your physician/provider. We have found that certain health care needs can be provided without the need for a physical exam.  This service lets us provide the care you need with a short phone conversation.  If a prescription is necessary we can send it directly to your pharmacy.  If lab work is needed we can place an order for that and you can then stop by our lab to have the test done at a later time.    Telephone visits are billed at different rates depending on your insurance coverage. During this emergency period, for some insurers they may be billed the same as an in-person visit.  Please reach out to your insurance provider with any questions.    If during the course of the call the physician/provider feels a telephone visit is not appropriate, you will not be charged for this service.\"    Patient has given verbal consent for Telephone visit?  Yes    How would you like to obtain your AVS? Lo Pool     Stef Mosher is a 60 year old male who presents to clinic today for the following health issues:    HPI  Hyperlipidemia Follow-Up      Are you regularly taking any medication or supplement to lower your cholesterol?   Yes- Atrovastatin    Are you having muscle aches or other side effects that you think could be caused by your cholesterol lowering medication?  No    Hypertension Follow-up      Do you check your blood pressure regularly outside of the clinic? No     Are you following a low salt diet? No    Are your blood pressures ever more than 140 on the top number (systolic) OR more   than 90 on the bottom number (diastolic), for example 140/90? No  No chest pain/sob/palps/ha's/dizziness.  Hypothyroidism Follow-up  History of thyroid nodules. Reviewed recent thyroid US results.    Since last " visit, patient describes the following symptoms: Weight stable, no hair loss, no skin changes, no constipation, no loose stools  Denies weight changes, fatigue. constipation    How many servings of fruits and vegetables do you eat daily?  4 or more    On average, how many sweetened beverages do you drink each day (Examples: soda, juice, sweet tea, etc.  Do NOT count diet or artificially sweetened beverages)?   1    How many days per week do you exercise enough to make your heart beat faster? 3 or less    How many minutes a day do you exercise enough to make your heart beat faster? 9 or less    How many days per week do you miss taking your medication? 0    Bilateral knee pain. Probable OA. Stiffness and soreness.   Cortisone has proven helpful in the past.     Patient Active Problem List   Diagnosis     Hypothyroidism     New onset atrial fibrillation (H)     Dilated cardiomyopathy (H)     Seasonal allergies     Hyperlipidemia LDL goal <130     Long-term (current) use of anticoagulants [Z79.01]     Essential hypertension     Paroxysmal atrial fibrillation (H)     CHF (congestive heart failure) (H)     Morbid obesity (H)     Past Surgical History:   Procedure Laterality Date     HC THYROIDECTOMY         Social History     Tobacco Use     Smoking status: Never Smoker     Smokeless tobacco: Never Used     Tobacco comment: Lives in smoke free household   Substance Use Topics     Alcohol use: No     Comment: None     Family History   Problem Relation Age of Onset     Blood Disease Mother      Cancer Mother      Arthritis Father      Hypertension Father      Breast Cancer Sister      Allergies Sister      Cancer Sister      Thyroid Disease Sister      Blood Disease Sister      Diabetes No family hx of      Cerebrovascular Disease No family hx of      Glaucoma No family hx of      Macular Degeneration No family hx of          Current Outpatient Medications   Medication Sig Dispense Refill     ASPIRIN LOW DOSE 81 MG  chewable tablet TAKE 1 TABLET BY MOUTH EVERY DAY 90 tablet 3     atorvastatin (LIPITOR) 10 MG tablet TAKE 1 TABLET BY MOUTH EVERY DAY 90 tablet 1     furosemide (LASIX) 20 MG tablet Take 1 tablet (20 mg) by mouth daily 90 tablet 1     lisinopril (ZESTRIL) 20 MG tablet Take 1 tablet (20 mg) by mouth daily 90 tablet 0     Menthol, Topical Analgesic, (BIOFREEZE) 4 % GEL Externally apply topically 3 times daily as needed 118 mL 11     warfarin ANTICOAGULANT (COUMADIN) 2.5 MG tablet 2.5 mg on Sun, Tue, Thu; 5 mg all other days or as directed by INR clinic. 140 tablet 3     HYDROcodone-acetaminophen (NORCO) 5-325 MG per tablet Take 1-2 tablets by mouth nightly as needed for severe pain (Patient not taking: Reported on 5/1/2020) 30 tablet 0     metoprolol tartrate (LOPRESSOR) 100 MG tablet TAKE 1 TABLET (100 MG) BY MOUTH 2 TIMES DAILY (Patient not taking: Reported on 5/1/2020) 180 tablet 1     Misc Natural Products (GLUCOSAMINE CHOND COMPLEX/MSM) TABS Take 1 tablet by mouth 2 times daily       Omega-3 Fatty Acids (OMEGA-3 FISH OIL PO) Take 1,200 mg by mouth 2 times daily (with meals)       order for DME Equipment being ordered: cryo knee cuff and supplies for cold therapy of knees- bilateral 1 Device 0     sildenafil (VIAGRA) 50 MG tablet Take 0.5-1 tablets (25-50 mg) by mouth daily as needed Take 30 min to 4 hours before intercourse.  Never use with nitroglycerin, terazosin or doxazosin. (Patient not taking: Reported on 5/1/2020) 12 tablet 1     No Known Allergies  Recent Labs   Lab Test 09/13/19  1336 06/29/18  1129  04/24/17  1041 04/07/14  1008   A1C  --  5.4  --   --   --    LDL  --  107*  --  90 150*   HDL  --  49  --  48 34*   TRIG  --  98  --  97 164*   ALT 31 40  --  53  --    CR 0.77 0.91   < > 0.78 0.82   GFRESTIMATED >90 86   < > >90  Non  GFR Calc   >90   GFRESTBLACK >90 >90   < > >90   GFR Calc   >90   POTASSIUM 4.7 4.3   < > 4.3 4.0   TSH 0.77 0.62  --  0.54 0.80    < > =  values in this interval not displayed.      BP Readings from Last 3 Encounters:   09/13/19 130/84   06/29/18 124/84   10/23/17 (!) 140/99    Wt Readings from Last 3 Encounters:   09/13/19 132 kg (291 lb)   06/29/18 (!) 152.9 kg (337 lb)   09/18/17 (!) 155.1 kg (342 lb)                    Reviewed and updated as needed this visit by Provider         Review of Systems   ROS COMP: Constitutional, HEENT, cardiovascular, pulmonary, GI, , musculoskeletal, neuro, skin, endocrine and psych systems are negative, except as otherwise noted.       Objective   Reported vitals:  There were no vitals taken for this visit.   healthy, alert and no distress  PSYCH: Alert and oriented times 3; coherent speech, normal   rate and volume, able to articulate logical thoughts, able   to abstract reason, no tangential thoughts, no hallucinations   or delusions  His affect is normal  RESP: No cough, no audible wheezing, able to talk in full sentences  Remainder of exam unable to be completed due to telephone visits    Diagnostic Test Results:  Labs reviewed in Epic        Assessment/Plan:  1. Primary osteoarthritis of left knee  Advised supportive and symptomatic treatment.  Follow up with Provider - if condition persists or worsens.   Tylenol.  Consider cortisone shots if symptoms persist into the summer.   2. Chronic atrial fibrillation  Stable. Asymptomatic   - lisinopril (ZESTRIL) 20 MG tablet; Take 1 tablet (20 mg) by mouth daily  Dispense: 90 tablet; Refill: 0    3. Hyperlipidemia LDL goal <130  Stable. Lower fat, higher fiber diet and consistent exercise.  Continue lipitor    4. Essential hypertension  Stable. Continue all meds.  - lisinopril (ZESTRIL) 20 MG tablet; Take 1 tablet (20 mg) by mouth daily  Dispense: 90 tablet; Refill: 0    work on lifestyle modification  Recheck in 4 mos for a physical.    Phone call duration:  17 minutes    Sivakumar Kang PA-C

## 2020-05-22 ENCOUNTER — ANTICOAGULATION THERAPY VISIT (OUTPATIENT)
Dept: FAMILY MEDICINE | Facility: CLINIC | Age: 60
End: 2020-05-22

## 2020-05-22 DIAGNOSIS — I48.91 NEW ONSET ATRIAL FIBRILLATION (H): ICD-10-CM

## 2020-05-22 DIAGNOSIS — Z79.01 LONG TERM CURRENT USE OF ANTICOAGULANT THERAPY: ICD-10-CM

## 2020-05-22 LAB
CAPILLARY BLOOD COLLECTION: NORMAL
INR PPP: 2.5 (ref 0.86–1.14)

## 2020-05-22 PROCEDURE — 85610 PROTHROMBIN TIME: CPT | Performed by: NURSE PRACTITIONER

## 2020-05-22 PROCEDURE — 36416 COLLJ CAPILLARY BLOOD SPEC: CPT | Performed by: NURSE PRACTITIONER

## 2020-05-22 PROCEDURE — 99207 ZZC NO CHARGE NURSE ONLY: CPT | Performed by: NURSE PRACTITIONER

## 2020-05-22 NOTE — PROGRESS NOTES
ANTICOAGULATION FOLLOW-UP CLINIC VISIT    Patient Name:  Stef Mosher  Date:  2020  Contact Type:  Telephone    SUBJECTIVE:  Patient Findings     Comments:   Spoke with patient, he denies any problems or concerns. He will start a tumeric supplement 1 week prior to next INR visit to determine if it will affect INR results. He voiced understanding with dosing        Clinical Outcomes     Comments:   Spoke with patient, he denies any problems or concerns. He will start a tumeric supplement 1 week prior to next INR visit to determine if it will affect INR results. He voiced understanding with dosing           OBJECTIVE    Recent labs: (last 7 days)     20  1048   INR 2.50*       ASSESSMENT / PLAN  INR assessment THER    Recheck INR In: 5 WEEKS    INR Location Clinic      Anticoagulation Summary  As of 2020    INR goal:   2.0-3.0   TTR:   55.0 % (1 y)   INR used for dosin.50 (2020)   Warfarin maintenance plan:   5 mg (2.5 mg x 2) every Fri; 2.5 mg (2.5 mg x 1) all other days   Full warfarin instructions:   5 mg every Fri; 2.5 mg all other days   Weekly warfarin total:   20 mg   No change documented:   Dahiana Barrera   Plan last modified:   Linnea Brooks RN (4/3/2020)   Next INR check:   2020   Priority:   Maintenance   Target end date:       Indications    Long-term (current) use of anticoagulants [Z79.01] [Z79.01]  New onset atrial fibrillation (H) [I48.91]             Anticoagulation Episode Summary     INR check location:       Preferred lab:       Send INR reminders to:   BRIANA RODRIGUEZ    Comments:         Anticoagulation Care Providers     Provider Role Specialty Phone number    Sivakumar Kang PA-C Responsible Physician Assistant [phone]            See the Encounter Report to view Anticoagulation Flowsheet and Dosing Calendar (Go to Encounters tab in chart review, and find the Anticoagulation Therapy Visit)    Dosage adjustment made based on physician directed care  plan.    LEFTY SHEPHERD

## 2020-06-01 DIAGNOSIS — I42.9 CARDIOMYOPATHY, UNSPECIFIED TYPE (H): ICD-10-CM

## 2020-06-01 DIAGNOSIS — I48.91 ATRIAL FIBRILLATION, UNSPECIFIED TYPE (H): ICD-10-CM

## 2020-06-01 NOTE — TELEPHONE ENCOUNTER
Requested Prescriptions   Pending Prescriptions Disp Refills     furosemide (LASIX) 20 MG tablet 90 tablet 1     Sig: Take 1 tablet (20 mg) by mouth daily   Last Written Prescription Date:  3-6-20  Last Fill Quantity: 90,  # refills: 1   Last office visit: 9/13/2019 with prescribing provider:  9-13-19   Future Office Visit:            There is no refill protocol information for this order

## 2020-06-02 RX ORDER — FUROSEMIDE 20 MG
20 TABLET ORAL DAILY
Qty: 90 TABLET | Refills: 1 | Status: SHIPPED | OUTPATIENT
Start: 2020-06-02 | End: 2020-11-17

## 2020-06-02 NOTE — TELEPHONE ENCOUNTER
Prescription approved per [facility] Refill Protocol.  Sandee Cormier, RN, BSN

## 2020-07-08 ENCOUNTER — TELEPHONE (OUTPATIENT)
Dept: NURSING | Facility: CLINIC | Age: 60
End: 2020-07-08

## 2020-07-08 NOTE — TELEPHONE ENCOUNTER
Patient over due for INR check.  Left message for patient to call back at [phone] to schedule lab appointment for INR check

## 2020-07-08 NOTE — LETTER
July 13, 2020        Stef Mosher  [address] [address] [id number]        Dear Stef,    This is to remind you that your INR is due.      You may call our office at [phone] to schedule an appointment.      Please disregard this notice if you have already made an appointment.      Sincerely,          [facility] Care Team

## 2020-07-09 NOTE — TELEPHONE ENCOUNTER
Left message on voice mail for patient to call clinic. [phone] and schedule lab appointment for INR check.

## 2020-07-13 NOTE — TELEPHONE ENCOUNTER
Attempted to reach out to patient x 3.  No response or INR appointment made.  Will have TC send 1st INR letter.

## 2020-07-24 ENCOUNTER — ANTICOAGULATION THERAPY VISIT (OUTPATIENT)
Dept: FAMILY MEDICINE | Facility: CLINIC | Age: 60
End: 2020-07-24

## 2020-07-24 ENCOUNTER — TELEPHONE (OUTPATIENT)
Dept: FAMILY MEDICINE | Facility: CLINIC | Age: 60
End: 2020-07-24

## 2020-07-24 DIAGNOSIS — I48.20 CHRONIC ATRIAL FIBRILLATION (H): Primary | ICD-10-CM

## 2020-07-24 DIAGNOSIS — Z79.01 LONG TERM CURRENT USE OF ANTICOAGULANT THERAPY: ICD-10-CM

## 2020-07-24 DIAGNOSIS — I48.91 NEW ONSET ATRIAL FIBRILLATION (H): ICD-10-CM

## 2020-07-24 LAB
CAPILLARY BLOOD COLLECTION: NORMAL
INR PPP: 2.5 (ref 0.86–1.14)

## 2020-07-24 PROCEDURE — 36416 COLLJ CAPILLARY BLOOD SPEC: CPT | Performed by: NURSE PRACTITIONER

## 2020-07-24 PROCEDURE — 85610 PROTHROMBIN TIME: CPT | Performed by: NURSE PRACTITIONER

## 2020-07-24 PROCEDURE — 99207 ZZC NO CHARGE NURSE ONLY: CPT | Performed by: NURSE PRACTITIONER

## 2020-07-24 NOTE — PROGRESS NOTES
ANTICOAGULATION FOLLOW-UP CLINIC VISIT    Patient Name:  Stef Mosher  Date:  2020  Contact Type:  Telephone    SUBJECTIVE:  Patient Findings     Comments:   Spoke with patient, denies any problems or concerns.         Clinical Outcomes     Comments:   Spoke with patient, denies any problems or concerns.            OBJECTIVE    Recent labs: (last 7 days)     20  1106   INR 2.50*       ASSESSMENT / PLAN  INR assessment THER    Recheck INR In: 7 WEEKS soonest he could return   INR Location Clinic      Anticoagulation Summary  As of 2020    INR goal:   2.0-3.0   TTR:   64.7 % (1 y)   INR used for dosin.50 (2020)   Warfarin maintenance plan:   5 mg (2.5 mg x 2) every Fri; 2.5 mg (2.5 mg x 1) all other days   Full warfarin instructions:   5 mg every Fri; 2.5 mg all other days   Weekly warfarin total:   20 mg   No change documented:   Dahiana Barrera   Plan last modified:   Linnea Brooks RN (4/3/2020)   Next INR check:   2020   Priority:   Maintenance   Target end date:       Indications    Long-term (current) use of anticoagulants [Z79.01] [Z79.01]  New onset atrial fibrillation (H) [I48.91]             Anticoagulation Episode Summary     INR check location:       Preferred lab:       Send INR reminders to:   BRIANA RODRIGUEZ    Comments:         Anticoagulation Care Providers     Provider Role Specialty Phone number    Sivakumar Kang PA-C Responsible Physician Assistant [phone]            See the Encounter Report to view Anticoagulation Flowsheet and Dosing Calendar (Go to Encounters tab in chart review, and find the Anticoagulation Therapy Visit)    Dosage adjustment made based on physician directed care plan.    DAHIANA BARRERA

## 2020-08-17 DIAGNOSIS — I48.20 CHRONIC ATRIAL FIBRILLATION (H): ICD-10-CM

## 2020-08-17 DIAGNOSIS — I10 ESSENTIAL HYPERTENSION: ICD-10-CM

## 2020-08-17 NOTE — TELEPHONE ENCOUNTER
"Requested Prescriptions   Pending Prescriptions Disp Refills     lisinopril (ZESTRIL) 20 MG tablet 90 tablet 0     Sig: Take 1 tablet (20 mg) by mouth daily   Last Written Prescription Date:  05/26/20  Last Fill Quantity: 90,  # refills: 3   Last office visit: 05/01/20 with prescribing provider:  FERNANDO Kang   Future Office Visit:              ACE Inhibitors (Including Combos) Protocol Passed - 8/17/2020  9:17 AM        Passed - Blood pressure under 140/90 in past 12 months     BP Readings from Last 3 Encounters:   09/13/19 130/84   06/29/18 124/84   10/23/17 (!) 140/99                 Passed - Recent (12 mo) or future (30 days) visit within the authorizing provider's specialty     Patient has had an office visit with the authorizing provider or a provider within the authorizing providers department within the previous 12 mos or has a future within next 30 days. See \"Patient Info\" tab in inbasket, or \"Choose Columns\" in Meds & Orders section of the refill encounter.              Passed - Medication is active on med list        Passed - Patient is age 18 or older        Passed - Normal serum creatinine on file in past 12 months     Recent Labs   Lab Test 09/13/19  1336   CR 0.77       Ok to refill medication if creatinine is low          Passed - Normal serum potassium on file in past 12 months     Recent Labs   Lab Test 09/13/19  1336   POTASSIUM 4.7                  "

## 2020-08-18 RX ORDER — LISINOPRIL 20 MG/1
20 TABLET ORAL DAILY
Qty: 30 TABLET | Refills: 0 | Status: SHIPPED | OUTPATIENT
Start: 2020-08-18 | End: 2020-09-23

## 2020-09-11 ENCOUNTER — ANTICOAGULATION THERAPY VISIT (OUTPATIENT)
Dept: FAMILY MEDICINE | Facility: CLINIC | Age: 60
End: 2020-09-11

## 2020-09-11 DIAGNOSIS — Z79.01 LONG TERM CURRENT USE OF ANTICOAGULANT THERAPY: ICD-10-CM

## 2020-09-11 DIAGNOSIS — I48.20 CHRONIC ATRIAL FIBRILLATION (H): ICD-10-CM

## 2020-09-11 DIAGNOSIS — I48.91 NEW ONSET ATRIAL FIBRILLATION (H): ICD-10-CM

## 2020-09-11 LAB
CAPILLARY BLOOD COLLECTION: NORMAL
INR PPP: 2.7 (ref 0.86–1.14)

## 2020-09-11 PROCEDURE — 85610 PROTHROMBIN TIME: CPT | Performed by: NURSE PRACTITIONER

## 2020-09-11 PROCEDURE — 99207 ZZC NO CHARGE NURSE ONLY: CPT | Performed by: NURSE PRACTITIONER

## 2020-09-11 PROCEDURE — 36416 COLLJ CAPILLARY BLOOD SPEC: CPT | Performed by: NURSE PRACTITIONER

## 2020-09-11 NOTE — PROGRESS NOTES
ANTICOAGULATION FOLLOW-UP CLINIC VISIT    Patient Name:  Stef Mosher  Date:  2020  Contact Type:  Telephone    SUBJECTIVE:  Patient Findings     Comments:   Spoke with patient, no problems or concerns. Voiced understanding with dosing        Clinical Outcomes     Comments:   Spoke with patient, no problems or concerns. Voiced understanding with dosing           OBJECTIVE    Recent labs: (last 7 days)     20  1105   INR 2.70*       ASSESSMENT / PLAN  INR assessment THER    Recheck INR In: 6 WEEKS    INR Location Clinic      Anticoagulation Summary  As of 2020    INR goal:   2.0-3.0   TTR:   65.2 % (1 y)   INR used for dosin.70 (2020)   Warfarin maintenance plan:   5 mg (2.5 mg x 2) every Fri; 2.5 mg (2.5 mg x 1) all other days   Full warfarin instructions:   5 mg every Fri; 2.5 mg all other days   Weekly warfarin total:   20 mg   No change documented:   Dahiana Barrera   Plan last modified:   Linnea Brooks RN (4/3/2020)   Next INR check:   10/23/2020   Priority:   Maintenance   Target end date:   Indefinite    Indications    Long-term (current) use of anticoagulants [Z79.01] [Z79.01]  New onset atrial fibrillation (H) [I48.91]  Chronic atrial fibrillation (H) [I48.20]             Anticoagulation Episode Summary     INR check location:       Preferred lab:       Send INR reminders to:   BRIANA RODRIGUEZ    Comments:         Anticoagulation Care Providers     Provider Role Specialty Phone number    Josephine Roberts NP Referring Nurse Practitioner - Family [phone]    Sivakumar Kang PA-C Responsible Physician Assistant [phone]            See the Encounter Report to view Anticoagulation Flowsheet and Dosing Calendar (Go to Encounters tab in chart review, and find the Anticoagulation Therapy Visit)    Dosage adjustment made based on physician directed care plan.    DAHIANA BARRERA

## 2020-09-14 DIAGNOSIS — I48.91 NEW ONSET ATRIAL FIBRILLATION (H): ICD-10-CM

## 2020-09-15 NOTE — TELEPHONE ENCOUNTER
Routing refill request to provider for review/approval because:  Drug interaction warning        Olga Beverly BSN, RN

## 2020-09-16 RX ORDER — WARFARIN SODIUM 2.5 MG/1
TABLET ORAL
Qty: 140 TABLET | Refills: 0 | Status: SHIPPED | OUTPATIENT
Start: 2020-09-16 | End: 2020-10-23

## 2020-09-17 ENCOUNTER — TELEPHONE (OUTPATIENT)
Dept: FAMILY MEDICINE | Facility: CLINIC | Age: 60
End: 2020-09-17

## 2020-09-17 DIAGNOSIS — Z79.01 LONG TERM CURRENT USE OF ANTICOAGULANT THERAPY: ICD-10-CM

## 2020-09-17 RX ORDER — ASPIRIN 81 MG/1
81 TABLET, CHEWABLE ORAL DAILY
Qty: 90 TABLET | Refills: 1 | Status: SHIPPED | OUTPATIENT
Start: 2020-09-17 | End: 2021-05-07

## 2020-09-17 NOTE — TELEPHONE ENCOUNTER
Reason for call:  Medication   If this is a refill request, has the caller requested the refill from the pharmacy already? Yes  Will the patient be using a [facility]? No  Name of the pharmacy and phone number for the current request:   [facility] [address] [place] [place] - [address] [phone] (Phone)  [phone] (Fax)         Name of the medication requested:     Patient does not know the name.   Please call back to discuss.    Other request: Patient made appointment for 9/23/20 at 7:40am, he would like a refill of the medications until then. Please call.    Phone number to reach patient:  Home number on file [phone] (home)    Best Time:  Any     Can we leave a detailed message on this number?  YES    Please leave message and call back number!    Travel screening: Negative

## 2020-09-17 NOTE — TELEPHONE ENCOUNTER
Left message on voice mail for patient that medication refilled yesterday to requested pharmacy. He is to call clinic if any questions or concerns. [phone].  Dahiana Barrera RN

## 2020-09-18 DIAGNOSIS — I10 ESSENTIAL HYPERTENSION: ICD-10-CM

## 2020-09-18 RX ORDER — METOPROLOL TARTRATE 100 MG
TABLET ORAL
Qty: 180 TABLET | Refills: 0 | Status: SHIPPED | OUTPATIENT
Start: 2020-09-18 | End: 2020-09-23

## 2020-09-18 NOTE — TELEPHONE ENCOUNTER
Next 5 appointments (look out 90 days)    Sep 23, 2020  7:40 AM CDT  PHYSICAL with Sivakumar Kang PA-C  [facility] [place] ([facility]) [address]  [place] [address]  [phone]        Rx refilled.    Olga Beverly BSN, RN

## 2020-09-23 ENCOUNTER — OFFICE VISIT (OUTPATIENT)
Dept: FAMILY MEDICINE | Facility: CLINIC | Age: 60
End: 2020-09-23
Payer: COMMERCIAL

## 2020-09-23 VITALS
HEART RATE: 91 BPM | DIASTOLIC BLOOD PRESSURE: 88 MMHG | TEMPERATURE: 97.4 F | HEIGHT: 77 IN | BODY MASS INDEX: 37.19 KG/M2 | WEIGHT: 315 LBS | RESPIRATION RATE: 16 BRPM | OXYGEN SATURATION: 97 % | SYSTOLIC BLOOD PRESSURE: 135 MMHG

## 2020-09-23 DIAGNOSIS — E78.5 HYPERLIPIDEMIA LDL GOAL <130: ICD-10-CM

## 2020-09-23 DIAGNOSIS — I10 ESSENTIAL HYPERTENSION: ICD-10-CM

## 2020-09-23 DIAGNOSIS — E04.1 THYROID NODULE: ICD-10-CM

## 2020-09-23 DIAGNOSIS — Z00.00 ROUTINE GENERAL MEDICAL EXAMINATION AT A HEALTH CARE FACILITY: Primary | ICD-10-CM

## 2020-09-23 DIAGNOSIS — Z12.11 COLON CANCER SCREENING: ICD-10-CM

## 2020-09-23 DIAGNOSIS — I48.20 CHRONIC ATRIAL FIBRILLATION (H): ICD-10-CM

## 2020-09-23 DIAGNOSIS — Z12.5 SCREENING FOR PROSTATE CANCER: ICD-10-CM

## 2020-09-23 PROCEDURE — 99213 OFFICE O/P EST LOW 20 MIN: CPT | Mod: 25 | Performed by: PHYSICIAN ASSISTANT

## 2020-09-23 PROCEDURE — 99396 PREV VISIT EST AGE 40-64: CPT | Performed by: PHYSICIAN ASSISTANT

## 2020-09-23 RX ORDER — LISINOPRIL 40 MG/1
40 TABLET ORAL DAILY
Qty: 90 TABLET | Refills: 1 | Status: SHIPPED | OUTPATIENT
Start: 2020-09-23 | End: 2021-01-08

## 2020-09-23 RX ORDER — ATORVASTATIN CALCIUM 10 MG/1
TABLET, FILM COATED ORAL
Qty: 90 TABLET | Refills: 1 | Status: SHIPPED | OUTPATIENT
Start: 2020-09-23 | End: 2021-01-08

## 2020-09-23 RX ORDER — METOPROLOL TARTRATE 100 MG
TABLET ORAL
Qty: 180 TABLET | Refills: 1 | Status: SHIPPED | OUTPATIENT
Start: 2020-09-23 | End: 2021-05-25

## 2020-09-23 ASSESSMENT — MIFFLIN-ST. JEOR: SCORE: 2448.17

## 2020-09-23 NOTE — PROGRESS NOTES
3  SUBJECTIVE:   CC: Stef Mosher is an 60 year old male who presents for preventive health visit.       Patient has been advised of split billing requirements and indicates understanding: Yes  Healthy Habits:    Do you get at least three servings of calcium containing foods daily (dairy, green leafy vegetables, etc.)? yes    Amount of exercise or daily activities, outside of work: 3 day(s) per week    Problems taking medications regularly No    Medication side effects: No    Have you had an eye exam in the past two years? no    Do you see a dentist twice per year? yes    Do you have sleep apnea, excessive snoring or daytime drowsiness?no  Patient informed that anything we discuss that is not related to preventative medicine, may be billed for; patient verbalizes understanding.        PROBLEMS TO ADD ON...    None  -------------------------------------    Today's PHQ-2 Score:   PHQ-2 ( 1999 [employer]) 6/29/2018 4/7/2014   Q1: Little interest or pleasure in doing things 0 0   Q2: Feeling down, depressed or hopeless 0 0   PHQ-2 Score 0 0       Abuse: Current or Past(Physical, Sexual or Emotional)- No  Do you feel safe in your environment? Yes        Social History     Tobacco Use     Smoking status: Never Smoker     Smokeless tobacco: Never Used     Tobacco comment: Lives in smoke free household   Substance Use Topics     Alcohol use: No     Comment: None     If you drink alcohol do you typically have >3 drinks per day or >7 drinks per week? No                      Last PSA:   PSA   Date Value Ref Range Status   06/29/2018 0.59 0 - 4 ug/L Final     Comment:     Assay Method:  Chemiluminescence using Siemens Vista analyzer       Reviewed orders with patient. Reviewed health maintenance and updated orders accordingly - Yes  Lab work is in process  Labs reviewed in EPIC  BP Readings from Last 3 Encounters:   09/23/20 135/88   09/13/19 130/84   06/29/18 124/84    Wt Readings from Last 3 Encounters:   09/23/20 (!) 152.3 kg  (335 lb 12.8 oz)   09/13/19 132 kg (291 lb)   06/29/18 (!) 152.9 kg (337 lb)                  Patient Active Problem List   Diagnosis     Hypothyroidism     New onset atrial fibrillation (H)     Dilated cardiomyopathy (H)     Seasonal allergies     Hyperlipidemia LDL goal <130     Long-term (current) use of anticoagulants [Z79.01]     Essential hypertension     Paroxysmal atrial fibrillation (H)     CHF (congestive heart failure) (H)     Morbid obesity (H)     Chronic atrial fibrillation (H)     Past Surgical History:   Procedure Laterality Date     HC THYROIDECTOMY         Social History     Tobacco Use     Smoking status: Never Smoker     Smokeless tobacco: Never Used     Tobacco comment: Lives in smoke free household   Substance Use Topics     Alcohol use: No     Comment: None     Family History   Problem Relation Age of Onset     Blood Disease Mother      Cancer Mother      Arthritis Father      Hypertension Father      Breast Cancer Sister      Allergies Sister      Cancer Sister      Thyroid Disease Sister      Blood Disease Sister      Diabetes No family hx of      Cerebrovascular Disease No family hx of      Glaucoma No family hx of      Macular Degeneration No family hx of          Current Outpatient Medications   Medication Sig Dispense Refill     aspirin (ASPIRIN LOW DOSE) 81 MG chewable tablet Take 1 tablet (81 mg) by mouth daily 90 tablet 1     atorvastatin (LIPITOR) 10 MG tablet TAKE 1 TABLET BY MOUTH EVERY DAY 90 tablet 1     furosemide (LASIX) 20 MG tablet Take 1 tablet (20 mg) by mouth daily 90 tablet 1     HYDROcodone-acetaminophen (NORCO) 5-325 MG per tablet Take 1-2 tablets by mouth nightly as needed for severe pain 30 tablet 0     lisinopril (ZESTRIL) 40 MG tablet Take 1 tablet (40 mg) by mouth daily 90 tablet 1     metoprolol tartrate (LOPRESSOR) 100 MG tablet TAKE 1 TABLET (100 MG) BY MOUTH 2 TIMES DAILY 180 tablet 1     sildenafil (VIAGRA) 50 MG tablet Take 0.5-1 tablets (25-50 mg) by mouth  daily as needed Take 30 min to 4 hours before intercourse.  Never use with nitroglycerin, terazosin or doxazosin. 12 tablet 1     warfarin ANTICOAGULANT (COUMADIN) 2.5 MG tablet 2.5 mg on Sun, Tue, Thu; 5 mg all other days or as directed by INR clinic. 140 tablet 0     Menthol, Topical Analgesic, (BIOFREEZE) 4 % GEL Externally apply topically 3 times daily as needed (Patient not taking: Reported on 9/23/2020) 118 mL 11     Misc Natural Products (GLUCOSAMINE CHOND COMPLEX/MSM) TABS Take 1 tablet by mouth 2 times daily       Omega-3 Fatty Acids (OMEGA-3 FISH OIL PO) Take 1,200 mg by mouth 2 times daily (with meals)       order for DME Equipment being ordered: cryo knee cuff and supplies for cold therapy of knees- bilateral 1 Device 0     No Known Allergies  Recent Labs   Lab Test 09/13/19  1336 06/29/18  1129  04/24/17  1041 04/07/14  1008   A1C  --  5.4  --   --   --    LDL  --  107*  --  90 150*   HDL  --  49  --  48 34*   TRIG  --  98  --  97 164*   ALT 31 40  --  53  --    CR 0.77 0.91   < > 0.78 0.82   GFRESTIMATED >90 86   < > >90  Non  GFR Calc   >90   GFRESTBLACK >90 >90   < > >90   GFR Calc   >90   POTASSIUM 4.7 4.3   < > 4.3 4.0   TSH 0.77 0.62  --  0.54 0.80    < > = values in this interval not displayed.        Reviewed and updated as needed this visit by clinical staff         Reviewed and updated as needed this visit by Provider        Past Medical History:   Diagnosis Date     Arthritis      Atrial fibrillation (H)      Hayfever      Hypercholesterolemia      Hypertension      Unspecified hypothyroidism       Past Surgical History:   Procedure Laterality Date     HC THYROIDECTOMY         ROS:  CONSTITUTIONAL: NEGATIVE for fever, chills, change in weight  INTEGUMENTARY/SKIN: NEGATIVE for worrisome rashes, moles or lesions  EYES: NEGATIVE for vision changes or irritation  ENT: NEGATIVE for ear, mouth and throat problems  RESP: NEGATIVE for significant cough or SOB  CV:  NEGATIVE for chest pain, palpitations or peripheral edema  GI: NEGATIVE for nausea, abdominal pain, heartburn, or change in bowel habits   male: negative for dysuria, hematuria, decreased urinary stream, erectile dysfunction, urethral discharge  MUSCULOSKELETAL: NEGATIVE for significant arthralgias or myalgia  NEURO: NEGATIVE for weakness, dizziness or paresthesias  PSYCHIATRIC: NEGATIVE for changes in mood or affect    OBJECTIVE:   There were no vitals taken for this visit.  EXAM:  GENERAL: healthy, alert and no distress  EYES: Eyes grossly normal to inspection, PERRL and conjunctivae and sclerae normal  HENT: ear canals and TM's normal, nose and mouth without ulcers or lesions  NECK: no adenopathy, no asymmetry, masses, or scars and thyroid nodule   RESP: lungs clear to auscultation - no rales, rhonchi or wheezes  CV: irregularly irregular rhythm, no murmur, click or rub, peripheral pulses strong and no peripheral edema  ABDOMEN: soft, nontender, no hepatosplenomegaly, no masses and bowel sounds normal  MS: no gross musculoskeletal defects noted, no edema  SKIN: no suspicious lesions or rashes  NEURO: Normal strength and tone, mentation intact and speech normal  PSYCH: mentation appears normal, affect normal/bright    Diagnostic Test Results:  Labs reviewed in Epic    ASSESSMENT/PLAN:       ICD-10-CM    1. Routine general medical examination at a health care facility  Z00.00    2. Essential hypertension  I10 **Comprehensive metabolic panel FUTURE anytime     lisinopril (ZESTRIL) 40 MG tablet     metoprolol tartrate (LOPRESSOR) 100 MG tablet   3. Hyperlipidemia LDL goal <130  E78.5 Lipid panel reflex to direct LDL Fasting     atorvastatin (LIPITOR) 10 MG tablet   4. Screening for prostate cancer  Z12.5 **Prostate spec antigen screen FUTURE anytime   5. Thyroid nodule  E04.1 TSH WITH FREE T4 REFLEX   6. Chronic atrial fibrillation (H)  I48.20 lisinopril (ZESTRIL) 40 MG tablet   7. Colon cancer screening  Z12.11  "GASTROENTEROLOGY ADULT REF PROCEDURE ONLY     Increase lisinopril from 20  To 40 mg daily.  Fasting Labs to be checked at his next inr    Patient has been advised of split billing requirements and indicates understanding: Yes  COUNSELING:  Reviewed preventive health counseling, as reflected in patient instructions       Regular exercise       Healthy diet/nutrition    Estimated body mass index is 44.25 kg/m  as calculated from the following:    Height as of 9/13/19: 1.727 m (5' 8\").    Weight as of 9/13/19: 132 kg (291 lb).    Weight management plan: Discussed healthy diet and exercise guidelines    He reports that he has never smoked. He has never used smokeless tobacco.      Counseling Resources:  ATP IV Guidelines  Pooled Cohorts Equation Calculator  FRAX Risk Assessment  ICSI Preventive Guidelines  Dietary Guidelines for Americans, 2010  USDA's MyPlate  ASA Prophylaxis  Lung CA Screening    Sivakumar Kang PA-C  [facility]  "

## 2020-10-23 ENCOUNTER — ANTICOAGULATION THERAPY VISIT (OUTPATIENT)
Dept: NURSING | Facility: CLINIC | Age: 60
End: 2020-10-23
Payer: COMMERCIAL

## 2020-10-23 DIAGNOSIS — E78.5 HYPERLIPIDEMIA LDL GOAL <130: ICD-10-CM

## 2020-10-23 DIAGNOSIS — I48.91 NEW ONSET ATRIAL FIBRILLATION (H): ICD-10-CM

## 2020-10-23 DIAGNOSIS — I48.20 CHRONIC ATRIAL FIBRILLATION (H): ICD-10-CM

## 2020-10-23 DIAGNOSIS — I10 ESSENTIAL HYPERTENSION: ICD-10-CM

## 2020-10-23 DIAGNOSIS — Z79.01 LONG TERM CURRENT USE OF ANTICOAGULANT THERAPY: ICD-10-CM

## 2020-10-23 DIAGNOSIS — E04.1 THYROID NODULE: ICD-10-CM

## 2020-10-23 DIAGNOSIS — Z12.5 SCREENING FOR PROSTATE CANCER: ICD-10-CM

## 2020-10-23 LAB
ALBUMIN SERPL-MCNC: 4 G/DL (ref 3.4–5)
ALP SERPL-CCNC: 115 U/L (ref 40–150)
ALT SERPL W P-5'-P-CCNC: 39 U/L (ref 0–70)
ANION GAP SERPL CALCULATED.3IONS-SCNC: 5 MMOL/L (ref 3–14)
AST SERPL W P-5'-P-CCNC: 19 U/L (ref 0–45)
BILIRUB SERPL-MCNC: 0.9 MG/DL (ref 0.2–1.3)
BUN SERPL-MCNC: 17 MG/DL (ref 7–30)
CALCIUM SERPL-MCNC: 8.7 MG/DL (ref 8.5–10.1)
CHLORIDE SERPL-SCNC: 109 MMOL/L (ref 94–109)
CHOLEST SERPL-MCNC: 209 MG/DL
CO2 SERPL-SCNC: 29 MMOL/L (ref 20–32)
CREAT SERPL-MCNC: 0.72 MG/DL (ref 0.66–1.25)
GFR SERPL CREATININE-BSD FRML MDRD: >90 ML/MIN/{1.73_M2}
GLUCOSE SERPL-MCNC: 97 MG/DL (ref 70–99)
HDLC SERPL-MCNC: 47 MG/DL
INR PPP: 2.9 (ref 0.86–1.14)
LDLC SERPL CALC-MCNC: 138 MG/DL
NONHDLC SERPL-MCNC: 162 MG/DL
POTASSIUM SERPL-SCNC: 3.8 MMOL/L (ref 3.4–5.3)
PROT SERPL-MCNC: 7.4 G/DL (ref 6.8–8.8)
PSA SERPL-ACNC: 0.95 UG/L (ref 0–4)
SODIUM SERPL-SCNC: 143 MMOL/L (ref 133–144)
TRIGL SERPL-MCNC: 122 MG/DL
TSH SERPL DL<=0.005 MIU/L-ACNC: 1.09 MU/L (ref 0.4–4)

## 2020-10-23 PROCEDURE — 85610 PROTHROMBIN TIME: CPT | Performed by: NURSE PRACTITIONER

## 2020-10-23 PROCEDURE — G0103 PSA SCREENING: HCPCS | Performed by: PHYSICIAN ASSISTANT

## 2020-10-23 PROCEDURE — 80053 COMPREHEN METABOLIC PANEL: CPT | Performed by: PHYSICIAN ASSISTANT

## 2020-10-23 PROCEDURE — 99207 PR NO CHARGE NURSE ONLY: CPT

## 2020-10-23 PROCEDURE — 84443 ASSAY THYROID STIM HORMONE: CPT | Performed by: PHYSICIAN ASSISTANT

## 2020-10-23 PROCEDURE — 80061 LIPID PANEL: CPT | Performed by: PHYSICIAN ASSISTANT

## 2020-10-23 PROCEDURE — 36415 COLL VENOUS BLD VENIPUNCTURE: CPT | Performed by: PHYSICIAN ASSISTANT

## 2020-10-23 RX ORDER — WARFARIN SODIUM 2.5 MG/1
TABLET ORAL
Qty: 140 TABLET | Refills: 0
Start: 2020-10-23 | End: 2021-05-17

## 2020-11-16 DIAGNOSIS — I48.91 ATRIAL FIBRILLATION, UNSPECIFIED TYPE (H): ICD-10-CM

## 2020-11-16 DIAGNOSIS — I42.9 CARDIOMYOPATHY, UNSPECIFIED TYPE (H): ICD-10-CM

## 2020-11-17 RX ORDER — FUROSEMIDE 20 MG
20 TABLET ORAL DAILY
Qty: 90 TABLET | Refills: 0 | Status: SHIPPED | OUTPATIENT
Start: 2020-11-17 | End: 2021-02-08

## 2020-11-17 NOTE — TELEPHONE ENCOUNTER
Last Written Prescription Date:  6/2/2020  Last Fill Quantity: 90,  # refills: 1   Last office visit: 9/23/2020 with prescribing provider:  9/23/2020 with advised F/U in 6 months for HTN F/U.  Future Office Visit: none    Prescription approved per [facility] Refill Protocol.  Sandee Cormier, RN, BSN

## 2020-11-29 ENCOUNTER — HEALTH MAINTENANCE LETTER (OUTPATIENT)
Age: 60
End: 2020-11-29

## 2020-12-04 ENCOUNTER — ANTICOAGULATION THERAPY VISIT (OUTPATIENT)
Dept: NURSING | Facility: CLINIC | Age: 60
End: 2020-12-04

## 2020-12-04 DIAGNOSIS — I48.91 NEW ONSET ATRIAL FIBRILLATION (H): ICD-10-CM

## 2020-12-04 DIAGNOSIS — I48.20 CHRONIC ATRIAL FIBRILLATION (H): ICD-10-CM

## 2020-12-04 DIAGNOSIS — Z79.01 LONG TERM CURRENT USE OF ANTICOAGULANT THERAPY: ICD-10-CM

## 2020-12-04 LAB
CAPILLARY BLOOD COLLECTION: NORMAL
INR PPP: 2.3 (ref 0.86–1.14)

## 2020-12-04 PROCEDURE — 36416 COLLJ CAPILLARY BLOOD SPEC: CPT | Performed by: NURSE PRACTITIONER

## 2020-12-04 PROCEDURE — 85610 PROTHROMBIN TIME: CPT | Performed by: NURSE PRACTITIONER

## 2020-12-04 NOTE — PROGRESS NOTES
ANTICOAGULATION MANAGEMENT     Patient Name:  Stef Mosher  Date:  2020    ASSESSMENT /SUBJECTIVE:    Today's INR result of 2.3 is therapeutic. Goal INR of 2.0-3.0      Warfarin dose taken: Warfarin taken as instructed    Diet: No new diet changes affecting INR    Medication changes/ interactions: No new medications/supplements affecting INR    Previous INR: Therapeutic     S/S of bleeding or thromboembolism: No    New injury or illness: No    Upcoming surgery, procedure or cardioversion: No    Additional findings: None      PLAN:    Telephone call with Stef regarding INR result and instructed:     Warfarin Dosing Instructions: Continue your current warfarin dose 5 mg  and 2.5 mg all other days    Instructed patient to follow up no later than: 6 weeks  Lab visit scheduled    Education provided: Please call back if any changes to your diet, medications or how you've been taking warfarin and Contact the anticoagulation clinic with any changes, questions or concerns at #[phone]       Stef verbalizes understanding and agrees to warfarin dosing plan.    Instructed to call the Anticoagulation Clinic for any changes, questions or concerns. (#[phone])        Lawanda Bowers RN      OBJECTIVE:  Recent labs: (last 7 days)     20  1103   INR 2.30*         No question data found.  Anticoagulation Summary  As of 2020    INR goal:  2.0-3.0   TTR:  74.2 % (1 y)   INR used for dosin.30 (2020)   Warfarin maintenance plan:  5 mg (2.5 mg x 2) every Fri; 2.5 mg (2.5 mg x 1) all other days   Full warfarin instructions:  5 mg every Fri; 2.5 mg all other days   Weekly warfarin total:  20 mg   Plan last modified:  Linnea Brooks RN (4/3/2020)   Next INR check:     Priority:  Maintenance   Target end date:  Indefinite    Indications    Long-term (current) use of anticoagulants [Z79.01] [Z79.01]  New onset atrial fibrillation (H) [I48.91]  Chronic atrial fibrillation (H) [I48.20]              Anticoagulation Episode Summary     INR check location:      Preferred lab:      Send INR reminders to:  BRIANA RODRIGUEZ    Comments:        Anticoagulation Care Providers     Provider Role Specialty Phone number    Josephine Roberts NP Referring Family Medicine [phone]    Sivakumar Kang PA-C Responsible Family Medicine [phone]       Left message to call me back. [phone]. Lawanda Bowers RN

## 2021-01-08 ENCOUNTER — TELEPHONE (OUTPATIENT)
Dept: FAMILY MEDICINE | Facility: CLINIC | Age: 61
End: 2021-01-08

## 2021-01-08 DIAGNOSIS — I10 ESSENTIAL HYPERTENSION: ICD-10-CM

## 2021-01-08 DIAGNOSIS — I48.20 CHRONIC ATRIAL FIBRILLATION (H): ICD-10-CM

## 2021-01-08 DIAGNOSIS — E78.5 HYPERLIPIDEMIA LDL GOAL <130: ICD-10-CM

## 2021-01-08 RX ORDER — LISINOPRIL 40 MG/1
TABLET ORAL
Qty: 90 TABLET | Refills: 0 | Status: SHIPPED | OUTPATIENT
Start: 2021-01-08 | End: 2021-04-08

## 2021-01-08 RX ORDER — ATORVASTATIN CALCIUM 10 MG/1
TABLET, FILM COATED ORAL
Qty: 90 TABLET | Refills: 0 | Status: SHIPPED | OUTPATIENT
Start: 2021-01-08 | End: 2021-04-09

## 2021-01-08 NOTE — TELEPHONE ENCOUNTER
Patient called in and stated that he lost his scripts for the Atorvastatin and Lisinopril that he picked up in Dec.    He states he was very busy in Dec.and may have thrown them away.     Needs filled by Monday. Almost out.     Marian Dominguez RN Flex

## 2021-01-22 ENCOUNTER — ANTICOAGULATION THERAPY VISIT (OUTPATIENT)
Dept: NURSING | Facility: CLINIC | Age: 61
End: 2021-01-22

## 2021-01-22 DIAGNOSIS — Z79.01 LONG TERM CURRENT USE OF ANTICOAGULANT THERAPY: ICD-10-CM

## 2021-01-22 DIAGNOSIS — I48.91 NEW ONSET ATRIAL FIBRILLATION (H): ICD-10-CM

## 2021-01-22 DIAGNOSIS — I48.20 CHRONIC ATRIAL FIBRILLATION (H): ICD-10-CM

## 2021-01-22 LAB
CAPILLARY BLOOD COLLECTION: NORMAL
INR PPP: 2.4 (ref 0.86–1.14)

## 2021-01-22 PROCEDURE — 36416 COLLJ CAPILLARY BLOOD SPEC: CPT | Performed by: NURSE PRACTITIONER

## 2021-01-22 PROCEDURE — 85610 PROTHROMBIN TIME: CPT | Performed by: NURSE PRACTITIONER

## 2021-01-22 NOTE — PROGRESS NOTES
ANTICOAGULATION MANAGEMENT     Patient Name:  Stef Mosher  Date:  2021    ASSESSMENT /SUBJECTIVE:    Today's INR result of 2.40 is therapeutic. Goal INR of 2.0-3.0      Warfarin dose taken: Warfarin taken as instructed    Diet: No new diet changes affecting INR    Medication changes/ interactions: No new medications/supplements affecting INR    Previous INR: Therapeutic     S/S of bleeding or thromboembolism: No    New injury or illness: No    Upcoming surgery, procedure or cardioversion: No    Additional findings: None      PLAN:    Telephone call with Stef regarding INR result and instructed:     Warfarin Dosing Instructions: Continue your current warfarin dose 5 mg on Fri; 2.5 mg all other days    Instructed patient to follow up no later than: 6 weeks  Lab visit scheduled    Education provided: None required      Stef verbalizes understanding and agrees to warfarin dosing plan.    Instructed to call the Anticoagulation Clinic for any changes, questions or concerns. (#[phone])      Notes no Rx needed at this time.    Josephine Sharma RN      OBJECTIVE:  Recent labs: (last 7 days)     21  1108   INR 2.40*         INR assessment THER    Recheck INR In: 6 WEEKS    INR Location Clinic      Anticoagulation Summary  As of 2021    INR goal:  2.0-3.0   TTR:  87.7 % (1 y)   INR used for dosin.40 (2021)   Warfarin maintenance plan:  5 mg (2.5 mg x 2) every Fri; 2.5 mg (2.5 mg x 1) all other days   Full warfarin instructions:  5 mg every Fri; 2.5 mg all other days   Weekly warfarin total:  20 mg   No change documented:  Josephine Sharma RN   Plan last modified:  Linnea Brooks RN (4/3/2020)   Next INR check:  3/5/2021   Priority:  Maintenance   Target end date:  Indefinite    Indications    Long-term (current) use of anticoagulants [Z79.01] [Z79.01]  New onset atrial fibrillation (H) [I48.91]  Chronic atrial fibrillation (H) [I48.20]             Anticoagulation Episode Summary      INR check location:      Preferred lab:      Send INR reminders to:  BRIANA RODRIGUEZ    Comments:        Anticoagulation Care Providers     Provider Role Specialty Phone number    Josephine Roberts NP Referring Family Medicine [phone]    Sivakumar Kang PA-C Responsible Family Medicine [phone]

## 2021-02-07 DIAGNOSIS — I48.91 ATRIAL FIBRILLATION, UNSPECIFIED TYPE (H): ICD-10-CM

## 2021-02-07 DIAGNOSIS — I42.9 CARDIOMYOPATHY, UNSPECIFIED TYPE (H): ICD-10-CM

## 2021-02-08 RX ORDER — FUROSEMIDE 20 MG
20 TABLET ORAL DAILY
Qty: 90 TABLET | Refills: 0 | Status: SHIPPED | OUTPATIENT
Start: 2021-02-08 | End: 2021-05-03

## 2021-02-09 NOTE — CONFIDENTIAL NOTE
"  Prescription approved per [facility] Refill Protocol.  Requested Prescriptions   Pending Prescriptions Disp Refills     furosemide (LASIX) 20 MG tablet [Pharmacy Med Name: FUROSEMIDE 20 MG TABLET] 90 tablet 0     Sig: Take 1 tablet (20 mg) by mouth daily       Diuretics (Including Combos) Protocol Passed - 2/7/2021  9:32 AM        Passed - Blood pressure under 140/90 in past 12 months     BP Readings from Last 3 Encounters:   09/23/20 135/88   09/13/19 130/84   06/29/18 124/84                 Passed - Recent (12 mo) or future (30 days) visit within the authorizing provider's specialty     Patient has had an office visit with the authorizing provider or a provider within the authorizing providers department within the previous 12 mos or has a future within next 30 days. See \"Patient Info\" tab in inbasket, or \"Choose Columns\" in Meds & Orders section of the refill encounter.              Passed - Medication is active on med list        Passed - Patient is age 18 or older        Passed - Normal serum creatinine on file in past 12 months     Recent Labs   Lab Test 10/23/20  1055   CR 0.72              Passed - Normal serum potassium on file in past 12 months     Recent Labs   Lab Test 10/23/20  1055   POTASSIUM 3.8                    Passed - Normal serum sodium on file in past 12 months     Recent Labs   Lab Test 10/23/20  1055                      "

## 2021-03-05 ENCOUNTER — ANTICOAGULATION THERAPY VISIT (OUTPATIENT)
Dept: NURSING | Facility: CLINIC | Age: 61
End: 2021-03-05

## 2021-03-05 DIAGNOSIS — I48.20 CHRONIC ATRIAL FIBRILLATION (H): ICD-10-CM

## 2021-03-05 DIAGNOSIS — I48.91 NEW ONSET ATRIAL FIBRILLATION (H): ICD-10-CM

## 2021-03-05 DIAGNOSIS — Z79.01 LONG TERM CURRENT USE OF ANTICOAGULANT THERAPY: ICD-10-CM

## 2021-03-05 LAB
CAPILLARY BLOOD COLLECTION: NORMAL
INR PPP: 2.3 (ref 0.86–1.14)

## 2021-03-05 PROCEDURE — 36416 COLLJ CAPILLARY BLOOD SPEC: CPT | Performed by: NURSE PRACTITIONER

## 2021-03-05 PROCEDURE — 85610 PROTHROMBIN TIME: CPT | Performed by: NURSE PRACTITIONER

## 2021-03-05 NOTE — PROGRESS NOTES
Anticoagulation Management    Unable to reach Stef today.    Today's INR result of 2.30 is therapeutic (goal INR of 2.0-3.0).  Result received from: Clinic Lab    Follow up required to confirm warfarin dose taken and assess for changes    left message to call back to INR nurse before 4:30 today      Anticoagulation clinic to follow up    Lawanda Bowers [phone]

## 2021-03-05 NOTE — PROGRESS NOTES
ANTICOAGULATION MANAGEMENT     Patient Name:  Stef Mosher  Date:  3/5/2021    ASSESSMENT /SUBJECTIVE:    Today's INR result of 2.30 is therapeutic. Goal INR of 2.0-3.0      Warfarin dose taken: Warfarin taken as instructed    Diet: No new diet changes affecting INR    Medication changes/ interactions: No new medications/supplements affecting INR    Previous INR: Therapeutic     S/S of bleeding or thromboembolism: No    New injury or illness: No    Upcoming surgery, procedure or cardioversion: No    Additional findings: None      PLAN:    Telephone call with Stef regarding INR result and instructed:     Warfarin Dosing Instructions: Continue your current warfarin dose 5 mg Friday and 2.5 mg all other days    Instructed patient to follow up no later than: 6 weeks  Lab visit scheduled    Education provided: Please call back if any changes to your diet, medications or how you've been taking warfarin      Stef verbalizes understanding and agrees to warfarin dosing plan.    Instructed to call the Anticoagulation Clinic for any changes, questions or concerns. (#[phone])        Lawanda Bowers RN      OBJECTIVE:  Recent labs: (last 7 days)     21  1104   INR 2.30*         No question data found.  Anticoagulation Summary  As of 3/5/2021    INR goal:  2.0-3.0   TTR:  92.5 % (1 y)   INR used for dosin.30 (3/5/2021)   Warfarin maintenance plan:  5 mg (2.5 mg x 2) every Fri; 2.5 mg (2.5 mg x 1) all other days   Full warfarin instructions:  5 mg every Fri; 2.5 mg all other days   Weekly warfarin total:  20 mg   Plan last modified:  Linnea Brooks RN (4/3/2020)   Next INR check:     Priority:  Maintenance   Target end date:  Indefinite    Indications    Long-term (current) use of anticoagulants [Z79.01] [Z79.01]  New onset atrial fibrillation (H) [I48.91]  Chronic atrial fibrillation (H) [I48.20]             Anticoagulation Episode Summary     INR check location:      Preferred lab:      Send INR reminders  to:  BRIANA RODRIGUEZ    Comments:        Anticoagulation Care Providers     Provider Role Specialty Phone number    Josephine Roberts NP Referring Family Medicine [phone]    Sivakumar Kang PA-C Responsible Family Medicine [phone]

## 2021-04-08 ENCOUNTER — TELEPHONE (OUTPATIENT)
Dept: FAMILY MEDICINE | Facility: CLINIC | Age: 61
End: 2021-04-08

## 2021-04-08 DIAGNOSIS — E78.5 HYPERLIPIDEMIA LDL GOAL <130: ICD-10-CM

## 2021-04-08 DIAGNOSIS — I48.20 CHRONIC ATRIAL FIBRILLATION (H): ICD-10-CM

## 2021-04-08 DIAGNOSIS — I10 ESSENTIAL HYPERTENSION: ICD-10-CM

## 2021-04-09 NOTE — TELEPHONE ENCOUNTER
Provider increased dose to 20mg on 12/30/20, a new script was never sent      Please call pt to see if he increased the dosage.      Sivakumar Kang PA-C   12/30/2020  2:12 PM CST      Stef,  Your cholesterol numbers from 6 wks ago came back elevated. As such, I want you to increase your lipitor dose to 20 mg  (two 10 mg tabs) daily. Also, really work on a Lower fat, higher fiber diet and consistent exercise.   The rest of your lab work came back nice and stable.     Sivakumar

## 2021-04-12 RX ORDER — LISINOPRIL 40 MG/1
40 TABLET ORAL DAILY
Qty: 90 TABLET | Refills: 0 | Status: SHIPPED | OUTPATIENT
Start: 2021-04-12 | End: 2021-06-11

## 2021-04-12 NOTE — TELEPHONE ENCOUNTER
Medication is being filled for 1 time refill only due to:  Patient needs to be seen because +++NEED RECHECK+++.

## 2021-04-12 NOTE — TELEPHONE ENCOUNTER
Called patient .  He stated that he does not know how to activate his mychart and never knew that he was supposed to increase his atorvastatin to 20 mg.    Routed to PCP as current order is 10 mg tabs.  Needs to be updated to 20 mg tabs. Or for patient to take 2 tabs of the 10 mg tablets.    When should patient recheck labs.

## 2021-04-13 RX ORDER — ATORVASTATIN CALCIUM 10 MG/1
20 TABLET, FILM COATED ORAL DAILY
Qty: 180 TABLET | Refills: 0 | Status: SHIPPED | OUTPATIENT
Start: 2021-04-13 | End: 2021-07-06

## 2021-04-16 ENCOUNTER — ANTICOAGULATION THERAPY VISIT (OUTPATIENT)
Dept: NURSING | Facility: CLINIC | Age: 61
End: 2021-04-16

## 2021-04-16 DIAGNOSIS — I48.20 CHRONIC ATRIAL FIBRILLATION (H): ICD-10-CM

## 2021-04-16 DIAGNOSIS — Z79.01 LONG TERM CURRENT USE OF ANTICOAGULANT THERAPY: ICD-10-CM

## 2021-04-16 DIAGNOSIS — I48.91 NEW ONSET ATRIAL FIBRILLATION (H): ICD-10-CM

## 2021-04-16 LAB
CAPILLARY BLOOD COLLECTION: NORMAL
INR PPP: 2.5 (ref 0.86–1.14)

## 2021-04-16 PROCEDURE — 36416 COLLJ CAPILLARY BLOOD SPEC: CPT | Performed by: NURSE PRACTITIONER

## 2021-04-16 PROCEDURE — 85610 PROTHROMBIN TIME: CPT | Performed by: NURSE PRACTITIONER

## 2021-04-16 NOTE — PROGRESS NOTES
ANTICOAGULATION MANAGEMENT     Patient Name:  Stef Mosher  Date:  2021    ASSESSMENT /SUBJECTIVE:    Today's INR result of 2.50 is therapeutic. Goal INR of 2.0-3.0      Warfarin dose taken: Warfarin taken as instructed    Diet: No new diet changes affecting INR    Medication changes/ interactions: No new medications/supplements affecting INR    Previous INR: Therapeutic     S/S of bleeding or thromboembolism: No    New injury or illness: No    Upcoming surgery, procedure or cardioversion: No    Additional findings: denies any changes or concerns      PLAN:    Telephone call with Stef regarding INR result and instructed:     Warfarin Dosing Instructions: Continue your current warfarin dose 5 mg Friday and 2.5 mg all other days    Instructed patient to follow up no later than: 6 weeks  Left detailed message with recommended recheck date call was disconnected while discussing next apt and then I left detailed message for him to call and set up apt In 6 wks and call eloisaota # if help needed to schedule.    Education provided: Please call back if any changes to your diet, medications or how you've been taking warfarin      Stef verbalizes understanding and agrees to warfarin dosing plan.    Instructed to call the Anticoagulation Clinic for any changes, questions or concerns. (#[phone])        Lawanda Bowers RN      OBJECTIVE:  Recent labs: (last 7 days)     21  1102   INR 2.50*         No question data found.  Anticoagulation Summary  As of 2021    INR goal:  2.0-3.0   TTR:  100.0 % (1 y)   INR used for dosin.50 (2021)   Warfarin maintenance plan:  5 mg (2.5 mg x 2) every Fri; 2.5 mg (2.5 mg x 1) all other days   Full warfarin instructions:  5 mg every Fri; 2.5 mg all other days   Weekly warfarin total:  20 mg   Plan last modified:  Linnea Brooks RN (4/3/2020)   Next INR check:     Priority:  Maintenance   Target end date:  Indefinite    Indications    Long-term (current) use of  anticoagulants [Z79.01] [Z79.01]  New onset atrial fibrillation (H) [I48.91]  Chronic atrial fibrillation (H) [I48.20]             Anticoagulation Episode Summary     INR check location:      Preferred lab:      Send INR reminders to:  BRIANA RODRIGUEZ    Comments:        Anticoagulation Care Providers     Provider Role Specialty Phone number    Josephine Roberts NP Referring Family Medicine [phone]    Sivakumar Kang PA-C Responsible Family Medicine [phone]

## 2021-05-07 DIAGNOSIS — Z79.01 LONG TERM CURRENT USE OF ANTICOAGULANT THERAPY: ICD-10-CM

## 2021-05-07 RX ORDER — ASPIRIN 81 MG/1
81 TABLET, CHEWABLE ORAL DAILY
Qty: 90 TABLET | Refills: 0 | Status: SHIPPED | OUTPATIENT
Start: 2021-05-07 | End: 2021-07-09

## 2021-05-07 NOTE — TELEPHONE ENCOUNTER
"Requested Prescriptions   Pending Prescriptions Disp Refills     aspirin (ASPIRIN LOW DOSE) 81 MG chewable tablet 90 tablet 1     Sig: Take 1 tablet (81 mg) by mouth daily       Analgesics (Non-Narcotic Tylenol and ASA Only) Passed - 5/7/2021  1:18 PM        Passed - Recent (12 mo) or future (30 days) visit within the authorizing provider's specialty     Patient has had an office visit with the authorizing provider or a provider within the authorizing providers department within the previous 12 mos or has a future within next 30 days. See \"Patient Info\" tab in inbasket, or \"Choose Columns\" in Meds & Orders section of the refill encounter.              Passed - Patient is age 20 years or older     If ASA is flagged for ages under 20 years old. Forward to provider for confirmation Ryes Syndrome is not a concern.              Passed - Medication is active on med list           Routing refill request to provider for review/approval because:  Drug interaction warning with Warfarin    Stella PATEL-RN  Triage Nurse  [facility]            "

## 2021-05-12 DIAGNOSIS — I10 ESSENTIAL HYPERTENSION: ICD-10-CM

## 2021-05-12 DIAGNOSIS — I48.20 CHRONIC ATRIAL FIBRILLATION (H): ICD-10-CM

## 2021-05-14 RX ORDER — LISINOPRIL 40 MG/1
40 TABLET ORAL DAILY
Qty: 30 TABLET | Refills: 2 | OUTPATIENT
Start: 2021-05-14

## 2021-05-14 NOTE — TELEPHONE ENCOUNTER
"Requested Prescriptions   Pending Prescriptions Disp Refills     lisinopril (ZESTRIL) 40 MG tablet [Pharmacy Med Name: LISINOPRIL 40 MG TABLET] 30 tablet 2     Sig: TAKE 1 TABLET (40 MG) BY MOUTH DAILY +++NEED RECHECK+++       ACE Inhibitors (Including Combos) Protocol Passed - 5/12/2021 12:15 AM        Passed - Blood pressure under 140/90 in past 12 months     BP Readings from Last 3 Encounters:   09/23/20 135/88   09/13/19 130/84   06/29/18 124/84                 Passed - Recent (12 mo) or future (30 days) visit within the authorizing provider's specialty     Patient has had an office visit with the authorizing provider or a provider within the authorizing providers department within the previous 12 mos or has a future within next 30 days. See \"Patient Info\" tab in inbasket, or \"Choose Columns\" in Meds & Orders section of the refill encounter.              Passed - Medication is active on med list        Passed - Patient is age 18 or older        Passed - Normal serum creatinine on file in past 12 months     Recent Labs   Lab Test 10/23/20  1055   CR 0.72       Ok to refill medication if creatinine is low          Passed - Normal serum potassium on file in past 12 months     Recent Labs   Lab Test 10/23/20  1055   POTASSIUM 3.8                  "

## 2021-05-24 DIAGNOSIS — I10 ESSENTIAL HYPERTENSION: ICD-10-CM

## 2021-05-25 RX ORDER — METOPROLOL TARTRATE 100 MG
TABLET ORAL
Qty: 60 TABLET | Refills: 0 | Status: SHIPPED | OUTPATIENT
Start: 2021-05-25 | End: 2021-06-16

## 2021-05-25 NOTE — TELEPHONE ENCOUNTER
Medication is being filled for 1 time refill only due to:  Patient needs to be seen because due  for appt.   Reminder sent.  Dahiana Barrera RN  [facility]

## 2021-05-27 DIAGNOSIS — I42.9 CARDIOMYOPATHY, UNSPECIFIED TYPE (H): ICD-10-CM

## 2021-05-27 DIAGNOSIS — I48.91 ATRIAL FIBRILLATION, UNSPECIFIED TYPE (H): ICD-10-CM

## 2021-05-28 NOTE — TELEPHONE ENCOUNTER
Routing refill request to provider for review/approval because:   Patient needs to be seen because:  Return in about 6 months (around 3/23/2021) for BP Recheck.  Labs are up to date

## 2021-05-29 RX ORDER — FUROSEMIDE 20 MG
TABLET ORAL
Qty: 30 TABLET | Refills: 0 | Status: SHIPPED | OUTPATIENT
Start: 2021-05-29 | End: 2021-06-25

## 2021-06-09 ENCOUNTER — TELEPHONE (OUTPATIENT)
Dept: FAMILY MEDICINE | Facility: CLINIC | Age: 61
End: 2021-06-09

## 2021-06-09 DIAGNOSIS — I48.20 CHRONIC ATRIAL FIBRILLATION (H): ICD-10-CM

## 2021-06-09 DIAGNOSIS — I10 ESSENTIAL HYPERTENSION: ICD-10-CM

## 2021-06-09 NOTE — TELEPHONE ENCOUNTER
ANTICOAGULATION   INR due 5/28  Stef Mosher is overdue for INR check.      Left message for patient to call and schedule lab appointment as soon as possible. If returning call, please schedule.     Lawanda Bowers RN

## 2021-06-11 RX ORDER — LISINOPRIL 40 MG/1
TABLET ORAL
Qty: 30 TABLET | Refills: 0 | Status: SHIPPED | OUTPATIENT
Start: 2021-06-11 | End: 2021-07-05

## 2021-06-11 NOTE — TELEPHONE ENCOUNTER
"Prescription approved per [facility] Refill Protocol.  Requested Prescriptions   Pending Prescriptions Disp Refills     lisinopril (ZESTRIL) 40 MG tablet [Pharmacy Med Name: LISINOPRIL 40 MG TABLET] 30 tablet 0     Sig: TAKE 1 TABLET BY MOUTH EVERY DAY. *NEED RECHECK*       ACE Inhibitors (Including Combos) Protocol Passed - 6/9/2021  8:30 AM        Passed - Blood pressure under 140/90 in past 12 months     BP Readings from Last 3 Encounters:   09/23/20 135/88   09/13/19 130/84   06/29/18 124/84                 Passed - Recent (12 mo) or future (30 days) visit within the authorizing provider's specialty     Patient has had an office visit with the authorizing provider or a provider within the authorizing providers department within the previous 12 mos or has a future within next 30 days. See \"Patient Info\" tab in inbasket, or \"Choose Columns\" in Meds & Orders section of the refill encounter.              Passed - Medication is active on med list        Passed - Patient is age 18 or older        Passed - Normal serum creatinine on file in past 12 months     Recent Labs   Lab Test 10/23/20  1055   CR 0.72       Ok to refill medication if creatinine is low          Passed - Normal serum potassium on file in past 12 months     Recent Labs   Lab Test 10/23/20  1055   POTASSIUM 3.8                  "

## 2021-06-15 DIAGNOSIS — I10 ESSENTIAL HYPERTENSION: ICD-10-CM

## 2021-06-16 RX ORDER — METOPROLOL TARTRATE 100 MG
TABLET ORAL
Qty: 60 TABLET | Refills: 0 | Status: SHIPPED | OUTPATIENT
Start: 2021-06-16 | End: 2021-07-09

## 2021-06-16 NOTE — TELEPHONE ENCOUNTER
Medication is being filled for 1 time refill only due to:  Patient needs to be seen because due for follow up. reminder sent.   appt scheduled.  Dahiana Barrera RN  [facility]

## 2021-06-25 ENCOUNTER — ANTICOAGULATION THERAPY VISIT (OUTPATIENT)
Dept: NURSING | Facility: CLINIC | Age: 61
End: 2021-06-25

## 2021-06-25 ENCOUNTER — TELEPHONE (OUTPATIENT)
Dept: FAMILY MEDICINE | Facility: CLINIC | Age: 61
End: 2021-06-25

## 2021-06-25 DIAGNOSIS — I48.20 CHRONIC ATRIAL FIBRILLATION (H): Primary | ICD-10-CM

## 2021-06-25 DIAGNOSIS — Z79.01 LONG TERM CURRENT USE OF ANTICOAGULANT THERAPY: ICD-10-CM

## 2021-06-25 DIAGNOSIS — I42.9 CARDIOMYOPATHY, UNSPECIFIED TYPE (H): ICD-10-CM

## 2021-06-25 DIAGNOSIS — I48.91 NEW ONSET ATRIAL FIBRILLATION (H): ICD-10-CM

## 2021-06-25 DIAGNOSIS — I48.91 ATRIAL FIBRILLATION, UNSPECIFIED TYPE (H): ICD-10-CM

## 2021-06-25 DIAGNOSIS — I48.20 CHRONIC ATRIAL FIBRILLATION (H): ICD-10-CM

## 2021-06-25 LAB
CAPILLARY BLOOD COLLECTION: NORMAL
INR PPP: 2.1 (ref 0.86–1.14)

## 2021-06-25 PROCEDURE — 36416 COLLJ CAPILLARY BLOOD SPEC: CPT | Performed by: NURSE PRACTITIONER

## 2021-06-25 PROCEDURE — 85610 PROTHROMBIN TIME: CPT | Performed by: NURSE PRACTITIONER

## 2021-06-25 RX ORDER — FUROSEMIDE 20 MG
TABLET ORAL
Qty: 30 TABLET | Refills: 0 | Status: SHIPPED | OUTPATIENT
Start: 2021-06-25 | End: 2021-07-09

## 2021-06-25 NOTE — PROGRESS NOTES
ANTICOAGULATION MANAGEMENT     Stef Mosher 61 year old male is on warfarin with therapeutic INR result. (Goal INR 2.0-3.0)    Recent labs: (last 7 days)     06/25/21  1136   INR 2.10*       ASSESSMENT     Source(s): Patient/Caregiver Call       Warfarin doses taken: Warfarin taken as instructed    Diet: No new diet changes identified, protein shakes twice weekly    New illness, injury, or hospitalization: No    Medication/supplement changes: None noted    Signs or symptoms of bleeding or clotting: No    Previous INR: Therapeutic last 2(+) visits    Additional findings: None     PLAN     Recommended plan for no diet, medication or health factor changes affecting INR     Dosing Instructions: Continue your current warfarin dose with next INR in 6 weeks       Summary  As of 6/25/2021    Full warfarin instructions:  5 mg every Fri; 2.5 mg all other days   Next INR check:  8/6/2021             Telephone call with Stef whom verbalizes understanding and agrees to plan    Patient declines scheduling appointment at this time.    Education provided: None required    Plan made per [facility] anticoagulation protocol    Josephine Sharma, RN  Anticoagulation Clinic  6/25/2021    _______________________________________________________________________     Anticoagulation Episode Summary     Current INR goal:  2.0-3.0   TTR:  100.0 % (1 y)   Target end date:  Indefinite   Send INR reminders to:  [facility]    Indications    Long-term (current) use of anticoagulants [Z79.01] [Z79.01]  New onset atrial fibrillation (H) [I48.91]  Chronic atrial fibrillation (H) [I48.20]           Comments:           Anticoagulation Care Providers     Provider Role Specialty Phone number    Josephine Roberts NP Referring Family Medicine [phone]    Sivakumar Kang PA-C Responsible Family Medicine [phone]

## 2021-06-25 NOTE — TELEPHONE ENCOUNTER
ANTICOAGULATION MANAGEMENT      Stef Mosher due for annual renewal of referral to anticoagulation monitoring. Order pended for your review and signature.      ANTICOAGULATION SUMMARY      Warfarin indication(s)     Atrial fibrillation    Heart valve present?  NO       Current goal range   INR: 2.0-3.0     Goal appropriate for indication? Yes, INR 2-3 appropriate for hx of DVT, PE, hypercoagulable state, Afib, LVAD, or bileaflet AVR without risk factors     Current duration of therapy Indefinite/long term therapy   Time in Therapeutic Range (TTR)  (Goal > 60%) 100%       Office visit with referring provider's group within last year yes on 9/23/2020       Josephine Sharma RN

## 2021-07-09 ENCOUNTER — OFFICE VISIT (OUTPATIENT)
Dept: FAMILY MEDICINE | Facility: CLINIC | Age: 61
End: 2021-07-09
Payer: COMMERCIAL

## 2021-07-09 ENCOUNTER — TRANSFERRED RECORDS (OUTPATIENT)
Dept: HEALTH INFORMATION MANAGEMENT | Facility: CLINIC | Age: 61
End: 2021-07-09

## 2021-07-09 VITALS
WEIGHT: 315 LBS | RESPIRATION RATE: 20 BRPM | BODY MASS INDEX: 36.45 KG/M2 | HEART RATE: 97 BPM | OXYGEN SATURATION: 97 % | HEIGHT: 78 IN | TEMPERATURE: 97.2 F | DIASTOLIC BLOOD PRESSURE: 76 MMHG | SYSTOLIC BLOOD PRESSURE: 111 MMHG

## 2021-07-09 DIAGNOSIS — I48.20 CHRONIC ATRIAL FIBRILLATION (H): ICD-10-CM

## 2021-07-09 DIAGNOSIS — R53.83 FATIGUE, UNSPECIFIED TYPE: ICD-10-CM

## 2021-07-09 DIAGNOSIS — Z86.39 HISTORY OF THYROID NODULE: ICD-10-CM

## 2021-07-09 DIAGNOSIS — Z79.01 LONG TERM CURRENT USE OF ANTICOAGULANT THERAPY: ICD-10-CM

## 2021-07-09 DIAGNOSIS — E78.5 HYPERLIPIDEMIA LDL GOAL <100: ICD-10-CM

## 2021-07-09 DIAGNOSIS — I42.9 CARDIOMYOPATHY, UNSPECIFIED TYPE (H): ICD-10-CM

## 2021-07-09 DIAGNOSIS — I10 HYPERTENSION GOAL BP (BLOOD PRESSURE) < 140/90: ICD-10-CM

## 2021-07-09 DIAGNOSIS — Z00.00 ROUTINE GENERAL MEDICAL EXAMINATION AT A HEALTH CARE FACILITY: Primary | ICD-10-CM

## 2021-07-09 DIAGNOSIS — E66.01 MORBID OBESITY (H): ICD-10-CM

## 2021-07-09 DIAGNOSIS — Z12.11 SCREENING FOR COLON CANCER: ICD-10-CM

## 2021-07-09 LAB
ALBUMIN SERPL-MCNC: 3.4 G/DL (ref 3.4–5)
ALP SERPL-CCNC: 145 U/L (ref 40–150)
ALT SERPL W P-5'-P-CCNC: 25 U/L (ref 0–70)
ANION GAP SERPL CALCULATED.3IONS-SCNC: 7 MMOL/L (ref 3–14)
AST SERPL W P-5'-P-CCNC: 16 U/L (ref 0–45)
BASOPHILS # BLD AUTO: 0 10E9/L (ref 0–0.2)
BASOPHILS NFR BLD AUTO: 0.2 %
BILIRUB SERPL-MCNC: 0.8 MG/DL (ref 0.2–1.3)
BUN SERPL-MCNC: 14 MG/DL (ref 7–30)
CALCIUM SERPL-MCNC: 8.5 MG/DL (ref 8.5–10.1)
CHLORIDE SERPL-SCNC: 108 MMOL/L (ref 94–109)
CHOLEST SERPL-MCNC: 125 MG/DL
CO2 SERPL-SCNC: 24 MMOL/L (ref 20–32)
CREAT SERPL-MCNC: 0.74 MG/DL (ref 0.66–1.25)
DIFFERENTIAL METHOD BLD: ABNORMAL
EOSINOPHIL # BLD AUTO: 0.2 10E9/L (ref 0–0.7)
EOSINOPHIL NFR BLD AUTO: 2.5 %
ERYTHROCYTE [DISTWIDTH] IN BLOOD BY AUTOMATED COUNT: 16.1 % (ref 10–15)
GFR SERPL CREATININE-BSD FRML MDRD: >90 ML/MIN/{1.73_M2}
GLUCOSE SERPL-MCNC: 100 MG/DL (ref 70–99)
HCT VFR BLD AUTO: 21.7 % (ref 40–53)
HDLC SERPL-MCNC: 35 MG/DL
HGB BLD-MCNC: 6.2 G/DL (ref 13.3–17.7)
LDLC SERPL CALC-MCNC: 74 MG/DL
LYMPHOCYTES # BLD AUTO: 0.8 10E9/L (ref 0.8–5.3)
LYMPHOCYTES NFR BLD AUTO: 10.2 %
MCH RBC QN AUTO: 23.8 PG (ref 26.5–33)
MCHC RBC AUTO-ENTMCNC: 28.6 G/DL (ref 31.5–36.5)
MCV RBC AUTO: 83 FL (ref 78–100)
MONOCYTES # BLD AUTO: 1 10E9/L (ref 0–1.3)
MONOCYTES NFR BLD AUTO: 12.4 %
NEUTROPHILS # BLD AUTO: 6 10E9/L (ref 1.6–8.3)
NEUTROPHILS NFR BLD AUTO: 74.7 %
NONHDLC SERPL-MCNC: 90 MG/DL
PLATELET # BLD AUTO: 465 10E9/L (ref 150–450)
POTASSIUM SERPL-SCNC: 4.5 MMOL/L (ref 3.4–5.3)
PROT SERPL-MCNC: 7 G/DL (ref 6.8–8.8)
RBC # BLD AUTO: 2.61 10E12/L (ref 4.4–5.9)
SODIUM SERPL-SCNC: 139 MMOL/L (ref 133–144)
TRIGL SERPL-MCNC: 82 MG/DL
TSH SERPL DL<=0.005 MIU/L-ACNC: 1.23 MU/L (ref 0.4–4)
WBC # BLD AUTO: 8.1 10E9/L (ref 4–11)

## 2021-07-09 PROCEDURE — 80050 GENERAL HEALTH PANEL: CPT | Performed by: FAMILY MEDICINE

## 2021-07-09 PROCEDURE — 99396 PREV VISIT EST AGE 40-64: CPT | Performed by: FAMILY MEDICINE

## 2021-07-09 PROCEDURE — 80061 LIPID PANEL: CPT | Performed by: FAMILY MEDICINE

## 2021-07-09 PROCEDURE — 99214 OFFICE O/P EST MOD 30 MIN: CPT | Mod: 25 | Performed by: FAMILY MEDICINE

## 2021-07-09 PROCEDURE — 36415 COLL VENOUS BLD VENIPUNCTURE: CPT | Performed by: FAMILY MEDICINE

## 2021-07-09 RX ORDER — FUROSEMIDE 20 MG
20 TABLET ORAL DAILY
Qty: 90 TABLET | Refills: 3 | Status: SHIPPED | OUTPATIENT
Start: 2021-07-09 | End: 2022-05-08

## 2021-07-09 RX ORDER — LISINOPRIL 40 MG/1
40 TABLET ORAL DAILY
Qty: 90 TABLET | Refills: 3 | Status: SHIPPED | OUTPATIENT
Start: 2021-07-09 | End: 2021-08-02

## 2021-07-09 RX ORDER — WARFARIN SODIUM 2.5 MG/1
TABLET ORAL
Qty: 156 TABLET | Refills: 1 | Status: SHIPPED | OUTPATIENT
Start: 2021-07-09 | End: 2022-01-31 | Stop reason: ALTCHOICE

## 2021-07-09 RX ORDER — ASPIRIN 81 MG/1
81 TABLET, CHEWABLE ORAL DAILY
Qty: 90 TABLET | Refills: 0 | Status: SHIPPED | OUTPATIENT
Start: 2021-07-09 | End: 2021-08-09

## 2021-07-09 RX ORDER — METOPROLOL TARTRATE 100 MG
100 TABLET ORAL 2 TIMES DAILY
Qty: 180 TABLET | Refills: 3 | Status: SHIPPED | OUTPATIENT
Start: 2021-07-09 | End: 2021-08-02

## 2021-07-09 RX ORDER — ATORVASTATIN CALCIUM 20 MG/1
20 TABLET, FILM COATED ORAL DAILY
Qty: 90 TABLET | Refills: 3 | Status: SHIPPED | OUTPATIENT
Start: 2021-07-09 | End: 2022-03-30

## 2021-07-09 ASSESSMENT — MIFFLIN-ST. JEOR: SCORE: 2421.77

## 2021-07-09 NOTE — PROGRESS NOTES
3  SUBJECTIVE:   CC: Stef Mosher is an 61 year old male who presents for preventive health visit.       Patient has been advised of split billing requirements and indicates understanding: Yes  Healthy Habits:    Do you get at least three servings of calcium containing foods daily (dairy, green leafy vegetables, etc.)? yes    Amount of exercise or daily activities, outside of work: gets lots of steps in each day    Problems taking medications regularly No    Medication side effects: Yes bp med has been making him tired so he has stopped that and has been tracking blood pressure since June 14th.     Have you had an eye exam in the past two years? yes    Do you see a dentist twice per year? yes    Do you have sleep apnea, excessive snoring or daytime drowsiness?no      Patient is fasting.    Fatigue-  Reports that he's been extremely fatigued lately for about 2-3 days but has been ongoing for the past month or so.   States that he was in [place] for about a week and doesn't know if the elevation change had an affect on the fatigue.    Reports a prior history of right thyroidectomy.  Last thyroid US revealed multiple left thyroid nodules.   Last TSH  TSH   Date Value Ref Range Status   10/23/2020 1.09 0.40 - 4.00 mU/L Final     History of chronic atrial fibrillation. Currently on long term anticoagulation therapy and rate control on metoprolol. Following with the INR clinic. Last INR-  INR   Date Value Ref Range Status   06/25/2021 2.10 (H) 0.86 - 1.14 Final     Comment:     This test is intended for monitoring Coumadin therapy.  Results are not   accurate in patients with prolonged INR due to factor deficiency.          Cardiomyopathy- last Echo done in 9/2017 revealed a decreased left ventricular function with EF 45-50 %   Has been on a diuretic, lisinopril but admits that he's only been taking the Lasix once a week.     HYPERLIPIDEMIA - Patient has a long history of significant Hyperlipidemia requiring  medication for treatment with recent unable to assess control. Patient reports he discontinued the Lipitor after his wife did some research about it.    Last lipid panel-  Recent Labs   Lab Test 10/23/20  1055 06/29/18  1129 04/07/14  1008 04/07/14  1008   CHOL 209* 176   < > 217*   HDL 47 49   < > 34*   * 107*   < > 150*   TRIG 122 98   < > 164*   CHOLHDLRATIO  --   --   --  6.4*    < > = values in this interval not displayed.       HYPERTENSION - Patient has longstanding history of HTN , currently denies any symptoms referable to elevated blood pressure. Specifically denies chest pain, palpitations, dyspnea, orthopnea, PND or peripheral edema. Blood pressure readings have been in normal range. Current medication regimen is as listed below. Patient denies any side effects of medication.         HEALTH CARE MAINTENANCE: Due for colon cancer screening, annual labs.       Today's PHQ-2 Score:   PHQ-2 ( 1999 Pf[employer]) 7/9/2021 9/23/2020   Q1: Little interest or pleasure in doing things 0 0   Q2: Feeling down, depressed or hopeless 0 0   PHQ-2 Score 0 0       Abuse: Current or Past(Physical, Sexual or Emotional)- No  Do you feel safe in your environment? Yes        Social History     Tobacco Use     Smoking status: Never Smoker     Smokeless tobacco: Never Used     Tobacco comment: Lives in smoke free household   Substance Use Topics     Alcohol use: No     Comment: None     If you drink alcohol do you typically have >3 drinks per day or >7 drinks per week? Not Applicable                      Last PSA:   PSA   Date Value Ref Range Status   10/23/2020 0.95 0 - 4 ug/L Final     Comment:     Assay Method:  Chemiluminescence using Siemens Vista analyzer       Reviewed orders with patient. Reviewed health maintenance and updated orders accordingly - Yes  Lab work is in process  Labs reviewed in EPIC  BP Readings from Last 3 Encounters:   07/09/21 111/76   09/23/20 135/88   09/13/19 130/84    Wt Readings from Last 3  Encounters:   07/09/21 149.1 kg (328 lb 9.6 oz)   09/23/20 (!) 152.3 kg (335 lb 12.8 oz)   09/13/19 132 kg (291 lb)                  Patient Active Problem List   Diagnosis     Hypothyroidism     New onset atrial fibrillation (H)     Dilated cardiomyopathy (H)     Seasonal allergies     Hyperlipidemia LDL goal <130     Long-term (current) use of anticoagulants [Z79.01]     Essential hypertension     Paroxysmal atrial fibrillation (H)     CHF (congestive heart failure) (H)     Morbid obesity (H)     Chronic atrial fibrillation (H)     Past Surgical History:   Procedure Laterality Date     HC THYROIDECTOMY         Social History     Tobacco Use     Smoking status: Never Smoker     Smokeless tobacco: Never Used     Tobacco comment: Lives in smoke free household   Substance Use Topics     Alcohol use: No     Comment: None     Family History   Problem Relation Age of Onset     Blood Disease Mother      Cancer Mother      Arthritis Father      Hypertension Father      Breast Cancer Sister      Allergies Sister      Cancer Sister      Thyroid Disease Sister      Blood Disease Sister      Diabetes No family hx of      Cerebrovascular Disease No family hx of      Glaucoma No family hx of      Macular Degeneration No family hx of          Current Outpatient Medications   Medication Sig Dispense Refill     aspirin (ASPIRIN LOW DOSE) 81 MG chewable tablet Take 1 tablet (81 mg) by mouth daily Due for follow up visit. 90 tablet 0     atorvastatin (LIPITOR) 20 MG tablet Take 1 tablet (20 mg) by mouth daily 90 tablet 3     furosemide (LASIX) 20 MG tablet Take 1 tablet (20 mg) by mouth daily 90 tablet 3     lisinopril (ZESTRIL) 40 MG tablet Take 1 tablet (40 mg) by mouth daily 90 tablet 3     metoprolol tartrate (LOPRESSOR) 100 MG tablet Take 1 tablet (100 mg) by mouth 2 times daily 180 tablet 3     warfarin ANTICOAGULANT (COUMADIN) 2.5 MG tablet Take daily as directed. Current dose 5 mg Friday and 2.5 mg rest of days 156 tablet 1  "    Omega-3 Fatty Acids (OMEGA-3 FISH OIL PO) Take 1,200 mg by mouth 2 times daily (with meals)       No Known Allergies    Reviewed and updated as needed this visit by clinical staff  Tobacco  Allergies  Meds              Reviewed and updated as needed this visit by Provider                    ROS:  CONSTITUTIONAL: Positive for Fatigue. NEGATIVE for fever, chills, change in weight  INTEGUMENTARY/SKIN: NEGATIVE for worrisome rashes, moles or lesions  EYES: NEGATIVE for vision changes or irritation  ENT: NEGATIVE for ear, mouth and throat problems  CV: NEGATIVE for chest pain, palpitations or peripheral edema  GI: NEGATIVE for nausea, abdominal pain, heartburn, or change in bowel habits   male: negative for dysuria, hematuria, decreased urinary stream, erectile dysfunction, urethral discharge  MUSCULOSKELETAL: NEGATIVE for significant arthralgias or myalgia  NEURO: NEGATIVE for weakness, dizziness or paresthesias  PSYCHIATRIC: NEGATIVE for changes in mood or affect    OBJECTIVE:   /76   Pulse 97   Temp 97.2  F (36.2  C) (Tympanic)   Resp 20   Ht 1.97 m (6' 5.56\")   Wt 149.1 kg (328 lb 9.6 oz)   SpO2 97%   BMI 38.41 kg/m    EXAM:  GENERAL: healthy, alert and no distress  EYES: Eyes grossly normal to inspection, PERRL and conjunctivae and sclerae normal  HENT: ear canals and TM's normal, nose and mouth without ulcers or lesions  NECK: no adenopathy, no asymmetry, masses, or scars and thyroid normal to palpation  RESP: lungs clear to auscultation - no rales, rhonchi or wheezes  CV: regular rate and rhythm, normal S1 S2, no S3 or S4, no murmur, click or rub, 2 + peripheral edema and peripheral pulses strong  ABDOMEN: soft, nontender, no hepatosplenomegaly, no masses and bowel sounds normal  MS: no gross musculoskeletal defects noted, no edema  SKIN: no suspicious lesions or rashes  NEURO: Normal strength and tone, mentation intact and speech normal  PSYCH: mentation appears normal, affect " normal/bright    Diagnostic Test Results:  Reviewed and discussed with patient prior to discharge.  Results for orders placed or performed in visit on 07/09/21   CBC with platelets and differential     Status: Abnormal   Result Value Ref Range    WBC 8.1 4.0 - 11.0 10e9/L    RBC Count 2.61 (L) 4.4 - 5.9 10e12/L    Hemoglobin 6.2 (LL) 13.3 - 17.7 g/dL    Hematocrit 21.7 (L) 40.0 - 53.0 %    MCV 83 78 - 100 fl    MCH 23.8 (L) 26.5 - 33.0 pg    MCHC 28.6 (L) 31.5 - 36.5 g/dL    RDW 16.1 (H) 10.0 - 15.0 %    Platelet Count 465 (H) 150 - 450 10e9/L    % Neutrophils 74.7 %    % Lymphocytes 10.2 %    % Monocytes 12.4 %    % Eosinophils 2.5 %    % Basophils 0.2 %    Absolute Neutrophil 6.0 1.6 - 8.3 10e9/L    Absolute Lymphocytes 0.8 0.8 - 5.3 10e9/L    Absolute Monocytes 1.0 0.0 - 1.3 10e9/L    Absolute Eosinophils 0.2 0.0 - 0.7 10e9/L    Absolute Basophils 0.0 0.0 - 0.2 10e9/L    Diff Method Automated Method          ASSESSMENT/PLAN:   Stef was seen today for physical.    Diagnoses and all orders for this visit:    Routine general medical examination at a health care facility  -     CBC with platelets and differential  -     Comprehensive metabolic panel (BMP + Alb, Alk Phos, ALT, AST, Total. Bili, TP)    Screening for colon cancer  -     Fecal colorectal cancer screen FIT; Future    Fatigue, unspecified type, worsening.   Obtain labs to further evaluate.  -     CBC with platelets and differential  -     Comprehensive metabolic panel (BMP + Alb, Alk Phos, ALT, AST, Total. Bili, TP)  -     TSH with free T4 reflex    Chronic atrial fibrillation (H)  -    Refill:  metoprolol tartrate (LOPRESSOR) 100 MG tablet; Take 1 tablet (100 mg) by mouth 2 times daily  -     Refill: warfarin ANTICOAGULANT (COUMADIN) 2.5 MG tablet; Take daily as directed. Current dose 5 mg Friday and 2.5 mg rest of days    Long term current use of anticoagulant therapy  -    Refill:  warfarin ANTICOAGULANT (COUMADIN) 2.5 MG tablet; Take daily as  "directed. Current dose 5 mg Friday and 2.5 mg rest of days    Cardiomyopathy, unspecified type (H)  -     Refill: aspirin (ASPIRIN LOW DOSE) 81 MG chewable tablet; Take 1 tablet (81 mg) by mouth daily Due for follow up visit.  -     Refill: furosemide (LASIX) 20 MG tablet; Take 1 tablet (20 mg) by mouth daily  -     Resume: lisinopril (ZESTRIL) 40 MG tablet; Take 1 tablet (40 mg) by mouth daily  -     Refill: metoprolol tartrate (LOPRESSOR) 100 MG tablet; Take 1 tablet (100 mg) by mouth 2 times daily  -     Echocardiogram Complete; Future  -     CARDIOLOGY EVAL ADULT REFERRAL; Future    Hypertension goal BP (blood pressure) < 140/90, controlled.  -     Resume: lisinopril (ZESTRIL) 40 MG tablet; Take 1 tablet (40 mg) by mouth daily  -     Refill: metoprolol tartrate (LOPRESSOR) 100 MG tablet; Take 1 tablet (100 mg) by mouth 2 times daily    History of thyroid nodule s/p right thyroidectomy  -     TSH with free T4 reflex  -     US Thyroid; Future    Hyperlipidemia LDL goal <100  -     Resume: Lipid panel reflex to direct LDL Fasting  -     atorvastatin (LIPITOR) 20 MG tablet; Take 1 tablet (20 mg) by mouth daily    Morbid obesity (H)  Weight management plan: Discussed healthy diet and exercise guidelines        ADDENDUM  CBC results were called to patient after he left the clinic. Noted a cr  critical lab result- Hg- 6.2. Recommend to go to the ER for further evaluation. Patient verbalized understanding.      Patient has been advised of split billing requirements and indicates understanding: Yes  COUNSELING:  Reviewed preventive health counseling, as reflected in patient instructions       Regular exercise       Healthy diet/nutrition    Estimated body mass index is 38.41 kg/m  as calculated from the following:    Height as of this encounter: 1.97 m (6' 5.56\").    Weight as of this encounter: 149.1 kg (328 lb 9.6 oz).    Weight management plan: Discussed healthy diet and exercise guidelines    He reports that he has " never smoked. He has never used smokeless tobacco.      Counseling Resources:  ATP IV Guidelines  Pooled Cohorts Equation Calculator  FRAX Risk Assessment  ICSI Preventive Guidelines  Dietary Guidelines for Americans, 2010  USDA's MyPlate  ASA Prophylaxis  Lung CA Screening    Follow up in 6 months- med check.   Next Annual Physical due in 7 /2022    Roxane Be MD  [facility]

## 2021-07-09 NOTE — LETTER
July 12, 2021      Stef Mosher  [address]  [place] [address]        Dear ,    We are writing to inform you of your test results.    Here's a copy of the other labs we did in the clinic. These looked good except the low iron.   This needs to be further evaluated in the ER/Hospital like we spoke on the phone.   You will likely need additional iron studies, blood transfusion, an Upper and Lower Endoscopy.     Resulted Orders   Lipid panel reflex to direct LDL Fasting   Result Value Ref Range    Cholesterol 125 <200 mg/dL    Triglycerides 82 <150 mg/dL      Comment:      Fasting specimen    HDL Cholesterol 35 (L) >39 mg/dL    LDL Cholesterol Calculated 74 <100 mg/dL      Comment:      Desirable:       <100 mg/dl    Non HDL Cholesterol 90 <130 mg/dL   CBC with platelets and differential   Result Value Ref Range    WBC 8.1 4.0 - 11.0 10e9/L    RBC Count 2.61 (L) 4.4 - 5.9 10e12/L    Hemoglobin 6.2 (LL) 13.3 - 17.7 g/dL      Comment:      Critical Value called to and read back by  hannah nava at 07.09.21. kv      Hematocrit 21.7 (L) 40.0 - 53.0 %    MCV 83 78 - 100 fl    MCH 23.8 (L) 26.5 - 33.0 pg    MCHC 28.6 (L) 31.5 - 36.5 g/dL      Comment:      Results confirmed by repeat test    RDW 16.1 (H) 10.0 - 15.0 %    Platelet Count 465 (H) 150 - 450 10e9/L    % Neutrophils 74.7 %    % Lymphocytes 10.2 %    % Monocytes 12.4 %    % Eosinophils 2.5 %    % Basophils 0.2 %    Absolute Neutrophil 6.0 1.6 - 8.3 10e9/L    Absolute Lymphocytes 0.8 0.8 - 5.3 10e9/L    Absolute Monocytes 1.0 0.0 - 1.3 10e9/L    Absolute Eosinophils 0.2 0.0 - 0.7 10e9/L    Absolute Basophils 0.0 0.0 - 0.2 10e9/L    Diff Method Automated Method    Comprehensive metabolic panel (BMP + Alb, Alk Phos, ALT, AST, Total. Bili, TP)   Result Value Ref Range    Sodium 139 133 - 144 mmol/L    Potassium 4.5 3.4 - 5.3 mmol/L    Chloride 108 94 - 109 mmol/L    Carbon Dioxide 24 20 - 32 mmol/L    Anion Gap 7 3 - 14 mmol/L    Glucose 100 (H) 70 -  99 mg/dL      Comment:      Fasting specimen    Urea Nitrogen 14 7 - 30 mg/dL    Creatinine 0.74 0.66 - 1.25 mg/dL    GFR Estimate >90 >60 mL/min/[1.73_m2]      Comment:      Non  GFR Calc  Starting 12/18/2018, serum creatinine based estimated GFR (eGFR) will be   calculated using the Chronic Kidney Disease Epidemiology Collaboration   (CKD-EPI) equation.      GFR Estimate If Black >90 >60 mL/min/[1.73_m2]      Comment:       GFR Calc  Starting 12/18/2018, serum creatinine based estimated GFR (eGFR) will be   calculated using the Chronic Kidney Disease Epidemiology Collaboration   (CKD-EPI) equation.      Calcium 8.5 8.5 - 10.1 mg/dL    Bilirubin Total 0.8 0.2 - 1.3 mg/dL    Albumin 3.4 3.4 - 5.0 g/dL    Protein Total 7.0 6.8 - 8.8 g/dL    Alkaline Phosphatase 145 40 - 150 U/L    ALT 25 0 - 70 U/L    AST 16 0 - 45 U/L   TSH with free T4 reflex   Result Value Ref Range    TSH 1.23 0.40 - 4.00 mU/L       If you have any questions or concerns, please call the clinic at the number listed above.       Sincerely,    Roxane Be M.D/marichuy

## 2021-07-12 NOTE — RESULT ENCOUNTER NOTE
Please send a result letter on clinic letterhead with results along with the following instructions      Mr. Mosher      Here's a copy of the other labs we did in the clinic. These looked good except the low iron.   This needs to be further evaluated in the ER/Hospital like we spoke on the phone.   You will likely need additional iron studies, blood transfusion, an Upper and Lower Endoscopy.     Please contact me if you have any additional questions or concerns.    Sincerely,      Roxane Be M.D

## 2021-07-14 DIAGNOSIS — I48.20 CHRONIC ATRIAL FIBRILLATION (H): Primary | ICD-10-CM

## 2021-07-14 DIAGNOSIS — Z79.01 LONG TERM CURRENT USE OF ANTICOAGULANT THERAPY: ICD-10-CM

## 2021-07-14 DIAGNOSIS — I48.0 PAROXYSMAL ATRIAL FIBRILLATION (H): Primary | ICD-10-CM

## 2021-07-15 ENCOUNTER — TRANSFERRED RECORDS (OUTPATIENT)
Dept: HEALTH INFORMATION MANAGEMENT | Facility: CLINIC | Age: 61
End: 2021-07-15

## 2021-07-15 ENCOUNTER — TELEPHONE (OUTPATIENT)
Dept: CARDIOLOGY | Facility: CLINIC | Age: 61
End: 2021-07-15

## 2021-07-15 NOTE — TELEPHONE ENCOUNTER
[facility] Call Center    Phone Message    May a detailed message be left on voicemail: no     Reason for Call: Other: Lore called to update Dr Buenrostro that Stef spent 24 hours in the hospital at [place], admitted the afternoon of 7/9/2021 and discharged the evening of 7/10/2021. His Hemoglobin levels were low at 6.2 and a blood transfusion was necessary. He was seen by GI today and they found a large ulcer on the upper part of his small intestine. He has been off Warfarin since 7/9/2021 am. The Gastro specialist would like to know what Dr Buenrostro would like to do about the Pt's Warfarin, so he can be safe but still ultimately heal the ulcer. Could he provide a plan going forward? Please reach out to Stef with clarification.      Action Taken: Other: [facility]    Travel Screening: Not Applicable

## 2021-07-15 NOTE — TELEPHONE ENCOUNTER
Spoke to patient who reports that he was found to be having a GI bleed, warfarin was stopped 7/9.  He received 2 blood transfusions and 1 iron transfusion during his hospital stay 7/9-7/11.  Colonoscopy was completed out-patient and a large ulcer was found but no active bleeding per wife.  He has not restarted his warfarin.    Writer offered patient an appt with Dr. Turner as an add on to discuss restarting warfarin on 1/19 at 1:00 pm and patient accepted.    Mahnaz Reynoso RN  Cardiology Care Coordinator  [facility]  [phone] option 1

## 2021-07-19 ENCOUNTER — LAB (OUTPATIENT)
Dept: LAB | Facility: CLINIC | Age: 61
End: 2021-07-19

## 2021-07-19 ENCOUNTER — OFFICE VISIT (OUTPATIENT)
Dept: CARDIOLOGY | Facility: CLINIC | Age: 61
End: 2021-07-19
Payer: COMMERCIAL

## 2021-07-19 VITALS
WEIGHT: 315 LBS | DIASTOLIC BLOOD PRESSURE: 84 MMHG | SYSTOLIC BLOOD PRESSURE: 121 MMHG | HEART RATE: 93 BPM | BODY MASS INDEX: 38.22 KG/M2 | OXYGEN SATURATION: 97 %

## 2021-07-19 DIAGNOSIS — I42.9 CARDIOMYOPATHY, SECONDARY (H): ICD-10-CM

## 2021-07-19 DIAGNOSIS — D64.9 ANEMIA: ICD-10-CM

## 2021-07-19 DIAGNOSIS — I42.0 DILATED CARDIOMYOPATHY (H): ICD-10-CM

## 2021-07-19 DIAGNOSIS — I48.0 PAROXYSMAL ATRIAL FIBRILLATION (H): Primary | ICD-10-CM

## 2021-07-19 LAB
ERYTHROCYTE [DISTWIDTH] IN BLOOD BY AUTOMATED COUNT: 18.8 % (ref 10–15)
HCT VFR BLD AUTO: 30.3 % (ref 40–53)
HGB BLD-MCNC: 9.3 G/DL (ref 13.3–17.7)
MCH RBC QN AUTO: 26 PG (ref 26.5–33)
MCHC RBC AUTO-ENTMCNC: 30.7 G/DL (ref 31.5–36.5)
MCV RBC AUTO: 85 FL (ref 78–100)
PLATELET # BLD AUTO: 308 10E3/UL (ref 150–450)
RBC # BLD AUTO: 3.58 10E6/UL (ref 4.4–5.9)
WBC # BLD AUTO: 7.8 10E3/UL (ref 4–11)

## 2021-07-19 PROCEDURE — 36415 COLL VENOUS BLD VENIPUNCTURE: CPT

## 2021-07-19 PROCEDURE — 99205 OFFICE O/P NEW HI 60 MIN: CPT | Mod: GC | Performed by: INTERNAL MEDICINE

## 2021-07-19 PROCEDURE — 85027 COMPLETE CBC AUTOMATED: CPT

## 2021-07-19 NOTE — LETTER
7/19/2021      RE: Stef Mosher  [address] [place] [address]       Dear Colleague,    Thank you for the opportunity to participate in the care of your patient, Stef Mosher, at the [facility]. Please see a copy of my visit note below.    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 At that time, they did basic labs and found him to be anemic. He was sent to the ER and admitted after being noted to have brown/black stools for last several weeks. During course of stay, was felt likely d/t GIB and the patient required transfusion of 3U PRBC. Seen by GI as inpatient with instructions to hold ASA and warfarin and follow up with them for EGD and colonoscopy to assess for bleeding source.    Patient was discharged and followed up with GI and underwent above procedures, which were noted for a gastric ulcer, duodenal ulcer, and numerous colonic polyps. The duodenal ulcer was approximately 2cm in size and was not actively bleeding, but was felt to the be source of his bleeding. Numerous polyps were removed and were found to not be malignant, but consistent with Cowden's syndrome. Patient was then instructed to discuss with his cardiologist restarting his anticoagulation.    In discussion with the patient, states he has felt fine since his endoscopy. Does have some intermittent palpitations. States no SOB, chest pain, lightheadedness or dizziness. States he is able to complete all activities he wishes to complete without much limitations.     Regarding his anticoagulation, states he  has been on warfarin and ASA for as long as he can remember and does not remember ever discussing and NOAC/DOAC in the past. States he would be amenable to this. States he does not feel strongly about starting his AC now versus waiting. States he is comfortable with whatever his teams decide is best for him. No issues with repeat bleeding. Has been off his warfarin for about 10 days    PAST MEDICAL HISTORY:  Past Medical History:   Diagnosis Date     Arthritis      Atrial fibrillation (H)      Hayfever      Hypercholesterolemia      Hypertension      Unspecified hypothyroidism        CURRENT MEDICATIONS:  Current Outpatient Medications   Medication Sig Dispense Refill     aspirin (ASPIRIN LOW DOSE) 81 MG chewable tablet Take 1 tablet (81 mg) by mouth daily Due for follow up visit. 90 tablet 0     atorvastatin (LIPITOR) 20 MG tablet Take 1 tablet (20 mg) by mouth daily 90 tablet 3     furosemide (LASIX) 20 MG tablet Take 1 tablet (20 mg) by mouth daily 90 tablet 3     metoprolol tartrate (LOPRESSOR) 100 MG tablet Take 1 tablet (100 mg) by mouth 2 times daily 180 tablet 3     Omega-3 Fatty Acids (OMEGA-3 FISH OIL PO) Take 1,200 mg by mouth 2 times daily (with meals)       lisinopril (ZESTRIL) 40 MG tablet Take 1 tablet (40 mg) by mouth daily (Patient not taking: Reported on 7/19/2021) 90 tablet 3     warfarin ANTICOAGULANT (COUMADIN) 2.5 MG tablet Take daily as directed. Current dose 5 mg Friday and 2.5 mg rest of days (Patient not taking: Reported on 7/19/2021) 156 tablet 1       PAST SURGICAL HISTORY:  Past Surgical History:   Procedure Laterality Date     HC THYROIDECTOMY         ALLERGIES:   No Known Allergies    FAMILY HISTORY:  - Premature coronary artery disease  - Atrial fibrillation  - Sudden cardiac death     SOCIAL HISTORY:  Social History     Tobacco Use     Smoking status: Never Smoker     Smokeless tobacco: Never Used     Tobacco comment: Lives in smoke free household   Substance Use Topics     Alcohol  use: No     Comment: None     Drug use: No     Comment: Never       ROS:   Constitutional: No fever, chills, or sweats. Weight stable.   ENT: No visual disturbance, ear ache, epistaxis, sore throat.   Cardiovascular: As per HPI.   Respiratory: No cough, hemoptysis.    GI: No nausea, vomiting, hematemesis, melena, or hematochezia.   : No hematuria.   Integument: Negative.   Psychiatric: Negative.   Hematologic:  Easy bruising, no easy bleeding.  Neuro: Negative.   Endocrinology: No significant heat or cold intolerance   Musculoskeletal: No myalgia.    Exam:  /84 (BP Location: Right arm, Patient Position: Chair, Cuff Size: Adult Large)   Pulse 93   Wt 148.3 kg (327 lb)   SpO2 97%   BMI 38.22 kg/m    GENERAL APPEARANCE: well-developed, well-appearing male in NAD, polite and conversational  HEENT: at/nc, eomi, mmm, sclera anicteric, no oral ulcerations, no xanthelasmas   NECK: no adenopathy, thyroid normal to palpation, JVP not elevated  RESPIRATORY: CTAB on RA, nonlabored, no accessory muscle use  CARDIOVASCULAR: irr irr, s1, s2, no murmur, rub or gallop, no s3 or s4  ABDOMEN: soft, nt/nd, bs+  EXTREMITIES: no gross deformity, adequate muscle bulk, no edema  NEURO: alert, interactive, speech fluent, grossly nonfocal, moving all 4  VASC: Radial and DP pulses are normal in volumes and symmetric bilaterally  SKIN: no ecchymoses, no rashes on exposed skin      Labs:  CBC RESULTS:   Lab Results   Component Value Date    WBC 7.8 07/19/2021    WBC 8.1 07/09/2021    RBC 3.58 (L) 07/19/2021    RBC 2.61 (L) 07/09/2021    HGB 9.3 (L) 07/19/2021    HGB 6.2 (LL) 07/09/2021    HCT 30.3 (L) 07/19/2021    HCT 21.7 (L) 07/09/2021    MCV 85 07/19/2021    MCV 83 07/09/2021    MCH 26.0 (L) 07/19/2021    MCH 23.8 (L) 07/09/2021    MCHC 30.7 (L) 07/19/2021    MCHC 28.6 (L) 07/09/2021    RDW 18.8 (H) 07/19/2021    RDW 16.1 (H) 07/09/2021     07/19/2021     (H) 07/09/2021       BMP RESULTS:  Lab Results   Component  Value Date     07/09/2021    POTASSIUM 4.5 07/09/2021    CHLORIDE 108 07/09/2021    CO2 24 07/09/2021    ANIONGAP 7 07/09/2021     (H) 07/09/2021    BUN 14 07/09/2021    CR 0.74 07/09/2021    GFRESTIMATED >90 07/09/2021    GFRESTBLACK >90 07/09/2021    EUGENIA 8.5 07/09/2021        INR RESULTS:  Lab Results   Component Value Date    INR 2.10 (H) 06/25/2021    INR 2.50 (H) 04/16/2021    INR 2.30 (H) 03/05/2021    INR 2.40 (H) 01/22/2021       Procedures:  Recent colonoscopy and EGD as detailed above    Assessment and Plan:   Stef Mosher is a 61 year old male with PMH of atrial fibrillation c/b 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 Waylon Pacheco) with pathology obtained. At present, there is no emergent indication for restart of anticoagulation as the daily risk for stroke is relatively low, thus it would be reasonable to do what is optimal for healing from the purposes of his GI ulceration. Given this, we will plan to discuss the patient's case with Dr. Pacheco, the patient's gastroenterologist, how long they would like to hold anticoagulation to assist with healing of his known ulcerations while balancing the risk of CVA (current CHADSVASC of 4 based on presence of HTN, prior TIA, and CHF corresponds to stroke risk of 4.8% per [place] Atrial Fibrillation Cohort Study, which conveys a daily risk of ~0.01%) . The patient has already started PPI therapy and has stopped ASA.     Long term, the patient should abstain from ASA given he is on anticoagulation already with warfarin. We will plan to discuss holding of anticoagulation with Dr. Pacheco and plan to see  the patient back in clinic in 2 weeks to rediscuss restarting anticoagulation. Given he has not had a CBC, we will obtain today as well as prior to his RTC visit as well as obtain and echocardiogram prior to that visit given he has not had one since 2017    -we will discuss optimal timing for restart of anticoagulation with Dr. Pacheco of [facility]  -continue to hold warfarin for now  -stop ASA (has already stopped when presented for GIB)  -check CBC  -RTC in 2 weeks with Dr. Turner with repeat CBC and TTE prior    The patient was seen and discussed with Dr. Turner, who agrees with the above assessment and plan.    Ritika Koenig MD  Cardiology Fellow PGY4  P: [phone]      I have seen, interviewed, and examined patient. I have reviewed the laboratory tests, imaging, and other investigations. I have reviewed the management plan with the patient. I discussed with the team and agree with the findings and plan in this resident/fellow/nurse practitioner's note. In addition, changes in the physical examination, assessment and plan have been incorporated into the note by myself, as to make it a single cohesive document.       Leanna Turner MD, MS  Cardiology/Cardiac EP Attending Staff      CC  Patient Care Team:  Josephine Roberts NP as PCP - General (Nurse Practitioner - Family)  Stella Allison, RN as Specialty Care Coordinator (Cardiology)  Roxane Be MD as Assigned PCP

## 2021-07-19 NOTE — PROGRESS NOTES
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 At that time, they did basic labs and found him to be anemic. He was sent to the ER and admitted after being noted to have brown/black stools for last several weeks. During course of stay, was felt likely d/t GIB and the patient required transfusion of 3U PRBC. Seen by GI as inpatient with instructions to hold ASA and warfarin and follow up with them for EGD and colonoscopy to assess for bleeding source.    Patient was discharged and followed up with GI and underwent above procedures, which were noted for a gastric ulcer, duodenal ulcer, and numerous colonic polyps. The duodenal ulcer was approximately 2cm in size and was not actively bleeding, but was felt to the be source of his bleeding. Numerous polyps were removed and were found to not be malignant, but consistent with Cowden's syndrome. Patient was then instructed to discuss with his cardiologist restarting his anticoagulation.    In discussion with the patient, states he has felt fine since his endoscopy. Does have some intermittent palpitations. States no SOB, chest pain, lightheadedness or dizziness. States he is able to complete all activities he wishes to complete without much limitations.     Regarding his anticoagulation, states he has been on warfarin and ASA for as long as he can remember and does not remember ever discussing and NOAC/DOAC in the past. States he would be amenable to this. States he does not feel strongly about starting his AC now versus waiting. States he is comfortable with whatever his teams decide is best for him. No issues with repeat  bleeding. Has been off his warfarin for about 10 days    PAST MEDICAL HISTORY:  Past Medical History:   Diagnosis Date     Arthritis      Atrial fibrillation (H)      Hayfever      Hypercholesterolemia      Hypertension      Unspecified hypothyroidism        CURRENT MEDICATIONS:  Current Outpatient Medications   Medication Sig Dispense Refill     aspirin (ASPIRIN LOW DOSE) 81 MG chewable tablet Take 1 tablet (81 mg) by mouth daily Due for follow up visit. 90 tablet 0     atorvastatin (LIPITOR) 20 MG tablet Take 1 tablet (20 mg) by mouth daily 90 tablet 3     furosemide (LASIX) 20 MG tablet Take 1 tablet (20 mg) by mouth daily 90 tablet 3     metoprolol tartrate (LOPRESSOR) 100 MG tablet Take 1 tablet (100 mg) by mouth 2 times daily 180 tablet 3     Omega-3 Fatty Acids (OMEGA-3 FISH OIL PO) Take 1,200 mg by mouth 2 times daily (with meals)       lisinopril (ZESTRIL) 40 MG tablet Take 1 tablet (40 mg) by mouth daily (Patient not taking: Reported on 7/19/2021) 90 tablet 3     warfarin ANTICOAGULANT (COUMADIN) 2.5 MG tablet Take daily as directed. Current dose 5 mg Friday and 2.5 mg rest of days (Patient not taking: Reported on 7/19/2021) 156 tablet 1       PAST SURGICAL HISTORY:  Past Surgical History:   Procedure Laterality Date     HC THYROIDECTOMY         ALLERGIES:   No Known Allergies    FAMILY HISTORY:  - Premature coronary artery disease  - Atrial fibrillation  - Sudden cardiac death     SOCIAL HISTORY:  Social History     Tobacco Use     Smoking status: Never Smoker     Smokeless tobacco: Never Used     Tobacco comment: Lives in smoke free household   Substance Use Topics     Alcohol use: No     Comment: None     Drug use: No     Comment: Never       ROS:   Constitutional: No fever, chills, or sweats. Weight stable.   ENT: No visual disturbance, ear ache, epistaxis, sore throat.   Cardiovascular: As per HPI.   Respiratory: No cough, hemoptysis.    GI: No nausea, vomiting, hematemesis, melena, or hematochezia.    : No hematuria.   Integument: Negative.   Psychiatric: Negative.   Hematologic:  Easy bruising, no easy bleeding.  Neuro: Negative.   Endocrinology: No significant heat or cold intolerance   Musculoskeletal: No myalgia.    Exam:  /84 (BP Location: Right arm, Patient Position: Chair, Cuff Size: Adult Large)   Pulse 93   Wt 148.3 kg (327 lb)   SpO2 97%   BMI 38.22 kg/m    GENERAL APPEARANCE: well-developed, well-appearing male in NAD, polite and conversational  HEENT: at/nc, eomi, mmm, sclera anicteric, no oral ulcerations, no xanthelasmas   NECK: no adenopathy, thyroid normal to palpation, JVP not elevated  RESPIRATORY: CTAB on RA, nonlabored, no accessory muscle use  CARDIOVASCULAR: irr irr, s1, s2, no murmur, rub or gallop, no s3 or s4  ABDOMEN: soft, nt/nd, bs+  EXTREMITIES: no gross deformity, adequate muscle bulk, no edema  NEURO: alert, interactive, speech fluent, grossly nonfocal, moving all 4  VASC: Radial and DP pulses are normal in volumes and symmetric bilaterally  SKIN: no ecchymoses, no rashes on exposed skin      Labs:  CBC RESULTS:   Lab Results   Component Value Date    WBC 7.8 07/19/2021    WBC 8.1 07/09/2021    RBC 3.58 (L) 07/19/2021    RBC 2.61 (L) 07/09/2021    HGB 9.3 (L) 07/19/2021    HGB 6.2 (LL) 07/09/2021    HCT 30.3 (L) 07/19/2021    HCT 21.7 (L) 07/09/2021    MCV 85 07/19/2021    MCV 83 07/09/2021    MCH 26.0 (L) 07/19/2021    MCH 23.8 (L) 07/09/2021    MCHC 30.7 (L) 07/19/2021    MCHC 28.6 (L) 07/09/2021    RDW 18.8 (H) 07/19/2021    RDW 16.1 (H) 07/09/2021     07/19/2021     (H) 07/09/2021       BMP RESULTS:  Lab Results   Component Value Date     07/09/2021    POTASSIUM 4.5 07/09/2021    CHLORIDE 108 07/09/2021    CO2 24 07/09/2021    ANIONGAP 7 07/09/2021     (H) 07/09/2021    BUN 14 07/09/2021    CR 0.74 07/09/2021    GFRESTIMATED >90 07/09/2021    GFRESTBLACK >90 07/09/2021    EUGENIA 8.5 07/09/2021        INR RESULTS:  Lab Results   Component  Value Date    INR 2.10 (H) 06/25/2021    INR 2.50 (H) 04/16/2021    INR 2.30 (H) 03/05/2021    INR 2.40 (H) 01/22/2021       Procedures:  Recent colonoscopy and EGD as detailed above    Assessment and Plan:   Stef Mosher is a 61 year old male with PMH of atrial fibrillation c/b 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 Waylon Pacheco) with pathology obtained. At present, there is no emergent indication for restart of anticoagulation as the daily risk for stroke is relatively low, thus it would be reasonable to do what is optimal for healing from the purposes of his GI ulceration. Given this, we will plan to discuss the patient's case with Dr. Pacheco, the patient's gastroenterologist, how long they would like to hold anticoagulation to assist with healing of his known ulcerations while balancing the risk of CVA (current CHADSVASC of 4 based on presence of HTN, prior TIA, and CHF corresponds to stroke risk of 4.8% per [place] Atrial Fibrillation Cohort Study, which conveys a daily risk of ~0.01%) . The patient has already started PPI therapy and has stopped ASA.     Long term, the patient should abstain from ASA given he is on anticoagulation already with warfarin. We will plan to discuss holding of anticoagulation with Dr. Pacheco and plan to see the patient back in clinic in 2 weeks to rediscuss restarting anticoagulation. Given he has not had a CBC, we will obtain today as well as prior to his RTC visit as well as obtain and echocardiogram prior to that visit given he has not had one since 2017    -we will discuss optimal timing for restart of anticoagulation with   [facility]  -continue to hold warfarin for now  -stop ASA (has already stopped when presented for GIB)  -check CBC  -RTC in 2 weeks with Dr. Turner with repeat CBC and TTE prior    The patient was seen and discussed with Dr. Turner, who agrees with the above assessment and plan.    Ritika Koenig MD  Cardiology Fellow PGY4  P: [phone]      I have seen, interviewed, and examined patient. I have reviewed the laboratory tests, imaging, and other investigations. I have reviewed the management plan with the patient. I discussed with the team and agree with the findings and plan in this resident/fellow/nurse practitioner's note. In addition, changes in the physical examination, assessment and plan have been incorporated into the note by myself, as to make it a single cohesive document.       Leanna Turner MD, MS  Cardiology/Cardiac EP Attending Staff            CC  Patient Care Team:  Josephine Roberts NP as PCP - General (Nurse Practitioner - Family)  Leanna Turner MD as MD (Cardiology)  Stella Allison, RN as Specialty Care Coordinator (Cardiology)  Roxane Be MD as Assigned PCP

## 2021-07-19 NOTE — PATIENT INSTRUCTIONS
Thank you for coming to the [facility] @ [place]; please note the following instructions:    1. Labs (CBC) today    2.  Echocardiogram    3.  Follow up in 2 weeks with labs prior (CBC)        If you have any questions regarding your visit please contact your care team:     Cardiology  Telephone Number   Mahnaz MCKEON, RN  Malgorzata MARTINEZ, RN   Laura SKY, RMA  Analisa GORMAN, RMA  Olman MAYES, LPN   [phone] (option 1)   For scheduling appts:     [phone] (select option 1)       For the Device Clinic (Pacemakers and ICD's)  RN's :  Vale Cartagena   During business hours: [phone]    *After business hours:  [phone] (select option 4)      Normal test result notifications will be released via [employer] or mailed within 7 business days.  All other test results, will be communicated via telephone once reviewed by your cardiologist.    If you need a medication refill please contact your pharmacy.  Please allow 3 business days for your refill to be completed.    As always, thank you for trusting us with your health care needs!

## 2021-07-19 NOTE — NURSING NOTE
"Chief Complaint   Patient presents with     RECHECK      1 month follow up with Dr. Leanna Turner for HTN, Cardiomyopathy, Afib, edema. ECHO and Zio 9/22/17.        Initial /84 (BP Location: Right arm, Patient Position: Chair, Cuff Size: Adult Large)   Pulse 93   Wt 148.3 kg (327 lb)   SpO2 97%   BMI 38.22 kg/m   Estimated body mass index is 38.22 kg/m  as calculated from the following:    Height as of 7/9/21: 1.97 m (6' 5.56\").    Weight as of this encounter: 148.3 kg (327 lb)..  BP completed using cuff size: shell Chavez MA  "

## 2021-07-21 ENCOUNTER — ANCILLARY PROCEDURE (OUTPATIENT)
Dept: CARDIOLOGY | Facility: CLINIC | Age: 61
End: 2021-07-21
Attending: INTERNAL MEDICINE
Payer: COMMERCIAL

## 2021-07-21 ENCOUNTER — TELEPHONE (OUTPATIENT)
Dept: CARDIOLOGY | Facility: CLINIC | Age: 61
End: 2021-07-21

## 2021-07-21 DIAGNOSIS — I42.0 DILATED CARDIOMYOPATHY (H): ICD-10-CM

## 2021-07-21 PROCEDURE — 93306 TTE W/DOPPLER COMPLETE: CPT | Performed by: STUDENT IN AN ORGANIZED HEALTH CARE EDUCATION/TRAINING PROGRAM

## 2021-07-21 PROCEDURE — 99207 PR STATISTIC IV PUSH SINGLE INITIAL SUBSTANCE: CPT | Performed by: STUDENT IN AN ORGANIZED HEALTH CARE EDUCATION/TRAINING PROGRAM

## 2021-07-21 RX ADMIN — Medication 3 ML: at 09:33

## 2021-07-21 NOTE — TELEPHONE ENCOUNTER
Spoke to patient and discussed CBC results.  Patient verbalized understanding.    Mahnaz Reynoso, RN  Cardiology Care Coordinator  [facility]  [phone] option 1

## 2021-07-21 NOTE — TELEPHONE ENCOUNTER
----- Message from Leanna Turner MD sent at 7/21/2021  3:50 PM CDT -----  As planned, repeat CBC in 2 weeks.  ----- Message -----  From: Mahnaz Reynoso RN  Sent: 7/21/2021   9:42 AM CDT  To: Leanna Turner MD, Mahnaz Reynoso RN    Review cbc and advise please aMhnaz

## 2021-07-23 NOTE — TELEPHONE ENCOUNTER
I discussed with Dr. Waylon Pacheco, a gastroenterologist at [facility], the optimal timing for restarting anticoagulation.  Dr. Nicholas agrees with the plan of rechecking CBC in approximately 2 weeks.  If the hemoglobin remains stable, at that juncture, we can restart anticoagulation.  I will be seeing the patient in approximately 2 weeks with a recheck CBC.    I discussed the plan with the patient and the patient is very happy with the plan.

## 2021-08-02 ENCOUNTER — LAB (OUTPATIENT)
Dept: LAB | Facility: CLINIC | Age: 61
End: 2021-08-02
Payer: COMMERCIAL

## 2021-08-02 ENCOUNTER — OFFICE VISIT (OUTPATIENT)
Dept: CARDIOLOGY | Facility: CLINIC | Age: 61
End: 2021-08-02
Payer: COMMERCIAL

## 2021-08-02 VITALS
SYSTOLIC BLOOD PRESSURE: 119 MMHG | HEART RATE: 105 BPM | BODY MASS INDEX: 38.45 KG/M2 | WEIGHT: 315 LBS | OXYGEN SATURATION: 97 % | DIASTOLIC BLOOD PRESSURE: 75 MMHG

## 2021-08-02 DIAGNOSIS — I42.8 NONISCHEMIC CARDIOMYOPATHY (H): ICD-10-CM

## 2021-08-02 DIAGNOSIS — I42.9 CARDIOMYOPATHY, UNSPECIFIED TYPE (H): ICD-10-CM

## 2021-08-02 DIAGNOSIS — I48.0 PAROXYSMAL ATRIAL FIBRILLATION (H): Primary | ICD-10-CM

## 2021-08-02 LAB
ERYTHROCYTE [DISTWIDTH] IN BLOOD BY AUTOMATED COUNT: 18.7 % (ref 10–15)
HCT VFR BLD AUTO: 31.5 % (ref 40–53)
HGB BLD-MCNC: 9.4 G/DL (ref 13.3–17.7)
MCH RBC QN AUTO: 24.5 PG (ref 26.5–33)
MCHC RBC AUTO-ENTMCNC: 29.8 G/DL (ref 31.5–36.5)
MCV RBC AUTO: 82 FL (ref 78–100)
PLATELET # BLD AUTO: 359 10E3/UL (ref 150–450)
RBC # BLD AUTO: 3.84 10E6/UL (ref 4.4–5.9)
WBC # BLD AUTO: 6.4 10E3/UL (ref 4–11)

## 2021-08-02 PROCEDURE — 85027 COMPLETE CBC AUTOMATED: CPT

## 2021-08-02 PROCEDURE — 99214 OFFICE O/P EST MOD 30 MIN: CPT | Mod: GC | Performed by: INTERNAL MEDICINE

## 2021-08-02 PROCEDURE — 36415 COLL VENOUS BLD VENIPUNCTURE: CPT

## 2021-08-02 RX ORDER — METOPROLOL SUCCINATE 200 MG/1
200 TABLET, EXTENDED RELEASE ORAL DAILY
Qty: 90 TABLET | Refills: 3 | Status: SHIPPED | OUTPATIENT
Start: 2021-08-02 | End: 2022-05-09

## 2021-08-02 RX ORDER — LISINOPRIL 10 MG/1
10 TABLET ORAL DAILY
Qty: 90 TABLET | Refills: 1 | Status: SHIPPED | OUTPATIENT
Start: 2021-08-02 | End: 2021-10-04

## 2021-08-02 RX ORDER — LISINOPRIL 5 MG/1
5 TABLET ORAL DAILY
Qty: 90 TABLET | Refills: 3 | Status: SHIPPED | OUTPATIENT
Start: 2021-08-02 | End: 2021-08-02

## 2021-08-02 NOTE — LETTER
8/2/2021      RE: Stef Mosher  [address] [facility]  [place] [address]       Dear Colleague,    Thank you for the opportunity to participate in the care of your patient, Stef Mosher, at the [facility]. Please see a copy of my visit note below.    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 No repeat hematochezia or hematuria. States he has not been limited while exercising or walking around. No palititations, chest pain, or SOB. States he is wondering what to do with his AC and antiHTN meds    PAST MEDICAL HISTORY:  Past Medical History:   Diagnosis Date     Arthritis      Atrial fibrillation (H)      Hayfever      Hypercholesterolemia      Hypertension      Unspecified hypothyroidism        CURRENT MEDICATIONS:  Current Outpatient Medications   Medication Sig Dispense Refill     aspirin (ASPIRIN LOW DOSE) 81 MG chewable tablet Take 1 tablet (81 mg) by mouth daily Due for follow up visit. 90 tablet 0     atorvastatin (LIPITOR) 20 MG tablet Take 1 tablet (20 mg) by mouth daily 90 tablet 3     furosemide (LASIX) 20 MG tablet Take 1 tablet (20 mg) by mouth daily 90 tablet 3     metoprolol tartrate (LOPRESSOR) 100 MG tablet Take 1 tablet (100 mg) by mouth 2 times daily 180 tablet 3     Omega-3 Fatty Acids (OMEGA-3 FISH OIL PO) Take 1,200 mg by mouth 2 times daily (with meals)       lisinopril (ZESTRIL) 40 MG tablet Take 1 tablet (40 mg) by mouth daily (Patient not taking: Reported on 7/19/2021) 90 tablet 3     warfarin ANTICOAGULANT (COUMADIN) 2.5 MG tablet Take daily as directed. Current dose 5 mg Friday and 2.5 mg rest of days (Patient not taking: Reported on 7/19/2021) 156 tablet 1        PAST SURGICAL HISTORY:  Past Surgical History:   Procedure Laterality Date     HC THYROIDECTOMY         ALLERGIES:   No Known Allergies    FAMILY HISTORY:  - Premature coronary artery disease  - Atrial fibrillation  - Sudden cardiac death     SOCIAL HISTORY:  Social History     Tobacco Use     Smoking status: Never Smoker     Smokeless tobacco: Never Used     Tobacco comment: Lives in smoke free household   Substance Use Topics     Alcohol use: No     Comment: None     Drug use: No     Comment: Never       ROS:   Constitutional: No fever, chills, or sweats. Weight stable.   ENT: No visual disturbance, ear ache, epistaxis, sore throat.   Cardiovascular: As per HPI.   Respiratory: No cough, hemoptysis.    GI: No nausea, vomiting, hematemesis, melena, or hematochezia.   : No hematuria.   Integument: Negative.   Psychiatric: Negative.   Hematologic:  Easy bruising, no easy bleeding.  Neuro: Negative.   Endocrinology: No significant heat or cold intolerance   Musculoskeletal: No myalgia.    Exam:  /75 (BP Location: Left arm, Patient Position: Chair, Cuff Size: Adult Large)   Pulse 105   Wt 149.2 kg (329 lb)   SpO2 97%   BMI 38.45 kg/m    GENERAL APPEARANCE: well-developed, well-appearing male in NAD, polite and conversational  HEENT: at/nc, eomi, mmm, sclera anicteric, no oral ulcerations, no xanthelasmas   NECK: no adenopathy, thyroid normal to palpation, JVP not elevated  RESPIRATORY: CTAB on RA, nonlabored, no accessory muscle use  CARDIOVASCULAR: rrr, s1, s2, no murmur, rub or gallop, no s3 or s4  ABDOMEN: soft, nt/nd, bs+  EXTREMITIES: no gross deformity, adequate muscle bulk, no edema  NEURO: alert, interactive, speech fluent, grossly nonfocal, moving all 4  VASC: Radial and DP pulses are normal in volumes and symmetric bilaterally  SKIN: no ecchymoses, no rashes on exposed skin      Labs:  CBC RESULTS:   Lab Results   Component Value Date    WBC 7.8 07/19/2021    WBC 8.1 07/09/2021    RBC 3.58  (L) 07/19/2021    RBC 2.61 (L) 07/09/2021    HGB 9.3 (L) 07/19/2021    HGB 6.2 (LL) 07/09/2021    HCT 30.3 (L) 07/19/2021    HCT 21.7 (L) 07/09/2021    MCV 85 07/19/2021    MCV 83 07/09/2021    MCH 26.0 (L) 07/19/2021    MCH 23.8 (L) 07/09/2021    MCHC 30.7 (L) 07/19/2021    MCHC 28.6 (L) 07/09/2021    RDW 18.8 (H) 07/19/2021    RDW 16.1 (H) 07/09/2021     07/19/2021     (H) 07/09/2021       BMP RESULTS:  Lab Results   Component Value Date     07/09/2021    POTASSIUM 4.5 07/09/2021    CHLORIDE 108 07/09/2021    CO2 24 07/09/2021    ANIONGAP 7 07/09/2021     (H) 07/09/2021    BUN 14 07/09/2021    CR 0.74 07/09/2021    GFRESTIMATED >90 07/09/2021    GFRESTBLACK >90 07/09/2021    EUGENIA 8.5 07/09/2021        INR RESULTS:  Lab Results   Component Value Date    INR 2.10 (H) 06/25/2021    INR 2.50 (H) 04/16/2021    INR 2.30 (H) 03/05/2021    INR 2.40 (H) 01/22/2021       Procedures:      Assessment and Plan:   Stef Mosher is a 61 year old male with PMH of atrial fibrillation c/b 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 Discussed case with Dr. Pacheco of [facility] and ok to restart AC from their standpoint if Hgb stable. Awaiting results from lab today    #paroxysmal atrial fibrillation  Currently rate controlled well  -rate control: change metoprolol tartrate to succinate for GDMT with prior HF  -AC: will plan to restart warfarin given would like to have available reversal agent given patient is at increased risk for repeat bleeding    #HFrEF with recovered EF 2/2 NICM, last EF 40% (7/21/21)  BB: change to metoprolol succinate as above  ACEI/ARB/ARNI: restart lisinopril 10mg (prior dose 40mg)  Volume status: appears euvolemic  -continue statin  -could consider SGLT2 and transition to ARNI at future visits    Will have  patient RTC in 2 months with repeat CBD and electrolytes.    The patient was seen and discussed with Dr. Turner, who agrees with the above assessment and plan.    Ritika Koenig MD  Cardiology Fellow PGY4  P: [phone]      I have seen, interviewed, and examined patient. I have reviewed the laboratory tests, imaging, and other investigations. I have reviewed the management plan with the patient. I discussed with the team and agree with the findings and plan in this resident/fellow/nurse practitioner's note. In addition, changes in the physical examination, assessment and plan have been incorporated into the note by myself, as to make it a single cohesive document.       Leanna Turner MD, MS  Cardiology/Cardiac EP Attending Staff    CC  Patient Care Team:  Josephine Roberts NP as PCP - General (Nurse Practitioner - Family)  Stella Allison, RN as Specialty Care Coordinator (Cardiology)  Roxane Be MD as Assigned PCP

## 2021-08-02 NOTE — PATIENT INSTRUCTIONS
Thank you for coming to the [facility] @ [place]; please note the following instructions:    1. STOP metoprolol tartrate 100 mg BID    2.  START metoprolol 200 mg daily    3.  START lisinopril 10 mg daily    4.  Restart warfarin at 2.5 mg daily.  INR check in 3 days after starting warfarin    5.  2 month follow up with labs prior        If you have any questions regarding your visit please contact your care team:     Cardiology  Telephone Number   Mahnaz MORRIS., RN  Malgorzata MARTINEZ, RN   Laura SKY, IAM GORMAN, IAM MAYES, LPN   [phone] (option 1)   For scheduling appts:     [phone] (select option 1)       For the Device Clinic (Pacemakers and ICD's)  RN's :  Vale Cartagena   During business hours: [phone]    *After business hours:  [phone] (select option 4)      Normal test result notifications will be released via [employer] or mailed within 7 business days.  All other test results, will be communicated via telephone once reviewed by your cardiologist.    If you need a medication refill please contact your pharmacy.  Please allow 3 business days for your refill to be completed.    As always, thank you for trusting us with your health care needs!

## 2021-08-02 NOTE — PROGRESS NOTES
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 No repeat hematochezia or hematuria. States he has not been limited while exercising or walking around. No palititations, chest pain, or SOB. States he is wondering what to do with his AC and antiHTN meds    PAST MEDICAL HISTORY:  Past Medical History:   Diagnosis Date     Arthritis      Atrial fibrillation (H)      Hayfever      Hypercholesterolemia      Hypertension      Unspecified hypothyroidism        CURRENT MEDICATIONS:  Current Outpatient Medications   Medication Sig Dispense Refill     aspirin (ASPIRIN LOW DOSE) 81 MG chewable tablet Take 1 tablet (81 mg) by mouth daily Due for follow up visit. 90 tablet 0     atorvastatin (LIPITOR) 20 MG tablet Take 1 tablet (20 mg) by mouth daily 90 tablet 3     furosemide (LASIX) 20 MG tablet Take 1 tablet (20 mg) by mouth daily 90 tablet 3     metoprolol tartrate (LOPRESSOR) 100 MG tablet Take 1 tablet (100 mg) by mouth 2 times daily 180 tablet 3     Omega-3 Fatty Acids (OMEGA-3 FISH OIL PO) Take 1,200 mg by mouth 2 times daily (with meals)       lisinopril (ZESTRIL) 40 MG tablet Take 1 tablet (40 mg) by mouth daily (Patient not taking: Reported on 7/19/2021) 90 tablet 3     warfarin ANTICOAGULANT (COUMADIN) 2.5 MG tablet Take daily as directed. Current dose 5 mg Friday and 2.5 mg rest of days (Patient not taking: Reported on 7/19/2021) 156 tablet 1       PAST SURGICAL HISTORY:  Past Surgical History:   Procedure Laterality Date     HC THYROIDECTOMY         ALLERGIES:   No Known Allergies    FAMILY HISTORY:  - Premature coronary artery disease  - Atrial fibrillation  - Sudden cardiac death     SOCIAL HISTORY:  Social History     Tobacco Use     Smoking status: Never Smoker     Smokeless  tobacco: Never Used     Tobacco comment: Lives in smoke free household   Substance Use Topics     Alcohol use: No     Comment: None     Drug use: No     Comment: Never       ROS:   Constitutional: No fever, chills, or sweats. Weight stable.   ENT: No visual disturbance, ear ache, epistaxis, sore throat.   Cardiovascular: As per HPI.   Respiratory: No cough, hemoptysis.    GI: No nausea, vomiting, hematemesis, melena, or hematochezia.   : No hematuria.   Integument: Negative.   Psychiatric: Negative.   Hematologic:  Easy bruising, no easy bleeding.  Neuro: Negative.   Endocrinology: No significant heat or cold intolerance   Musculoskeletal: No myalgia.    Exam:  /75 (BP Location: Left arm, Patient Position: Chair, Cuff Size: Adult Large)   Pulse 105   Wt 149.2 kg (329 lb)   SpO2 97%   BMI 38.45 kg/m    GENERAL APPEARANCE: well-developed, well-appearing male in NAD, polite and conversational  HEENT: at/nc, eomi, mmm, sclera anicteric, no oral ulcerations, no xanthelasmas   NECK: no adenopathy, thyroid normal to palpation, JVP not elevated  RESPIRATORY: CTAB on RA, nonlabored, no accessory muscle use  CARDIOVASCULAR: rrr, s1, s2, no murmur, rub or gallop, no s3 or s4  ABDOMEN: soft, nt/nd, bs+  EXTREMITIES: no gross deformity, adequate muscle bulk, no edema  NEURO: alert, interactive, speech fluent, grossly nonfocal, moving all 4  VASC: Radial and DP pulses are normal in volumes and symmetric bilaterally  SKIN: no ecchymoses, no rashes on exposed skin      Labs:  CBC RESULTS:   Lab Results   Component Value Date    WBC 7.8 07/19/2021    WBC 8.1 07/09/2021    RBC 3.58 (L) 07/19/2021    RBC 2.61 (L) 07/09/2021    HGB 9.3 (L) 07/19/2021    HGB 6.2 (LL) 07/09/2021    HCT 30.3 (L) 07/19/2021    HCT 21.7 (L) 07/09/2021    MCV 85 07/19/2021    MCV 83 07/09/2021    MCH 26.0 (L) 07/19/2021    MCH 23.8 (L) 07/09/2021    MCHC 30.7 (L) 07/19/2021    MCHC 28.6 (L) 07/09/2021    RDW 18.8 (H) 07/19/2021    RDW 16.1 (H)  07/09/2021     07/19/2021     (H) 07/09/2021       BMP RESULTS:  Lab Results   Component Value Date     07/09/2021    POTASSIUM 4.5 07/09/2021    CHLORIDE 108 07/09/2021    CO2 24 07/09/2021    ANIONGAP 7 07/09/2021     (H) 07/09/2021    BUN 14 07/09/2021    CR 0.74 07/09/2021    GFRESTIMATED >90 07/09/2021    GFRESTBLACK >90 07/09/2021    EUGENIA 8.5 07/09/2021        INR RESULTS:  Lab Results   Component Value Date    INR 2.10 (H) 06/25/2021    INR 2.50 (H) 04/16/2021    INR 2.30 (H) 03/05/2021    INR 2.40 (H) 01/22/2021       Procedures:      Assessment and Plan:   Stef Mosher is a 61 year old male with PMH of atrial fibrillation c/b 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 Discussed case with Dr. Pacheco of [facility] and ok to restart AC from their standpoint if Hgb stable. Awaiting results from lab today    #paroxysmal atrial fibrillation  Currently rate controlled well  -rate control: change metoprolol tartrate to succinate for GDMT with prior HF  -AC: will plan to restart warfarin given would like to have available reversal agent given patient is at increased risk for repeat bleeding    #HFrEF with recovered EF 2/2 NICM, last EF 40% (7/21/21)  BB: change to metoprolol succinate as above  ACEI/ARB/ARNI: restart lisinopril 10mg (prior dose 40mg)  Volume status: appears euvolemic  -continue statin  -could consider SGLT2 and transition to ARNI at future visits    Will have patient RTC in 2 months with repeat CBD and electrolytes.    The patient was seen and discussed with Dr. Turner, who agrees with the above assessment and plan.    Ritika Koenig MD  Cardiology Fellow PGY4  P: [phone]      I have seen, interviewed, and examined patient. I have reviewed the laboratory tests, imaging, and other investigations. I have  reviewed the management plan with the patient. I discussed with the team and agree with the findings and plan in this resident/fellow/nurse practitioner's note. In addition, changes in the physical examination, assessment and plan have been incorporated into the note by myself, as to make it a single cohesive document.       Leanna Turner MD, MS  Cardiology/Cardiac EP Attending Staff          CC  Patient Care Team:  Josephine Roberts NP as PCP - General (Nurse Practitioner - Family)  Leanna Turner MD as MD (Cardiology)  Stella Allison, HOLLY as Specialty Care Coordinator (Cardiology)  Roxane Parisi MD as Assigned PCP  Leanna Turner MD as Assigned Heart and Vascular Provider  ROXANE PARISI

## 2021-08-03 ENCOUNTER — TELEPHONE (OUTPATIENT)
Dept: CARDIOLOGY | Facility: CLINIC | Age: 61
End: 2021-08-03

## 2021-08-03 DIAGNOSIS — I48.91 NEW ONSET ATRIAL FIBRILLATION (H): ICD-10-CM

## 2021-08-03 DIAGNOSIS — Z79.01 LONG TERM CURRENT USE OF ANTICOAGULANT THERAPY: Primary | ICD-10-CM

## 2021-08-03 DIAGNOSIS — I48.20 CHRONIC ATRIAL FIBRILLATION (H): ICD-10-CM

## 2021-08-03 NOTE — CONFIDENTIAL NOTE
Warfarin reinitiated. Hg >9.0 Per Dr. Turner, patient to restart warfarin at 2.5 mg daily starting 8/2/21.  Patient advised to have an INR in 3 days and to call INR clinic to set this up/discuss.    Writer will also inform INR clinic.    Mahnaz Reynoso RN  Cardiology Care Coordinator  [facility]  [phone] option 1

## 2021-08-03 NOTE — TELEPHONE ENCOUNTER
Called patient and schedule Lab apt for INR on Thursday 8/5.   He resumed warfarin 2.5 mg daily starting yesterday 8/2. Lawanda Bowers RN

## 2021-08-05 ENCOUNTER — TELEPHONE (OUTPATIENT)
Dept: CARDIOLOGY | Facility: CLINIC | Age: 61
End: 2021-08-05

## 2021-08-05 ENCOUNTER — ANTICOAGULATION THERAPY VISIT (OUTPATIENT)
Dept: ANTICOAGULATION | Facility: CLINIC | Age: 61
End: 2021-08-05

## 2021-08-05 ENCOUNTER — LAB (OUTPATIENT)
Dept: LAB | Facility: CLINIC | Age: 61
End: 2021-08-05
Payer: COMMERCIAL

## 2021-08-05 DIAGNOSIS — Z79.01 LONG TERM CURRENT USE OF ANTICOAGULANT THERAPY: Primary | ICD-10-CM

## 2021-08-05 DIAGNOSIS — I48.0 PAROXYSMAL ATRIAL FIBRILLATION (H): ICD-10-CM

## 2021-08-05 DIAGNOSIS — I48.91 NEW ONSET ATRIAL FIBRILLATION (H): ICD-10-CM

## 2021-08-05 DIAGNOSIS — I48.20 CHRONIC ATRIAL FIBRILLATION (H): ICD-10-CM

## 2021-08-05 LAB — INR BLD: 1.1 (ref 0.9–1.1)

## 2021-08-05 PROCEDURE — 85610 PROTHROMBIN TIME: CPT

## 2021-08-05 PROCEDURE — 36416 COLLJ CAPILLARY BLOOD SPEC: CPT

## 2021-08-05 NOTE — PROGRESS NOTES
Advise PTA warfarin dose, since INR was 2.3 at time of GIB, no boost due to unable to check INR until returns from travel.    Cecilia Flower, PharmD BCACP  Anticoagulation Clinical Pharmacist

## 2021-08-05 NOTE — PROGRESS NOTES
ANTICOAGULATION MANAGEMENT     Stef Mosher 61 year old male is on warfarin with subtherapeutic INR result. (Goal INR 2.0-3.0)    Recent labs: (last 7 days)     08/05/21  1305   INR 1.1       ASSESSMENT     Source(s): Chart Review and Patient/Caregiver Call       Warfarin doses taken: Warfarin recently held for GI bleed which may be affecting INR    Diet: No new diet changes identified    New illness, injury, or hospitalization: Patient has been holding for past 3 weeks for GI bleed    Medication/supplement changes: None noted    Signs or symptoms of bleeding or clotting: No    Previous INR: Therapeutic last 2(+) visits    Additional findings: [place]   The earliest patient could come in to have INR checked is 8/18/21.     PLAN     Recommended plan for no diet, medication or health factor changes affecting INR     Dosing Instructions: Continue your current warfarin dose with next INR in 3 days       Summary  As of 8/5/2021    Full warfarin instructions:  5 mg every Fri; 2.5 mg all other days   Next INR check:               Telephone call with Stef who verbalizes understanding and agrees to plan    Lab visit scheduled    Education provided: Target INR goal and significance of current INR result, Importance of therapeutic range, Importance of following up for INR monitoring at instructed interval, Importance of taking warfarin as instructed, Monitoring for clotting signs and symptoms and When to seek medical attention/emergency care    Plan made with [facility] Pharmacist Cecilia Dolan, RN  Anticoagulation Clinic  8/5/2021    _______________________________________________________________________     Anticoagulation Episode Summary     Current INR goal:  2.0-3.0   TTR:  89.9 % (1 y)   Target end date:  Indefinite   Send INR reminders to:  BRIANA RODRIGUEZ    Indications    Long-term (current) use of anticoagulants [Z79.01] [Z79.01]  New onset atrial  fibrillation (H) [I48.91]  Chronic atrial fibrillation (H) [I48.20]           Comments:           Anticoagulation Care Providers     Provider Role Specialty Phone number    Josephine Roberts NP Referring Family Medicine [phone]    Sivakumar Kang PA-C Responsible Family Medicine [phone]

## 2021-08-09 NOTE — CONFIDENTIAL NOTE
Per Dr. Turner, patient does not need dual therapy.  STOP aspirin.  Detailed message left and to call clinic back with questions.    Mahnaz Reynoso RN  Cardiology Care Coordinator  [facility]  [phone] option 1

## 2021-08-18 ENCOUNTER — ANTICOAGULATION THERAPY VISIT (OUTPATIENT)
Dept: ANTICOAGULATION | Facility: CLINIC | Age: 61
End: 2021-08-18

## 2021-08-18 ENCOUNTER — LAB (OUTPATIENT)
Dept: LAB | Facility: CLINIC | Age: 61
End: 2021-08-18
Payer: COMMERCIAL

## 2021-08-18 DIAGNOSIS — I48.20 CHRONIC ATRIAL FIBRILLATION (H): ICD-10-CM

## 2021-08-18 DIAGNOSIS — I48.91 NEW ONSET ATRIAL FIBRILLATION (H): ICD-10-CM

## 2021-08-18 DIAGNOSIS — I48.0 PAROXYSMAL ATRIAL FIBRILLATION (H): ICD-10-CM

## 2021-08-18 DIAGNOSIS — Z79.01 LONG TERM CURRENT USE OF ANTICOAGULANT THERAPY: Primary | ICD-10-CM

## 2021-08-18 LAB — INR BLD: 2.5 (ref 0.9–1.1)

## 2021-08-18 PROCEDURE — 36416 COLLJ CAPILLARY BLOOD SPEC: CPT

## 2021-08-18 PROCEDURE — 85610 PROTHROMBIN TIME: CPT

## 2021-08-18 NOTE — PROGRESS NOTES
ANTICOAGULATION MANAGEMENT     Stef Mosher 61 year old male is on warfarin with therapeutic INR result. (Goal INR 2.0-3.0)    Recent labs: (last 7 days)     08/18/21  0839   INR 2.5*       ASSESSMENT     Source(s): Chart Review and Patient/Caregiver Call       Warfarin doses taken: Warfarin taken as instructed    Diet: No new diet changes identified    New illness, injury, or hospitalization: No    Medication/supplement changes: None noted    Signs or symptoms of bleeding or clotting: No    Previous INR: Subtherapeutic    Additional findings: None     PLAN     Recommended plan for no diet, medication or health factor changes affecting INR     Dosing Instructions: Continue your current warfarin dose with next INR in 2 weeks       Summary  As of 8/18/2021    Full warfarin instructions:  5 mg every Fri; 2.5 mg all other days   Next INR check:  9/3/2021             Telephone call with Stef who verbalizes understanding and agrees to plan    Lab visit scheduled    Education provided: Please call back if any changes to your diet, medications or how you've been taking warfarin    Plan made per [facility] anticoagulation protocol    Lawanda Bowers RN  Anticoagulation Clinic  8/18/2021    _______________________________________________________________________     Anticoagulation Episode Summary     Current INR goal:  2.0-3.0   TTR:  87.6 % (1 y)   Target end date:  Indefinite   Send INR reminders to:  ANTICOARLINE RODRIGUEZ    Indications    Long-term (current) use of anticoagulants [Z79.01] [Z79.01]  New onset atrial fibrillation (H) [I48.91]  Chronic atrial fibrillation (H) [I48.20]           Comments:           Anticoagulation Care Providers     Provider Role Specialty Phone number    Josephine Roberts NP Referring Family Medicine [phone]    Sivakumar Kang PA-C Responsible Family Medicine [phone]

## 2021-09-17 ENCOUNTER — LAB (OUTPATIENT)
Dept: LAB | Facility: CLINIC | Age: 61
End: 2021-09-17
Payer: COMMERCIAL

## 2021-09-17 ENCOUNTER — ANTICOAGULATION THERAPY VISIT (OUTPATIENT)
Dept: ANTICOAGULATION | Facility: CLINIC | Age: 61
End: 2021-09-17

## 2021-09-17 DIAGNOSIS — Z79.01 LONG TERM CURRENT USE OF ANTICOAGULANT THERAPY: Primary | ICD-10-CM

## 2021-09-17 DIAGNOSIS — I48.20 CHRONIC ATRIAL FIBRILLATION (H): ICD-10-CM

## 2021-09-17 DIAGNOSIS — I48.91 NEW ONSET ATRIAL FIBRILLATION (H): ICD-10-CM

## 2021-09-17 DIAGNOSIS — I48.0 PAROXYSMAL ATRIAL FIBRILLATION (H): ICD-10-CM

## 2021-09-17 LAB — INR BLD: 2.1 (ref 0.9–1.1)

## 2021-09-17 PROCEDURE — 36416 COLLJ CAPILLARY BLOOD SPEC: CPT

## 2021-09-17 PROCEDURE — 85610 PROTHROMBIN TIME: CPT

## 2021-09-17 NOTE — PROGRESS NOTES
ANTICOAGULATION MANAGEMENT     Setf Mosher 61 year old male is on warfarin with therapeutic INR result. (Goal INR 2.0-3.0)    Recent labs: (last 7 days)     09/17/21  0943   INR 2.1*       ASSESSMENT     Source(s): Chart Review and Patient/Caregiver Call       Warfarin doses taken: Warfarin taken as instructed    Diet: No new diet changes identified    New illness, injury, or hospitalization: No    Medication/supplement changes: None noted    Signs or symptoms of bleeding or clotting: No    Previous INR: Therapeutic last visit; previously outside of goal range    Additional findings: None     PLAN     Recommended plan for no diet, medication or health factor changes affecting INR     Dosing Instructions: Continue your current warfarin dose with next INR in 4 weeks       Summary  As of 9/17/2021    Full warfarin instructions:  5 mg every Fri; 2.5 mg all other days   Next INR check:  10/15/2021             Telephone call with Stef who verbalizes understanding and agrees to plan    Patient offered & declined to schedule next visit    Education provided: Please call back if any changes to your diet, medications or how you've been taking warfarin and Contact [phone]  with any changes, questions or concerns.     Plan made per [facility] anticoagulation protocol    Lawanda Bowers, RN  Anticoagulation Clinic  9/17/2021    _______________________________________________________________________     Anticoagulation Episode Summary     Current INR goal:  2.0-3.0   TTR:  87.6 % (1 y)   Target end date:  Indefinite   Send INR reminders to:  BRIANA RODRIGUEZ    Indications    Long-term (current) use of anticoagulants [Z79.01] [Z79.01]  New onset atrial fibrillation (H) [I48.91]  Chronic atrial fibrillation (H) [I48.20]           Comments:           Anticoagulation Care Providers     Provider Role Specialty Phone number    Josephine Roberts NP Referring Family Medicine [phone]    Sivakumar Kang PA-C Responsible Family  Medicine [phone]

## 2021-09-25 ENCOUNTER — HEALTH MAINTENANCE LETTER (OUTPATIENT)
Age: 61
End: 2021-09-25

## 2021-09-28 ENCOUNTER — TELEPHONE (OUTPATIENT)
Dept: LAB | Facility: CLINIC | Age: 61
End: 2021-09-28

## 2021-09-28 NOTE — PROGRESS NOTES
Hi! Please place future laboratory orders if needed for upcoming appointment on 10/1/21. Patient appointment notes said BMP & CBC from you. If labs are not due, please have your care team contact the patient to cancel lab only appointment.     Thank you!  [facility] [place] [facility]

## 2021-10-04 ENCOUNTER — OFFICE VISIT (OUTPATIENT)
Dept: CARDIOLOGY | Facility: CLINIC | Age: 61
End: 2021-10-04
Payer: COMMERCIAL

## 2021-10-04 VITALS
DIASTOLIC BLOOD PRESSURE: 88 MMHG | BODY MASS INDEX: 38.69 KG/M2 | SYSTOLIC BLOOD PRESSURE: 134 MMHG | HEART RATE: 90 BPM | OXYGEN SATURATION: 97 % | WEIGHT: 315 LBS

## 2021-10-04 DIAGNOSIS — I42.9 CARDIOMYOPATHY, UNSPECIFIED TYPE (H): ICD-10-CM

## 2021-10-04 DIAGNOSIS — I48.0 PAROXYSMAL ATRIAL FIBRILLATION (H): Primary | ICD-10-CM

## 2021-10-04 DIAGNOSIS — I42.8 NONISCHEMIC CARDIOMYOPATHY (H): ICD-10-CM

## 2021-10-04 PROCEDURE — 99214 OFFICE O/P EST MOD 30 MIN: CPT | Mod: GC | Performed by: INTERNAL MEDICINE

## 2021-10-04 RX ORDER — LISINOPRIL 40 MG/1
40 TABLET ORAL DAILY
Qty: 90 TABLET | Refills: 1 | Status: SHIPPED | OUTPATIENT
Start: 2021-10-04 | End: 2022-03-28

## 2021-10-04 NOTE — PROGRESS NOTES
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 Has now restarted AC.    States he has been doing well overall and notes substantial increase in his overall energy level. States he is taking the stairs more d/t increased energy. Has no complaints today. Does not take his BP regularly at home    PAST MEDICAL HISTORY:  Past Medical History:   Diagnosis Date     Arthritis      Atrial fibrillation (H)      Hayfever      Hypercholesterolemia      Hypertension      Unspecified hypothyroidism        CURRENT MEDICATIONS:  Current Outpatient Medications   Medication Sig Dispense Refill     atorvastatin (LIPITOR) 20 MG tablet Take 1 tablet (20 mg) by mouth daily 90 tablet 3     furosemide (LASIX) 20 MG tablet Take 1 tablet (20 mg) by mouth daily 90 tablet 3     lisinopril (ZESTRIL) 10 MG tablet Take 1 tablet (10 mg) by mouth daily 90 tablet 1     metoprolol succinate ER (TOPROL-XL) 200 MG 24 hr tablet Take 1 tablet (200 mg) by mouth daily 90 tablet 3     warfarin ANTICOAGULANT (COUMADIN) 2.5 MG tablet Take daily as directed. Current dose 5 mg Friday and 2.5 mg rest of days 156 tablet 1     Omega-3 Fatty Acids (OMEGA-3 FISH OIL PO) Take 1,200 mg by mouth 2 times daily (with meals) (Patient not taking: Reported on 10/4/2021)         PAST SURGICAL HISTORY:  Past Surgical History:   Procedure Laterality Date     HC THYROIDECTOMY         ALLERGIES:   No Known Allergies    FAMILY HISTORY:  - Premature coronary artery disease  - Atrial fibrillation  - Sudden cardiac death     SOCIAL HISTORY:  Social History     Tobacco Use     Smoking status: Never Smoker     Smokeless tobacco: Never Used     Tobacco comment: Lives in smoke free household   Substance Use Topics     Alcohol use: No     Comment: None     Drug use: No     Comment: Never       ROS:    Constitutional: No fever, chills, or sweats. Weight stable.   ENT: No visual disturbance, ear ache, epistaxis, sore throat.   Cardiovascular: As per HPI.   Respiratory: No cough, hemoptysis.    GI: No nausea, vomiting, hematemesis, melena, or hematochezia.   : No hematuria.   Integument: Negative.   Psychiatric: Negative.   Hematologic:  Easy bruising, no easy bleeding.  Neuro: Negative.   Endocrinology: No significant heat or cold intolerance   Musculoskeletal: No myalgia.    Exam:  BP (!) 148/99 (BP Location: Left arm, Patient Position: Chair, Cuff Size: Adult Large)   Pulse 90   Wt (!) 150.1 kg (331 lb)   SpO2 97%   BMI 38.69 kg/m    GENERAL APPEARANCE: well-developed, well-appearing male in NAD, polite and conversational  HEENT: at/nc, eomi, mmm, sclera anicteric, no oral ulcerations, no xanthelasmas   NECK: no adenopathy, thyroid normal to palpation, JVP not elevated  RESPIRATORY: CTAB on RA, nonlabored, no accessory muscle use  CARDIOVASCULAR: rrr, s1, s2, no murmur, rub or gallop, no s3 or s4  ABDOMEN: soft, nt/nd, bs+  EXTREMITIES: no gross deformity, adequate muscle bulk, no edema  NEURO: alert, interactive, speech fluent, grossly nonfocal, moving all 4  VASC: Radial and DP pulses are normal in volumes and symmetric bilaterally  SKIN: no ecchymoses, no rashes on exposed skin    Labs:  CBC RESULTS:   Lab Results   Component Value Date    WBC 6.4 08/02/2021    WBC 8.1 07/09/2021    RBC 3.84 (L) 08/02/2021    RBC 2.61 (L) 07/09/2021    HGB 9.4 (L) 08/02/2021    HGB 6.2 (LL) 07/09/2021    HCT 31.5 (L) 08/02/2021    HCT 21.7 (L) 07/09/2021    MCV 82 08/02/2021    MCV 83 07/09/2021    MCH 24.5 (L) 08/02/2021    MCH 23.8 (L) 07/09/2021    MCHC 29.8 (L) 08/02/2021    MCHC 28.6 (L) 07/09/2021    RDW 18.7 (H) 08/02/2021    RDW 16.1 (H) 07/09/2021     08/02/2021     (H) 07/09/2021       BMP RESULTS:  Lab Results   Component Value Date     07/09/2021    POTASSIUM 4.5 07/09/2021    CHLORIDE  108 07/09/2021    CO2 24 07/09/2021    ANIONGAP 7 07/09/2021     (H) 07/09/2021    BUN 14 07/09/2021    CR 0.74 07/09/2021    GFRESTIMATED >90 07/09/2021    GFRESTBLACK >90 07/09/2021    EUGENIA 8.5 07/09/2021        INR RESULTS:  Lab Results   Component Value Date    INR 2.1 (H) 09/17/2021    INR 2.5 (H) 08/18/2021    INR 1.1 08/05/2021    INR 2.10 (H) 06/25/2021    INR 2.50 (H) 04/16/2021    INR 2.30 (H) 03/05/2021    INR 2.40 (H) 01/22/2021       Procedures:      Assessment and Plan:   Stef Mosher is a 61 year old male with PMH of atrial fibrillation c/b 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 Has now restarted AC.    Overall reports doing well with increased energy level. Will plan to reintroduce prior medications    #Recent GIB  Repeat EGD noted healed ulcer. Follows with [facility]     #paroxysmal atrial fibrillation  Currently rate controlled well  -rate control: continue metoprolol  succinate given HFrEF  -AC: continue warfarin     #HFrEF with recovered EF 2/2 NICM, last EF 40% (7/21/21)  BB: metoprolol succinate as above  ACEI/ARB/ARNI: increase lisinopril to 40mg qday (prior to GIB dose)  Volume status: appears euvolemic  -continue statin  -RTC in ~2 months with repeat TTE prior with plan to reassess for additional of further GDMT medications  -obtain BMP next week given increase in lisinopril    The patient was seen and discussed with Dr. Turner, who agrees with the above assessment and plan.    Ritika Koenig MD  Cardiology Fellow PGY4  P: [phone]      I have seen, interviewed, and examined patient. I have reviewed the laboratory tests, imaging, and other investigations. I have reviewed the management plan with the patient. I discussed with the team and agree with the findings and plan in this resident/fellow/nurse practitioner's note. In addition, changes in the physical examination,  assessment and plan have been incorporated into the note by myself, as to make it a single cohesive document.       Leanna Turner MD, MS  Cardiology/Cardiac EP Attending Staff          CC  Patient Care Team:  Josephine Roberts NP as PCP - General (Nurse Practitioner - Family)  Leanna Turner MD as MD (Cardiology)  Stella Allison, RN as Specialty Care Coordinator (Cardiology)  Roxane Be MD as Assigned PCP  Leanna Turner MD as Assigned Heart and Vascular Provider

## 2021-10-04 NOTE — LETTER
10/4/2021      RE: Stef Mosher  [address] [facility]  [place] [address]       Dear Colleague,    Thank you for the opportunity to participate in the care of your patient, Stef Mosher, at the [facility]. Please see a copy of my visit note below.    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 Has now restarted AC.    States he has been doing well overall and notes substantial increase in his overall energy level. States he is taking the stairs more d/t increased energy. Has no complaints today. Does not take his BP regularly at home    PAST MEDICAL HISTORY:  Past Medical History:   Diagnosis Date     Arthritis      Atrial fibrillation (H)      Hayfever      Hypercholesterolemia      Hypertension      Unspecified hypothyroidism        CURRENT MEDICATIONS:  Current Outpatient Medications   Medication Sig Dispense Refill     atorvastatin (LIPITOR) 20 MG tablet Take 1 tablet (20 mg) by mouth daily 90 tablet 3     furosemide (LASIX) 20 MG tablet Take 1 tablet (20 mg) by mouth daily 90 tablet 3     lisinopril (ZESTRIL) 10 MG tablet Take 1 tablet (10 mg) by mouth daily 90 tablet 1     metoprolol succinate ER (TOPROL-XL) 200 MG 24 hr tablet Take 1 tablet (200 mg) by mouth daily 90 tablet 3     warfarin ANTICOAGULANT (COUMADIN) 2.5 MG tablet Take daily as directed. Current dose 5 mg Friday and 2.5 mg rest of days 156 tablet 1     Omega-3 Fatty Acids (OMEGA-3 FISH OIL PO) Take 1,200 mg by mouth 2 times daily (with meals) (Patient not taking: Reported on 10/4/2021)         PAST SURGICAL HISTORY:  Past Surgical History:   Procedure Laterality Date     HC THYROIDECTOMY         ALLERGIES:   No Known Allergies    FAMILY HISTORY:  - Premature coronary  artery disease  - Atrial fibrillation  - Sudden cardiac death     SOCIAL HISTORY:  Social History     Tobacco Use     Smoking status: Never Smoker     Smokeless tobacco: Never Used     Tobacco comment: Lives in smoke free household   Substance Use Topics     Alcohol use: No     Comment: None     Drug use: No     Comment: Never       ROS:   Constitutional: No fever, chills, or sweats. Weight stable.   ENT: No visual disturbance, ear ache, epistaxis, sore throat.   Cardiovascular: As per HPI.   Respiratory: No cough, hemoptysis.    GI: No nausea, vomiting, hematemesis, melena, or hematochezia.   : No hematuria.   Integument: Negative.   Psychiatric: Negative.   Hematologic:  Easy bruising, no easy bleeding.  Neuro: Negative.   Endocrinology: No significant heat or cold intolerance   Musculoskeletal: No myalgia.    Exam:  BP (!) 148/99 (BP Location: Left arm, Patient Position: Chair, Cuff Size: Adult Large)   Pulse 90   Wt (!) 150.1 kg (331 lb)   SpO2 97%   BMI 38.69 kg/m    GENERAL APPEARANCE: well-developed, well-appearing male in NAD, polite and conversational  HEENT: at/nc, eomi, mmm, sclera anicteric, no oral ulcerations, no xanthelasmas   NECK: no adenopathy, thyroid normal to palpation, JVP not elevated  RESPIRATORY: CTAB on RA, nonlabored, no accessory muscle use  CARDIOVASCULAR: rrr, s1, s2, no murmur, rub or gallop, no s3 or s4  ABDOMEN: soft, nt/nd, bs+  EXTREMITIES: no gross deformity, adequate muscle bulk, no edema  NEURO: alert, interactive, speech fluent, grossly nonfocal, moving all 4  VASC: Radial and DP pulses are normal in volumes and symmetric bilaterally  SKIN: no ecchymoses, no rashes on exposed skin    Labs:  CBC RESULTS:   Lab Results   Component Value Date    WBC 6.4 08/02/2021    WBC 8.1 07/09/2021    RBC 3.84 (L) 08/02/2021    RBC 2.61 (L) 07/09/2021    HGB 9.4 (L) 08/02/2021    HGB 6.2 (LL) 07/09/2021    HCT 31.5 (L) 08/02/2021    HCT 21.7 (L) 07/09/2021    MCV 82 08/02/2021    MCV 83  07/09/2021    MCH 24.5 (L) 08/02/2021    MCH 23.8 (L) 07/09/2021    MCHC 29.8 (L) 08/02/2021    MCHC 28.6 (L) 07/09/2021    RDW 18.7 (H) 08/02/2021    RDW 16.1 (H) 07/09/2021     08/02/2021     (H) 07/09/2021       BMP RESULTS:  Lab Results   Component Value Date     07/09/2021    POTASSIUM 4.5 07/09/2021    CHLORIDE 108 07/09/2021    CO2 24 07/09/2021    ANIONGAP 7 07/09/2021     (H) 07/09/2021    BUN 14 07/09/2021    CR 0.74 07/09/2021    GFRESTIMATED >90 07/09/2021    GFRESTBLACK >90 07/09/2021    EUGENIA 8.5 07/09/2021        INR RESULTS:  Lab Results   Component Value Date    INR 2.1 (H) 09/17/2021    INR 2.5 (H) 08/18/2021    INR 1.1 08/05/2021    INR 2.10 (H) 06/25/2021    INR 2.50 (H) 04/16/2021    INR 2.30 (H) 03/05/2021    INR 2.40 (H) 01/22/2021       Procedures:      Assessment and Plan:   Stef Mosher is a 61 year old male with PMH of atrial fibrillation c/b 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 Has now restarted AC.    Overall reports doing well with increased energy level. Will plan to reintroduce prior medications    #Recent GIB  Repeat EGD noted healed ulcer. Follows with [facility]     #paroxysmal atrial fibrillation  Currently rate controlled well  -rate control: continue metoprolol  succinate given HFrEF  -AC: continue warfarin     #HFrEF with recovered EF 2/2 NICM, last EF 40% (7/21/21)  BB: metoprolol succinate as above  ACEI/ARB/ARNI: increase lisinopril to 40mg qday (prior to GIB dose)  Volume status: appears euvolemic  -continue statin  -RTC in ~2 months with repeat TTE prior with plan to reassess for additional of further GDMT medications  -obtain BMP next week given increase in lisinopril    The patient was seen and discussed with Dr. Turner, who agrees with the above assessment and plan.    Ritika Koenig MD  Cardiology Fellow PGY4  P: [phone]      I  have seen, interviewed, and examined patient. I have reviewed the laboratory tests, imaging, and other investigations. I have reviewed the management plan with the patient. I discussed with the team and agree with the findings and plan in this resident/fellow/nurse practitioner's note. In addition, changes in the physical examination, assessment and plan have been incorporated into the note by myself, as to make it a single cohesive document.       Leanna Turner MD, MS  Cardiology/Cardiac EP Attending Staff    CC  Patient Care Team:  Josephine Roberts NP as PCP - General (Nurse Practitioner - Family)  Stella Allison, HOLLY as Specialty Care Coordinator (Cardiology)  Roxane Be MD as Assigned PCP

## 2021-10-04 NOTE — NURSING NOTE
"Chief Complaint   Patient presents with     RECHECK     2 month follow up.        Initial BP (!) 148/99 (BP Location: Left arm, Patient Position: Chair, Cuff Size: Adult Large)   Pulse 90   Wt (!) 150.1 kg (331 lb)   SpO2 97%   BMI 38.69 kg/m   Estimated body mass index is 38.69 kg/m  as calculated from the following:    Height as of 7/9/21: 1.97 m (6' 5.56\").    Weight as of this encounter: 150.1 kg (331 lb)..  BP completed using cuff size: shell Chavez MA  "

## 2021-10-04 NOTE — PATIENT INSTRUCTIONS
Thank you for coming to the [facility] [place]; please note the following instructions:    1. Increase lisinopril to 40 mg daily.  A new prescription was sent to your preferred pharmacy    2.  Labs next week    3.  Echo and follow up in December 2021        If you have any questions regarding your visit please contact your care team:     Cardiology  Telephone Number   Mahnaz MORRIS., RN  Malgorzata MARTINEZ, RN   Laura SKY, RMA  Analisa GORMAN, IAM MAYES, LPN   [phone] (option 1)   For scheduling appts:     [phone] (select option 1)       For the Device Clinic (Pacemakers and ICD's)  RN's :  Vale Cartagena   During business hours: [phone]    *After business hours:  [phone] (select option 4)      Normal test result notifications will be released via Klangoo or mailed within 7 business days.  All other test results, will be communicated via telephone once reviewed by your cardiologist.    If you need a medication refill please contact your pharmacy.  Please allow 3 business days for your refill to be completed.    As always, thank you for trusting us with your health care needs!

## 2021-10-08 ENCOUNTER — LAB (OUTPATIENT)
Dept: LAB | Facility: CLINIC | Age: 61
End: 2021-10-08
Payer: COMMERCIAL

## 2021-10-08 ENCOUNTER — ANTICOAGULATION THERAPY VISIT (OUTPATIENT)
Dept: ANTICOAGULATION | Facility: CLINIC | Age: 61
End: 2021-10-08

## 2021-10-08 DIAGNOSIS — I42.9 CARDIOMYOPATHY, UNSPECIFIED TYPE (H): ICD-10-CM

## 2021-10-08 DIAGNOSIS — I48.0 PAROXYSMAL ATRIAL FIBRILLATION (H): ICD-10-CM

## 2021-10-08 DIAGNOSIS — I48.20 CHRONIC ATRIAL FIBRILLATION (H): ICD-10-CM

## 2021-10-08 DIAGNOSIS — Z79.01 LONG TERM CURRENT USE OF ANTICOAGULANT THERAPY: Primary | ICD-10-CM

## 2021-10-08 DIAGNOSIS — I48.91 NEW ONSET ATRIAL FIBRILLATION (H): ICD-10-CM

## 2021-10-08 LAB — INR BLD: 3.1 (ref 0.9–1.1)

## 2021-10-08 PROCEDURE — 36415 COLL VENOUS BLD VENIPUNCTURE: CPT

## 2021-10-08 PROCEDURE — 85610 PROTHROMBIN TIME: CPT

## 2021-10-08 NOTE — PROGRESS NOTES
ANTICOAGULATION MANAGEMENT     Stef Mosher 61 year old male is on warfarin with supratherapeutic INR result. (Goal INR 2.0-3.0)    Recent labs: (last 7 days)     10/08/21  1248   INR 3.1*       ASSESSMENT     Source(s): Chart Review and Patient/Caregiver Call       Warfarin doses taken: Warfarin taken as instructed    Diet: Increased greens/vitamin K in diet; plans to resume previous intake    New illness, injury, or hospitalization: No    Medication/supplement changes: None noted    Signs or symptoms of bleeding or clotting: No    Previous INR: Therapeutic last 2(+) visits    Additional findings: None     PLAN     Recommended plan for temporary change(s) affecting INR     Dosing Instructions: Continue your current warfarin dose with next INR in 1 week   Already had lab apt for other labs.    Summary  As of 10/8/2021    Full warfarin instructions:  5 mg every Fri; 2.5 mg all other days   Next INR check:  10/15/2021             Telephone call with Stef who verbalizes understanding and agrees to plan    Lab visit scheduled    Education provided: Please call back if any changes to your diet, medications or how you've been taking warfarin, Importance of consistent vitamin K intake and Contact [phone]  with any changes, questions or concerns.     Plan made per [facility] anticoagulation protocol    Lawanda Bowers, RN  Anticoagulation Clinic  10/8/2021    _______________________________________________________________________     Anticoagulation Episode Summary     Current INR goal:  2.0-3.0   TTR:  87.1 % (1 y)   Target end date:  Indefinite   Send INR reminders to:  [facility]    Indications    Long-term (current) use of anticoagulants [Z79.01] [Z79.01]  New onset atrial fibrillation (H) [I48.91]  Chronic atrial fibrillation (H) [I48.20]           Comments:           Anticoagulation Care Providers     Provider Role Specialty Phone number    Josephine Roberts NP Referring Family Medicine [phone]    Pearl  Sivakumar Samayoa PA-C [facility] [phone]

## 2021-10-22 ENCOUNTER — ANTICOAGULATION THERAPY VISIT (OUTPATIENT)
Dept: ANTICOAGULATION | Facility: CLINIC | Age: 61
End: 2021-10-22

## 2021-10-22 ENCOUNTER — LAB (OUTPATIENT)
Dept: LAB | Facility: CLINIC | Age: 61
End: 2021-10-22
Payer: COMMERCIAL

## 2021-10-22 DIAGNOSIS — I42.9 CARDIOMYOPATHY, UNSPECIFIED TYPE (H): ICD-10-CM

## 2021-10-22 DIAGNOSIS — I48.0 PAROXYSMAL ATRIAL FIBRILLATION (H): ICD-10-CM

## 2021-10-22 DIAGNOSIS — Z79.01 LONG TERM CURRENT USE OF ANTICOAGULANT THERAPY: Primary | ICD-10-CM

## 2021-10-22 DIAGNOSIS — I48.91 NEW ONSET ATRIAL FIBRILLATION (H): ICD-10-CM

## 2021-10-22 DIAGNOSIS — I48.20 CHRONIC ATRIAL FIBRILLATION (H): ICD-10-CM

## 2021-10-22 LAB
ANION GAP SERPL CALCULATED.3IONS-SCNC: 1 MMOL/L (ref 3–14)
BUN SERPL-MCNC: 16 MG/DL (ref 7–30)
CALCIUM SERPL-MCNC: 9.1 MG/DL (ref 8.5–10.1)
CHLORIDE BLD-SCNC: 110 MMOL/L (ref 94–109)
CO2 SERPL-SCNC: 29 MMOL/L (ref 20–32)
CREAT SERPL-MCNC: 0.73 MG/DL (ref 0.66–1.25)
GFR SERPL CREATININE-BSD FRML MDRD: >90 ML/MIN/1.73M2
GLUCOSE BLD-MCNC: 102 MG/DL (ref 70–99)
INR BLD: 2.9 (ref 0.9–1.1)
POTASSIUM BLD-SCNC: 4 MMOL/L (ref 3.4–5.3)
SODIUM SERPL-SCNC: 140 MMOL/L (ref 133–144)

## 2021-10-22 PROCEDURE — 36415 COLL VENOUS BLD VENIPUNCTURE: CPT

## 2021-10-22 PROCEDURE — 80048 BASIC METABOLIC PNL TOTAL CA: CPT

## 2021-10-22 PROCEDURE — 85610 PROTHROMBIN TIME: CPT

## 2021-10-22 NOTE — PROGRESS NOTES
ANTICOAGULATION MANAGEMENT     Stef Mosher 61 year old male is on warfarin with therapeutic INR result. (Goal INR 2.0-3.0)    Recent labs: (last 7 days)     10/22/21  1109   INR 2.9*       ASSESSMENT     Source(s): Chart Review and Patient/Caregiver Call       Warfarin doses taken: Warfarin taken as instructed    Diet: No new diet changes identified    New illness, injury, or hospitalization: No    Medication/supplement changes: None noted    Signs or symptoms of bleeding or clotting: No    Previous INR: Supratherapeutic    Additional findings: None     PLAN     Recommended plan for no diet, medication or health factor changes affecting INR     Dosing Instructions: Continue your current warfarin dose with next INR in 3 weeks       Summary  As of 10/22/2021    Full warfarin instructions:  5 mg every Fri; 2.5 mg all other days   Next INR check:  11/12/2021             Telephone call with Stef who verbalizes understanding and agrees to plan    Lab visit scheduled    Education provided: None required    Plan made per [facility] anticoagulation protocol    Josephine Sharma RN  Anticoagulation Clinic  10/22/2021    _______________________________________________________________________     Anticoagulation Episode Summary     Current INR goal:  2.0-3.0   TTR:  85.1 % (1 y)   Target end date:  Indefinite   Send INR reminders to:  BRIANA RODRIGUEZ    Indications    Long-term (current) use of anticoagulants [Z79.01] [Z79.01]  New onset atrial fibrillation (H) [I48.91]  Chronic atrial fibrillation (H) [I48.20]           Comments:           Anticoagulation Care Providers     Provider Role Specialty Phone number    Josephine Roberts NP Referring Family Medicine [phone]    Sivakumar Kang PA-C Responsible Family Medicine [phone]

## 2021-11-12 ENCOUNTER — ANTICOAGULATION THERAPY VISIT (OUTPATIENT)
Dept: ANTICOAGULATION | Facility: CLINIC | Age: 61
End: 2021-11-12

## 2021-11-12 ENCOUNTER — LAB (OUTPATIENT)
Dept: LAB | Facility: CLINIC | Age: 61
End: 2021-11-12
Payer: COMMERCIAL

## 2021-11-12 DIAGNOSIS — I48.91 NEW ONSET ATRIAL FIBRILLATION (H): ICD-10-CM

## 2021-11-12 DIAGNOSIS — I48.20 CHRONIC ATRIAL FIBRILLATION (H): ICD-10-CM

## 2021-11-12 DIAGNOSIS — I48.0 PAROXYSMAL ATRIAL FIBRILLATION (H): ICD-10-CM

## 2021-11-12 DIAGNOSIS — Z79.01 LONG TERM CURRENT USE OF ANTICOAGULANT THERAPY: Primary | ICD-10-CM

## 2021-11-12 LAB — INR BLD: 2.4 (ref 0.9–1.1)

## 2021-11-12 PROCEDURE — 36416 COLLJ CAPILLARY BLOOD SPEC: CPT

## 2021-11-12 PROCEDURE — 85610 PROTHROMBIN TIME: CPT

## 2021-11-12 RX ORDER — LEVETIRACETAM 500 MG/1
750 TABLET ORAL 2 TIMES DAILY
COMMUNITY
Start: 2021-11-09

## 2021-11-12 NOTE — PROGRESS NOTES
ANTICOAGULATION MANAGEMENT     Stef Mosher 61 year old male is on warfarin with therapeutic INR result. (Goal INR 2.0-3.0)    Recent labs: (last 7 days)     11/12/21  1510   INR 2.4*       ASSESSMENT     Source(s): Chart Review and Patient/Caregiver Call       Warfarin doses taken: Warfarin taken as instructed    Diet: No new diet changes identified    New illness, injury, or hospitalization: No    Medication/supplement changes: None noted    Signs or symptoms of bleeding or clotting: No    Previous INR: Therapeutic last visit; previously outside of goal range    Additional findings: None started Keppra a month ago for a seizure. No interaction expected.     PLAN     Recommended plan for no diet, medication or health factor changes affecting INR     Dosing Instructions: Continue your current warfarin dose with next INR in 4 weeks       Summary  As of 11/12/2021    Full warfarin instructions:  5 mg every Fri; 2.5 mg all other days   Next INR check:  12/10/2021             Telephone call with Stef who verbalizes understanding and agrees to plan    Patient offered & declined to schedule next visit he needs to arrange a ride and will call back to schedule.    Education provided: Please call back if any changes to your diet, medications or how you've been taking warfarin and Contact [phone]  with any changes, questions or concerns.     Plan made per [facility] anticoagulation protocol    Lawanda Bowers RN  Anticoagulation Clinic  11/12/2021    _______________________________________________________________________     Anticoagulation Episode Summary     Current INR goal:  2.0-3.0   TTR:  85.2 % (1 y)   Target end date:  Indefinite   Send INR reminders to:  BRIANA RODRIGUEZ    Indications    Long-term (current) use of anticoagulants [Z79.01] [Z79.01]  New onset atrial fibrillation (H) [I48.91]  Chronic atrial fibrillation (H) [I48.20]           Comments:           Anticoagulation Care Providers     Provider Role  Specialty Phone number    Josephine Roberts NP Referring Family Medicine [phone]    Sivakumar Kang PA-C Responsible Family Medicine [phone]

## 2021-11-18 ENCOUNTER — ANCILLARY PROCEDURE (OUTPATIENT)
Dept: CARDIOLOGY | Facility: CLINIC | Age: 61
End: 2021-11-18
Attending: INTERNAL MEDICINE
Payer: COMMERCIAL

## 2021-11-18 DIAGNOSIS — I42.9 CARDIOMYOPATHY, UNSPECIFIED TYPE (H): ICD-10-CM

## 2021-11-18 DIAGNOSIS — I48.0 PAROXYSMAL ATRIAL FIBRILLATION (H): ICD-10-CM

## 2021-11-18 LAB
BI-PLANE LVEF ECHO: NORMAL
LVEF ECHO: NORMAL

## 2021-11-18 PROCEDURE — 99207 PR STATISTIC IV PUSH SINGLE INITIAL SUBSTANCE: CPT | Performed by: INTERNAL MEDICINE

## 2021-11-18 PROCEDURE — 93325 DOPPLER ECHO COLOR FLOW MAPG: CPT | Performed by: INTERNAL MEDICINE

## 2021-11-18 PROCEDURE — 93321 DOPPLER ECHO F-UP/LMTD STD: CPT | Performed by: INTERNAL MEDICINE

## 2021-11-18 PROCEDURE — 93308 TTE F-UP OR LMTD: CPT | Performed by: INTERNAL MEDICINE

## 2021-11-18 RX ADMIN — Medication 7 ML: at 10:39

## 2021-12-13 ENCOUNTER — OFFICE VISIT (OUTPATIENT)
Dept: CARDIOLOGY | Facility: CLINIC | Age: 61
End: 2021-12-13
Payer: COMMERCIAL

## 2021-12-13 VITALS
DIASTOLIC BLOOD PRESSURE: 89 MMHG | HEART RATE: 98 BPM | WEIGHT: 315 LBS | BODY MASS INDEX: 37.87 KG/M2 | SYSTOLIC BLOOD PRESSURE: 121 MMHG | OXYGEN SATURATION: 96 %

## 2021-12-13 DIAGNOSIS — R06.02 SOB (SHORTNESS OF BREATH): ICD-10-CM

## 2021-12-13 DIAGNOSIS — I48.0 PAROXYSMAL ATRIAL FIBRILLATION (H): ICD-10-CM

## 2021-12-13 DIAGNOSIS — I42.9 CARDIOMYOPATHY, UNSPECIFIED TYPE (H): Primary | ICD-10-CM

## 2021-12-13 DIAGNOSIS — I48.20 CHRONIC ATRIAL FIBRILLATION (H): ICD-10-CM

## 2021-12-13 PROCEDURE — 99214 OFFICE O/P EST MOD 30 MIN: CPT | Mod: GC | Performed by: INTERNAL MEDICINE

## 2021-12-13 NOTE — PATIENT INSTRUCTIONS
Thank you for coming to the [facility] [place]; please note the following instructions:    1. Cardiac stress MRI     2.  Follow up in clinic after testing        If you have any questions regarding your visit please contact your care team:     Cardiology  Telephone Number   Mahnaz MCKEON, RN  Malgorzata MARTINEZ, RN   Laura SKY, RMJALEN GORMAN, RMJALEN MAYES, LPN   [phone] (option 1)   For scheduling appts:     [phone] (select option 1)       For the Device Clinic (Pacemakers and ICD's)  RN's :  Vale Cartagena   During business hours: [phone]    *After business hours:  [phone] (select option 4)      Normal test result notifications will be released via everbill or mailed within 7 business days.  All other test results, will be communicated via telephone once reviewed by your cardiologist.    If you need a medication refill please contact your pharmacy.  Please allow 3 business days for your refill to be completed.    As always, thank you for trusting us with your health care needs!

## 2021-12-13 NOTE — PROGRESS NOTES
HPI: Stef Mosher is a 61 year old male with PMH of atrial fibrillation c/b 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 Has now restarted AC.    Overall notes he is doing well. Did have instance where he had to go to ER where they though he was having a stroke, but was ultimately found to be having seizures. Has now started keppra and no recurrence. Was recently traveling to [place] to see his son and noted to feel well there. Was able to be active without much for limitations. Noted some decreased energy but overall tolerable. States no chest pain, SOB, abdominal pain, GABRIEL, LE swelling.     PAST MEDICAL HISTORY:  Past Medical History:   Diagnosis Date     Arthritis      Atrial fibrillation (H)      Hayfever      Hypercholesterolemia      Hypertension      Unspecified hypothyroidism        CURRENT MEDICATIONS:  Current Outpatient Medications   Medication Sig Dispense Refill     atorvastatin (LIPITOR) 20 MG tablet Take 1 tablet (20 mg) by mouth daily 90 tablet 3     furosemide (LASIX) 20 MG tablet Take 1 tablet (20 mg) by mouth daily 90 tablet 3     levETIRAcetam (KEPPRA) 500 MG tablet Take 500 mg by mouth       lisinopril (ZESTRIL) 40 MG tablet Take 1 tablet (40 mg) by mouth daily 90 tablet 1     metoprolol succinate ER (TOPROL-XL) 200 MG 24 hr tablet Take 1 tablet (200 mg) by mouth daily 90 tablet 3     warfarin ANTICOAGULANT (COUMADIN) 2.5 MG tablet Take daily as directed. Current dose 5 mg Friday and 2.5 mg rest of days 156 tablet 1       PAST SURGICAL HISTORY:  Past Surgical History:   Procedure Laterality Date     HC THYROIDECTOMY         ALLERGIES:   No Known Allergies    FAMILY HISTORY:  - Premature coronary artery disease  - Atrial fibrillation  - Sudden cardiac death     SOCIAL HISTORY:  Social History     Tobacco Use     Smoking status: Never Smoker     Smokeless tobacco: Never Used      Tobacco comment: Lives in smoke free household   Substance Use Topics     Alcohol use: No     Comment: None     Drug use: No     Comment: Never       ROS:   Constitutional: No fever, chills, or sweats. Weight stable.   ENT: No visual disturbance, ear ache, epistaxis, sore throat.   Cardiovascular: As per HPI.   Respiratory: No cough, hemoptysis.    GI: No nausea, vomiting, hematemesis, melena, or hematochezia.   : No hematuria.   Integument: Negative.   Psychiatric: Negative.   Hematologic:  Easy bruising, no easy bleeding.  Neuro: Negative.   Endocrinology: No significant heat or cold intolerance   Musculoskeletal: No myalgia.    Exam:  /89 (BP Location: Left arm, Patient Position: Chair, Cuff Size: Adult Large)   Pulse 98   Wt 147 kg (324 lb)   SpO2 96%   BMI 37.87 kg/m    GENERAL APPEARANCE: well-developed, well-appearing male in NAD, polite and conversational  HEENT: at/nc, eomi, mmm, sclera anicteric, no oral ulcerations, no xanthelasmas   NECK: no adenopathy, thyroid normal to palpation, JVP not elevated  RESPIRATORY: CTAB on RA, nonlabored, no accessory muscle use  CARDIOVASCULAR: rrr, s1, s2, no murmur, rub or gallop, no s3 or s4  GI: soft, nt/nd, bs+  EXTREMITIES: no gross deformity, adequate muscle bulk, no edema  NEURO: alert, interactive, speech fluent, grossly nonfocal, moving all 4  VASC: Radial and DP pulses are normal in volumes and symmetric bilaterally  SKIN: no ecchymoses, no rashes on exposed skin      Labs:  CBC RESULTS:   Lab Results   Component Value Date    WBC 6.4 08/02/2021    WBC 8.1 07/09/2021    RBC 3.84 (L) 08/02/2021    RBC 2.61 (L) 07/09/2021    HGB 9.4 (L) 08/02/2021    HGB 6.2 (LL) 07/09/2021    HCT 31.5 (L) 08/02/2021    HCT 21.7 (L) 07/09/2021    MCV 82 08/02/2021    MCV 83 07/09/2021    MCH 24.5 (L) 08/02/2021    MCH 23.8 (L) 07/09/2021    MCHC 29.8 (L) 08/02/2021    MCHC 28.6 (L) 07/09/2021    RDW 18.7 (H) 08/02/2021    RDW 16.1 (H) 07/09/2021     08/02/2021      (H) 07/09/2021       BMP RESULTS:  Lab Results   Component Value Date     10/22/2021     07/09/2021    POTASSIUM 4.0 10/22/2021    POTASSIUM 4.5 07/09/2021    CHLORIDE 110 (H) 10/22/2021    CHLORIDE 108 07/09/2021    CO2 29 10/22/2021    CO2 24 07/09/2021    ANIONGAP 1 (L) 10/22/2021    ANIONGAP 7 07/09/2021     (H) 10/22/2021     (H) 07/09/2021    BUN 16 10/22/2021    BUN 14 07/09/2021    CR 0.73 10/22/2021    CR 0.74 07/09/2021    GFRESTIMATED >90 10/22/2021    GFRESTIMATED >90 07/09/2021    GFRESTBLACK >90 07/09/2021    EUGENIA 9.1 10/22/2021    EUGENIA 8.5 07/09/2021        INR RESULTS:  Lab Results   Component Value Date    INR 2.4 (H) 11/12/2021    INR 2.9 (H) 10/22/2021    INR 3.1 (H) 10/08/2021    INR 2.1 (H) 09/17/2021    INR 2.10 (H) 06/25/2021    INR 2.50 (H) 04/16/2021    INR 2.30 (H) 03/05/2021    INR 2.40 (H) 01/22/2021       Procedures:      Assessment and Plan:   Stef Mosher is a 61 year old male with PMH of atrial fibrillation c/b 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 Has now restarted AC.    #paroxysmal atrial fibrillation  Currently rate controlled well  -rate control: continue metoprolol  succinate given HFrEF  -AC: continue warfarin, no recurrent GI bleed     #HFrEF with recovered EF 2/2 NICM, last EF 40% (7/21/21)  BB: metoprolol succinate as above  ACEI/ARB/ARNI: continue lisinopril 40mg qday; discussed ARNI use and wishes to continue lisinopril at this time  Trent ant: discussed aldactone and wishes to continue current regimen  SGLT2: discussed and wishes to continue current regimen  Volume status: appears euvolemic  -continue statin  -plan for cardiac stress and gadolinium MRI to r/o ischemic disease as contributor and evaluate etiologies for cardiomyopathy    Will follow up with Dr. Turner in-person in February 2022 for ongoing care.      The  patient was seen and discussed with Dr. Turner, who agrees with the above assessment and plan.     Ritika Koenig MD  Cardiology Fellow PGY4  P: [phone]      I have seen, interviewed, and examined patient. I have reviewed the laboratory tests, imaging, and other investigations. I have reviewed the management plan with the patient. I discussed with the team and agree with the findings and plan in this resident/fellow/nurse practitioner's note. In addition, changes in the physical examination, assessment and plan have been incorporated into the note by myself, as to make it a single cohesive document.       Leanna Turner MD, MS  Cardiology/Cardiac EP Attending Staff          CC  Patient Care Team:  Josephine Roberts NP as PCP - General (Nurse Practitioner - Family)  Leanna Turner MD as MD (Cardiology)  Stella Allison, RN as Specialty Care Coordinator (Cardiology)  Roxane Be MD as Assigned PCP  Leanna Turner MD as Assigned Heart and Vascular Provider

## 2021-12-13 NOTE — NURSING NOTE
"Chief Complaint   Patient presents with     RECHECK     2 month follow up.        Initial /89 (BP Location: Left arm, Patient Position: Chair, Cuff Size: Adult Large)   Pulse 98   Wt 147 kg (324 lb)   SpO2 96%   BMI 37.87 kg/m   Estimated body mass index is 37.87 kg/m  as calculated from the following:    Height as of 7/9/21: 1.97 m (6' 5.56\").    Weight as of this encounter: 147 kg (324 lb)..  BP completed using cuff size: shell Chavez MA  "

## 2021-12-13 NOTE — LETTER
12/13/2021      RE: Stef Mosher  [address] [place] [address]       Dear Colleague,    Thank you for the opportunity to participate in the care of your patient, Stef Mosher, at the [facility]. Please see a copy of my visit note below.    HPI: Stef Mosher is a 61 year old male with PMH of atrial fibrillation c/b 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 Has now restarted AC.    Overall notes he is doing well. Did have instance where he had to go to ER where they though he was having a stroke, but was ultimately found to be having seizures. Has now started keppra and no recurrence. Was recently traveling to [place] to see his son and noted to feel well there. Was able to be active without much for limitations. Noted some decreased energy but overall tolerable. States no chest pain, SOB, abdominal pain, GABRIEL, LE swelling.     PAST MEDICAL HISTORY:  Past Medical History:   Diagnosis Date     Arthritis      Atrial fibrillation (H)      Hayfever      Hypercholesterolemia      Hypertension      Unspecified hypothyroidism        CURRENT MEDICATIONS:  Current Outpatient Medications   Medication Sig Dispense Refill     atorvastatin (LIPITOR) 20 MG tablet Take 1 tablet (20 mg) by mouth daily 90 tablet 3     furosemide (LASIX) 20 MG tablet Take 1 tablet (20 mg) by mouth daily 90 tablet 3     levETIRAcetam (KEPPRA) 500 MG tablet Take 500 mg by mouth       lisinopril (ZESTRIL) 40 MG tablet Take 1 tablet (40 mg) by mouth daily 90 tablet 1     metoprolol succinate ER (TOPROL-XL) 200 MG 24 hr tablet Take 1 tablet (200 mg) by mouth daily 90 tablet 3     warfarin ANTICOAGULANT (COUMADIN) 2.5 MG tablet Take daily as directed. Current dose 5 mg Friday and 2.5 mg rest of days 156 tablet 1       PAST SURGICAL  HISTORY:  Past Surgical History:   Procedure Laterality Date     HC THYROIDECTOMY         ALLERGIES:   No Known Allergies    FAMILY HISTORY:  - Premature coronary artery disease  - Atrial fibrillation  - Sudden cardiac death     SOCIAL HISTORY:  Social History     Tobacco Use     Smoking status: Never Smoker     Smokeless tobacco: Never Used     Tobacco comment: Lives in smoke free household   Substance Use Topics     Alcohol use: No     Comment: None     Drug use: No     Comment: Never       ROS:   Constitutional: No fever, chills, or sweats. Weight stable.   ENT: No visual disturbance, ear ache, epistaxis, sore throat.   Cardiovascular: As per HPI.   Respiratory: No cough, hemoptysis.    GI: No nausea, vomiting, hematemesis, melena, or hematochezia.   : No hematuria.   Integument: Negative.   Psychiatric: Negative.   Hematologic:  Easy bruising, no easy bleeding.  Neuro: Negative.   Endocrinology: No significant heat or cold intolerance   Musculoskeletal: No myalgia.    Exam:  /89 (BP Location: Left arm, Patient Position: Chair, Cuff Size: Adult Large)   Pulse 98   Wt 147 kg (324 lb)   SpO2 96%   BMI 37.87 kg/m    GENERAL APPEARANCE: well-developed, well-appearing male in NAD, polite and conversational  HEENT: at/nc, eomi, mmm, sclera anicteric, no oral ulcerations, no xanthelasmas   NECK: no adenopathy, thyroid normal to palpation, JVP not elevated  RESPIRATORY: CTAB on RA, nonlabored, no accessory muscle use  CARDIOVASCULAR: rrr, s1, s2, no murmur, rub or gallop, no s3 or s4  GI: soft, nt/nd, bs+  EXTREMITIES: no gross deformity, adequate muscle bulk, no edema  NEURO: alert, interactive, speech fluent, grossly nonfocal, moving all 4  VASC: Radial and DP pulses are normal in volumes and symmetric bilaterally  SKIN: no ecchymoses, no rashes on exposed skin      Labs:  CBC RESULTS:   Lab Results   Component Value Date    WBC 6.4 08/02/2021    WBC 8.1 07/09/2021    RBC 3.84 (L) 08/02/2021    RBC 2.61 (L)  07/09/2021    HGB 9.4 (L) 08/02/2021    HGB 6.2 (LL) 07/09/2021    HCT 31.5 (L) 08/02/2021    HCT 21.7 (L) 07/09/2021    MCV 82 08/02/2021    MCV 83 07/09/2021    MCH 24.5 (L) 08/02/2021    MCH 23.8 (L) 07/09/2021    MCHC 29.8 (L) 08/02/2021    MCHC 28.6 (L) 07/09/2021    RDW 18.7 (H) 08/02/2021    RDW 16.1 (H) 07/09/2021     08/02/2021     (H) 07/09/2021       BMP RESULTS:  Lab Results   Component Value Date     10/22/2021     07/09/2021    POTASSIUM 4.0 10/22/2021    POTASSIUM 4.5 07/09/2021    CHLORIDE 110 (H) 10/22/2021    CHLORIDE 108 07/09/2021    CO2 29 10/22/2021    CO2 24 07/09/2021    ANIONGAP 1 (L) 10/22/2021    ANIONGAP 7 07/09/2021     (H) 10/22/2021     (H) 07/09/2021    BUN 16 10/22/2021    BUN 14 07/09/2021    CR 0.73 10/22/2021    CR 0.74 07/09/2021    GFRESTIMATED >90 10/22/2021    GFRESTIMATED >90 07/09/2021    GFRESTBLACK >90 07/09/2021    EUGENIA 9.1 10/22/2021    EUGENIA 8.5 07/09/2021        INR RESULTS:  Lab Results   Component Value Date    INR 2.4 (H) 11/12/2021    INR 2.9 (H) 10/22/2021    INR 3.1 (H) 10/08/2021    INR 2.1 (H) 09/17/2021    INR 2.10 (H) 06/25/2021    INR 2.50 (H) 04/16/2021    INR 2.30 (H) 03/05/2021    INR 2.40 (H) 01/22/2021       Procedures:      Assessment and Plan:   Stef Mosher is a 61 year old male with PMH of atrial fibrillation c/b 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 Has now restarted AC.    #paroxysmal atrial fibrillation  Currently rate controlled well  -rate control: continue metoprolol  succinate given HFrEF  -AC: continue warfarin, no recurrent GI bleed     #HFrEF with recovered EF 2/2 NICM, last EF 40% (7/21/21)  BB: metoprolol succinate as above  ACEI/ARB/ARNI: continue lisinopril 40mg qday; discussed ARNI use and wishes to continue lisinopril at this time  Trent ant: discussed aldactone and wishes to  continue current regimen  SGLT2: discussed and wishes to continue current regimen  Volume status: appears euvolemic  -continue statin  -plan for cardiac stress and gadolinium MRI to r/o ischemic disease as contributor and evaluate etiologies for cardiomyopathy    Will follow up with Dr. Turner in-person in February 2022 for ongoing care.      The patient was seen and discussed with Dr. Turner, who agrees with the above assessment and plan.     Ritika Koenig MD  Cardiology Fellow PGY4  P: [phone]      I have seen, interviewed, and examined patient. I have reviewed the laboratory tests, imaging, and other investigations. I have reviewed the management plan with the patient. I discussed with the team and agree with the findings and plan in this resident/fellow/nurse practitioner's note. In addition, changes in the physical examination, assessment and plan have been incorporated into the note by myself, as to make it a single cohesive document.       Leanna Turner MD, MS  Cardiology/Cardiac EP Attending Staff        CC  Patient Care Team:  Josephine Roberts NP as PCP - General (Nurse Practitioner - Family)  Stella Allison, RN as Specialty Care Coordinator (Cardiology)  Roxane Be MD as Assigned PCP

## 2021-12-20 ENCOUNTER — TELEPHONE (OUTPATIENT)
Dept: FAMILY MEDICINE | Facility: CLINIC | Age: 61
End: 2021-12-20
Payer: COMMERCIAL

## 2021-12-20 NOTE — TELEPHONE ENCOUNTER
ANTICOAGULATION     Stef Mosher is overdue for INR check.      Spoke with Stef and scheduled lab appointment on 12/31  This was first available that worked for his schedule.  Lawanda Bowers RN

## 2021-12-31 ENCOUNTER — LAB (OUTPATIENT)
Dept: LAB | Facility: CLINIC | Age: 61
End: 2021-12-31
Payer: COMMERCIAL

## 2021-12-31 ENCOUNTER — ANTICOAGULATION THERAPY VISIT (OUTPATIENT)
Dept: ANTICOAGULATION | Facility: CLINIC | Age: 61
End: 2021-12-31

## 2021-12-31 DIAGNOSIS — I48.0 PAROXYSMAL ATRIAL FIBRILLATION (H): ICD-10-CM

## 2021-12-31 DIAGNOSIS — I48.91 NEW ONSET ATRIAL FIBRILLATION (H): ICD-10-CM

## 2021-12-31 DIAGNOSIS — Z79.01 LONG TERM CURRENT USE OF ANTICOAGULANT THERAPY: Primary | ICD-10-CM

## 2021-12-31 DIAGNOSIS — I48.20 CHRONIC ATRIAL FIBRILLATION (H): ICD-10-CM

## 2021-12-31 LAB — INR BLD: 2.5 (ref 0.9–1.1)

## 2021-12-31 PROCEDURE — 85610 PROTHROMBIN TIME: CPT

## 2021-12-31 PROCEDURE — 36415 COLL VENOUS BLD VENIPUNCTURE: CPT

## 2021-12-31 NOTE — PROGRESS NOTES
ANTICOAGULATION MANAGEMENT     Stef Mosher 61 year old male is on warfarin with therapeutic INR result. (Goal INR 2.0-3.0)    Recent labs: (last 7 days)     12/31/21  0958   INR 2.5*       ASSESSMENT     Source(s): Chart Review and Patient/Caregiver Call       Warfarin doses taken: Warfarin taken as instructed    Diet: No new diet changes identified    New illness, injury, or hospitalization: No    Medication/supplement changes: None noted    Signs or symptoms of bleeding or clotting: No    Previous INR: Therapeutic last 2(+) visits    Additional findings: None     PLAN     Recommended plan for no diet, medication or health factor changes affecting INR     Dosing Instructions: Continue your current warfarin dose with next INR in 6 weeks       Summary  As of 12/31/2021    Full warfarin instructions:  5 mg every Fri; 2.5 mg all other days   Next INR check:  2/11/2022             Telephone call with Stef who verbalizes understanding and agrees to plan    Lab visit scheduled    Education provided: Please call back if any changes to your diet, medications or how you've been taking warfarin and Contact [phone]  with any changes, questions or concerns.     Plan made per [facility] anticoagulation protocol    Lawanda Bowers RN  Anticoagulation Clinic  12/31/2021    _______________________________________________________________________     Anticoagulation Episode Summary     Current INR goal:  2.0-3.0   TTR:  85.1 % (1 y)   Target end date:  Indefinite   Send INR reminders to:  ANTICOARLINE RODRIGUEZ    Indications    Long-term (current) use of anticoagulants [Z79.01] [Z79.01]  New onset atrial fibrillation (H) [I48.91]  Chronic atrial fibrillation (H) [I48.20]           Comments:           Anticoagulation Care Providers     Provider Role Specialty Phone number    Josephine Roberts NP Referring Family Medicine [phone]    Sivakumar Kang PA-C Responsible Family Medicine [phone]

## 2022-01-14 ENCOUNTER — TRANSFERRED RECORDS (OUTPATIENT)
Dept: HEALTH INFORMATION MANAGEMENT | Facility: CLINIC | Age: 62
End: 2022-01-14
Payer: COMMERCIAL

## 2022-01-16 ENCOUNTER — TRANSFERRED RECORDS (OUTPATIENT)
Dept: HEALTH INFORMATION MANAGEMENT | Facility: CLINIC | Age: 62
End: 2022-01-16
Payer: COMMERCIAL

## 2022-01-20 ENCOUNTER — TELEPHONE (OUTPATIENT)
Dept: CARDIOLOGY | Facility: CLINIC | Age: 62
End: 2022-01-20
Payer: COMMERCIAL

## 2022-01-20 NOTE — TELEPHONE ENCOUNTER
Patient wife states in [facility]  And [facility].  hospitalized with bleeding ulcers , and transfusions, and  will return Sunday to [place] , warfarin held , inr. Lab  will be checked tomorrow , feb 7th  Visit Leanna Turner MD. ,MS,FAHA,[facility].Patient wife request to know if sooner visit needed?Patient wife request to know if coumadin dose needs to be changed ?    Metoprolol and lisinopril  Instructions needed per patient wife request ? . Blood pressure log at home currently 01/18/21 95/71, p. 103, 01/20/22 bp 112/94,p.57, and 94/78, p. 96.   Records in process to be transferred to [employer] and [place]  .Olman Morse L.P.N.,[facility]. Dept.

## 2022-01-20 NOTE — TELEPHONE ENCOUNTER
[facility] Call Center    Phone Message    May a detailed message be left on voicemail: yes     Reason for Call: Other: Patients wife Lore called and spoke with writer,she states she would like  care team to give her a call at [phone]     Action Taken: Message routed to:  [facility] ([facility]): Cardio    Travel Screening: Not Applicable

## 2022-01-21 ENCOUNTER — TELEPHONE (OUTPATIENT)
Dept: FAMILY MEDICINE | Facility: CLINIC | Age: 62
End: 2022-01-21
Payer: COMMERCIAL

## 2022-01-21 ENCOUNTER — TRANSFERRED RECORDS (OUTPATIENT)
Dept: HEALTH INFORMATION MANAGEMENT | Facility: CLINIC | Age: 62
End: 2022-01-21
Payer: COMMERCIAL

## 2022-01-21 LAB — INR (EXTERNAL): 1.2 (ref 2–3)

## 2022-01-21 NOTE — TELEPHONE ENCOUNTER
Spoke with spouse. Admission for GIB Sunday 1/16/22 while in [place], INR 4.7, hgb 5.3- received 4  Units blood.   Discharged yesterday, will be home Sunday 1/23/22.  Spouse stated they will verify with discharging physician when to restart warfarin. Pt just had repeat INR/hgb- awaiting call with results.     -blood pressures running <120/70- pt is now taking metoprolol 200 mg daily.  -lisinopril on hold.     Instructed pt to follow recommendation from discharge hospital.   Instructed spouse/pt to continue to hold lisinopril and monitor blood pressures. Call if >140/90.  Scheduled follow up visit Monday 1/31 as requested by spouse and pt.     Confirmed MRI and follow up in Feb- pt would like to keep all as scheduled.

## 2022-01-22 NOTE — TELEPHONE ENCOUNTER
Reason for Call:  Same Day Appointment, Requested Provider:    [place]    PCP: Josephine Roberts    Reason for visit: Pt needs to be seen a.s.a.p. for GI bleed hospitalization f/u    Duration of symptoms: Past couple of weeks     Have you been treated for this in the past? No    Additional comments: Please call pt to work in week of 01/24/22    Can we leave a detailed message on this number? YES    Phone number patient can be reached at: Cell number on file:    Telephone Information:   Mobile [phone]       Best Time: ANY    Call taken on 1/21/2022 at 7:33 PM by Laura Molina

## 2022-01-24 NOTE — TELEPHONE ENCOUNTER
Attempted to call patient at home/mobile number, no answer, left message on voicemail; patient was instructed to return call to [facility] at [phone].    Krissy Box RN  [facility]

## 2022-01-25 NOTE — TELEPHONE ENCOUNTER
Spoke with patient, appt scheduled for 1/28/22.  He will also talk with his spouse who generated this call to determine if this appt is needed since, he also has an appt with his GI for 1/31/22.  He reports he is feeling well.  Dahiana Barrera RN  [facility]

## 2022-01-28 ENCOUNTER — ANTICOAGULATION THERAPY VISIT (OUTPATIENT)
Dept: ANTICOAGULATION | Facility: CLINIC | Age: 62
End: 2022-01-28

## 2022-01-28 ENCOUNTER — OFFICE VISIT (OUTPATIENT)
Dept: FAMILY MEDICINE | Facility: CLINIC | Age: 62
End: 2022-01-28
Payer: COMMERCIAL

## 2022-01-28 VITALS
TEMPERATURE: 97.4 F | OXYGEN SATURATION: 99 % | RESPIRATION RATE: 16 BRPM | DIASTOLIC BLOOD PRESSURE: 61 MMHG | SYSTOLIC BLOOD PRESSURE: 102 MMHG | BODY MASS INDEX: 38.48 KG/M2 | WEIGHT: 315 LBS | HEART RATE: 86 BPM

## 2022-01-28 DIAGNOSIS — I48.20 CHRONIC ATRIAL FIBRILLATION (H): ICD-10-CM

## 2022-01-28 DIAGNOSIS — Z79.01 LONG TERM CURRENT USE OF ANTICOAGULANT THERAPY: ICD-10-CM

## 2022-01-28 DIAGNOSIS — I48.91 NEW ONSET ATRIAL FIBRILLATION (H): ICD-10-CM

## 2022-01-28 DIAGNOSIS — K92.0 GASTROINTESTINAL HEMORRHAGE WITH HEMATEMESIS: Primary | ICD-10-CM

## 2022-01-28 DIAGNOSIS — Z79.01 LONG TERM CURRENT USE OF ANTICOAGULANT THERAPY: Primary | ICD-10-CM

## 2022-01-28 LAB
ERYTHROCYTE [DISTWIDTH] IN BLOOD BY AUTOMATED COUNT: 17.7 % (ref 10–15)
HCT VFR BLD AUTO: 30.9 % (ref 40–53)
HGB BLD-MCNC: 9.2 G/DL (ref 13.3–17.7)
INR BLD: 1.5 (ref 0.9–1.1)
MCH RBC QN AUTO: 29.4 PG (ref 26.5–33)
MCHC RBC AUTO-ENTMCNC: 29.8 G/DL (ref 31.5–36.5)
MCV RBC AUTO: 99 FL (ref 78–100)
PLATELET # BLD AUTO: 346 10E3/UL (ref 150–450)
RBC # BLD AUTO: 3.13 10E6/UL (ref 4.4–5.9)
WBC # BLD AUTO: 10.2 10E3/UL (ref 4–11)

## 2022-01-28 PROCEDURE — 85610 PROTHROMBIN TIME: CPT | Performed by: PHYSICIAN ASSISTANT

## 2022-01-28 PROCEDURE — 99214 OFFICE O/P EST MOD 30 MIN: CPT | Performed by: PHYSICIAN ASSISTANT

## 2022-01-28 PROCEDURE — 85027 COMPLETE CBC AUTOMATED: CPT | Performed by: PHYSICIAN ASSISTANT

## 2022-01-28 PROCEDURE — 36415 COLL VENOUS BLD VENIPUNCTURE: CPT | Performed by: PHYSICIAN ASSISTANT

## 2022-01-28 RX ORDER — OMEPRAZOLE 40 MG/1
40 CAPSULE, DELAYED RELEASE ORAL 2 TIMES DAILY
Qty: 180 CAPSULE | Refills: 0 | COMMUNITY
Start: 2022-01-28

## 2022-01-28 ASSESSMENT — PAIN SCALES - GENERAL: PAINLEVEL: NO PAIN (0)

## 2022-01-28 NOTE — PROGRESS NOTES
"  Assessment & Plan   Problem List Items Addressed This Visit        Other    Long-term (current) use of anticoagulants [Z79.01]    Relevant Orders    INR    INR point of care, Interfaced Result (Completed)      Other Visit Diagnoses     Gastrointestinal hemorrhage with hematemesis    -  Primary    Relevant Orders    CBC with platelets (Completed)         Hgb stable and INR subtherapeutic. He only took half of his warfarin dose today and I encouraged him to take the second half. Will continue on his chronic meds with Fe suppl daily and omeprazole 40mg BID. Has follow up with his cardiologist in 3 days and GI soon as well. No further melena. Follow up with primary or me as needed. Recommended COVID-19 vaccination.    Complete history and physical exam as below. AF with normal VS.    DDx and Dx discussed with and explained to the pt to their satisfaction.  All questions were answered at this time. Pt expressed understanding of and agreement with this dx, tx, and plan. No further workup warranted and standard medication warnings given. I have given the patient a list of pertinent indications for re-evaluation. Will go to the Emergency Department if symptoms worsen or new concerning symptoms arise. Patient left in no apparent distress.     35 minutes spent on the date of the encounter doing chart review, history and exam, documentation and further activities per the note       BMI:   Estimated body mass index is 38.48 kg/m  as calculated from the following:    Height as of 7/9/21: 1.97 m (6' 5.56\").    Weight as of this encounter: 149.3 kg (329 lb 3.2 oz).       See Patient Instructions    Return in about 1 week (around 2/4/2022) for a recheck of your symptoms if not improving, or call 911/go to an ER anytime if worsening.    YUSUF Gaston  [facility] JENNIFER Finch is a 61 year old who presents for the following health issues  accompanied by his spouse.    [facility] " Follow-up Visit:  Was hospitalized overnight for PUD of duodenum in [place], [place]. Records unavailable. Melena and weakness.  On warfarin for Afib. At presentation, bp was 70/40bpm. Hgb 5.3. INR 4.7. Transfused 4 units of PRBCs. Hgb went up to 9.9. transitioned from IV Protonix to omeprazole 40mg BID. Has been on warfarin since last week. Feeling fairly normal until 7:30pm and then he feels exhausted. Always in afib.     Hospital/Nursing Home/ Rehab Facility: [facility]  Date of Admission: 1/16/22-1/17/22  Date of Discharge: unknown per patient  Reason(s) for Admission: Low Hgb      Was your hospitalization related to COVID-19? No   Problems taking medications regularly:  None  Medication changes since discharge: None  Problems adhering to non-medication therapy:  None    Summary of hospitalization:  Discharge summary unavailable  Diagnostic Tests/Treatments reviewed.  Follow up needed: GI and cardiology  Other Healthcare Providers Involved in Patient s Care:         None  Update since discharge: improved.   Post Discharge Medication Reconciliation: discharge medications reconciled and changed, per note/orders.  Plan of care communicated with patient and family     Review of Systems   Constitutional, HEENT, cardiovascular, pulmonary, gi and gu systems are negative, except as otherwise noted.      Objective    /61   Pulse 86   Temp 97.4  F (36.3  C) (Tympanic)   Resp 16   Wt 149.3 kg (329 lb 3.2 oz)   SpO2 99%   BMI 38.48 kg/m    Body mass index is 38.48 kg/m .  Physical Exam  Vitals and nursing note reviewed.   Constitutional:       General: He is not in acute distress.     Appearance: He is not ill-appearing or diaphoretic.   HENT:      Head: Normocephalic and atraumatic.      Mouth/Throat:      Mouth: Mucous membranes are moist.   Eyes:      Conjunctiva/sclera: Conjunctivae normal.   Cardiovascular:      Rate and Rhythm: Normal rate and regular rhythm.      Heart sounds:  Normal heart sounds. No murmur heard.  No friction rub. No gallop.    Pulmonary:      Effort: Pulmonary effort is normal. No respiratory distress.      Breath sounds: Normal breath sounds. No stridor. No wheezing, rhonchi or rales.   Abdominal:      General: Bowel sounds are normal. There is no distension.      Palpations: Abdomen is soft. There is no mass.      Tenderness: There is no abdominal tenderness. There is no guarding or rebound.      Hernia: No hernia is present.   Skin:     General: Skin is warm and dry.      Coloration: Skin is pale.   Neurological:      General: No focal deficit present.      Mental Status: He is alert. Mental status is at baseline.   Psychiatric:         Mood and Affect: Mood normal.         Behavior: Behavior normal.       Results for orders placed or performed in visit on 01/28/22   CBC with platelets     Status: Abnormal   Result Value Ref Range    WBC Count 10.2 4.0 - 11.0 10e3/uL    RBC Count 3.13 (L) 4.40 - 5.90 10e6/uL    Hemoglobin 9.2 (L) 13.3 - 17.7 g/dL    Hematocrit 30.9 (L) 40.0 - 53.0 %    MCV 99 78 - 100 fL    MCH 29.4 26.5 - 33.0 pg    MCHC 29.8 (L) 31.5 - 36.5 g/dL    RDW 17.7 (H) 10.0 - 15.0 %    Platelet Count 346 150 - 450 10e3/uL   INR point of care, Interfaced Result     Status: Abnormal   Result Value Ref Range    INR 1.5 (H) 0.9 - 1.1    Narrative    This test is intended for monitoring Coumadin therapy. Results are not accurate in patients with prolonged INR due to factor deficiency.

## 2022-01-28 NOTE — PATIENT INSTRUCTIONS
Gisel Finch,    Thank you for allowing [facility] to manage your care.    If you develop worsening/changing symptoms at any time, please call 911 or go to the emergency department for evaluation.    I ordered some lab work, please go to the laboratory to get your studies.    Drink 8-10 glasses of fluid daily to stay well-hydrated. Continue the iron pills and omeprazole until you see [facility].    If you have any questions or concerns, please feel free to call us at [phone]    Sincerely,    Bear Amaro PA-C    Did you know?      You can schedule a video visit for follow-up appointments as well as future appointments for certain conditions.  Please see the below link.     [email or web address]    If you have not already done so,  I encourage you to sign up for [employer] ([email or web address]).  This will allow you to review your results, securely communicate with a provider, and schedule virtual visits as well.      Patient Education     When You Have Gastrointestinal (GI) Bleeding  Blood in your vomit or stool can be a sign of gastrointestinal (GI) bleeding. GI bleeding can be scary. But the cause may not be serious. You should always see a doctor if you have GI bleeding.   The GI tract is the path through which food travels in the body. Food passes from the mouth down the esophagus. This is the tube from the mouth to the stomach. Food starts to break down in the stomach. It then moves through the duodenum , the first part of the small intestine . Nutrients are absorbed as food travels through the small intestine. What is left passes into the colon (large intestine) as waste. The colon removes water from the waste. Waste continues from the colon to the rectum (where stool is stored). Waste then leaves the body through the anus . The upper GI tract is from the mouth through the duodenum. The lower GI tract is from the end of the duodenum to the anus.      Causes of GI bleeding  GI bleeding can be caused by many different problems. Some of the more common causes include:     Swollen veins in the anus (hemorrhoids)    Swollen veins in the esophagus (varices)    Sore on the lining of the GI tract (ulcer)    Cuts or scrapes in the mouth or throat    Infection caused by germs such as bacteria or parasites    Food allergies, such as milk allergy in young children    Medicines, especially aspirin, blood thinners, and NSAIDs (non-steroidal anti-inflammatory drugs) such as ibuprofen    Inflammation of the GI tract (gastritis or esophagitis)    Colitis (Crohn's disease or ulcerative colitis)    Cancer (tumors or polyps)    Abnormal pouches in the colon (diverticula)    Tears in the esophagus or anus    Nosebleed    Abnormal blood vessels in the GI tract (angiodysplasia)  Diagnosing the cause of blood in stool   If blood is coming out in your stool, you may have a lower GI tract problem or a very fast upper GI tract bleed. Bleeding from the GI tract can be bright red. Or it may look dark and tarry. Tests may also find blood in your stool that can t be seen with the eye (occult blood). To find out the cause, tests that may be ordered include:     Blood tests. A blood sample is taken and sent to a lab for exam.    Hemoccult test. Checks a stool sample for blood.    Stool culture. Checks a stool sample for bacteria or parasites.    X-ray, ultrasound, nuclear scan, or CT scan. Imaging tests that take pictures of the digestive tract.    Colonoscopy or sigmoidoscopy. This test uses a flexible tube with a tiny camera. The tube is inserted through your anus into your rectum to see the inside of your colon. Your provider can also take a tiny tissue sample (biopsy) and treat a bleeding source    Capsule endoscopy. This test uses a tiny camera that is swallowed, passes through the intestine, and takes pictures of the small intestine that is more difficult to reach with  scopes.  Diagnosing the cause of blood in vomit   If you are vomiting blood or something that looks like coffee grounds, you may have an upper GI tract problem. To find the cause, tests that may be done include:     Upper endoscopy. A flexible tube with a tiny camera is inserted through your mouth and throat to see inside your upper GI tract. This lets your provider take a tiny tissue sample (biopsy) and treat a bleeding source.    Nasogastric lavage. The healthcare provider may withdraw some of the fluid in the stomach to test it for bleeding. This can sometimes tell if you have upper GI or lower GI bleeding.    X-ray, ultrasound, nuclear scan, or CT scan. Imaging tests that take pictures of your digestive tract.    Upper GI series. X-rays of the upper part of your GI tract taken after swallowing a contrast drink. .    Enteroscopy. This sends a flexible tube or a small, swallowed capsule camera into your small intestine.  When to call your healthcare provider  Call your healthcare provider right away if you have any of the following:     Fever of 100.4  F ( 38.0 ) or higher    Signs of fluid loss (dehydration). These include a dry, sticky mouth, decreased urine output, and very dark urine.    Belly (abdominal) pain  Call 911  Call 911, or get medical care right away  if any of the following occur:     Bleeding from your mouth or anus that can't be stopped    Bleeding along with feeling lightheaded or dizzy  [employer] last reviewed this educational content on 6/1/2019 2000-2021 [employer]. All rights reserved. This information is not intended as a substitute for professional medical care. Always follow your healthcare professional's instructions.         We are scheduling all people age 5 and older for COVID-19 vaccines (patients age 5-17 can only receive the Pfizer vaccine).    We are offering third doses of the Pfizer and Moderna COVID-19 vaccines to moderately and severely immunocompromised  patients.     [facility] is offering booster doses of Covid-19 vaccination to anyone age 18 and up who got the Mundo and Mundo vaccine 2 or more months ago or Moderna COVID-19 vaccine or Pfizer COVID-19 vaccine 6 months or more ago.  We are also offering boosters to people age 16-17 who got their second Pfizer vaccine in the [place] 6 months or more ago.    If your initial vaccination was Mundo & Mundo, we recommend getting a Pfizer or Moderna second dose to maximize immunity.  If you received Moderna or Pfizer, you can schedule either Moderna or Pfizer for your booster dose based on convenience or personal preference other than people younger than 18 years old who can only get pfizer for a booster.      To schedule any COVID-19 vaccination appointment for Moderna or Pfizer, please log in to [facility] using this using this link to see when and where we have openings.  Mundo & Mundo is only offered at our retail pharmacies (and they also offer Moderna and Pfizer).  To schedule at [facility] please go to [email or web address].    If you have technical difficulty using [facility], call [phone], option 1 for assistance.    More information about vaccine effectiveness at reducing spread of disease, hospitalizations, and death as well as vaccine safety and answers to other questions can be found on our website: [email or web address].

## 2022-01-28 NOTE — PROGRESS NOTES
Anticoagulation Management    Unable to reach Stef today.    Today's INR result of 1.5 is subtherapeutic (goal INR of 2.0-3.0).  Result received from: Clinic Lab    Follow up required to confirm warfarin dose taken and assess for changes and discuss out of range INR     left message to call back nurse before 6 pm  See notes in chart. Seen by provider today for fup GIB while in [place] on 1/16.  Need to know if warfarin resumed and when and what dose.  Did schedule INR for 1/31 prior to other apt with provider on 1/31.    Anticoagulation clinic to follow up    Lawanda Bowers RN

## 2022-01-28 NOTE — PROGRESS NOTES
Again reached voicemail. Left message on voicemail for patient to continue to follow instructions for warfarin as given by provider. Also scheduled for INR in lab on Monday prior to Cardiology apt at 3:30. Left message to call back nurse to discuss further.  Lawanda Bowers RN

## 2022-01-31 ENCOUNTER — OFFICE VISIT (OUTPATIENT)
Dept: CARDIOLOGY | Facility: CLINIC | Age: 62
End: 2022-01-31
Payer: COMMERCIAL

## 2022-01-31 ENCOUNTER — LAB (OUTPATIENT)
Dept: LAB | Facility: CLINIC | Age: 62
End: 2022-01-31
Payer: COMMERCIAL

## 2022-01-31 ENCOUNTER — DOCUMENTATION ONLY (OUTPATIENT)
Dept: LAB | Facility: CLINIC | Age: 62
End: 2022-01-31

## 2022-01-31 ENCOUNTER — ANTICOAGULATION THERAPY VISIT (OUTPATIENT)
Dept: ANTICOAGULATION | Facility: CLINIC | Age: 62
End: 2022-01-31

## 2022-01-31 VITALS
HEART RATE: 71 BPM | BODY MASS INDEX: 38.45 KG/M2 | SYSTOLIC BLOOD PRESSURE: 114 MMHG | DIASTOLIC BLOOD PRESSURE: 78 MMHG | OXYGEN SATURATION: 99 % | WEIGHT: 315 LBS

## 2022-01-31 DIAGNOSIS — D64.9 LOW HEMOGLOBIN: ICD-10-CM

## 2022-01-31 DIAGNOSIS — I48.20 CHRONIC ATRIAL FIBRILLATION (H): ICD-10-CM

## 2022-01-31 DIAGNOSIS — I48.19 PERSISTENT ATRIAL FIBRILLATION (H): Primary | ICD-10-CM

## 2022-01-31 DIAGNOSIS — I48.91 NEW ONSET ATRIAL FIBRILLATION (H): ICD-10-CM

## 2022-01-31 DIAGNOSIS — Z79.01 LONG TERM CURRENT USE OF ANTICOAGULANT THERAPY: Primary | ICD-10-CM

## 2022-01-31 DIAGNOSIS — D64.9 ANEMIA, UNSPECIFIED TYPE: ICD-10-CM

## 2022-01-31 DIAGNOSIS — D64.9 ANEMIA, UNSPECIFIED TYPE: Primary | ICD-10-CM

## 2022-01-31 DIAGNOSIS — K92.2 GASTROINTESTINAL HEMORRHAGE, UNSPECIFIED GASTROINTESTINAL HEMORRHAGE TYPE: ICD-10-CM

## 2022-01-31 DIAGNOSIS — I48.0 PAROXYSMAL ATRIAL FIBRILLATION (H): ICD-10-CM

## 2022-01-31 LAB
HGB BLD-MCNC: 10 G/DL (ref 13.3–17.7)
INR BLD: 1.5 (ref 0.9–1.1)

## 2022-01-31 PROCEDURE — 85018 HEMOGLOBIN: CPT

## 2022-01-31 PROCEDURE — 93000 ELECTROCARDIOGRAM COMPLETE: CPT | Performed by: INTERNAL MEDICINE

## 2022-01-31 PROCEDURE — 99215 OFFICE O/P EST HI 40 MIN: CPT | Mod: GC | Performed by: INTERNAL MEDICINE

## 2022-01-31 PROCEDURE — 36415 COLL VENOUS BLD VENIPUNCTURE: CPT

## 2022-01-31 PROCEDURE — 36416 COLLJ CAPILLARY BLOOD SPEC: CPT

## 2022-01-31 PROCEDURE — 85610 PROTHROMBIN TIME: CPT

## 2022-01-31 NOTE — PROGRESS NOTES
ANTICOAGULATION MANAGEMENT     Stef Mosher 61 year old male is on warfarin with subtherapeutic INR result. (Goal INR 2.0-3.0)    Recent labs: (last 7 days)     01/31/22  1506   INR 1.5*       ASSESSMENT     Source(s): Chart Review and Patient/Caregiver Call       Warfarin doses taken: Warfarin taken as instructed    Diet: No new diet changes identified    New illness, injury, or hospitalization: Yes: Gi bleed    Medication/supplement changes: None noted    Signs or symptoms of bleeding or clotting: No    Previous INR: Subtherapeutic    Additional findings: None     PLAN     Recommended plan for no diet, medication or health factor changes affecting INR     Dosing Instructions: Booster dose then continue your current warfarin dose with next INR in 4 days       Summary  As of 1/31/2022    Full warfarin instructions:  5 mg every Fri; 2.5 mg all other days   Next INR check:               Telephone call with Stef who verbalizes understanding and agrees to plan    Lab visit scheduled    Education provided: Please call back if any changes to your diet, medications or how you've been taking warfarin and Monitoring for clotting signs and symptoms    Plan made per [facility] anticoagulation protocol    Ana M Argueta RN  Anticoagulation Clinic  1/31/2022    _______________________________________________________________________     Anticoagulation Episode Summary     Current INR goal:  2.0-3.0   TTR:  80.5 % (1 y)   Target end date:  Indefinite   Send INR reminders to:  [facility]    Indications    Long-term (current) use of anticoagulants [Z79.01] [Z79.01]  New onset atrial fibrillation (H) [I48.91]  Chronic atrial fibrillation (H) [I48.20]           Comments:           Anticoagulation Care Providers     Provider Role Specialty Phone number    Josephine Roberts NP Referring Family Medicine [phone]    Sivakumar Kang PA-C Responsible Family Medicine [phone]

## 2022-01-31 NOTE — NURSING NOTE
/78 (BP Location: Left arm, Patient Position: Sitting, Cuff Size: Adult Large)   Pulse 71   Wt 149.2 kg (329 lb)   SpO2 99%   BMI 38.45 kg/m

## 2022-01-31 NOTE — PROGRESS NOTES
ANTICOAGULATION MANAGEMENT     Stef Mosher 61 year old male is on warfarin with subtherapeutic INR result. (Goal INR 2.0-3.0)    Recent labs: (last 7 days)     01/28/22  1544   INR 1.5*       ASSESSMENT     Source(s): Chart Review and Patient/Caregiver Call       Warfarin doses taken: Less warfarin taken than planned which may be affecting INR    Diet: No new diet changes identified states did have large salad on Friday.    New illness, injury, or hospitalization: Yes: GIB 1/16 while out of state. Given transfusions and warfarin held. Seen for fup Friday and warfarin continued. Note on TE reports INR was 4.7 at time of bleed and Hgb 5.3 States had resumed arfarin about 4 days prior to check on Friday.     Medication/supplement changes: omeprazole 40 BID and Fe supplement    Signs or symptoms of bleeding or clotting: No    Previous INR: Supratherapeutic    Additional findings: seeing cardiology today for fup and will have INR done at same time  Will call after INR done again today and await fo up with cardiology today and states is seeing GI provider tomorrow.      PLAN     Recommended plan for ongoing change(s) affecting INR     Dosing Instructions: Continue your current warfarin dose with next INR in 2 days       Summary  As of 1/28/2022    Full warfarin instructions:  5 mg every Fri; 2.5 mg all other days   Next INR check:  1/31/2022             Telephone call with Stef who verbalizes understanding and agrees to plan    Lab visit scheduled    Education provided: Please call back if any changes to your diet, medications or how you've been taking warfarin and Contact [phone]  with any changes, questions or concerns.     Plan made per [facility] anticoagulation protocol    Lawanda Bowers, RN  Anticoagulation Clinic  1/31/2022    _______________________________________________________________________     Anticoagulation Episode Summary     Current INR goal:  2.0-3.0   TTR:  81.1 % (1 y)   Target end date:   Indefinite   Send INR reminders to:  [facility]    Indications    Long-term (current) use of anticoagulants [Z79.01] [Z79.01]  New onset atrial fibrillation (H) [I48.91]  Chronic atrial fibrillation (H) [I48.20]           Comments:           Anticoagulation Care Providers     Provider Role Specialty Phone number    Josephine Roberts NP Referring Family Medicine [phone]    Sivakumar Kang PA-C Responsible [facility] Medicine [phone]

## 2022-01-31 NOTE — PATIENT INSTRUCTIONS
Thank you for coming to the [facility] at [place]; please note the following instructions:    1.  STOP warfarin    2. START Eliquis 2.5 mg twice daily    3.  Weekly Hemoglobin labs    4.  Schedule an appointment with [facility]-you will need an endoscopy    5.  Keep next weeks virtual visit      If you have any questions regarding your visit, please contact your care team:     CARDIOLOGY  TELEPHONE NUMBER   Mahnaz MORRIS., Registered Nurse  Malgorzata MARTINEZ, Registered Nurse  Analisa GORMAN, Registered Medical Assistant  Olman MAYES, Licensed Practical Nurse  Sandee DECKER, Visit Facilitator [phone] (select option 1)    *After hours: [phone]   For Scheduling Appts:     [phone] (select option 1)    *After hours: [phone]   For the Device Clinic (Pacemakers and ICD's)  Vale BRIDGES, Registered Nurse   During business hours: [phone]    *After business hours:  [phone] (select option 4)      Normal test result notifications will be released via [employer] or mailed within 7 business days.  All other test results, will be communicated via telephone once reviewed by your cardiologist.    If you need a medication refill, please contact your pharmacy.  Please allow 3 business days for your refill to be completed.    As always, thank you for trusting us with your health care needs!

## 2022-01-31 NOTE — LETTER
1/31/2022      RE: Stef Mosher  [address] [place] [address]       Dear Colleague,    Thank you for the opportunity to participate in the care of your patient, Stef Mosher, at the [facility]. Please see a copy of my visit note below.    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    He was last seen via video visit last month with Dr Turner. At that time, cMRI was ordered to better assess history of cardiomyopathy, though it is still pending.    Two weeks ago the patient was admitted to a hospital in [place] for GI bleed his HgB was 5.3 and his INR was >4. He got 4 U of pRBCs with improvement of his HgB to 9. He had an endoscopy which showed no evidence of active bleed. He was   Discharged and instructed to start Warfarin on 1/28/22.     Overall notes he is doing well and denies limitations.States no chest pain, SOB, abdominal pain, GABRIEL, LE swelling.     PAST MEDICAL HISTORY:  Past Medical History:   Diagnosis Date     Arthritis      Atrial fibrillation (H)      Hayfever      Hypercholesterolemia      Hypertension      Unspecified hypothyroidism        CURRENT MEDICATIONS:  Current Outpatient Medications   Medication Sig Dispense Refill     atorvastatin (LIPITOR) 20 MG tablet Take 1 tablet (20 mg) by mouth daily 90 tablet 3     furosemide (LASIX) 20 MG tablet Take 1 tablet (20 mg) by mouth daily 90 tablet 3     levETIRAcetam (KEPPRA) 500 MG tablet Take 500 mg by mouth       lisinopril (ZESTRIL) 40 MG tablet Take 1 tablet (40 mg) by mouth daily 90 tablet 1     metoprolol succinate ER (TOPROL-XL) 200 MG 24 hr tablet Take 1 tablet (200 mg) by mouth daily 90 tablet 3     omeprazole (PRILOSEC) 40 MG DR capsule Take 1 capsule (40 mg) by mouth 2 times daily 180  capsule 0     warfarin ANTICOAGULANT (COUMADIN) 2.5 MG tablet Take daily as directed. Current dose 5 mg Friday and 2.5 mg rest of days 156 tablet 1       PAST SURGICAL HISTORY:  Past Surgical History:   Procedure Laterality Date     HC THYROIDECTOMY         ALLERGIES:   No Known Allergies    FAMILY HISTORY:  - Premature coronary artery disease  - Atrial fibrillation  - Sudden cardiac death     SOCIAL HISTORY:  Social History     Tobacco Use     Smoking status: Never Smoker     Smokeless tobacco: Never Used     Tobacco comment: Lives in smoke free household   Vaping Use     Vaping Use: Never used   Substance Use Topics     Alcohol use: No     Comment: None     Drug use: No     Comment: Never       ROS:   Constitutional: No fever, chills, or sweats. Weight stable.   ENT: No visual disturbance, ear ache, epistaxis, sore throat.   Cardiovascular: As per HPI.   Respiratory: No cough, hemoptysis.    GI: No nausea, vomiting, hematemesis, melena, or hematochezia.   : No hematuria.   Integument: Negative.   Psychiatric: Negative.   Hematologic:  Easy bruising, no easy bleeding.  Neuro: Negative.   Endocrinology: No significant heat or cold intolerance   Musculoskeletal: No myalgia.    Exam:  /78 (BP Location: Left arm, Patient Position: Sitting, Cuff Size: Adult Large)   Pulse 71   Wt 149.2 kg (329 lb)   SpO2 99%   BMI 38.45 kg/m    GENERAL APPEARANCE: well-developed, well-appearing male in NAD, polite and conversational  HEENT: at/nc, eomi, mmm, sclera anicteric, no oral ulcerations, no xanthelasmas   NECK: no adenopathy, thyroid normal to palpation, JVP not elevated  RESPIRATORY: CTAB on RA, nonlabored, no accessory muscle use  CARDIOVASCULAR: rrr, s1, s2, no murmur, rub or gallop, no s3 or s4  GI: soft, nt/nd, bs+  EXTREMITIES: no gross deformity, adequate muscle bulk, no edema  NEURO: alert, interactive, speech fluent, grossly nonfocal, moving all 4  VASC: Radial and DP pulses are normal in volumes and  symmetric bilaterally  SKIN: no ecchymoses, no rashes on exposed skin      Labs:  CBC RESULTS:   Lab Results   Component Value Date    WBC 10.2 01/28/2022    WBC 8.1 07/09/2021    RBC 3.13 (L) 01/28/2022    RBC 2.61 (L) 07/09/2021    HGB 9.2 (L) 01/28/2022    HGB 6.2 (LL) 07/09/2021    HCT 30.9 (L) 01/28/2022    HCT 21.7 (L) 07/09/2021    MCV 99 01/28/2022    MCV 83 07/09/2021    MCH 29.4 01/28/2022    MCH 23.8 (L) 07/09/2021    MCHC 29.8 (L) 01/28/2022    MCHC 28.6 (L) 07/09/2021    RDW 17.7 (H) 01/28/2022    RDW 16.1 (H) 07/09/2021     01/28/2022     (H) 07/09/2021       BMP RESULTS:  Lab Results   Component Value Date     10/22/2021     07/09/2021    POTASSIUM 4.0 10/22/2021    POTASSIUM 4.5 07/09/2021    CHLORIDE 110 (H) 10/22/2021    CHLORIDE 108 07/09/2021    CO2 29 10/22/2021    CO2 24 07/09/2021    ANIONGAP 1 (L) 10/22/2021    ANIONGAP 7 07/09/2021     (H) 10/22/2021     (H) 07/09/2021    BUN 16 10/22/2021    BUN 14 07/09/2021    CR 0.73 10/22/2021    CR 0.74 07/09/2021    GFRESTIMATED >90 10/22/2021    GFRESTIMATED >90 07/09/2021    GFRESTBLACK >90 07/09/2021    EUGENIA 9.1 10/22/2021    EUGENIA 8.5 07/09/2021        INR RESULTS:  Lab Results   Component Value Date    INR 1.5 (H) 01/31/2022    INR 1.5 (H) 01/28/2022    INR 2.5 (H) 12/31/2021    INR 2.4 (H) 11/12/2021    INR 2.10 (H) 06/25/2021    INR 2.50 (H) 04/16/2021    INR 2.30 (H) 03/05/2021    INR 2.40 (H) 01/22/2021       Procedures:   TTE 11/18/21  Interpretation Summary     Left ventricular function is decreased. The ejection fraction is 35-40%  (moderately reduced).  Global right ventricular function is mildly reduced.  The inferior vena cava cannot be assessed.  No pericardial effusion is present.  There has been no change.      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    #paroxysmal atrial fibrillation-CHADSVASC2 4 Annual risk of stroke is 4.8% by pooled cohort and a HAS-BLED score of 3 with corresponding risk of bleed at 5.8%   Currently rate controlled strategy  -rate control: continue metoprolol succinate given HFrEF  -AC: Hold warfarin in the setting of second major GI bleed.   -Refer to GI for repeat endoscopy to evaluate for Bleed, patient stated he will follow with his outpatient GI doctor on his own (Dr. Waylon Pacheco)  -Will initiate apixaban with 2.5mg BID  -Weekly CBCs for 8 weeks   -Will see patient in 1-2 months via video visit   -Video visit scheduled next week to discuss cMRI results to discuss potential Watchman procedure in case he has further bleeding episodes on Apixaban      #HFrEF with recovered EF 2/2 NICM, last EF 35-40%  BB: metoprolol succinate as above  ACEI/ARB/ARNI: continue lisinopril 40mg qday  Volume status: appears euvolemic  -continue statin  -plan for cardiac stress and gadolinium MRI to r/o ischemic disease as contributor and evaluate etiologies for cardiomyopathy      The patient was seen and discussed with Dr. Turner, who agrees with the above assessment and plan.     Vern Estrada MD  Cardiology Fellow PGY4  P: [phone]      I discussed the aims, risks, and alternatives to starting apixaban. I emphasized the risk of GI bleeding. Patient and wife understood the discussion and decided to start apixaban.    I have seen, interviewed, and examined patient. I have reviewed the laboratory tests, imaging, and other investigations. I have reviewed the management plan with the patient. I discussed with the team and agree with the findings and plan in this resident/fellow/nurse practitioner's note. In addition, changes in the physical examination, assessment and plan have been incorporated into the note by myself, as to make it a single cohesive document.       Leanna Turner MD, MS  Cardiology/Cardiac EP Attending  Staff      CC  Patient Care Team:  Josephine Roberts NP as PCP - General (Nurse Practitioner - Family)  Stella Allison, RN as Specialty Care Coordinator (Cardiology)  Roxane Be MD as Assigned PCP

## 2022-01-31 NOTE — PROGRESS NOTES
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    He was last seen via video visit last month with Dr Turner. At that time, cMRI was ordered to better assess history of cardiomyopathy, though it is still pending.    Two weeks ago the patient was admitted to a hospital in [place] for GI bleed his HgB was 5.3 and his INR was >4. He got 4 U of pRBCs with improvement of his HgB to 9. He had an endoscopy which showed no evidence of active bleed. He was   Discharged and instructed to start Warfarin on 1/28/22.     Overall notes he is doing well and denies limitations.States no chest pain, SOB, abdominal pain, GABRIEL, LE swelling.     PAST MEDICAL HISTORY:  Past Medical History:   Diagnosis Date     Arthritis      Atrial fibrillation (H)      Hayfever      Hypercholesterolemia      Hypertension      Unspecified hypothyroidism        CURRENT MEDICATIONS:  Current Outpatient Medications   Medication Sig Dispense Refill     atorvastatin (LIPITOR) 20 MG tablet Take 1 tablet (20 mg) by mouth daily 90 tablet 3     furosemide (LASIX) 20 MG tablet Take 1 tablet (20 mg) by mouth daily 90 tablet 3     levETIRAcetam (KEPPRA) 500 MG tablet Take 500 mg by mouth       lisinopril (ZESTRIL) 40 MG tablet Take 1 tablet (40 mg) by mouth daily 90 tablet 1     metoprolol succinate ER (TOPROL-XL) 200 MG 24 hr tablet Take 1 tablet (200 mg) by mouth daily 90 tablet 3     omeprazole (PRILOSEC) 40 MG DR capsule Take 1 capsule (40 mg) by mouth 2 times daily 180 capsule 0     warfarin ANTICOAGULANT (COUMADIN) 2.5 MG tablet Take daily as directed. Current dose 5 mg Friday and 2.5 mg rest of days 156 tablet 1       PAST SURGICAL HISTORY:  Past Surgical History:   Procedure Laterality Date     HC THYROIDECTOMY         ALLERGIES:   No Known Allergies    FAMILY HISTORY:  - Premature coronary artery  disease  - Atrial fibrillation  - Sudden cardiac death     SOCIAL HISTORY:  Social History     Tobacco Use     Smoking status: Never Smoker     Smokeless tobacco: Never Used     Tobacco comment: Lives in smoke free household   Vaping Use     Vaping Use: Never used   Substance Use Topics     Alcohol use: No     Comment: None     Drug use: No     Comment: Never       ROS:   Constitutional: No fever, chills, or sweats. Weight stable.   ENT: No visual disturbance, ear ache, epistaxis, sore throat.   Cardiovascular: As per HPI.   Respiratory: No cough, hemoptysis.    GI: No nausea, vomiting, hematemesis, melena, or hematochezia.   : No hematuria.   Integument: Negative.   Psychiatric: Negative.   Hematologic:  Easy bruising, no easy bleeding.  Neuro: Negative.   Endocrinology: No significant heat or cold intolerance   Musculoskeletal: No myalgia.    Exam:  /78 (BP Location: Left arm, Patient Position: Sitting, Cuff Size: Adult Large)   Pulse 71   Wt 149.2 kg (329 lb)   SpO2 99%   BMI 38.45 kg/m    GENERAL APPEARANCE: well-developed, well-appearing male in NAD, polite and conversational  HEENT: at/nc, eomi, mmm, sclera anicteric, no oral ulcerations, no xanthelasmas   NECK: no adenopathy, thyroid normal to palpation, JVP not elevated  RESPIRATORY: CTAB on RA, nonlabored, no accessory muscle use  CARDIOVASCULAR: rrr, s1, s2, no murmur, rub or gallop, no s3 or s4  GI: soft, nt/nd, bs+  EXTREMITIES: no gross deformity, adequate muscle bulk, no edema  NEURO: alert, interactive, speech fluent, grossly nonfocal, moving all 4  VASC: Radial and DP pulses are normal in volumes and symmetric bilaterally  SKIN: no ecchymoses, no rashes on exposed skin      Labs:  CBC RESULTS:   Lab Results   Component Value Date    WBC 10.2 01/28/2022    WBC 8.1 07/09/2021    RBC 3.13 (L) 01/28/2022    RBC 2.61 (L) 07/09/2021    HGB 9.2 (L) 01/28/2022    HGB 6.2 (LL) 07/09/2021    HCT 30.9 (L) 01/28/2022    HCT 21.7 (L) 07/09/2021    MCV  99 01/28/2022    MCV 83 07/09/2021    MCH 29.4 01/28/2022    MCH 23.8 (L) 07/09/2021    MCHC 29.8 (L) 01/28/2022    MCHC 28.6 (L) 07/09/2021    RDW 17.7 (H) 01/28/2022    RDW 16.1 (H) 07/09/2021     01/28/2022     (H) 07/09/2021       BMP RESULTS:  Lab Results   Component Value Date     10/22/2021     07/09/2021    POTASSIUM 4.0 10/22/2021    POTASSIUM 4.5 07/09/2021    CHLORIDE 110 (H) 10/22/2021    CHLORIDE 108 07/09/2021    CO2 29 10/22/2021    CO2 24 07/09/2021    ANIONGAP 1 (L) 10/22/2021    ANIONGAP 7 07/09/2021     (H) 10/22/2021     (H) 07/09/2021    BUN 16 10/22/2021    BUN 14 07/09/2021    CR 0.73 10/22/2021    CR 0.74 07/09/2021    GFRESTIMATED >90 10/22/2021    GFRESTIMATED >90 07/09/2021    GFRESTBLACK >90 07/09/2021    EUGENIA 9.1 10/22/2021    EUGENIA 8.5 07/09/2021        INR RESULTS:  Lab Results   Component Value Date    INR 1.5 (H) 01/31/2022    INR 1.5 (H) 01/28/2022    INR 2.5 (H) 12/31/2021    INR 2.4 (H) 11/12/2021    INR 2.10 (H) 06/25/2021    INR 2.50 (H) 04/16/2021    INR 2.30 (H) 03/05/2021    INR 2.40 (H) 01/22/2021       Procedures:   TTE 11/18/21  Interpretation Summary     Left ventricular function is decreased. The ejection fraction is 35-40%  (moderately reduced).  Global right ventricular function is mildly reduced.  The inferior vena cava cannot be assessed.  No pericardial effusion is present.  There has been no change.      Assessment and Plan:   Stef Mosher is a 61 year old male with PMH of atrial fibrillation c/b tachycardia induced HFrEF with partially recovered EF, HTN, HLD, and TIA who presents for follow up of his atrial fibrillation.    #paroxysmal atrial fibrillation-CHADSVASC2 4 Annual risk of stroke is 4.8% by pooled cohort and a HAS-BLED score of 3 with corresponding risk of bleed at 5.8%   Currently rate controlled strategy  -rate control: continue metoprolol succinate given HFrEF  -AC: Hold warfarin in the setting of second major GI  bleed.   -Refer to GI for repeat endoscopy to evaluate for Bleed, patient stated he will follow with his outpatient GI doctor on his own (Dr. Waylon Pacheco)  -Will initiate apixaban with 2.5mg BID  -Weekly CBCs for 8 weeks   -Will see patient in 1-2 months via video visit   -Video visit scheduled next week to discuss cMRI results to discuss potential Watchman procedure in case he has further bleeding episodes on Apixaban      #HFrEF with recovered EF 2/2 NICM, last EF 35-40%  BB: metoprolol succinate as above  ACEI/ARB/ARNI: continue lisinopril 40mg qday  Volume status: appears euvolemic  -continue statin  -plan for cardiac stress and gadolinium MRI to r/o ischemic disease as contributor and evaluate etiologies for cardiomyopathy      The patient was seen and discussed with Dr. Turner, who agrees with the above assessment and plan.     Vern Estrada MD  Cardiology Fellow PGY4  P: [phone]      I discussed the aims, risks, and alternatives to starting apixaban. I emphasized the risk of GI bleeding. Patient and wife understood the discussion and decided to start apixaban.    I have seen, interviewed, and examined patient. I have reviewed the laboratory tests, imaging, and other investigations. I have reviewed the management plan with the patient. I discussed with the team and agree with the findings and plan in this resident/fellow/nurse practitioner's note. In addition, changes in the physical examination, assessment and plan have been incorporated into the note by myself, as to make it a single cohesive document.       Leanna Turner MD, MS  Cardiology/Cardiac EP Attending Staff          CC  Patient Care Team:  Josephine Roberts NP as PCP - General (Nurse Practitioner - Family)  eLanna Turner MD as MD (Cardiology)  Stella Allison, HOLLY as Specialty Care Coordinator (Cardiology)  Roxane Be MD as Assigned PCP  Leanna Turner MD as Assigned Heart and Vascular Provider

## 2022-01-31 NOTE — PROGRESS NOTES
Patient came in today for INR point of care, he mentioned you wanted a follow up hgb from 1/28/22. Blood drawn if you agree please place order in epic or let lab know if not needed, thanks

## 2022-02-01 NOTE — NURSING NOTE
"Chief Complaint   Patient presents with     Follow Up     follow up post hosp, cardiomyopathy, pt reports feeling fatigured the last 2 or 3 nights, said it was better last night, pt had labs before today's appt       Initial /78 (BP Location: Left arm, Patient Position: Sitting, Cuff Size: Adult Large)   Pulse 71   Wt 149.2 kg (329 lb)   SpO2 99%   BMI 38.45 kg/m   Estimated body mass index is 38.45 kg/m  as calculated from the following:    Height as of 7/9/21: 1.97 m (6' 5.56\").    Weight as of this encounter: 149.2 kg (329 lb)..  BP completed using cuff size: large    EKG completed.  Sandee Martinez, Visit Facilitator    "

## 2022-02-04 ENCOUNTER — LAB (OUTPATIENT)
Dept: LAB | Facility: CLINIC | Age: 62
End: 2022-02-04
Payer: COMMERCIAL

## 2022-02-04 ENCOUNTER — ANTICOAGULATION THERAPY VISIT (OUTPATIENT)
Dept: ANTICOAGULATION | Facility: CLINIC | Age: 62
End: 2022-02-04

## 2022-02-04 DIAGNOSIS — I48.20 CHRONIC ATRIAL FIBRILLATION (H): ICD-10-CM

## 2022-02-04 DIAGNOSIS — Z79.01 LONG TERM CURRENT USE OF ANTICOAGULANT THERAPY: Primary | ICD-10-CM

## 2022-02-04 DIAGNOSIS — I48.91 NEW ONSET ATRIAL FIBRILLATION (H): ICD-10-CM

## 2022-02-04 DIAGNOSIS — I48.0 PAROXYSMAL ATRIAL FIBRILLATION (H): ICD-10-CM

## 2022-02-04 DIAGNOSIS — D64.9 LOW HEMOGLOBIN: ICD-10-CM

## 2022-02-04 LAB
HGB BLD-MCNC: 10.7 G/DL (ref 13.3–17.7)
INR BLD: 1.3 (ref 0.9–1.1)

## 2022-02-04 PROCEDURE — 85610 PROTHROMBIN TIME: CPT

## 2022-02-04 PROCEDURE — 85018 HEMOGLOBIN: CPT

## 2022-02-04 PROCEDURE — 36415 COLL VENOUS BLD VENIPUNCTURE: CPT

## 2022-02-04 NOTE — PROGRESS NOTES
ANTICOAGULATION  MANAGEMENT    Stef Mosher is being discharged from the [facility] ([facility]).  Spoke to patient he started doac a week ago.  Reason for discharge: warfarin replaced by alternate therapy, Eliquis    Anticoagulation episode resolved    If patient needs warfarin management in the future, please send a new referral    Ana M Argueta RN

## 2022-02-07 ENCOUNTER — VIRTUAL VISIT (OUTPATIENT)
Dept: CARDIOLOGY | Facility: CLINIC | Age: 62
End: 2022-02-07
Payer: COMMERCIAL

## 2022-02-07 DIAGNOSIS — R06.02 SOB (SHORTNESS OF BREATH): ICD-10-CM

## 2022-02-07 DIAGNOSIS — I48.19 PERSISTENT ATRIAL FIBRILLATION (H): Primary | ICD-10-CM

## 2022-02-07 DIAGNOSIS — I42.9 SECONDARY CARDIOMYOPATHY (H): ICD-10-CM

## 2022-02-07 PROCEDURE — 99215 OFFICE O/P EST HI 40 MIN: CPT | Mod: 95 | Performed by: INTERNAL MEDICINE

## 2022-02-07 NOTE — PROGRESS NOTES
Stef is a 61 year old who is being evaluated via a billable video visit.      How would you like to obtain your AVS? [place]  If the video visit is dropped, the invitation should be resent by: Text to cell phone: [phone]  Will anyone else be joining your video visit? No  If patient encounters technical issues they should call [phone] :[id number]}    HPI: Stef Mosher is a 61 year old male with PMH of atrial fibrillation c/b tachycardia induced HFrEF with partially recovered EF, HTN, HLD, and TIA who presents for follow up of his atrial fibrillation.    He was last seen in clinic with Dr Turner 1/31/2022. At that time, the patient was was recently admitted to a hospital in [place] for GI bleed his HgB was 5.3 and his INR was >4. He got 4 U of pRBCs with improvement of his HgB to 9. He had an endoscopy which showed no evidence of active bleed. He was discharged and instructed to start Warfarin on 1/28/22.     When we evaluated him, we requested that he follow up with his GI doctor for repeat endoscopy, reordered cMRI for ischemic eval and started low dose apixaban. cMRI has been ordered but was unable to do it because Hr was in the 100s to 110s. He takes his BP at home and it is usually low 100s/80s or 90s. His HR is usually in the 90s/110s. He took it on visit today and it was 93BPM at rest.    He has follow up with GI in March 8th. Since he was last seen, last HgB 10.7    Overall notes he is doing well and denies limitations.States no chest pain, SOB, abdominal pain, GABRIEL, LE swelling. His symptoms from his massive GI bleed are also improving.     PAST MEDICAL HISTORY:  Past Medical History:   Diagnosis Date     Arthritis      Atrial fibrillation (H)      Hayfever      Hypercholesterolemia      Hypertension      Unspecified hypothyroidism        CURRENT MEDICATIONS:  Current Outpatient Medications   Medication Sig Dispense Refill     apixaban ANTICOAGULANT (ELIQUIS ANTICOAGULANT) 2.5 MG tablet Take 1  tablet (2.5 mg) by mouth 2 times daily 60 tablet 3     atorvastatin (LIPITOR) 20 MG tablet Take 1 tablet (20 mg) by mouth daily 90 tablet 3     furosemide (LASIX) 20 MG tablet Take 1 tablet (20 mg) by mouth daily 90 tablet 3     levETIRAcetam (KEPPRA) 500 MG tablet Take 500 mg by mouth       lisinopril (ZESTRIL) 40 MG tablet Take 1 tablet (40 mg) by mouth daily 90 tablet 1     metoprolol succinate ER (TOPROL-XL) 200 MG 24 hr tablet Take 1 tablet (200 mg) by mouth daily 90 tablet 3     omeprazole (PRILOSEC) 40 MG DR capsule Take 1 capsule (40 mg) by mouth 2 times daily 180 capsule 0       PAST SURGICAL HISTORY:  Past Surgical History:   Procedure Laterality Date     HC THYROIDECTOMY         ALLERGIES:   No Known Allergies    FAMILY HISTORY:  - Premature coronary artery disease  - Atrial fibrillation  - Sudden cardiac death     SOCIAL HISTORY:  Social History     Tobacco Use     Smoking status: Never Smoker     Smokeless tobacco: Never Used     Tobacco comment: Lives in smoke free household   Vaping Use     Vaping Use: Never used   Substance Use Topics     Alcohol use: No     Comment: None     Drug use: No     Comment: Never       ROS:   Constitutional: No fever, chills, or sweats. Weight stable.   ENT: No visual disturbance, ear ache, epistaxis, sore throat.   Cardiovascular: As per HPI.   Respiratory: No cough, hemoptysis.    GI: No nausea, vomiting, hematemesis, melena, or hematochezia.   : No hematuria.   Integument: Negative.   Psychiatric: Negative.   Hematologic:  Easy bruising, no easy bleeding.  Neuro: Negative.   Endocrinology: No significant heat or cold intolerance   Musculoskeletal: No myalgia.    Exam:  HR 93 BPM  GENERAL APPEARANCE: well-developed, well-appearing male in NAD, polite and conversational  RESPIRATORY: No distress on RA, nonlabored, no accessory muscle use  NEURO: alert, interactive, speech fluent, grossly nonfocal, moving all 4  SKIN: no ecchymoses, no rashes on exposed  skin      Labs:  CBC RESULTS:   Lab Results   Component Value Date    WBC 10.2 01/28/2022    WBC 8.1 07/09/2021    RBC 3.13 (L) 01/28/2022    RBC 2.61 (L) 07/09/2021    HGB 10.7 (L) 02/04/2022    HGB 6.2 (LL) 07/09/2021    HCT 30.9 (L) 01/28/2022    HCT 21.7 (L) 07/09/2021    MCV 99 01/28/2022    MCV 83 07/09/2021    MCH 29.4 01/28/2022    MCH 23.8 (L) 07/09/2021    MCHC 29.8 (L) 01/28/2022    MCHC 28.6 (L) 07/09/2021    RDW 17.7 (H) 01/28/2022    RDW 16.1 (H) 07/09/2021     01/28/2022     (H) 07/09/2021       BMP RESULTS:  Lab Results   Component Value Date     10/22/2021     07/09/2021    POTASSIUM 4.0 10/22/2021    POTASSIUM 4.5 07/09/2021    CHLORIDE 110 (H) 10/22/2021    CHLORIDE 108 07/09/2021    CO2 29 10/22/2021    CO2 24 07/09/2021    ANIONGAP 1 (L) 10/22/2021    ANIONGAP 7 07/09/2021     (H) 10/22/2021     (H) 07/09/2021    BUN 16 10/22/2021    BUN 14 07/09/2021    CR 0.73 10/22/2021    CR 0.74 07/09/2021    GFRESTIMATED >90 10/22/2021    GFRESTIMATED >90 07/09/2021    GFRESTBLACK >90 07/09/2021    EUGENIA 9.1 10/22/2021    EUGENIA 8.5 07/09/2021        INR RESULTS:  Lab Results   Component Value Date    INR 1.3 (H) 02/04/2022    INR 1.5 (H) 01/31/2022    INR 1.5 (H) 01/28/2022    INR 2.5 (H) 12/31/2021    INR 2.10 (H) 06/25/2021    INR 2.50 (H) 04/16/2021    INR 2.30 (H) 03/05/2021    INR 2.40 (H) 01/22/2021       Procedures:   TTE 11/18/21  Interpretation Summary     Left ventricular function is decreased. The ejection fraction is 35-40%  (moderately reduced).  Global right ventricular function is mildly reduced.  The inferior vena cava cannot be assessed.  No pericardial effusion is present.  There has been no change.    Lexiscan 2017  IMPRESSION:  1. Normal myocardial SPECT study with a summed stress score of  0 . A  summed stress score of 0 is associated with an annual event rate of  0.8% and 0.9% for myocardial infarction and cardiac death,  respectively (Rosalinda.  Circulation 1998;98:535-43).  2. Mildly reduced left ventricular systolic function with a left  ventricular ejection fraction of  42%.   3. No inducible ischemia.   4. There has been no significant interval change compared to the prior  study from 9/20/2010.    Assessment and Plan:   Stef Mosher is a 61 year old male with PMH of atrial fibrillation c/b tachycardia induced HFrEF with partially recovered EF, HTN, HLD, and TIA who presents for follow up of his atrial fibrillation.    #paroxysmal atrial fibrillation-CHADSVASC2 4 Annual risk of stroke is 4.8% by pooled cohort and a HAS-BLED score of 3 with corresponding risk of bleed at 5.8%. He has been tolerating his anticoagulation since started 1 week ago without issue and his HgB has continued to increase most recent was 10.7. Given that we are unable to complete ischemic workup 2/2 high heart rates, we will opt for a rhythm control strategy with DCCV. His current rate control strategy with metoprolol 200mg XL daily is not optimal and there is concern about increasing this dose in light of his relatively borderline BP and recent GI bleed.  -rate control: continue metoprolol succinate given HFrEF  -AC: Will increase apixaban dose to 5mg BID   -Refer to GI for repeat endoscopy to evaluate for Bleed, patient stated he will follow with his outpatient GI appointment scheduled for March 8th per patient.  -Weekly CBCs for total 8 weeks (another 7 weeks)  -Will refer for WERNER+Cardioversion in an attempt to rhythm control. We will attempt to coordinate this for February 18th as this is the patients only day off.  -Video visit scheduled next week to discuss cMRI results   -Will see patient a few weeks after cardioversion and cMRI in person  -Will consider discussion of Watchman procedure in case he has further bleeding episodes on Apixaban      #HFrEF with previously recovered EF 2/2 NICM, last EF 35-40%  BB: metoprolol succinate as above, may need to consider decreasing if  WERNER+Cardioversion is successful  ACEI/ARB/ARNI: continue lisinopril 40mg qday  -continue statin  -plan for cardiac stress and gadolinium MRI to r/o ischemic disease as contributor and evaluate etiologies for cardiomyopathy assuming WERNER+DCCV Cardioversion is successful We will attempt to coordinate this for February 18th as this is the patients only day off.    The patient was seen and discussed with Dr. Turner, who agrees with the above assessment and plan.     Vern Estrada MD  Cardiology Fellow PGY4  P: [phone]    I have seen and interviewed patient. I have reviewed the laboratory tests, imaging, and other investigations. I have reviewed the management plan with the patient. I discussed with the team and agree with the findings and plan in this resident/fellow/nurse practitioner's note. In addition, assessment and plan have been incorporated into the note by myself, as to make it a single cohesive document.     Leanna Turner MD, MS  Cardiology/EP Staff Attending        CC  Patient Care Team:  Josephine Roberts NP as PCP - General (Nurse Practitioner - Family)  Leanna Turner MD as MD (Cardiology)  Stella Allison, HOLLY as Specialty Care Coordinator (Cardiology)  Roxane Be MD as Assigned PCP  Leanna Turner MD as Assigned Heart and Vascular Provider

## 2022-02-07 NOTE — PROGRESS NOTES
HPI: Stef Mosher is a 61 year old male with PMH of atrial fibrillation c/b 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 Has now restarted AC.    Overall notes he is doing well. Did have instance where he had to go to ER where they though he was having a stroke, but was ultimately found to be having seizures. Has now started keppra and no recurrence. Was recently traveling to [place] to see his son and noted to feel well there. Was able to be active without much for limitations. Noted some decreased energy but overall tolerable. States no chest pain, SOB, abdominal pain, GABRIEL, LE swelling.     PAST MEDICAL HISTORY:  Past Medical History:   Diagnosis Date     Arthritis      Atrial fibrillation (H)      Hayfever      Hypercholesterolemia      Hypertension      Unspecified hypothyroidism        CURRENT MEDICATIONS:  Current Outpatient Medications   Medication Sig Dispense Refill     apixaban ANTICOAGULANT (ELIQUIS ANTICOAGULANT) 2.5 MG tablet Take 1 tablet (2.5 mg) by mouth 2 times daily 60 tablet 3     atorvastatin (LIPITOR) 20 MG tablet Take 1 tablet (20 mg) by mouth daily 90 tablet 3     furosemide (LASIX) 20 MG tablet Take 1 tablet (20 mg) by mouth daily 90 tablet 3     levETIRAcetam (KEPPRA) 500 MG tablet Take 500 mg by mouth       lisinopril (ZESTRIL) 40 MG tablet Take 1 tablet (40 mg) by mouth daily 90 tablet 1     metoprolol succinate ER (TOPROL-XL) 200 MG 24 hr tablet Take 1 tablet (200 mg) by mouth daily 90 tablet 3     omeprazole (PRILOSEC) 40 MG DR capsule Take 1 capsule (40 mg) by mouth 2 times daily 180 capsule 0       PAST SURGICAL HISTORY:  Past Surgical History:   Procedure Laterality Date     HC THYROIDECTOMY         ALLERGIES:   No Known Allergies    FAMILY HISTORY:  - Premature coronary artery disease  - Atrial fibrillation  - Sudden cardiac death     SOCIAL HISTORY:  Social History      Tobacco Use     Smoking status: Never Smoker     Smokeless tobacco: Never Used     Tobacco comment: Lives in smoke free household   Vaping Use     Vaping Use: Never used   Substance Use Topics     Alcohol use: No     Comment: None     Drug use: No     Comment: Never       ROS:   Constitutional: No fever, chills, or sweats. Weight stable.   ENT: No visual disturbance, ear ache, epistaxis, sore throat.   Cardiovascular: As per HPI.   Respiratory: No cough, hemoptysis.    GI: No nausea, vomiting, hematemesis, melena, or hematochezia.   : No hematuria.   Integument: Negative.   Psychiatric: Negative.   Hematologic:  Easy bruising, no easy bleeding.  Neuro: Negative.   Endocrinology: No significant heat or cold intolerance   Musculoskeletal: No myalgia.    Exam:  There were no vitals taken for this visit.  GENERAL APPEARANCE: well-developed, well-appearing male in NAD, polite and conversational  HEENT: at/nc, eomi, mmm, sclera anicteric, no oral ulcerations, no xanthelasmas   NECK: no adenopathy, thyroid normal to palpation, JVP not elevated  RESPIRATORY: CTAB on RA, nonlabored, no accessory muscle use  CARDIOVASCULAR: rrr, s1, s2, no murmur, rub or gallop, no s3 or s4  GI: soft, nt/nd, bs+  EXTREMITIES: no gross deformity, adequate muscle bulk, no edema  NEURO: alert, interactive, speech fluent, grossly nonfocal, moving all 4  VASC: Radial and DP pulses are normal in volumes and symmetric bilaterally  SKIN: no ecchymoses, no rashes on exposed skin      Labs:  CBC RESULTS:   Lab Results   Component Value Date    WBC 10.2 01/28/2022    WBC 8.1 07/09/2021    RBC 3.13 (L) 01/28/2022    RBC 2.61 (L) 07/09/2021    HGB 10.7 (L) 02/04/2022    HGB 6.2 (LL) 07/09/2021    HCT 30.9 (L) 01/28/2022    HCT 21.7 (L) 07/09/2021    MCV 99 01/28/2022    MCV 83 07/09/2021    MCH 29.4 01/28/2022    MCH 23.8 (L) 07/09/2021    MCHC 29.8 (L) 01/28/2022    MCHC 28.6 (L) 07/09/2021    RDW 17.7 (H) 01/28/2022    RDW 16.1 (H) 07/09/2021      01/28/2022     (H) 07/09/2021       BMP RESULTS:  Lab Results   Component Value Date     10/22/2021     07/09/2021    POTASSIUM 4.0 10/22/2021    POTASSIUM 4.5 07/09/2021    CHLORIDE 110 (H) 10/22/2021    CHLORIDE 108 07/09/2021    CO2 29 10/22/2021    CO2 24 07/09/2021    ANIONGAP 1 (L) 10/22/2021    ANIONGAP 7 07/09/2021     (H) 10/22/2021     (H) 07/09/2021    BUN 16 10/22/2021    BUN 14 07/09/2021    CR 0.73 10/22/2021    CR 0.74 07/09/2021    GFRESTIMATED >90 10/22/2021    GFRESTIMATED >90 07/09/2021    GFRESTBLACK >90 07/09/2021    EUGENIA 9.1 10/22/2021    EUGENIA 8.5 07/09/2021        INR RESULTS:  Lab Results   Component Value Date    INR 1.3 (H) 02/04/2022    INR 1.5 (H) 01/31/2022    INR 1.5 (H) 01/28/2022    INR 2.5 (H) 12/31/2021    INR 2.10 (H) 06/25/2021    INR 2.50 (H) 04/16/2021    INR 2.30 (H) 03/05/2021    INR 2.40 (H) 01/22/2021       Procedures:      Assessment and Plan:   Stef Mosher is a 61 year old male with PMH of atrial fibrillation c/b 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 Has now restarted AC.    #paroxysmal atrial fibrillation  Currently rate controlled well  -rate control: continue metoprolol  succinate given HFrEF  -AC: continue warfarin, no recurrent GI bleed     #HFrEF with recovered EF 2/2 NICM, last EF 40% (7/21/21)  BB: metoprolol succinate as above  ACEI/ARB/ARNI: continue lisinopril 40mg qday; discussed ARNI use and wishes to continue lisinopril at this time  Trent ant: discussed aldactone and wishes to continue current regimen  SGLT2: discussed and wishes to continue current regimen  Volume status: appears euvolemic  -continue statin  -plan for cardiac stress and gadolinium MRI to r/o ischemic disease as contributor and evaluate etiologies for cardiomyopathy    Will follow up with Dr. Turner in-person in February 2022 for ongoing  care.      The patient was seen and discussed with Dr. Turner, who agrees with the above assessment and plan.     Ritika Koenig MD  Cardiology Fellow PGY4  P: [phone]      CC  Patient Care Team:  Josephine Roberts NP as PCP - General (Nurse Practitioner - Family)  Leanna Turner MD as MD (Cardiology)  Stella Allison, RN as Specialty Care Coordinator (Cardiology)  Roxane Be MD as Assigned PCP  Leanna Turner MD as Assigned Heart and Vascular Provider

## 2022-02-07 NOTE — PROGRESS NOTES
Stef is a 61 year old who is being evaluated via a billable video visit.      How would you like to obtain your AVS? MyChart  If the video visit is dropped, the invitation should be resent by: Text to cell phone: [phone]  Will anyone else be joining your video visit? No  {If patient encounters technical issues they should call [phone] :[id number]}

## 2022-02-07 NOTE — LETTER
2/7/2022        RE: Stef Mosher  [address] [facility]  [place] [address]         Dear Colleague,    Thank you for the opportunity to participate in the care of your patient, Stef Mosher, at the [facility] at . Please see a copy of my visit note below.    Stef is a 61 year old who is being evaluated via a billable video visit.      How would you like to obtain your AVS? MyChart  If the video visit is dropped, the invitation should be resent by: Text to cell phone: [phone]  Will anyone else be joining your video visit? No  If patient encounters technical issues they should call [phone] :[id number]}    HPI: Stfe Mosher is a 61 year old male with PMH of atrial fibrillation c/b tachycardia induced HFrEF with partially recovered EF, HTN, HLD, and TIA who presents for follow up of his atrial fibrillation.    He was last seen in clinic with Dr Turner 1/31/2022. At that time, the patient was was recently admitted to a hospital in [place] for GI bleed his HgB was 5.3 and his INR was >4. He got 4 U of pRBCs with improvement of his HgB to 9. He had an endoscopy which showed no evidence of active bleed. He was discharged and instructed to start Warfarin on 1/28/22.     When we evaluated him, we requested that he follow up with his GI doctor for repeat endoscopy, reordered cMRI for ischemic eval and started low dose apixaban. cMRI has been ordered but was unable to do it because Hr was in the 100s to 110s. He takes his BP at home and it is usually low 100s/80s or 90s. His HR is usually in the 90s/110s. He took it on visit today and it was 93BPM at rest.    He has follow up with GI in March 8th. Since he was last seen, last HgB 10.7    Overall notes he is doing well and denies limitations.States no chest pain, SOB, abdominal pain, GABRIEL, LE swelling. His symptoms from his massive GI bleed are also improving.     PAST MEDICAL  HISTORY:  Past Medical History:   Diagnosis Date     Arthritis      Atrial fibrillation (H)      Hayfever      Hypercholesterolemia      Hypertension      Unspecified hypothyroidism      CURRENT MEDICATIONS:  Current Outpatient Medications   Medication Sig Dispense Refill     apixaban ANTICOAGULANT (ELIQUIS ANTICOAGULANT) 2.5 MG tablet Take 1 tablet (2.5 mg) by mouth 2 times daily 60 tablet 3     atorvastatin (LIPITOR) 20 MG tablet Take 1 tablet (20 mg) by mouth daily 90 tablet 3     furosemide (LASIX) 20 MG tablet Take 1 tablet (20 mg) by mouth daily 90 tablet 3     levETIRAcetam (KEPPRA) 500 MG tablet Take 500 mg by mouth       lisinopril (ZESTRIL) 40 MG tablet Take 1 tablet (40 mg) by mouth daily 90 tablet 1     metoprolol succinate ER (TOPROL-XL) 200 MG 24 hr tablet Take 1 tablet (200 mg) by mouth daily 90 tablet 3     omeprazole (PRILOSEC) 40 MG DR capsule Take 1 capsule (40 mg) by mouth 2 times daily 180 capsule 0     PAST SURGICAL HISTORY:  Past Surgical History:   Procedure Laterality Date     HC THYROIDECTOMY       ALLERGIES:   No Known Allergies    FAMILY HISTORY:  - Premature coronary artery disease  - Atrial fibrillation  - Sudden cardiac death     SOCIAL HISTORY:  Social History     Tobacco Use     Smoking status: Never Smoker     Smokeless tobacco: Never Used     Tobacco comment: Lives in smoke free household   Vaping Use     Vaping Use: Never used   Substance Use Topics     Alcohol use: No     Comment: None     Drug use: No     Comment: Never     ROS:   Constitutional: No fever, chills, or sweats. Weight stable.   ENT: No visual disturbance, ear ache, epistaxis, sore throat.   Cardiovascular: As per HPI.   Respiratory: No cough, hemoptysis.    GI: No nausea, vomiting, hematemesis, melena, or hematochezia.   : No hematuria.   Integument: Negative.   Psychiatric: Negative.   Hematologic:  Easy bruising, no easy bleeding.  Neuro: Negative.   Endocrinology: No significant heat or cold intolerance    Musculoskeletal: No myalgia.    Exam:  HR 93 BPM  GENERAL APPEARANCE: well-developed, well-appearing male in NAD, polite and conversational  RESPIRATORY: No distress on RA, nonlabored, no accessory muscle use  NEURO: alert, interactive, speech fluent, grossly nonfocal, moving all 4  SKIN: no ecchymoses, no rashes on exposed skin    Labs:  CBC RESULTS:   Lab Results   Component Value Date    WBC 10.2 01/28/2022    WBC 8.1 07/09/2021    RBC 3.13 (L) 01/28/2022    RBC 2.61 (L) 07/09/2021    HGB 10.7 (L) 02/04/2022    HGB 6.2 (LL) 07/09/2021    HCT 30.9 (L) 01/28/2022    HCT 21.7 (L) 07/09/2021    MCV 99 01/28/2022    MCV 83 07/09/2021    MCH 29.4 01/28/2022    MCH 23.8 (L) 07/09/2021    MCHC 29.8 (L) 01/28/2022    MCHC 28.6 (L) 07/09/2021    RDW 17.7 (H) 01/28/2022    RDW 16.1 (H) 07/09/2021     01/28/2022     (H) 07/09/2021     BMP RESULTS:  Lab Results   Component Value Date     10/22/2021     07/09/2021    POTASSIUM 4.0 10/22/2021    POTASSIUM 4.5 07/09/2021    CHLORIDE 110 (H) 10/22/2021    CHLORIDE 108 07/09/2021    CO2 29 10/22/2021    CO2 24 07/09/2021    ANIONGAP 1 (L) 10/22/2021    ANIONGAP 7 07/09/2021     (H) 10/22/2021     (H) 07/09/2021    BUN 16 10/22/2021    BUN 14 07/09/2021    CR 0.73 10/22/2021    CR 0.74 07/09/2021    GFRESTIMATED >90 10/22/2021    GFRESTIMATED >90 07/09/2021    GFRESTBLACK >90 07/09/2021    EUGENIA 9.1 10/22/2021    EUGENIA 8.5 07/09/2021     INR RESULTS:  Lab Results   Component Value Date    INR 1.3 (H) 02/04/2022    INR 1.5 (H) 01/31/2022    INR 1.5 (H) 01/28/2022    INR 2.5 (H) 12/31/2021    INR 2.10 (H) 06/25/2021    INR 2.50 (H) 04/16/2021    INR 2.30 (H) 03/05/2021    INR 2.40 (H) 01/22/2021     Procedures:   TTE 11/18/21  Interpretation Summary     Left ventricular function is decreased. The ejection fraction is 35-40%  (moderately reduced).  Global right ventricular function is mildly reduced.  The inferior vena cava cannot be assessed.  No  pericardial effusion is present.  There has been no change.    Lexiscan 2017  IMPRESSION:  1. Normal myocardial SPECT study with a summed stress score of  0 . A  summed stress score of 0 is associated with an annual event rate of  0.8% and 0.9% for myocardial infarction and cardiac death,  respectively (Rosalinda. Circulation 1998;98:535-43).  2. Mildly reduced left ventricular systolic function with a left  ventricular ejection fraction of  42%.   3. No inducible ischemia.   4. There has been no significant interval change compared to the prior  study from 9/20/2010.    Assessment and Plan:   Stef Mosher is a 61 year old male with PMH of atrial fibrillation c/b tachycardia induced HFrEF with partially recovered EF, HTN, HLD, and TIA who presents for follow up of his atrial fibrillation.    #paroxysmal atrial fibrillation-CHADSVASC2 4 Annual risk of stroke is 4.8% by pooled cohort and a HAS-BLED score of 3 with corresponding risk of bleed at 5.8%. He has been tolerating his anticoagulation since started 1 week ago without issue and his HgB has continued to increase most recent was 10.7. Given that we are unable to complete ischemic workup 2/2 high heart rates, we will opt for a rhythm control strategy with DCCV. His current rate control strategy with metoprolol 200mg XL daily is not optimal and there is concern about increasing this dose in light of his relatively borderline BP and recent GI bleed.  -rate control: continue metoprolol succinate given HFrEF  -AC: Will increase apixaban dose to 5mg BID   -Refer to GI for repeat endoscopy to evaluate for Bleed, patient stated he will follow with his outpatient GI appointment scheduled for March 8th per patient.  -Weekly CBCs for total 8 weeks (another 7 weeks)  -Will refer for WERNER+Cardioversion in an attempt to rhythm control. We will attempt to coordinate this for February 18th as this is the patients only day off.  -Video visit scheduled next week to discuss cMRI  results   -Will see patient a few weeks after cardioversion and cMRI in person  -Will consider discussion of Watchman procedure in case he has further bleeding episodes on Apixaban      #HFrEF with previously recovered EF 2/2 NICM, last EF 35-40%  BB: metoprolol succinate as above, may need to consider decreasing if WERNER+Cardioversion is successful  ACEI/ARB/ARNI: continue lisinopril 40mg qday  -continue statin  -plan for cardiac stress and gadolinium MRI to r/o ischemic disease as contributor and evaluate etiologies for cardiomyopathy assuming WERNER+DCCV Cardioversion is successful We will attempt to coordinate this for February 18th as this is the patients only day off.    The patient was seen and discussed with Dr. Turner, who agrees with the above assessment and plan.     Vern Estrada MD  Cardiology Fellow PGY4  P: [phone]    I have seen and interviewed patient. I have reviewed the laboratory tests, imaging, and other investigations. I have reviewed the management plan with the patient. I discussed with the team and agree with the findings and plan in this resident/fellow/nurse practitioner's note. In addition, assessment and plan have been incorporated into the note by myself, as to make it a single cohesive document.     Please do not hesitate to contact me if you have any questions/concerns.     Sincerely,     Leanna Turner MD

## 2022-02-07 NOTE — PATIENT INSTRUCTIONS
Thank you for coming to the [facility] @ [facility]; please note the following instructions:    1. Increase Eliquis to 5 mg twice daily.  A new prescription was sent to your preferred pharmacy.    2.  Schedule a transesophageal echocardiogram and cardioversion and repeat the cardiac stress MRI after the cardioversion    You are scheduled for a Transesophageal Echocardiogram followed by a Cardioversion at the [facility] ([address], [place] [address], [phone]).       You will need to undergo a COVID-19 PCR swab test within 4 days of procedure. You will receive a phone call with more information. If you do not hear from the COVID scheduling team, please call: [phone] to schedule.    Visitor Policy: One visitor who must remain in pre-op room throughout entire procedure, or leave the facility.     Follow these instructions:    1. Report to the G[facility] waiting room in the [facility] on: __________    2. Please do not eat anything for 8 hours prior to your procedure. You may have sips of water up until 2 hours prior to your arrival.    3. The morning of your procedure you may take your scheduled medications with a SIP of water. If you take diabetic medications or a diuretic, you may hold these.     4. You will receive medication that makes you sleepy; you will need a .    You should not make any legal decisions for 24 hours following discharge.       If you have further questions, please utilize [employer] to contact us.   If your question concerns the above instructions, contact:  Mahnaz Reynoso RN   Electrophysiology Nurse Coordinator.  [phone]    If your question concerns the schedule/appointment times, contact:  SY[facility] Procedure   [phone]            If you have any questions regarding your visit please contact your care team:     Cardiology  Telephone Number   Mahnaz MCKEON, HOLLY MARTINEZ, HOLLY SKY, IAM GORMAN, IAM Thurman  R., LPN   [phone] (option 1)   For scheduling appts:     [phone] (select option 1)       For the Device Clinic (Pacemakers and ICD's)  RN's :  Vale Cartagena   During business hours: [phone]    *After business hours:  [phone] (select option 4)      Normal test result notifications will be released via [employer] or mailed within 7 business days.  All other test results, will be communicated via telephone once reviewed by your cardiologist.    If you need a medication refill please contact your pharmacy.  Please allow 3 business days for your refill to be completed.    As always, thank you for trusting us with your health care needs!

## 2022-02-08 RX ORDER — POTASSIUM CHLORIDE 750 MG/1
20 TABLET, EXTENDED RELEASE ORAL
Status: CANCELLED | OUTPATIENT
Start: 2022-02-08

## 2022-02-08 RX ORDER — POTASSIUM CHLORIDE 750 MG/1
40 TABLET, EXTENDED RELEASE ORAL
Status: CANCELLED | OUTPATIENT
Start: 2022-02-08

## 2022-02-08 RX ORDER — LIDOCAINE 40 MG/G
CREAM TOPICAL
Status: CANCELLED | OUTPATIENT
Start: 2022-02-08

## 2022-02-09 DIAGNOSIS — Z11.59 ENCOUNTER FOR SCREENING FOR OTHER VIRAL DISEASES: Primary | ICD-10-CM

## 2022-02-10 ENCOUNTER — HOSPITAL ENCOUNTER (OUTPATIENT)
Facility: CLINIC | Age: 62
End: 2022-02-10
Payer: COMMERCIAL

## 2022-02-10 DIAGNOSIS — I50.9 CHF (CONGESTIVE HEART FAILURE) (H): Primary | ICD-10-CM

## 2022-02-10 DIAGNOSIS — R06.02 SOB (SHORTNESS OF BREATH): ICD-10-CM

## 2022-02-11 ENCOUNTER — LAB (OUTPATIENT)
Dept: LAB | Facility: CLINIC | Age: 62
End: 2022-02-11
Payer: COMMERCIAL

## 2022-02-11 DIAGNOSIS — D64.9 LOW HEMOGLOBIN: ICD-10-CM

## 2022-02-11 LAB — HGB BLD-MCNC: 10.6 G/DL (ref 13.3–17.7)

## 2022-02-11 PROCEDURE — 36415 COLL VENOUS BLD VENIPUNCTURE: CPT

## 2022-02-11 PROCEDURE — 85018 HEMOGLOBIN: CPT

## 2022-02-17 ENCOUNTER — LAB (OUTPATIENT)
Dept: LAB | Facility: CLINIC | Age: 62
End: 2022-02-17
Payer: COMMERCIAL

## 2022-02-17 ENCOUNTER — TELEPHONE (OUTPATIENT)
Dept: CARDIOLOGY | Facility: CLINIC | Age: 62
End: 2022-02-17

## 2022-02-17 DIAGNOSIS — Z11.59 ENCOUNTER FOR SCREENING FOR OTHER VIRAL DISEASES: ICD-10-CM

## 2022-02-17 LAB — SARS-COV-2 RNA RESP QL NAA+PROBE: NEGATIVE

## 2022-02-17 PROCEDURE — U0003 INFECTIOUS AGENT DETECTION BY NUCLEIC ACID (DNA OR RNA); SEVERE ACUTE RESPIRATORY SYNDROME CORONAVIRUS 2 (SARS-COV-2) (CORONAVIRUS DISEASE [COVID-19]), AMPLIFIED PROBE TECHNIQUE, MAKING USE OF HIGH THROUGHPUT TECHNOLOGIES AS DESCRIBED BY CMS-2020-01-R: HCPCS

## 2022-02-17 PROCEDURE — U0005 INFEC AGEN DETEC AMPLI PROBE: HCPCS

## 2022-02-17 NOTE — TELEPHONE ENCOUNTER
[facility] Call Center    Phone Message    May a detailed message be left on voicemail: yes     Reason for Call: Other: The patient just had Covid test today and it wont be done in time for tomorrow, plus he does not have the time off of work and he said that the best time to call tomorrow between 10:30 and 11:30     Action Taken: Message routed to:  Other: cardiology    Travel Screening: Not Applicable

## 2022-02-17 NOTE — LETTER
February 24, 2022      TO: Stef Mosher  [address] [place] [address]         Dear Stef,    Here are your upcoming appointments:    3/4/22 labs to check Hemoglobin and nuclear stress test (Lexiscan) check in at [facility] ([address], [place] [address], [phone]) 9:30 am.  Instructions attached.    3/16/22 COVID TEST [address] 9:00 am        You are scheduled for a Transesophageal Echocardiogram followed by a Cardioversion at the [facility] ([address], [place] [address], [phone]).      Visitor Policy: One visitor.    Follow these instructions:    1. Report to the [facility] waiting room in the [facility] hospital on: Friday, March 18th at 11:45 am arrival    2. Please do not eat anything for 8 hours prior to your procedure. You may have sips of water up until 2 hours prior to your arrival.    3. The morning of your procedure you may take your scheduled medications with a SIP of water. If you take diabetic medications or a diuretic, you may hold these.     4. You will receive medication that makes you sleepy; you will need a .    You should not make any legal decisions for 24 hours following discharge.         FOLLOW UP WITH DR STRATTON IN CLINIC IS SCHEDULED FOR 4/25/22 AT 4:00 PM [facility]

## 2022-02-17 NOTE — LETTER
February 24, 2022      TO: Stef Mosher  [address] [place] [address]         Dear Stef,    ***    It was a pleasure to see you at your last clinic visit. Please do not hesitate to call me if you have any questions or concerns.    Sincerely,      Leanna Turner {[id number]}

## 2022-02-22 NOTE — CONFIDENTIAL NOTE
Spoke with patient and discussed rescheduling the cardioversion and scheduling the lexiscan to rule out CAD.      He is not available March 11th or March 25th.    Writer will work on schedluling and call patient with the details tomorrow.  Patient prefers to be called before 2 pm and Thursday he is more available.    Mahnaz Reynoso, HOLLY  Cardiology Care Coordinator  [facility]  [phone]

## 2022-02-23 DIAGNOSIS — Z11.59 ENCOUNTER FOR SCREENING FOR OTHER VIRAL DISEASES: Primary | ICD-10-CM

## 2022-02-24 NOTE — TELEPHONE ENCOUNTER
Lexiscan and WERNER/DCC scheduled.  All reviewed with the patient and letter sent to patient with details.  Please see letter.    Mahnaz Reynoso RN  Cardiology Care Coordinator  [facility]  [phone] option 1

## 2022-02-25 ENCOUNTER — LAB (OUTPATIENT)
Dept: LAB | Facility: CLINIC | Age: 62
End: 2022-02-25
Payer: COMMERCIAL

## 2022-02-25 DIAGNOSIS — D64.9 LOW HEMOGLOBIN: ICD-10-CM

## 2022-02-25 LAB — HGB BLD-MCNC: 12.4 G/DL (ref 13.3–17.7)

## 2022-02-25 PROCEDURE — 36415 COLL VENOUS BLD VENIPUNCTURE: CPT

## 2022-02-25 PROCEDURE — 85018 HEMOGLOBIN: CPT

## 2022-03-04 ENCOUNTER — HOSPITAL ENCOUNTER (OUTPATIENT)
Dept: NUCLEAR MEDICINE | Facility: CLINIC | Age: 62
Setting detail: NUCLEAR MEDICINE
End: 2022-03-04
Attending: INTERNAL MEDICINE
Payer: COMMERCIAL

## 2022-03-04 ENCOUNTER — LAB (OUTPATIENT)
Dept: LAB | Facility: CLINIC | Age: 62
End: 2022-03-04
Attending: INTERNAL MEDICINE
Payer: COMMERCIAL

## 2022-03-04 ENCOUNTER — HOSPITAL ENCOUNTER (OUTPATIENT)
Dept: CARDIOLOGY | Facility: CLINIC | Age: 62
End: 2022-03-04
Attending: INTERNAL MEDICINE
Payer: COMMERCIAL

## 2022-03-04 DIAGNOSIS — I50.9 CHF (CONGESTIVE HEART FAILURE) (H): ICD-10-CM

## 2022-03-04 DIAGNOSIS — R06.02 SOB (SHORTNESS OF BREATH): ICD-10-CM

## 2022-03-04 DIAGNOSIS — D64.9 LOW HEMOGLOBIN: ICD-10-CM

## 2022-03-04 LAB
CV STRESS MAX HR HE: 122
HGB BLD-MCNC: 13 G/DL (ref 13.3–17.7)
RATE PRESSURE PRODUCT: NORMAL
STRESS ECHO BASELINE DIASTOLIC HE: 82
STRESS ECHO BASELINE HR: 100 BPM
STRESS ECHO BASELINE SYSTOLIC BP: 121
STRESS ECHO CALCULATED PERCENT HR: 77 %
STRESS ECHO LAST STRESS DIASTOLIC BP: 85
STRESS ECHO LAST STRESS SYSTOLIC BP: 132
STRESS ECHO TARGET HR: 158

## 2022-03-04 PROCEDURE — 250N000011 HC RX IP 250 OP 636: Performed by: STUDENT IN AN ORGANIZED HEALTH CARE EDUCATION/TRAINING PROGRAM

## 2022-03-04 PROCEDURE — 93018 CV STRESS TEST I&R ONLY: CPT | Performed by: INTERNAL MEDICINE

## 2022-03-04 PROCEDURE — 36415 COLL VENOUS BLD VENIPUNCTURE: CPT

## 2022-03-04 PROCEDURE — A9502 TC99M TETROFOSMIN: HCPCS | Performed by: INTERNAL MEDICINE

## 2022-03-04 PROCEDURE — 78452 HT MUSCLE IMAGE SPECT MULT: CPT

## 2022-03-04 PROCEDURE — 78452 HT MUSCLE IMAGE SPECT MULT: CPT | Mod: 26 | Performed by: INTERNAL MEDICINE

## 2022-03-04 PROCEDURE — 343N000001 HC RX 343: Performed by: INTERNAL MEDICINE

## 2022-03-04 PROCEDURE — 93017 CV STRESS TEST TRACING ONLY: CPT

## 2022-03-04 PROCEDURE — 85018 HEMOGLOBIN: CPT

## 2022-03-04 PROCEDURE — 93016 CV STRESS TEST SUPVJ ONLY: CPT | Performed by: STUDENT IN AN ORGANIZED HEALTH CARE EDUCATION/TRAINING PROGRAM

## 2022-03-04 RX ORDER — REGADENOSON 0.08 MG/ML
0.4 INJECTION, SOLUTION INTRAVENOUS ONCE
Status: COMPLETED | OUTPATIENT
Start: 2022-03-04 | End: 2022-03-04

## 2022-03-04 RX ORDER — CAFFEINE CITRATE 20 MG/ML
60 SOLUTION INTRAVENOUS
Status: DISCONTINUED | OUTPATIENT
Start: 2022-03-04 | End: 2022-03-05 | Stop reason: HOSPADM

## 2022-03-04 RX ORDER — ACYCLOVIR 200 MG/1
0-1 CAPSULE ORAL
Status: DISCONTINUED | OUTPATIENT
Start: 2022-03-04 | End: 2022-03-05 | Stop reason: HOSPADM

## 2022-03-04 RX ORDER — ALBUTEROL SULFATE 90 UG/1
2 AEROSOL, METERED RESPIRATORY (INHALATION) EVERY 5 MIN PRN
Status: DISCONTINUED | OUTPATIENT
Start: 2022-03-04 | End: 2022-03-05 | Stop reason: HOSPADM

## 2022-03-04 RX ORDER — AMINOPHYLLINE 25 MG/ML
50-100 INJECTION, SOLUTION INTRAVENOUS
Status: DISCONTINUED | OUTPATIENT
Start: 2022-03-04 | End: 2022-03-05 | Stop reason: HOSPADM

## 2022-03-04 RX ADMIN — TETROFOSMIN 10.2 MCI.: 1.38 INJECTION, POWDER, LYOPHILIZED, FOR SOLUTION INTRAVENOUS at 10:10

## 2022-03-04 RX ADMIN — REGADENOSON 0.4 MG: 0.08 INJECTION, SOLUTION INTRAVENOUS at 11:16

## 2022-03-04 RX ADMIN — TETROFOSMIN 40 MCI.: 1.38 INJECTION, POWDER, LYOPHILIZED, FOR SOLUTION INTRAVENOUS at 11:17

## 2022-03-04 NOTE — PROGRESS NOTES
Pt here for Lexiscan nuclear stress test.  Medication and side effects reviewed with patient. Lung sounds clear to auscultation bilaterally.  Denied caffeine use.  Patient tolerated Lexiscan dose without any adverse reactions.  VSS.  Monitored post injection and then taken to the [facility] waiting room and instructed to wait there for nuclear medicine tech for follow up imaging.

## 2022-03-16 ENCOUNTER — LAB (OUTPATIENT)
Dept: LAB | Facility: CLINIC | Age: 62
End: 2022-03-16
Attending: INTERNAL MEDICINE
Payer: COMMERCIAL

## 2022-03-16 DIAGNOSIS — Z11.59 ENCOUNTER FOR SCREENING FOR OTHER VIRAL DISEASES: ICD-10-CM

## 2022-03-16 LAB — SARS-COV-2 RNA RESP QL NAA+PROBE: NEGATIVE

## 2022-03-16 PROCEDURE — U0003 INFECTIOUS AGENT DETECTION BY NUCLEIC ACID (DNA OR RNA); SEVERE ACUTE RESPIRATORY SYNDROME CORONAVIRUS 2 (SARS-COV-2) (CORONAVIRUS DISEASE [COVID-19]), AMPLIFIED PROBE TECHNIQUE, MAKING USE OF HIGH THROUGHPUT TECHNOLOGIES AS DESCRIBED BY CMS-2020-01-R: HCPCS

## 2022-03-16 PROCEDURE — U0005 INFEC AGEN DETEC AMPLI PROBE: HCPCS

## 2022-03-18 ENCOUNTER — ANESTHESIA EVENT (OUTPATIENT)
Dept: SURGERY | Facility: CLINIC | Age: 62
End: 2022-03-18
Payer: COMMERCIAL

## 2022-03-18 ENCOUNTER — HOSPITAL ENCOUNTER (OUTPATIENT)
Dept: CARDIOLOGY | Facility: CLINIC | Age: 62
Discharge: HOME | End: 2022-03-18
Attending: NURSE PRACTITIONER | Admitting: ANESTHESIOLOGY
Payer: COMMERCIAL

## 2022-03-18 ENCOUNTER — APPOINTMENT (OUTPATIENT)
Dept: LAB | Facility: CLINIC | Age: 62
End: 2022-03-18
Attending: ANESTHESIOLOGY
Payer: COMMERCIAL

## 2022-03-18 ENCOUNTER — HOSPITAL ENCOUNTER (OUTPATIENT)
Facility: CLINIC | Age: 62
Discharge: HOME | End: 2022-03-18
Attending: ANESTHESIOLOGY | Admitting: ANESTHESIOLOGY
Payer: COMMERCIAL

## 2022-03-18 ENCOUNTER — ANESTHESIA (OUTPATIENT)
Dept: SURGERY | Facility: CLINIC | Age: 62
End: 2022-03-18
Payer: COMMERCIAL

## 2022-03-18 ENCOUNTER — APPOINTMENT (OUTPATIENT)
Dept: MEDSURG UNIT | Facility: CLINIC | Age: 62
End: 2022-03-18
Attending: NURSE PRACTITIONER
Payer: COMMERCIAL

## 2022-03-18 ENCOUNTER — HOSPITAL ENCOUNTER (OUTPATIENT)
Dept: CARDIOLOGY | Facility: CLINIC | Age: 62
Discharge: HOME | End: 2022-03-18
Attending: NURSE PRACTITIONER
Payer: COMMERCIAL

## 2022-03-18 VITALS
RESPIRATION RATE: 20 BRPM | HEART RATE: 83 BPM | OXYGEN SATURATION: 97 % | DIASTOLIC BLOOD PRESSURE: 92 MMHG | SYSTOLIC BLOOD PRESSURE: 125 MMHG

## 2022-03-18 VITALS
RESPIRATION RATE: 16 BRPM | SYSTOLIC BLOOD PRESSURE: 129 MMHG | OXYGEN SATURATION: 99 % | HEART RATE: 91 BPM | DIASTOLIC BLOOD PRESSURE: 90 MMHG

## 2022-03-18 LAB
HOLD SPECIMEN: NORMAL
LVEF ECHO: NORMAL
MAGNESIUM SERPL-MCNC: 1.9 MG/DL (ref 1.6–2.3)
POTASSIUM BLD-SCNC: 4.1 MMOL/L (ref 3.4–5.3)

## 2022-03-18 PROCEDURE — 250N000009 HC RX 250: Performed by: INTERNAL MEDICINE

## 2022-03-18 PROCEDURE — 99152 MOD SED SAME PHYS/QHP 5/>YRS: CPT | Performed by: INTERNAL MEDICINE

## 2022-03-18 PROCEDURE — 84132 ASSAY OF SERUM POTASSIUM: CPT | Performed by: INTERNAL MEDICINE

## 2022-03-18 PROCEDURE — 92960 CARDIOVERSION ELECTRIC EXT: CPT

## 2022-03-18 PROCEDURE — 93010 ELECTROCARDIOGRAM REPORT: CPT | Mod: 59 | Performed by: INTERNAL MEDICINE

## 2022-03-18 PROCEDURE — 36415 COLL VENOUS BLD VENIPUNCTURE: CPT | Performed by: INTERNAL MEDICINE

## 2022-03-18 PROCEDURE — 76376 3D RENDER W/INTRP POSTPROCES: CPT | Mod: 26 | Performed by: INTERNAL MEDICINE

## 2022-03-18 PROCEDURE — 93312 ECHO TRANSESOPHAGEAL: CPT | Mod: 26 | Performed by: INTERNAL MEDICINE

## 2022-03-18 PROCEDURE — 92960 CARDIOVERSION ELECTRIC EXT: CPT | Performed by: NURSE PRACTITIONER

## 2022-03-18 PROCEDURE — 258N000003 HC RX IP 258 OP 636: Performed by: INTERNAL MEDICINE

## 2022-03-18 PROCEDURE — 999N000054 HC STATISTIC EKG NON-CHARGEABLE

## 2022-03-18 PROCEDURE — 83735 ASSAY OF MAGNESIUM: CPT | Performed by: INTERNAL MEDICINE

## 2022-03-18 PROCEDURE — 93320 DOPPLER ECHO COMPLETE: CPT | Mod: 26 | Performed by: INTERNAL MEDICINE

## 2022-03-18 PROCEDURE — 93325 DOPPLER ECHO COLOR FLOW MAPG: CPT | Mod: 26 | Performed by: INTERNAL MEDICINE

## 2022-03-18 PROCEDURE — 93325 DOPPLER ECHO COLOR FLOW MAPG: CPT

## 2022-03-18 PROCEDURE — 250N000011 HC RX IP 250 OP 636: Performed by: INTERNAL MEDICINE

## 2022-03-18 PROCEDURE — 250N000009 HC RX 250: Performed by: NURSE ANESTHETIST, CERTIFIED REGISTERED

## 2022-03-18 PROCEDURE — 370N000017 HC ANESTHESIA TECHNICAL FEE, PER MIN

## 2022-03-18 RX ORDER — NALOXONE HYDROCHLORIDE 0.4 MG/ML
0.2 INJECTION, SOLUTION INTRAMUSCULAR; INTRAVENOUS; SUBCUTANEOUS
Status: DISCONTINUED | OUTPATIENT
Start: 2022-03-18 | End: 2022-03-19 | Stop reason: HOSPADM

## 2022-03-18 RX ORDER — FENTANYL CITRATE 50 UG/ML
50 INJECTION, SOLUTION INTRAMUSCULAR; INTRAVENOUS ONCE
Status: DISCONTINUED | OUTPATIENT
Start: 2022-03-18 | End: 2022-03-19 | Stop reason: HOSPADM

## 2022-03-18 RX ORDER — LIDOCAINE 40 MG/G
CREAM TOPICAL
Status: DISCONTINUED | OUTPATIENT
Start: 2022-03-18 | End: 2022-03-19 | Stop reason: HOSPADM

## 2022-03-18 RX ORDER — ACYCLOVIR 200 MG/1
9.5 CAPSULE ORAL
Status: DISCONTINUED | OUTPATIENT
Start: 2022-03-18 | End: 2022-03-19 | Stop reason: HOSPADM

## 2022-03-18 RX ORDER — POTASSIUM CHLORIDE 1500 MG/1
20 TABLET, EXTENDED RELEASE ORAL
Status: DISCONTINUED | OUTPATIENT
Start: 2022-03-18 | End: 2022-03-19 | Stop reason: HOSPADM

## 2022-03-18 RX ORDER — NALOXONE HYDROCHLORIDE 0.4 MG/ML
0.4 INJECTION, SOLUTION INTRAMUSCULAR; INTRAVENOUS; SUBCUTANEOUS
Status: DISCONTINUED | OUTPATIENT
Start: 2022-03-18 | End: 2022-03-19 | Stop reason: HOSPADM

## 2022-03-18 RX ORDER — MAGNESIUM SULFATE HEPTAHYDRATE 40 MG/ML
2 INJECTION, SOLUTION INTRAVENOUS
Status: DISCONTINUED | OUTPATIENT
Start: 2022-03-18 | End: 2022-03-19 | Stop reason: HOSPADM

## 2022-03-18 RX ORDER — FENTANYL CITRATE 50 UG/ML
25 INJECTION, SOLUTION INTRAMUSCULAR; INTRAVENOUS
Status: DISCONTINUED | OUTPATIENT
Start: 2022-03-18 | End: 2022-03-19 | Stop reason: HOSPADM

## 2022-03-18 RX ORDER — LIDOCAINE HYDROCHLORIDE 20 MG/ML
15 SOLUTION OROPHARYNGEAL ONCE
Status: COMPLETED | OUTPATIENT
Start: 2022-03-18 | End: 2022-03-18

## 2022-03-18 RX ORDER — SODIUM CHLORIDE 9 MG/ML
INJECTION, SOLUTION INTRAVENOUS CONTINUOUS PRN
Status: DISCONTINUED | OUTPATIENT
Start: 2022-03-18 | End: 2022-03-19 | Stop reason: HOSPADM

## 2022-03-18 RX ORDER — FLUMAZENIL 0.1 MG/ML
0.2 INJECTION, SOLUTION INTRAVENOUS
Status: DISCONTINUED | OUTPATIENT
Start: 2022-03-18 | End: 2022-03-19 | Stop reason: HOSPADM

## 2022-03-18 RX ORDER — POTASSIUM CHLORIDE 1500 MG/1
40 TABLET, EXTENDED RELEASE ORAL
Status: DISCONTINUED | OUTPATIENT
Start: 2022-03-18 | End: 2022-03-19 | Stop reason: HOSPADM

## 2022-03-18 RX ADMIN — FENTANYL CITRATE 25 MCG: 50 INJECTION, SOLUTION INTRAMUSCULAR; INTRAVENOUS at 13:25

## 2022-03-18 RX ADMIN — LIDOCAINE HYDROCHLORIDE 30 ML: 20 SOLUTION ORAL; TOPICAL at 13:03

## 2022-03-18 RX ADMIN — SODIUM CHLORIDE 200 ML: 9 INJECTION, SOLUTION INTRAVENOUS at 14:27

## 2022-03-18 RX ADMIN — TOPICAL ANESTHETIC 0.5 ML: 200 SPRAY DENTAL; PERIODONTAL at 13:10

## 2022-03-18 RX ADMIN — FENTANYL CITRATE 50 MCG: 50 INJECTION, SOLUTION INTRAMUSCULAR; INTRAVENOUS at 13:18

## 2022-03-18 RX ADMIN — MIDAZOLAM 1 MG: 1 INJECTION INTRAMUSCULAR; INTRAVENOUS at 13:25

## 2022-03-18 RX ADMIN — METHOHEXITAL SODIUM 50 MG: 500 INJECTION, POWDER, LYOPHILIZED, FOR SOLUTION INTRAMUSCULAR; INTRAVENOUS; RECTAL at 14:08

## 2022-03-18 RX ADMIN — MIDAZOLAM 2 MG: 1 INJECTION INTRAMUSCULAR; INTRAVENOUS at 13:18

## 2022-03-18 ASSESSMENT — ENCOUNTER SYMPTOMS
SPEECH DIFFICULTY: 0
FEVER: 0
SORE THROAT: 0
SHORTNESS OF BREATH: 0
COUGH: 0
VOMITING: 0
NAUSEA: 0
TROUBLE SWALLOWING: 0
WHEEZING: 0

## 2022-03-18 NOTE — PROGRESS NOTES
Pt arrived in ECHO department for scheduled WERNER/Cardioversion.   Procedure explained, questions answered and consent signed. Discharge instructions discussed with patient and given written copy.  Pt's throat sprayed at 1300, therefore pt will not be able to eat or drink until 2 hours after at 1500. Informed pt of this time and encouraged to start with warm fluids and soft foods.    Pt tolerated procedure well, and was given a total of 75 mcg IV fentanyl and 3 mg IV versed for conscious sedation.    WERNER probe 64 used for procedure.  No clots visualized on WERNER so proceeded with DCCV. Anesthesia gave pt 50 mg IV Brevital for sedation and pt was DCCV X3; (150, 200, 200 Joules), but remained in atrial fibrillation.   Pt denied chest or throat pain after procedure and was D/C home after awake and VSS. Escorted out to front lobby by staff in w/c to meet pt's ride home, his spouse.

## 2022-03-18 NOTE — PROCEDURES
[facility]    Procedure: Cardioversion    Date/Time: 3/18/2022 3:34 PM  Performed by: Rupal Olguin APRN CNP  Authorized by: Leanna Turner MD       UNIVERSAL PROTOCOL   Site Marked: NA  Prior Images Obtained and Reviewed:  NA  Required items: Required blood products, implants, devices and special equipment available    Patient identity confirmed:  Verbally with patient  Patient was reevaluated immediately before administering moderate or deep sedation or anesthesia  Confirmation Checklist:  Patient's identity using two indicators  Universal Protocol: the Joint Commission Universal Protocol was followed       ANESTHESIA  Anesthesia was administered and monitored by anesthesiology.  See anesthesia documentation for details.    PROCEDURE DETAILS  Pre-procedure rhythm: atrial fibrillation  Patient position: patient was placed in a supine position  Chest area: chest area exposed  Electrodes: pads  Electrodes placed: anterior-posterior  Number of attempts: 3    Details of Attempts:  150J, 200J, and 200J biphasic synchronized shocks delivered without successful conversion. Patient remained in persistent atrial fibrillation.       PROCEDURE    Length of time physician/provider present for 1:1 monitoring during sedation: 0      VENECIA Navarro CNP  Electrophysiology Consult Service  Pager: [phone]

## 2022-03-18 NOTE — ANESTHESIA CARE TRANSFER NOTE
Patient: Stef Mosher    Procedure: Procedure(s):  ANESTHESIA, FOR CARDIOVERSION@1400       Diagnosis: Paroxysmal atrial fibrillation (H) [I48.0]  Diagnosis Additional Information: No value filed.    Anesthesia Type:   No value filed.     Note:    Oropharynx: oropharynx clear of all foreign objects and spontaneously breathing  Level of Consciousness: drowsy  Oxygen Supplementation: nasal cannula  Level of Supplemental Oxygen (L/min / FiO2): 4  Independent Airway: airway patency satisfactory and stable  Dentition: dentition unchanged  Vital Signs Stable: post-procedure vital signs reviewed and stable  Report to RN Given: handoff report given  Patient transferred to: Phase II    Handoff Report: Identifed the Patient, Identified the Reponsible Provider, Reviewed the pertinent medical history, Discussed the surgical course, Reviewed Intra-OP anesthesia mangement and issues during anesthesia, Set expectations for post-procedure period and Allowed opportunity for questions and acknowledgement of understanding      Vitals:  Vitals Value Taken Time   /86 03/18/22 1400   Temp     Pulse 94 03/18/22 1400   Resp 20 03/18/22 1400   SpO2 98 % 03/18/22 1400       Electronically Signed By: VENECIA Yang CRNA  March 18, 2022  2:13 PM

## 2022-03-18 NOTE — PRE-PROCEDURE
GENERAL PRE-PROCEDURE:   Procedure:  Transesophageal Echocardiogram with Moderate Sedation, Potential Cardioversion with General Anesthesia  Date/Time:  3/18/2022 12:46 PM    Verbal consent obtained?: Yes    Written consent obtained?: Yes    Risks and benefits: Risks, benefits and alternatives were discussed    Consent given by:  Patient  Patient states understanding of procedure being performed: Yes    Patient's understanding of procedure matches consent: Yes    Procedure consent matches procedure scheduled: Yes    Expected level of sedation:  Moderate  Appropriately NPO:  Yes  ASA Class:  2  Mallampati  :  Grade 2- soft palate, base of uvula, tonsillar pillars, and portion of posterior pharyngeal wall visible  Lungs:  Lungs clear with good breath sounds bilaterally  Heart:  Normal heart sounds and rate and a-fib  History & Physical reviewed:  History and physical reviewed and no updates needed  Statement of review:  I have reviewed the lab findings, diagnostic data, medications, and the plan for sedation

## 2022-03-18 NOTE — H&P
Stef Mosher is an 62 year old male a medical history that includes cardiomyopathy, likely tachycardia mediated, atrial fibrillation, HTN, HLD, and GI bleed. He is a patient of Dr. Turner's, and is referred for transesophageal echocardiogram and potential cardioversion. He was last seen by Dr. Turner on 2/7/22. Today he is feeling well, without any acute complaints. He is well informed about the planned procedures, and voices no concerns regarding the plan.    Past Medical History:   Diagnosis Date     Arthritis      Atrial fibrillation (H)      Hayfever      Hypercholesterolemia      Hypertension      Unspecified hypothyroidism        Allergies: No Known Allergies    Active Problems:    * No active hospital problems. *    Blood pressure 114/86, pulse 94, resp. rate 20, SpO2 98 %.    Review of Systems   Constitutional: Negative for fever.   HENT: Negative for congestion, dental problem, mouth sores, sore throat and trouble swallowing.    Respiratory: Negative for cough, shortness of breath and wheezing.    Cardiovascular: Negative for chest pain.   Gastrointestinal: Negative for nausea and vomiting.   Neurological: Negative for speech difficulty.       Physical Exam  Vitals and nursing note reviewed.   Constitutional:       General: He is not in acute distress.     Appearance: Normal appearance.   HENT:      Head: Normocephalic and atraumatic.      Nose: Nose normal.      Mouth/Throat:      Mouth: Mucous membranes are dry.      Pharynx: Oropharynx is clear. No oropharyngeal exudate or posterior oropharyngeal erythema.   Eyes:      General: No scleral icterus.     Conjunctiva/sclera: Conjunctivae normal.   Cardiovascular:      Rate and Rhythm: Normal rate. Rhythm irregular.      Pulses: Normal pulses.      Heart sounds: Normal heart sounds.   Pulmonary:      Effort: Pulmonary effort is normal.      Breath sounds: Normal breath sounds. No stridor. No wheezing or rhonchi.   Musculoskeletal:      Left lower leg: Edema  present.   Skin:     General: Skin is warm and dry.   Neurological:      Mental Status: He is alert.         Assessment:  Stef Mosher is an 62 year old male a medical history that includes cardiomyopathy, likely tachycardia mediated, atrial fibrillation, HTN, HLD, and GI bleed who presents for a transesophageal echocardiogram and potential DCCV.    Plan:  - transesophageal echocardiogram with moderate sedation  - DCCV with EP under general anesthesia, pending results of WERNER    Carlos Rivera MD  Cardiology Fellow  3/18/2022

## 2022-03-18 NOTE — DISCHARGE INSTRUCTIONS
GOING HOME AFTER YOUR TRANSESOPHAGEAL ECHOCARDIOGRAM AND CARDIOVERSION    FOR NEXT 24 HOURS:    An adult should stay with you.    Relax and take it easy.    DO NOT make any important legal decisions.    DO NOT drive or operate machines at home or at work.    DO NOT consume any alcohol today.    Resume your regular diet 2 HOURS after procedure @ _______ and drink plenty of fluids.    If your throat is sore, eat cold, bland, soft foods.    If you have any redness/skin sorness where the patches were placed, you may use aloe vera gel or 1% hydrocortisone cream to the skin (sold at drug stores)    Take Tylenol (Acetaminophen) per your provider's recommendations as needed to help relieve local pain.     CALL YOUR HEALTHCARE PROVIDER IF:    New pain or trouble swallowing    New stomach or chest pain    You develop nausea or vomiting    Fever above 100.6 F or greater    You develop hives or a rash or any unexplained itching    Have irregular heartbeat or fast pulse    Feel faint, dizzy, or lightheaded    Have chest pain with increased activity    Have bleeding issues from blood-thinning medicines (vomiting that looks like coffee grounds or contains blood OR black, bloody stools).    CALL 911 RIGHT AWAY IF YOU HAVE:    Pain in your chest, arm, shoulder, neck, or upper back    Shortness of breath    Loss of vision, speech, or strength or coordination in any body part    Weakness in your arm or leg    Uncontrolled bleeding    Feel unstable in any way     FOLLOW UP APPOINTMENT:      Follow up as scheduled with EP/Cardiology Provider     ADDITIONAL INFORMATION:    If you have any questions:        Call the Echo Lab Nurse at [phone], press 4 (Monday-Friday 7:00 am - 4:00 pm)        Call the hospital: [phone] and ask them to page [phone] (after 4:00 pm and on   weekends or holidays)      Cardiovascular Clinic:   [address] [place] [address]. [place] [address]  Your Care Team:  EP Cardiology   Telephone Number     Rupal Leal  Chantelle NP [phone]   Stella Allison RN  Ngoc Talbot RN  [phone]     For scheduling appts or procedures:    Pita Carcamo   [phone]   For the Device Clinic (Pacemakers and ICD's)   RN's :   Vale Cartagena  During business hours: [phone]     After business hours:   [phone]- select option 4 and ask for job code [id number].       As always, Thank you for trusting us with your health care needs!

## 2022-03-19 LAB
ATRIAL RATE - MUSE: 107 BPM
DIASTOLIC BLOOD PRESSURE - MUSE: NORMAL MMHG
INTERPRETATION ECG - MUSE: NORMAL
P AXIS - MUSE: NORMAL DEGREES
PR INTERVAL - MUSE: NORMAL MS
QRS DURATION - MUSE: 116 MS
QT - MUSE: 356 MS
QTC - MUSE: 454 MS
R AXIS - MUSE: 5 DEGREES
SYSTOLIC BLOOD PRESSURE - MUSE: NORMAL MMHG
T AXIS - MUSE: -2 DEGREES
VENTRICULAR RATE- MUSE: 98 BPM

## 2022-03-22 ENCOUNTER — TELEPHONE (OUTPATIENT)
Dept: CARDIOLOGY | Facility: CLINIC | Age: 62
End: 2022-03-22
Payer: COMMERCIAL

## 2022-03-22 NOTE — TELEPHONE ENCOUNTER
[facility] Call Center    Phone Message    May a detailed message be left on voicemail: yes     Reason for Call: Medication Question or concern regarding medication   Prescription Clarification  Name of Medication: apixaban ANTICOAGULANT (ELIQUIS ANTICOAGULANT) 5 MG tablet  Prescribing Provider: Dr Turner   Pharmacy:    What on the order needs clarification? The patient is having Endoscopic US on 4/19th and they are requesting a 3 day hold   Verbal approval is ok          Action Taken: Other: cardiology    Travel Screening: Not Applicable

## 2022-03-22 NOTE — CONFIDENTIAL NOTE
Writer will discuss with Dr. Turner.    Mahnaz Reynoso, RN  Cardiology Care Coordinator  [facility]  [phone] option 1

## 2022-03-24 ENCOUNTER — TELEPHONE (OUTPATIENT)
Dept: CARDIOLOGY | Facility: CLINIC | Age: 62
End: 2022-03-24
Payer: COMMERCIAL

## 2022-03-24 NOTE — CONFIDENTIAL NOTE
Per Dr. Turner ok to hold Eliquis 3 days prior to procedure.    Writer contacted [facility] with order details and left detailed message with hold orders and to call clinic if any questions.    Mahnaz Reynoso RN  Cardiology Care Coordinator  [facility]  [phone] option 1

## 2022-03-24 NOTE — CONFIDENTIAL NOTE
3/18/22 Cardioversion attempted x3-not successful.    Per Dr. Turner, schedule virtual visit on 4/4 to discuss next steps.    Left message for patient to call clinic.      Mahnaz Reynoso RN

## 2022-03-28 ENCOUNTER — TELEPHONE (OUTPATIENT)
Dept: CARDIOLOGY | Facility: CLINIC | Age: 62
End: 2022-03-28
Payer: COMMERCIAL

## 2022-03-28 DIAGNOSIS — E78.5 HYPERLIPIDEMIA LDL GOAL <100: ICD-10-CM

## 2022-03-28 DIAGNOSIS — I48.0 PAROXYSMAL ATRIAL FIBRILLATION (H): ICD-10-CM

## 2022-03-28 DIAGNOSIS — I42.9 CARDIOMYOPATHY, UNSPECIFIED TYPE (H): ICD-10-CM

## 2022-03-28 RX ORDER — LISINOPRIL 40 MG/1
TABLET ORAL
Qty: 90 TABLET | Refills: 1 | Status: SHIPPED | OUTPATIENT
Start: 2022-03-28 | End: 2022-06-17 | Stop reason: ALTCHOICE

## 2022-03-28 NOTE — CONFIDENTIAL NOTE
Signed Prescriptions:                        Disp   Refills    lisinopril (ZESTRIL) 40 MG tablet          90 tab*1        Sig: TAKE 1 TABLET BY MOUTH EVERY DAY  Authorizing Provider: KAITLYNN STRATTON  Ordering User: MELODY YOUNG Comprehensive Metabolic Panel:  Sodium   Date Value Ref Range Status   10/22/2021 140 133 - 144 mmol/L Final   07/09/2021 139 133 - 144 mmol/L Final     Potassium   Date Value Ref Range Status   03/18/2022 4.1 3.4 - 5.3 mmol/L Final   07/09/2021 4.5 3.4 - 5.3 mmol/L Final     Chloride   Date Value Ref Range Status   10/22/2021 110 (H) 94 - 109 mmol/L Final   07/09/2021 108 94 - 109 mmol/L Final     Carbon Dioxide   Date Value Ref Range Status   07/09/2021 24 20 - 32 mmol/L Final     Carbon Dioxide (CO2)   Date Value Ref Range Status   10/22/2021 29 20 - 32 mmol/L Final     Anion Gap   Date Value Ref Range Status   10/22/2021 1 (L) 3 - 14 mmol/L Final   07/09/2021 7 3 - 14 mmol/L Final     Glucose   Date Value Ref Range Status   10/22/2021 102 (H) 70 - 99 mg/dL Final   07/09/2021 100 (H) 70 - 99 mg/dL Final     Comment:     Fasting specimen     Urea Nitrogen   Date Value Ref Range Status   10/22/2021 16 7 - 30 mg/dL Final   07/09/2021 14 7 - 30 mg/dL Final     Creatinine   Date Value Ref Range Status   10/22/2021 0.73 0.66 - 1.25 mg/dL Final   07/09/2021 0.74 0.66 - 1.25 mg/dL Final     GFR Estimate   Date Value Ref Range Status   10/22/2021 >90 >60 mL/min/1.73m2 Final     Comment:     As of July 11, 2021, eGFR is calculated by the CKD-EPI creatinine equation, without race adjustment. eGFR can be influenced by muscle mass, exercise, and diet. The reported eGFR is an estimation only and is only applicable if the renal function is stable.   07/09/2021 >90 >60 mL/min/[1.73_m2] Final     Comment:     Non  GFR Calc  Starting 12/18/2018, serum creatinine based estimated GFR (eGFR) will be   calculated using the Chronic Kidney Disease Epidemiology Collaboration   (CKD-EPI)  equation.       Calcium   Date Value Ref Range Status   10/22/2021 9.1 8.5 - 10.1 mg/dL Final   07/09/2021 8.5 8.5 - 10.1 mg/dL Final     Mahnaz Reynoso RN  Cardiology Care Coordinator  [facility]  [phone] option 1

## 2022-03-28 NOTE — TELEPHONE ENCOUNTER
[facility] Call Center    Phone Message    May a detailed message be left on voicemail: yes     Reason for Call: Other: Stef is returning a missed call from the care team about lab results, please advise      Action Taken: Message routed to:  [facility] clinical pool    Travel Screening: Not Applicable

## 2022-03-29 NOTE — TELEPHONE ENCOUNTER
Spoke to patient and offered a virtual appointment with Dr. Turner on 4/4 at 11 am and patient accepted.  Patient states he feels really well since changing from coumadin to Eliquis.  Feels that he has more energy.      Mahnaz Reynoso RN  Cardiology Care Coordinator  [facility]  [phone] option 1

## 2022-03-30 RX ORDER — ATORVASTATIN CALCIUM 20 MG/1
20 TABLET, FILM COATED ORAL DAILY
Qty: 90 TABLET | Refills: 3 | Status: SHIPPED | OUTPATIENT
Start: 2022-03-30 | End: 2022-12-26

## 2022-03-30 NOTE — CONFIDENTIAL NOTE
Signed Prescriptions:                        Disp   Refills    atorvastatin (LIPITOR) 20 MG tablet        90 tab*3        Sig: Take 1 tablet (20 mg) by mouth daily  Authorizing Provider: KAITLYNN STRATTON  Ordering User: MAHNAZ REYNOSO    Rx filled per refill protocol.    Patient also requesting a refill on Keppra. Pt states it was started at the hospital in [place] for seizures.  Writer explained that he will have to reach out to his primary care for refills.  He states that he has refills but it is at a [place] [facility].  Writer advised patient to contact pharmacy and request to have the Rx transferred to preferred local pharmacy.  Patient verbalized understanding.  He should also establish care with primary for future Keppra refills.  Patient also wanting to know if Dr. Stratton could help with refills of Keprra-writer advised patient to discuss it with Dr. Stratton during virtual visit on Monday 4/4/22.  Pt verbalized understanding.    Mahnaz Reynoso RN  Cardiology Care Coordinator  [facility]  [phone] option 1        Lab Results   Component Value Date    CHOL 125 07/09/2021     Lab Results   Component Value Date    HDL 35 07/09/2021     Lab Results   Component Value Date    LDL 74 07/09/2021     Lab Results   Component Value Date    TRIG 82 07/09/2021     Lab Results   Component Value Date    CHOLHDLRATIO 6.4 04/07/2014     Mahnaz Reynoso RN  Cardiology Care Coordinator  [facility]  [phone] option 1

## 2022-03-30 NOTE — TELEPHONE ENCOUNTER
[facility] Call Center    Phone Message    May a detailed message be left on voicemail: yes     Reason for Call: Other: Stef called wanting to speak to Mahnaz and would like for her to call him back. Please advise. Thank you.      Action Taken: Message routed to:  Other: [place]    Travel Screening: Not Applicable

## 2022-04-01 ENCOUNTER — LAB (OUTPATIENT)
Dept: LAB | Facility: CLINIC | Age: 62
End: 2022-04-01
Payer: COMMERCIAL

## 2022-04-01 DIAGNOSIS — D64.9 LOW HEMOGLOBIN: ICD-10-CM

## 2022-04-01 LAB — HGB BLD-MCNC: 14.7 G/DL (ref 13.3–17.7)

## 2022-04-01 PROCEDURE — 85018 HEMOGLOBIN: CPT

## 2022-04-01 PROCEDURE — 36415 COLL VENOUS BLD VENIPUNCTURE: CPT

## 2022-04-04 ENCOUNTER — VIRTUAL VISIT (OUTPATIENT)
Dept: CARDIOLOGY | Facility: CLINIC | Age: 62
End: 2022-04-04
Payer: COMMERCIAL

## 2022-04-04 DIAGNOSIS — I48.19 PERSISTENT ATRIAL FIBRILLATION (H): Primary | ICD-10-CM

## 2022-04-04 DIAGNOSIS — I50.22 CHRONIC SYSTOLIC HEART FAILURE (H): ICD-10-CM

## 2022-04-04 PROCEDURE — 99214 OFFICE O/P EST MOD 30 MIN: CPT | Mod: 95 | Performed by: INTERNAL MEDICINE

## 2022-04-04 NOTE — PROGRESS NOTES
Stef is a 62 year old who is being evaluated via a billable video visit.      How would you like to obtain your AVS? [employer]  If the video visit is dropped, the invitation should be resent by: [phone]  Will anyone else be joining your video visit? No    Video Start Time: 11:10 AM  Video-Visit Details    Type of service:  Video Visit    Video End Time: 11:24 AM    Originating Location (pt. Location): Other Work    Distant Location (provider location):  [facility] [place]     Platform used for Video Visit: [employer]     HPI: Purpose of visit: Follow-up for persistent atrial fibrillation and heart failure with reduced ejection fraction    Patient is a 62-year-old gentleman with a complex past medical history that includes persistent atrial fibrillation, heart failure with reduced ejection fraction, and significant GI bleed requiring blood transfusions.    Patient's last visit with me was in February 2022 and patient that visit we decided to send patient for a WERNER guided cardioversion.  The cardioversion was attempted on March 18, 2022, but was unsuccessful.  The WERNER noted that the ejection fraction was 30 to 35%.  Of note, patient has been restarted on apixaban 5 mg twice daily and his weekly hemoglobin levels have remained stable and he has not noted any clinical GI bleed.  A nuclear stress test was done on March 4 that showed evidence of myocardial ischemia but reported a normal ejection fraction.    Patient is currently taking 40 mg lisinopril once daily, Lasix 20 mg once daily, and Toprol- mg once daily.    Importantly, in the last 6 weeks, patient has noticed an improvement in his energy levels and his stamina.  He did not report any symptoms of palpitations, irregular heartbeat sensation, exertional angina, frequent lightheadedness, presyncope or syncope.      PAST MEDICAL HISTORY:  Past Medical History:   Diagnosis Date     Arthritis      Atrial fibrillation (H)      Hayfever       Hypercholesterolemia      Hypertension      Unspecified hypothyroidism        CURRENT MEDICATIONS:  Current Outpatient Medications   Medication Sig Dispense Refill     apixaban ANTICOAGULANT (ELIQUIS ANTICOAGULANT) 5 MG tablet Take 1 tablet (5 mg) by mouth 2 times daily 60 tablet 4     atorvastatin (LIPITOR) 20 MG tablet Take 1 tablet (20 mg) by mouth daily 90 tablet 3     furosemide (LASIX) 20 MG tablet Take 1 tablet (20 mg) by mouth daily 90 tablet 3     levETIRAcetam (KEPPRA) 500 MG tablet Take 500 mg by mouth       lisinopril (ZESTRIL) 40 MG tablet TAKE 1 TABLET BY MOUTH EVERY DAY 90 tablet 1     metoprolol succinate ER (TOPROL-XL) 200 MG 24 hr tablet Take 1 tablet (200 mg) by mouth daily 90 tablet 3     omeprazole (PRILOSEC) 40 MG DR capsule Take 1 capsule (40 mg) by mouth 2 times daily 180 capsule 0       PAST SURGICAL HISTORY:  Past Surgical History:   Procedure Laterality Date     ANESTHESIA CARDIOVERSION N/A 3/18/2022    Procedure: ANESTHESIA, FOR CARDIOVERSION@1400;  Surgeon: GENERIC ANESTHESIA PROVIDER;  Location: [facility]      THYROIDECTOMY         ALLERGIES:   No Known Allergies    FAMILY HISTORY:  - Premature coronary artery disease  - Atrial fibrillation  - Sudden cardiac death     SOCIAL HISTORY:  Social History     Tobacco Use     Smoking status: Never Smoker     Smokeless tobacco: Never Used     Tobacco comment: Lives in smoke free household   Vaping Use     Vaping Use: Never used   Substance Use Topics     Alcohol use: No     Comment: None     Drug use: No     Comment: Never       ROS:   Constitutional: No fever, chills, or sweats. Weight stable.   ENT: No visual disturbance, ear ache, epistaxis, sore throat.   Cardiovascular: As per HPI.   Respiratory: No cough, hemoptysis.    GI: No nausea, vomiting, hematemesis, melena, or hematochezia.   : No hematuria.   Integument: Negative.   Psychiatric: Negative.   Hematologic:  Easy bruising, no easy bleeding.  Neuro: Negative.   Endocrinology: No  significant heat or cold intolerance   Musculoskeletal: No myalgia.    Exam:  There were no vitals taken for this visit.  GENERAL APPEARANCE: healthy, alert and no distress    Labs:  CBC RESULTS:   Lab Results   Component Value Date    WBC 10.2 01/28/2022    WBC 8.1 07/09/2021    RBC 3.13 (L) 01/28/2022    RBC 2.61 (L) 07/09/2021    HGB 14.7 04/01/2022    HGB 6.2 (LL) 07/09/2021    HCT 30.9 (L) 01/28/2022    HCT 21.7 (L) 07/09/2021    MCV 99 01/28/2022    MCV 83 07/09/2021    MCH 29.4 01/28/2022    MCH 23.8 (L) 07/09/2021    MCHC 29.8 (L) 01/28/2022    MCHC 28.6 (L) 07/09/2021    RDW 17.7 (H) 01/28/2022    RDW 16.1 (H) 07/09/2021     01/28/2022     (H) 07/09/2021       BMP RESULTS:  Lab Results   Component Value Date     10/22/2021     07/09/2021    POTASSIUM 4.1 03/18/2022    POTASSIUM 4.5 07/09/2021    CHLORIDE 110 (H) 10/22/2021    CHLORIDE 108 07/09/2021    CO2 29 10/22/2021    CO2 24 07/09/2021    ANIONGAP 1 (L) 10/22/2021    ANIONGAP 7 07/09/2021     (H) 10/22/2021     (H) 07/09/2021    BUN 16 10/22/2021    BUN 14 07/09/2021    CR 0.73 10/22/2021    CR 0.74 07/09/2021    GFRESTIMATED >90 10/22/2021    GFRESTIMATED >90 07/09/2021    GFRESTBLACK >90 07/09/2021    EUGENIA 9.1 10/22/2021    EUGENIA 8.5 07/09/2021        INR RESULTS:  Lab Results   Component Value Date    INR 1.3 (H) 02/04/2022    INR 1.5 (H) 01/31/2022    INR 1.5 (H) 01/28/2022    INR 1.20 (A) 01/21/2022    INR 2.5 (H) 12/31/2021    INR 2.10 (H) 06/25/2021    INR 2.50 (H) 04/16/2021    INR 2.30 (H) 03/05/2021    INR 2.40 (H) 01/22/2021       Procedures:      Assessment and Plan:     Persistent atrial fibrillation-resistant to electrical cardioversion    Heart failure with reduced ejection fraction-ejection fraction 30 to 35% based on WERNER on March 18, 2022    History of symptomatic and significant GI bleed-current hemoglobin remains stable on apixaban 5 mg twice daily    I discussed extensively with patient his  complex medical situation.  It is encouraging that his weekly hemoglobin levels have remained stable in the past several weeks and we will continue apixaban 5 mg twice daily for stroke prophylaxis.    It is further encouraging that patient has noted a clinical improvement in his functional capacity and stamina.  Due to the heart failure with reduced ejection fraction, I will refer patient to Dr. Sivakumar Brambila for his expertise in starting further treatments such as Entresto, MRAs, or SGL 2 inhibitors. We will obtain a transthoracic echocardiogram in June 2022 before Dr. Brambila's clinic visit.    I will see patient again by video visit in August 2022.    All questions and concerns were addressed and the patient is happy with the plan.        CC  Patient Care Team:  Leanan Turner MD as PCP - General (Cardiovascular Disease)  Leanna Turner MD as MD (Cardiology)  Stella Allison RN as Specialty Care Coordinator (Cardiology)  Leanna Turner MD as Assigned Heart and Vascular Provider  Roxane Be MD as Assigned PCP

## 2022-04-04 NOTE — PATIENT INSTRUCTIONS
Thank you for coming to the [facility] at [place]; please note the following instructions:    1. Establish care with Dr.. Brambila, heart failure cardiology.  You will need echocardiogram and labs prior.    2. Follow up with Dr. Turner in August.  (reschedule April 25th appt)        If you have any questions regarding your visit, please contact your care team:     CARDIOLOGY  TELEPHONE NUMBER   Mahnaz MCKEON, Registered Nurse  Malgorzata MARTINEZ, Registered Nurse  Anailsa GORMAN, Registered Medical Assistant  Sandee DECKER, Visit Facilitator [phone] (select option 1)    *After hours: [phone]   For Scheduling Appts:     [phone] (select option 1)    *After hours: [phone]   For the Device Clinic (Pacemakers and ICD's)  Vale BRIDGES, Registered Nurse   During business hours: [phone]    *After business hours:  [phone] (select option 4)      Normal test result notifications will be released via [facility] or mailed within 7 business days.  All other test results, will be communicated via telephone once reviewed by your cardiologist.    If you need a medication refill, please contact your pharmacy.  Please allow 3 business days for your refill to be completed.    As always, thank you for trusting us with your health care needs!

## 2022-04-04 NOTE — LETTER
4/4/2022      RE: Stef Mosher  [address]  [place] [address]       Dear Colleague,    Thank you for the opportunity to participate in the care of your patient, Stef Mosher, at the [facility] HEART [facility]. Please see a copy of my visit note below.    Stef is a 62 year old who is being evaluated via a billable video visit.      How would you like to obtain your AVS? MyChart  If the video visit is dropped, the invitation should be resent by: [phone]  Will anyone else be joining your video visit? No    Video Start Time: 11:10 AM  Video-Visit Details    Type of service:  Video Visit    Video End Time: 11:24 AM    Originating Location (pt. Location): Other Work    Distant Location (provider location):  [facility]     Platform used for Video Visit: DoximiMapData     HPI: Purpose of visit: Follow-up for persistent atrial fibrillation and heart failure with reduced ejection fraction    Patient is a 62-year-old gentleman with a complex past medical history that includes persistent atrial fibrillation, heart failure with reduced ejection fraction, and significant GI bleed requiring blood transfusions.    Patient's last visit with me was in February 2022 and patient that visit we decided to send patient for a WERNER guided cardioversion.  The cardioversion was attempted on March 18, 2022, but was unsuccessful.  The WERNER noted that the ejection fraction was 30 to 35%.  Of note, patient has been restarted on apixaban 5 mg twice daily and his weekly hemoglobin levels have remained stable and he has not noted any clinical GI bleed.  A nuclear stress test was done on March 4 that showed evidence of myocardial ischemia but reported a normal ejection fraction.    Patient is currently taking 40 mg lisinopril once daily, Lasix 20 mg once daily, and Toprol- mg once daily.    Importantly, in the last 6 weeks, patient has  noticed an improvement in his energy levels and his stamina.  He did not report any symptoms of palpitations, irregular heartbeat sensation, exertional angina, frequent lightheadedness, presyncope or syncope.      PAST MEDICAL HISTORY:  Past Medical History:   Diagnosis Date     Arthritis      Atrial fibrillation (H)      Hayfever      Hypercholesterolemia      Hypertension      Unspecified hypothyroidism        CURRENT MEDICATIONS:  Current Outpatient Medications   Medication Sig Dispense Refill     apixaban ANTICOAGULANT (ELIQUIS ANTICOAGULANT) 5 MG tablet Take 1 tablet (5 mg) by mouth 2 times daily 60 tablet 4     atorvastatin (LIPITOR) 20 MG tablet Take 1 tablet (20 mg) by mouth daily 90 tablet 3     furosemide (LASIX) 20 MG tablet Take 1 tablet (20 mg) by mouth daily 90 tablet 3     levETIRAcetam (KEPPRA) 500 MG tablet Take 500 mg by mouth       lisinopril (ZESTRIL) 40 MG tablet TAKE 1 TABLET BY MOUTH EVERY DAY 90 tablet 1     metoprolol succinate ER (TOPROL-XL) 200 MG 24 hr tablet Take 1 tablet (200 mg) by mouth daily 90 tablet 3     omeprazole (PRILOSEC) 40 MG DR capsule Take 1 capsule (40 mg) by mouth 2 times daily 180 capsule 0       PAST SURGICAL HISTORY:  Past Surgical History:   Procedure Laterality Date     ANESTHESIA CARDIOVERSION N/A 3/18/2022    Procedure: ANESTHESIA, FOR CARDIOVERSION@1400;  Surgeon: GENERIC ANESTHESIA PROVIDER;  [facility]: [facility]      THYROIDECTOMY         ALLERGIES:   No Known Allergies    FAMILY HISTORY:  - Premature coronary artery disease  - Atrial fibrillation  - Sudden cardiac death     SOCIAL HISTORY:  Social History     Tobacco Use     Smoking status: Never Smoker     Smokeless tobacco: Never Used     Tobacco comment: Lives in smoke free household   Vaping Use     Vaping Use: Never used   Substance Use Topics     Alcohol use: No     Comment: None     Drug use: No     Comment: Never       ROS:   Constitutional: No fever, chills, or sweats. Weight stable.   ENT: No visual  disturbance, ear ache, epistaxis, sore throat.   Cardiovascular: As per HPI.   Respiratory: No cough, hemoptysis.    GI: No nausea, vomiting, hematemesis, melena, or hematochezia.   : No hematuria.   Integument: Negative.   Psychiatric: Negative.   Hematologic:  Easy bruising, no easy bleeding.  Neuro: Negative.   Endocrinology: No significant heat or cold intolerance   Musculoskeletal: No myalgia.    Exam:  There were no vitals taken for this visit.  GENERAL APPEARANCE: healthy, alert and no distress    Labs:  CBC RESULTS:   Lab Results   Component Value Date    WBC 10.2 01/28/2022    WBC 8.1 07/09/2021    RBC 3.13 (L) 01/28/2022    RBC 2.61 (L) 07/09/2021    HGB 14.7 04/01/2022    HGB 6.2 (LL) 07/09/2021    HCT 30.9 (L) 01/28/2022    HCT 21.7 (L) 07/09/2021    MCV 99 01/28/2022    MCV 83 07/09/2021    MCH 29.4 01/28/2022    MCH 23.8 (L) 07/09/2021    MCHC 29.8 (L) 01/28/2022    MCHC 28.6 (L) 07/09/2021    RDW 17.7 (H) 01/28/2022    RDW 16.1 (H) 07/09/2021     01/28/2022     (H) 07/09/2021       BMP RESULTS:  Lab Results   Component Value Date     10/22/2021     07/09/2021    POTASSIUM 4.1 03/18/2022    POTASSIUM 4.5 07/09/2021    CHLORIDE 110 (H) 10/22/2021    CHLORIDE 108 07/09/2021    CO2 29 10/22/2021    CO2 24 07/09/2021    ANIONGAP 1 (L) 10/22/2021    ANIONGAP 7 07/09/2021     (H) 10/22/2021     (H) 07/09/2021    BUN 16 10/22/2021    BUN 14 07/09/2021    CR 0.73 10/22/2021    CR 0.74 07/09/2021    GFRESTIMATED >90 10/22/2021    GFRESTIMATED >90 07/09/2021    GFRESTBLACK >90 07/09/2021    EUGENIA 9.1 10/22/2021    EUGENIA 8.5 07/09/2021        INR RESULTS:  Lab Results   Component Value Date    INR 1.3 (H) 02/04/2022    INR 1.5 (H) 01/31/2022    INR 1.5 (H) 01/28/2022    INR 1.20 (A) 01/21/2022    INR 2.5 (H) 12/31/2021    INR 2.10 (H) 06/25/2021    INR 2.50 (H) 04/16/2021    INR 2.30 (H) 03/05/2021    INR 2.40 (H) 01/22/2021       Procedures:      Assessment and Plan:      Persistent atrial fibrillation-resistant to electrical cardioversion    Heart failure with reduced ejection fraction-ejection fraction 30 to 35% based on WERNER on March 18, 2022    History of symptomatic and significant GI bleed-current hemoglobin remains stable on apixaban 5 mg twice daily    I discussed extensively with patient his complex medical situation.  It is encouraging that his weekly hemoglobin levels have remained stable in the past several weeks and we will continue apixaban 5 mg twice daily for stroke prophylaxis.    It is further encouraging that patient has noted a clinical improvement in his functional capacity and stamina.  Due to the heart failure with reduced ejection fraction, I will refer patient to Dr. Sivakumar Brambila for his expertise in starting further treatments such as Entresto, MRAs, or SGL 2 inhibitors. We will obtain a transthoracic echocardiogram in June 2022 before Dr. Brambila's clinic visit.    I will see patient again by video visit in August 2022.    All questions and concerns were addressed and the patient is happy with the plan.        CC  Patient Care Team:  Leanna Turner MD as PCP - General (Cardiovascular Disease)  Leanna Turner MD as MD (Cardiology)  Stella Allison RN as Specialty Care Coordinator (Cardiology)  Leanna Turner MD as Assigned Heart and Vascular Provider  Roxane Be MD as Assigned PCP            Please do not hesitate to contact me if you have any questions/concerns.     Sincerely,     Leanna Turner MD

## 2022-04-06 ENCOUNTER — TELEPHONE (OUTPATIENT)
Dept: FAMILY MEDICINE | Facility: CLINIC | Age: 62
End: 2022-04-06
Payer: COMMERCIAL

## 2022-04-06 DIAGNOSIS — K92.0 GASTROINTESTINAL HEMORRHAGE WITH HEMATEMESIS: Primary | ICD-10-CM

## 2022-04-06 NOTE — TELEPHONE ENCOUNTER
Patient made an appointment for endoscopy and Moralesina needs a referral for this.  Please call Kassidy      Fax referral to   Phone and fax number are the same MIRELA Vega-  [facility]

## 2022-04-06 NOTE — TELEPHONE ENCOUNTER
Patient last seen in January by Bear Amaro PA-C. I don't see note regarding patient needing an endoscopy. Will route to provider for review. Does patient need to be seen for referral to be placed? Kira Dan MA

## 2022-04-15 ENCOUNTER — OFFICE VISIT (OUTPATIENT)
Dept: FAMILY MEDICINE | Facility: CLINIC | Age: 62
End: 2022-04-15
Payer: COMMERCIAL

## 2022-04-15 VITALS
SYSTOLIC BLOOD PRESSURE: 129 MMHG | DIASTOLIC BLOOD PRESSURE: 85 MMHG | RESPIRATION RATE: 14 BRPM | HEIGHT: 78 IN | OXYGEN SATURATION: 97 % | WEIGHT: 315 LBS | HEART RATE: 83 BPM | TEMPERATURE: 98.3 F | BODY MASS INDEX: 36.45 KG/M2

## 2022-04-15 DIAGNOSIS — E78.5 HYPERLIPIDEMIA LDL GOAL <130: ICD-10-CM

## 2022-04-15 DIAGNOSIS — I10 ESSENTIAL HYPERTENSION: ICD-10-CM

## 2022-04-15 DIAGNOSIS — E66.01 MORBID OBESITY (H): ICD-10-CM

## 2022-04-15 DIAGNOSIS — I50.22 CHRONIC SYSTOLIC CONGESTIVE HEART FAILURE (H): ICD-10-CM

## 2022-04-15 DIAGNOSIS — Z01.818 PRE-OPERATIVE CLEARANCE: Primary | ICD-10-CM

## 2022-04-15 DIAGNOSIS — I48.20 CHRONIC ATRIAL FIBRILLATION (H): ICD-10-CM

## 2022-04-15 DIAGNOSIS — K92.0 GASTROINTESTINAL HEMORRHAGE WITH HEMATEMESIS: ICD-10-CM

## 2022-04-15 LAB
ANION GAP SERPL CALCULATED.3IONS-SCNC: 6 MMOL/L (ref 3–14)
BUN SERPL-MCNC: 20 MG/DL (ref 7–30)
CALCIUM SERPL-MCNC: 8.9 MG/DL (ref 8.5–10.1)
CHLORIDE BLD-SCNC: 108 MMOL/L (ref 94–109)
CO2 SERPL-SCNC: 28 MMOL/L (ref 20–32)
CREAT SERPL-MCNC: 0.76 MG/DL (ref 0.66–1.25)
GFR SERPL CREATININE-BSD FRML MDRD: >90 ML/MIN/1.73M2
GLUCOSE BLD-MCNC: 102 MG/DL (ref 70–99)
POTASSIUM BLD-SCNC: 4.1 MMOL/L (ref 3.4–5.3)
SODIUM SERPL-SCNC: 142 MMOL/L (ref 133–144)

## 2022-04-15 PROCEDURE — 36415 COLL VENOUS BLD VENIPUNCTURE: CPT | Performed by: STUDENT IN AN ORGANIZED HEALTH CARE EDUCATION/TRAINING PROGRAM

## 2022-04-15 PROCEDURE — 80048 BASIC METABOLIC PNL TOTAL CA: CPT | Performed by: STUDENT IN AN ORGANIZED HEALTH CARE EDUCATION/TRAINING PROGRAM

## 2022-04-15 PROCEDURE — 99214 OFFICE O/P EST MOD 30 MIN: CPT | Performed by: STUDENT IN AN ORGANIZED HEALTH CARE EDUCATION/TRAINING PROGRAM

## 2022-04-15 ASSESSMENT — PAIN SCALES - GENERAL: PAINLEVEL: NO PAIN (0)

## 2022-04-15 NOTE — PATIENT INSTRUCTIONS
Ross Finch,    Thank you for allowing [facility] to manage your care.    Hold your Eliquis 3 days before the surgery,    Take your other medications with a sip of water the morning of the surgery    I ordered some blood work, please go to the laboratory to get your laboratory studies.    I will wait for cardiology input.    For your convenience, test results are released as soon as they are available  Please allow 1-2 business days for me to send you a comment about your results.  If not done so, I encourage you to login into [employer] ([email or web address] to review your results in real time.     If you have any questions or concerns, please feel free to call us at [phone].    Sincerely,    Dr. Murphy    Did you know?      You can schedule a video visit for follow-up appointments as well as future appointments for certain conditions.  Please see the below link.     [email or web address]    If you have not already done so,  I encourage you to sign up for [employer] ([email or web address]).  This will allow you to review your results, securely communicate with a provider, and schedule virtual visits as well.

## 2022-04-15 NOTE — PROGRESS NOTES
[facility]  [address]  [place] [address]  Phone: [phone]  Primary Provider: Leanna Turner      :[id number]}  PREOPERATIVE EVALUATION:  Today's date: 4/15/2022    Stef Mosher is a 62 year old male who presents for a preoperative evaluation.    Surgical Information:  Surgery/Procedure: Endoscopic ultrasound   Surgery Location: [facility]   Surgeon: Talisha   Surgery Date: 4/26/22  Time of Surgery: TBD   Where patient plans to recover: At home with family  Fax number for surgical facility:     Type of Anesthesia Anticipated: to be determined    Assessment & Plan     The proposed surgical procedure is considered LOW risk.    1. Pre-operative clearance    - REVIEW OF HEALTH MAINTENANCE PROTOCOL ORDERS  - BASIC METABOLIC PANEL; Future  - BASIC METABOLIC PANEL    2. Gastrointestinal hemorrhage with hematemesis  Stable hemoglobin at 14.7.  3. Chronic atrial fibrillation (H)  Unsuccessful cardioversion in March 2022. R rate is controlled. On beta blocker and Eliquis  Managed by cardiology .    4. Hyperlipidemia LDL goal <130  On statin    5. Chronic systolic congestive heart failure,  Managed by cardiology.Would appreciate cardiology input on CHF and A.fib.Cardiology has been contacted electronically.  On ACE inhibitor, beta blocker and diuretics.   Last Echocardiogram 3/18/22,ejection fraction was 30-35%  Cardiology note reviewed, pt was referred for consideration for Enteresto, MRA or SGL 2 inhibitor treatment.  6. Essential hypertension  Controlled. Continue medication    7. Morbid obesity (H)  Counseled diet and exercise            Risks and Recommendations:  The patient has the following additional risks and recommendations for perioperative complications:    Medication Instructions:   - apixaban (Eliquis), edoxaban (Savaysa), rivaroxaban (Xarelto): Neuraxial or regional block anticipated AND CrCL (>=) 50mL/min. HOLD 3 days before surgery.    - ACE/ARB: May be  continued on the day of surgery.    - Beta Blockers: Continue taking on the day of surgery.   - Diuretics: May continue due to heart failure.   - Statins: Continue taking on the day of surgery.    - Antiepileptics: Continue without modification.    RECOMMENDATION:  Surgery approval pending clearance from cardiology.  Review of external notes as documented above   56}    Subjective     HPI related to upcoming procedure: He has a GI bleed in 3/11/22 requiring transfusion. Hb at the time was 5.3. He was on warfarin    Preop Questions 4/15/2022   1. Have you ever had a heart attack or stroke? No   2. Have you ever had surgery on your heart or blood vessels, such as a stent placement, a coronary artery bypass, or surgery on an artery in your head, neck, heart, or legs? No   3. Do you have chest pain with activity? No   4. Do you have a history of  heart failure? No   5. Do you currently have a cold, bronchitis or symptoms of other infection? No   6. Do you have a cough, shortness of breath, or wheezing? No   7. Do you or anyone in your family have previous history of blood clots? No   8. Do you or does anyone in your family have a serious bleeding problem such as prolonged bleeding following surgeries or cuts? No   9. Have you ever had problems with anemia or been told to take iron pills? YES - had a GI bleed in 01/22, he was on warfarin at the time, source of bleed was not found, reason he is having further evaluation   10. Have you had any abnormal blood loss such as black, tarry or bloody stools? YES -    11. Have you ever had a blood transfusion? No   12. Are you willing to have a blood transfusion if it is medically needed before, during, or after your surgery? Yes   13. Have you or any of your relatives ever had problems with anesthesia? No   14. Do you have sleep apnea, excessive snoring or daytime drowsiness? No   15. Do you have any artifical heart valves or other implanted medical devices like a pacemaker,  defibrillator, or continuous glucose monitor? No   16. Do you have artificial joints? No   17. Are you allergic to latex? No       Health Care Directive:  Patient does not have a Health Care Directive or Living Will: Discussed advance care planning with patient; however, patient declined at this time.  :[id number]}    Status of Chronic Conditions:  A-FIB - Patient has a longstanding history of persistent A-fib currently on rate and rhythm control. Current treatment regimen includes Apixaban for stroke prevention and denies significant symptoms of lightheadedness, palpitations or dyspnea.     ANEMIA - Patient has a recent history of moderate-severe anemia, which has not been symptomatic. Work up to date has revealed  Stable hemoglobin at 14.7. Still undergoing work up.     CHF - induced by tachycardia. Exacerbating conditions include atrial fibrilation. Currently the patient's condition is improving. Current treatment regimen includes ACE inhibitor, beta blocker and diuretic. The patient denies chest pain, edema, orthopnea, SOB or recent weight gain. Last Echocardiogram 3/18/22,ejection fraction was 30-35% (moderately reduced, EKG 3/18/22.     HYPERLIPIDEMIA - Patient has a long history of significant Hyperlipidemia requiring medication for treatment with recent good control. Patient reports no problems or side effects with the medication.     HYPERTENSION - Patient has longstanding history of HTN , currently denies any symptoms referable to elevated blood pressure. Specifically denies chest pain, palpitations, dyspnea, orthopnea, PND or peripheral edema. Blood pressure readings have been in normal range. Current medication regimen is as listed below. Patient denies any side effects of medication.       Review of Systems  Constitutional, neuro, ENT, endocrine, pulmonary, cardiac, gastrointestinal, genitourinary, musculoskeletal, integument and psychiatric systems are negative, except as otherwise noted.    Patient Active  Problem List    Diagnosis Date Noted     Chronic atrial fibrillation (H) 07/24/2020     Priority: Medium     Morbid obesity (H) 06/29/2018     Priority: Medium     Paroxysmal atrial fibrillation (H) 09/18/2017     Priority: Medium     Essential hypertension 03/30/2016     Priority: Medium     Long-term (current) use of anticoagulants [Z79.01] 01/25/2016     Priority: Medium     Hyperlipidemia LDL goal <130 11/20/2012     Priority: Medium     Seasonal allergies 03/24/2011     Priority: Medium     New onset atrial fibrillation (H) 07/13/2010     Priority: Medium     Dilated cardiomyopathy (H) 07/13/2010     Priority: Medium     CHF (congestive heart failure) (H) 07/06/2010     Priority: Medium     Hypothyroidism 12/29/2006     Priority: Medium     Problem list name updated by automated process. Provider to review        Past Medical History:   Diagnosis Date     Arthritis      Atrial fibrillation (H)      Hayfever      Hypercholesterolemia      Hypertension      Unspecified hypothyroidism      Past Surgical History:   Procedure Laterality Date     ANESTHESIA CARDIOVERSION N/A 3/18/2022    Procedure: ANESTHESIA, FOR CARDIOVERSION@1400;  Surgeon: GENERIC ANESTHESIA PROVIDER;  Location: [facility]      THYROIDECTOMY       Current Outpatient Medications   Medication Sig Dispense Refill     apixaban ANTICOAGULANT (ELIQUIS ANTICOAGULANT) 5 MG tablet Take 1 tablet (5 mg) by mouth 2 times daily 60 tablet 4     atorvastatin (LIPITOR) 20 MG tablet Take 1 tablet (20 mg) by mouth daily 90 tablet 3     furosemide (LASIX) 20 MG tablet Take 1 tablet (20 mg) by mouth daily 90 tablet 3     levETIRAcetam (KEPPRA) 500 MG tablet Take 500 mg by mouth       lisinopril (ZESTRIL) 40 MG tablet TAKE 1 TABLET BY MOUTH EVERY DAY 90 tablet 1     metoprolol succinate ER (TOPROL-XL) 200 MG 24 hr tablet Take 1 tablet (200 mg) by mouth daily 90 tablet 3     omeprazole (PRILOSEC) 40 MG DR capsule Take 1 capsule (40 mg) by mouth 2 times daily 180 capsule  0       No Known Allergies     Social History     Tobacco Use     Smoking status: Never Smoker     Smokeless tobacco: Never Used     Tobacco comment: Lives in smoke free household   Substance Use Topics     Alcohol use: No     Comment: None     Family History   Problem Relation Age of Onset     Blood Disease Mother      Cancer Mother      Arthritis Father      Hypertension Father      Breast Cancer Sister      Allergies Sister      Cancer Sister      Thyroid Disease Sister      Blood Disease Sister      Diabetes No family hx of      Cerebrovascular Disease No family hx of      Glaucoma No family hx of      Macular Degeneration No family hx of      History   Drug Use No     Comment: Never         Objective     There were no vitals taken for this visit.    Physical Exam    GENERAL APPEARANCE: healthy, alert and no distress     EYES: EOMI,  PERRL     HENT: ear canals and TM's normal and nose and mouth without ulcers or lesions     NECK: no adenopathy, no asymmetry, masses, or scars and thyroid normal to palpation     RESP: lungs clear to auscultation - no rales, rhonchi or wheezes     CV:iiregularly irrgular ,      ABDOMEN:  soft, nontender,  bowel sounds normal, obese abdomen, limited exam due to body habitus     MS: extremities normal- no gross deformities noted, no evidence of inflammation in joints, FROM in all extremities.     SKIN: no suspicious lesions or rashes     NEURO: Normal strength and tone, sensory exam grossly normal, mentation intact and speech normal     PSYCH: mentation appears normal. and affect normal/bright     LYMPHATICS: No cervical adenopathy    Recent Labs   Lab Test 04/01/22  1116 03/18/22  1159 03/04/22  1022 02/11/22  1034 02/04/22  1111 01/31/22  1519 01/31/22  1506 01/28/22  1544 11/12/21  1510 10/22/21  1109 08/05/21  1305 08/02/21  1543 07/19/21  1444 07/09/21  1207   HGB 14.7  --  13.0*   < > 10.7*   < >  --  9.2*  --   --   --  9.4*   < > 6.2*   PLT  --   --   --   --   --   --   --   346  --   --   --  359   < > 465*   INR  --   --   --   --  1.3*  --  1.5* 1.5*   < > 2.9*   < >  --   --   --    NA  --   --   --   --   --   --   --   --   --  140  --   --   --  139   POTASSIUM  --  4.1  --   --   --   --   --   --   --  4.0  --   --   --  4.5   CR  --   --   --   --   --   --   --   --   --  0.73  --   --   --  0.74    < > = values in this interval not displayed.        Diagnostics:  Labs pending at this time.  Results will be reviewed when available.   EKG done 3/18 showed   Atrial fibrillation   Incomplete left bundle branch block   Nonspecific ST abnormality   Abnormal ECG   When compared with ECG of 01-FEB-2022   ST no longer depressed in Inferior leads   Nonspecific T wave abnormality now evident in Inferior leads   Revised Cardiac Risk Index (RCRI):  The patient has the following serious cardiovascular risks for perioperative complications:   - Congestive Heart Failure (pulmonary edema, PND, s3 timothy, CXR with pulmonary congestion, basilar rales) = 1 point     RCRI Interpretation: 1 point: Class II (low risk - 0.9% complication rate)           Signed Electronically by: Aliza Murphy MD  Copy of this evaluation report is provided to requesting physician.

## 2022-04-21 ENCOUNTER — TELEPHONE (OUTPATIENT)
Dept: CARDIOLOGY | Facility: CLINIC | Age: 62
End: 2022-04-21
Payer: COMMERCIAL

## 2022-04-21 NOTE — TELEPHONE ENCOUNTER
Looks like patient is referring to endoscopy procedure that was scheduled for 4/26.  Writer attempted to contact patient to discuss and get more information.      Mahnaz Reynoso, RN  Cardiology Care Coordinator  [facility]  [phone] option 1

## 2022-04-21 NOTE — TELEPHONE ENCOUNTER
[facility] Call Center    Phone Message    May a detailed message be left on voicemail: yes     Reason for Call: Other: Pt wants to leave a message for Eloise as his procedure for the 26th was cancelled due to insurance not covering it     Action Taken: Message routed to:  [facility] Cardio    Travel Screening: Not Applicable

## 2022-04-26 NOTE — CONFIDENTIAL NOTE
"Writer states the endoscopy was cancelled because it is an \"elective procedure\"      Writer spoke with [facility] who explained that the endoscopy was ordered on 3/9 due to iron deficiency.  Writer asked to talk to Dr. Julien's team to discuss importance of endoscopy.  Will await Maria G's return call.    Dr. Turner, patient will need a cardiac pre-op clearance for the endoscopy.    Mahnaz Reynoso, HOLLY  Cardiology Care Coordinator  [facility]  [phone] option 1      2[facility]7/2022  [facility], Leanna Feldman MD         When we evaluated him, we requested that he follow up with his GI doctor for repeat endoscopy, reordered cMRI for ischemic eval and started low dose apixaban. cMRI has been ordered but was unable to do it because Hr was in the 100s to 110s. He takes his BP at home and it is usually low 100s/80s or 90s. His HR is usually in the 90s/110s. He took it on visit today and it was 93BPM at rest.  "
Per Dr. Turner, [facility] will need to work on getting this procedure covered.  Advised patient to contact GI providers to discuss coverage and if any cardiac questions can call clinic.    Mahnaz Reynoso RN  Cardiology Care Coordinator  [facility]  [phone] option 1      
Per Dr. Turner, patient can have the endoscopy on a cardiac stand point but is requiring the patient to have the procedure completed at a hospital facility.    [facility] has not returned call back today.  Will try again tomorrow to discuss.    Mahnaz Reynoso RN  Cardiology Care Coordinator  [facility]  [phone] option 1      
Yes

## 2022-05-05 DIAGNOSIS — I42.9 CARDIOMYOPATHY, UNSPECIFIED TYPE (H): ICD-10-CM

## 2022-05-05 DIAGNOSIS — I48.0 PAROXYSMAL ATRIAL FIBRILLATION (H): ICD-10-CM

## 2022-05-08 RX ORDER — FUROSEMIDE 20 MG
TABLET ORAL
Qty: 90 TABLET | Refills: 1 | Status: SHIPPED | OUTPATIENT
Start: 2022-05-08 | End: 2022-12-16

## 2022-05-09 RX ORDER — METOPROLOL SUCCINATE 200 MG/1
200 TABLET, EXTENDED RELEASE ORAL DAILY
Qty: 90 TABLET | Refills: 3 | Status: SHIPPED | OUTPATIENT
Start: 2022-05-09 | End: 2023-06-20

## 2022-05-10 NOTE — TELEPHONE ENCOUNTER
Signed Prescriptions:                        Disp   Refills    metoprolol succinate ER (TOPROL XL) 200 MG*90 tab*3        Sig: TAKE 1 TABLET (200 MG) BY MOUTH DAILY  Authorizing Provider: KAITLYNN STRATTON  Ordering User: MAHNAZ REYNOSO  rx filled per refill protocol.    Mahnaz Reynoso RN  Cardiology Care Coordinator  [facility]  [phone] option 1

## 2022-06-03 ENCOUNTER — ANCILLARY PROCEDURE (OUTPATIENT)
Dept: CARDIOLOGY | Facility: CLINIC | Age: 62
End: 2022-06-03
Attending: FAMILY MEDICINE
Payer: COMMERCIAL

## 2022-06-03 LAB — LVEF ECHO: NORMAL

## 2022-06-03 PROCEDURE — 93306 TTE W/DOPPLER COMPLETE: CPT | Performed by: INTERNAL MEDICINE

## 2022-06-03 PROCEDURE — 99207 PR STATISTIC IV PUSH SINGLE INITIAL SUBSTANCE: CPT | Performed by: INTERNAL MEDICINE

## 2022-06-03 RX ADMIN — Medication 6 ML: at 14:31

## 2022-06-15 ENCOUNTER — LAB (OUTPATIENT)
Dept: LAB | Facility: CLINIC | Age: 62
End: 2022-06-15
Payer: COMMERCIAL

## 2022-06-15 DIAGNOSIS — Z13.220 SCREENING FOR HYPERLIPIDEMIA: ICD-10-CM

## 2022-06-15 DIAGNOSIS — E03.9 HYPOTHYROIDISM: ICD-10-CM

## 2022-06-15 DIAGNOSIS — D64.9 LOW HEMOGLOBIN: ICD-10-CM

## 2022-06-15 LAB — HGB BLD-MCNC: 14.7 G/DL (ref 13.3–17.7)

## 2022-06-15 PROCEDURE — 85018 HEMOGLOBIN: CPT

## 2022-06-15 PROCEDURE — 36415 COLL VENOUS BLD VENIPUNCTURE: CPT

## 2022-06-17 ENCOUNTER — OFFICE VISIT (OUTPATIENT)
Dept: CARDIOLOGY | Facility: CLINIC | Age: 62
End: 2022-06-17
Payer: COMMERCIAL

## 2022-06-17 VITALS
OXYGEN SATURATION: 96 % | SYSTOLIC BLOOD PRESSURE: 138 MMHG | DIASTOLIC BLOOD PRESSURE: 92 MMHG | HEART RATE: 100 BPM | WEIGHT: 315 LBS | BODY MASS INDEX: 37.51 KG/M2

## 2022-06-17 DIAGNOSIS — I50.22 CHRONIC SYSTOLIC HEART FAILURE (H): Primary | ICD-10-CM

## 2022-06-17 DIAGNOSIS — I10 BENIGN ESSENTIAL HYPERTENSION: ICD-10-CM

## 2022-06-17 DIAGNOSIS — I48.21 PERMANENT ATRIAL FIBRILLATION (H): ICD-10-CM

## 2022-06-17 DIAGNOSIS — I50.20 HEART FAILURE WITH REDUCED EJECTION FRACTION, NYHA CLASS III (H): ICD-10-CM

## 2022-06-17 DIAGNOSIS — E78.2 MIXED HYPERLIPIDEMIA: ICD-10-CM

## 2022-06-17 DIAGNOSIS — I42.8 NONISCHEMIC CARDIOMYOPATHY (H): ICD-10-CM

## 2022-06-17 PROCEDURE — 99214 OFFICE O/P EST MOD 30 MIN: CPT | Performed by: INTERNAL MEDICINE

## 2022-06-17 RX ORDER — SPIRONOLACTONE 25 MG/1
25 TABLET ORAL DAILY
Qty: 90 TABLET | Refills: 3 | Status: SHIPPED | OUTPATIENT
Start: 2022-06-17 | End: 2023-06-20

## 2022-06-17 RX ORDER — SACUBITRIL AND VALSARTAN 97; 103 MG/1; MG/1
1 TABLET, FILM COATED ORAL 2 TIMES DAILY
Qty: 60 TABLET | Refills: 3 | Status: SHIPPED | OUTPATIENT
Start: 2022-06-17 | End: 2022-07-11

## 2022-06-17 NOTE — NURSING NOTE
New Medication: Patient was educated regarding newly prescribed medication, including discussion of  the indication, administration, side effects, and when to report to MD or RN. Patient demonstrated understanding of this information and agreed to call with further questions or concerns. Start spironolactone 25 mg daily. Start Entresto  mg BID.    Return Appointment: Patient given instructions regarding scheduling next clinic visit. Patient demonstrated understanding of this information and agreed to call with further questions or concerns. Follow up in 3 months.    Medication Change: Patient was educated regarding prescribed medication change, including discussion of the indication, administration, side effects, and when to report to MD or RN. Patient demonstrated understanding of this information and agreed to call with further questions or concerns. Stop taking lisinopril. Wait 36 hours before starting Entresto.    Coronary CT ordered and scheduled for 7/22. Patient provided with instructions.    Patient stated he understood all health information given and agreed to call with further questions or concerns.    Yari Nash, RN

## 2022-06-17 NOTE — PROGRESS NOTES
June 17, 2022     Dear Dr. Turner  I had the pleasure of seeing Stef Mosher  in the [facility] for further evaluation and management of his heart failure. As you know he is a 62-year-old male history of persistent atrial fibrillation, heart failure with reduced ejection fraction, and significant GI bleed requiring blood transfusions. He is new to my clinic today for HFrEF management.   He saw Dr. Truner in 2/2022 and the decision was made to proceed with WERNER DCCV at the time that was attempted on March 18, 2022, but was unsuccessful.  The WERNER noted that the ejection fraction was 30 to 35%.  He was restarted on apixaban 5 mg twice daily and his weekly hemoglobin levels have remained stable and he has not noted any clinical GI bleed.  A nuclear stress test was done on March 4 that showed evidence of myocardial ischemia but reported a normal ejection fraction.  Patient notes that overall he has been doing well without any new issues.  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  He has minimal limitations.  He takes her medications as prescribed no dizziness lightheadedness palpitations.  No side effects from the medications.  Overall he recently switched over from Coumadin to apixaban and he feels that his energy level has improved significantly.  Also he is taking the Toprol-XL without any issues.  No other    PAST MEDICAL HISTORY:  Past Medical History:   Diagnosis Date     Arthritis      Atrial fibrillation (H)      Hayfever      Hypercholesterolemia      Hypertension      Unspecified hypothyroidism      FAMILY HISTORY:  Family History   Problem Relation Age of Onset     Blood Disease Mother      Cancer Mother      Arthritis Father      Hypertension Father      Breast Cancer Sister      Allergies Sister      Cancer Sister      Thyroid Disease Sister      Blood Disease Sister      Diabetes No family hx of      Cerebrovascular Disease No family hx  of      Glaucoma No family hx of      Macular Degeneration No family hx of      SOCIAL HISTORY:  Social History     Socioeconomic History     Marital status:    Tobacco Use     Smoking status: Never Smoker     Smokeless tobacco: Never Used     Tobacco comment: Lives in smoke free household   Vaping Use     Vaping Use: Never used   Substance and Sexual Activity     Alcohol use: No     Comment: None     Drug use: No     Comment: Never     Sexual activity: Not Currently     Partners: Female     CURRENT MEDICATIONS:  Current Outpatient Medications   Medication     apixaban ANTICOAGULANT (ELIQUIS ANTICOAGULANT) 5 MG tablet     atorvastatin (LIPITOR) 20 MG tablet     furosemide (LASIX) 20 MG tablet     levETIRAcetam (KEPPRA) 500 MG tablet     lisinopril (ZESTRIL) 40 MG tablet     metoprolol succinate ER (TOPROL XL) 200 MG 24 hr tablet     omeprazole (PRILOSEC) 40 MG DR capsule     No current facility-administered medications for this visit.     ROS:   Constitutional: No fever, chills, or sweats.   ENT: No visual disturbance, ear ache, epistaxis, sore throat.   Allergies/Immunologic: Negative.   Respiratory: No cough, hemoptysis.   Cardiovascular: As per HPI.   GI: No nausea, vomiting, hematemesis, melena, or hematochezia.   : No urinary frequency, dysuria, or hematuria.   Integrument: Negative.   Psychiatric: No evidence of major depression  Neuro: No new neurological complaints at this time. Non focal  Endocrinology: Negative.   Musculoskeletal: As per HPI.      EXAM:  BP (!) 138/92 (BP Location: Right arm, Patient Position: Sitting, Cuff Size: Adult Large)   Pulse 100   Wt 149.1 kg (328 lb 12.8 oz)   SpO2 96%   BMI 37.51 kg/m    General: appears comfortable, alert and oriented  Head: normocephalic, atraumatic  Eyes: anicteric sclera, EOMI , PERRL  Neck: no adenopathy  Orophyarynx: moist mucosa, no lesions noted  Heart: regular, S1/S2, no murmurs, rubs or gallop. Estimated JVP at 5 cmH2O  Lungs: CTAB, No  wheezing.   Abdomen: soft, non-tender, bowel sounds present, no hepatosplenomegaly  Extremities: No LE edema today  Skin: no open lesions noted  Neuro: grossly non-focal  Psych: no evidence of depression noted     Labs:  Lab Results   Component Value Date    WBC 10.2 01/28/2022    HGB 14.7 06/15/2022    HCT 30.9 (L) 01/28/2022     01/28/2022     04/15/2022    POTASSIUM 4.1 04/15/2022    CHLORIDE 108 04/15/2022    CO2 28 04/15/2022    BUN 20 04/15/2022    CR 0.76 04/15/2022     (H) 04/15/2022    AST 16 07/09/2021    ALT 25 07/09/2021    ALKPHOS 145 07/09/2021    BILITOTAL 0.8 07/09/2021    INR 1.3 (H) 02/04/2022     TTE 2015:  Mildly reduced left ventricular systolic function. LVEF estimate 40-45%  (Traced 44%). Normal LV function and size. LV filling is restrictive. Normal   RV function and size. No significant valvular abnormalities. IVC cannot be assessed. No major changes compared to prior study in February 2011.    TTE 7/2021:  Difficult to assess ejection fraction due to [id number]-[id number] variability, the LVEF in the 40% range. Mild left ventricular dilation is present.  Moderate right ventricular dilation is present. Global right ventricular function is mildly reduced.  Mild dilatation of the aorta is present. Sinuses of Valsalva 4.4 cm (1.63 cm//m2).  No pericardial effusion is present.   This study was compared with the study from 9/22/2017. No significant changes noted.    TTE 11/2021:  Left ventricular function is decreased. The ejection fraction is 35-40% (moderately reduced).  Global right ventricular function is mildly reduced. The inferior vena cava cannot be assessed.  No pericardial effusion is present. There has been no change.    WERNER 2/8/22:  Left ventricular function is decreased. The ejection fraction is 30-35% (moderately reduced). Moderate diffuse hypokinesis is present.  Global right ventricular function is mildly reduced.  The left atrial appendage is free of thrombus. There is  spontaneous echo contrast with a severely dilated left atrium.  Mild functional mitral insufficiency is present. No pericardial effusion is present.     NM stress 3/4/2022:     The nuclear stress test is abnormal.     No evidence of myocardial ischemia. There is a small area of a mild degree of infarction in the distal septal segment(s) of the left ventricle. This appears to be in the left anterior descending distribution.     Left ventricular function is normal.     A prior study was conducted on 11/13/2017.     Nuclear Study Quality: Final image quality is satisfactory.    TTE 6/3/22:  Left ventricular function is decreased. The ejection fraction is 35-40% (moderately reduced). Moderate left ventricular dilation is present.  Global right ventricular function is mildly reduced.   The inferior vena cava was normal in size with preserved respiratory variability.  Trivial pericardial effusion is present.   This study was compared with the study from 11/2021. No significant changes noted.    ASSESSMENT AND PLAN:  In summary, patient is a 62 year old male with above past medical history including permanent atrial fibrillation for many years, heart failure with reduced ejection fraction class II symptoms at this point.  I did review most of the prior imaging he had and essentially he had reduced ejection fraction for a very long time at least since 2015 or even earlier.  Please note that he did have the permanent atrial fibrillation for at least 12 years he did get multiple shocks in the past 3 DCCV however these were unsuccessful.  I could not see any ischemic evaluation other than nuclear medicine stress test which showed some potential infarcts which are small.  At this time we will continue to optimize his heart failure medications.  We will try to see if we can transition him over to Entresto from lisinopril as I think this would give him more survival benefit.  We will try to transition to 97 mg twice daily dosing  from the lisinopril.  Transition instructions given that he is to hold lisinopril for 2 days and then stop the Entresto.  We will also add spironolactone 25 mg once a day or side effects discussed including potential gynecomastia and pain and if that is to develop then we will transition over to eplerenone.  He is going to continue maximal dose metoprolol XL as well as the atorvastatin.  In order to complete his ischemic evaluation I do not think stress test is a good idea especially because he had a negative experience with cardiac MRI and that he is in atrial fibrillation and as such we decided to proceed with coronary CTA.  If there is any concerns for significant coronary disease then we will proceed with coronary angiogram.  This was discussed with patient and his wife and they are in agreement.  We will continue to monitor him with this changes and then see him back in about 3 months or sooner as needed.          Follow up:   1 year with labs    I appreciate the opportunity to participate in the care of Stef Mosher . Please do not hesitate to contact me with any further questions.    Sincerely,   Sivakumar Brambila MD     [facility] Division of Cardiology

## 2022-06-17 NOTE — LETTER
6/17/2022      RE: Stef Mosher  [address]  [place] [address]       Dear Colleague,    Thank you for the opportunity to participate in the care of your patient, Stef Mosher, at the [facility]. Please see a copy of my visit note below.    June 17, 2022     Dear Dr. Turner  I had the pleasure of seeing Stef Mosher  in the [facility] for further evaluation and management of his heart failure. As you know he is a 62-year-old male history of persistent atrial fibrillation, heart failure with reduced ejection fraction, and significant GI bleed requiring blood transfusions. He is new to my clinic today for HFrEF management.   He saw Dr. Turner in 2/2022 and the decision was made to proceed with WERNER DCCV at the time that was attempted on March 18, 2022, but was unsuccessful.  The WERNER noted that the ejection fraction was 30 to 35%.  He was restarted on apixaban 5 mg twice daily and his weekly hemoglobin levels have remained stable and he has not noted any clinical GI bleed.  A nuclear stress test was done on March 4 that showed evidence of myocardial ischemia but reported a normal ejection fraction.  Patient notes that overall he has been doing well without any new issues.  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  He has minimal limitations.  He takes her medications as prescribed no dizziness lightheadedness palpitations.  No side effects from the medications.  Overall he recently switched over from Coumadin to apixaban and he feels that his energy level has improved significantly.  Also he is taking the Toprol-XL without any issues.  No other    PAST MEDICAL HISTORY:  Past Medical History:   Diagnosis Date     Arthritis      Atrial fibrillation (H)      Hayfever      Hypercholesterolemia      Hypertension      Unspecified hypothyroidism      FAMILY  HISTORY:  Family History   Problem Relation Age of Onset     Blood Disease Mother      Cancer Mother      Arthritis Father      Hypertension Father      Breast Cancer Sister      Allergies Sister      Cancer Sister      Thyroid Disease Sister      Blood Disease Sister      Diabetes No family hx of      Cerebrovascular Disease No family hx of      Glaucoma No family hx of      Macular Degeneration No family hx of      SOCIAL HISTORY:  Social History     Socioeconomic History     Marital status:    Tobacco Use     Smoking status: Never Smoker     Smokeless tobacco: Never Used     Tobacco comment: Lives in smoke free household   Vaping Use     Vaping Use: Never used   Substance and Sexual Activity     Alcohol use: No     Comment: None     Drug use: No     Comment: Never     Sexual activity: Not Currently     Partners: Female     CURRENT MEDICATIONS:  Current Outpatient Medications   Medication     apixaban ANTICOAGULANT (ELIQUIS ANTICOAGULANT) 5 MG tablet     atorvastatin (LIPITOR) 20 MG tablet     furosemide (LASIX) 20 MG tablet     levETIRAcetam (KEPPRA) 500 MG tablet     lisinopril (ZESTRIL) 40 MG tablet     metoprolol succinate ER (TOPROL XL) 200 MG 24 hr tablet     omeprazole (PRILOSEC) 40 MG DR capsule     No current facility-administered medications for this visit.     ROS:   Constitutional: No fever, chills, or sweats.   ENT: No visual disturbance, ear ache, epistaxis, sore throat.   Allergies/Immunologic: Negative.   Respiratory: No cough, hemoptysis.   Cardiovascular: As per HPI.   GI: No nausea, vomiting, hematemesis, melena, or hematochezia.   : No urinary frequency, dysuria, or hematuria.   Integrument: Negative.   Psychiatric: No evidence of major depression  Neuro: No new neurological complaints at this time. Non focal  Endocrinology: Negative.   Musculoskeletal: As per HPI.      EXAM:  BP (!) 138/92 (BP Location: Right arm, Patient Position: Sitting, Cuff Size: Adult Large)   Pulse 100   Wt  149.1 kg (328 lb 12.8 oz)   SpO2 96%   BMI 37.51 kg/m    General: appears comfortable, alert and oriented  Head: normocephalic, atraumatic  Eyes: anicteric sclera, EOMI , PERRL  Neck: no adenopathy  Orophyarynx: moist mucosa, no lesions noted  Heart: regular, S1/S2, no murmurs, rubs or gallop. Estimated JVP at 5 cmH2O  Lungs: CTAB, No wheezing.   Abdomen: soft, non-tender, bowel sounds present, no hepatosplenomegaly  Extremities: No LE edema today  Skin: no open lesions noted  Neuro: grossly non-focal  Psych: no evidence of depression noted     Labs:  Lab Results   Component Value Date    WBC 10.2 01/28/2022    HGB 14.7 06/15/2022    HCT 30.9 (L) 01/28/2022     01/28/2022     04/15/2022    POTASSIUM 4.1 04/15/2022    CHLORIDE 108 04/15/2022    CO2 28 04/15/2022    BUN 20 04/15/2022    CR 0.76 04/15/2022     (H) 04/15/2022    AST 16 07/09/2021    ALT 25 07/09/2021    ALKPHOS 145 07/09/2021    BILITOTAL 0.8 07/09/2021    INR 1.3 (H) 02/04/2022     TTE 2015:  Mildly reduced left ventricular systolic function. LVEF estimate 40-45%  (Traced 44%). Normal LV function and size. LV filling is restrictive. Normal   RV function and size. No significant valvular abnormalities. IVC cannot be assessed. No major changes compared to prior study in February 2011.    TTE 7/2021:  Difficult to assess ejection fraction due to [id number]-zr-[id number] variability, the LVEF in the 40% range. Mild left ventricular dilation is present.  Moderate right ventricular dilation is present. Global right ventricular function is mildly reduced.  Mild dilatation of the aorta is present. Sinuses of Valsalva 4.4 cm (1.63 cm//m2).  No pericardial effusion is present.   This study was compared with the study from 9/22/2017. No significant changes noted.    TTE 11/2021:  Left ventricular function is decreased. The ejection fraction is 35-40% (moderately reduced).  Global right ventricular function is mildly reduced. The inferior vena cava  cannot be assessed.  No pericardial effusion is present. There has been no change.    WERNER 2/8/22:  Left ventricular function is decreased. The ejection fraction is 30-35% (moderately reduced). Moderate diffuse hypokinesis is present.  Global right ventricular function is mildly reduced.  The left atrial appendage is free of thrombus. There is spontaneous echo contrast with a severely dilated left atrium.  Mild functional mitral insufficiency is present. No pericardial effusion is present.     NM stress 3/4/2022:     The nuclear stress test is abnormal.     No evidence of myocardial ischemia. There is a small area of a mild degree of infarction in the distal septal segment(s) of the left ventricle. This appears to be in the left anterior descending distribution.     Left ventricular function is normal.     A prior study was conducted on 11/13/2017.     Nuclear Study Quality: Final image quality is satisfactory.    TTE 6/3/22:  Left ventricular function is decreased. The ejection fraction is 35-40% (moderately reduced). Moderate left ventricular dilation is present.  Global right ventricular function is mildly reduced.   The inferior vena cava was normal in size with preserved respiratory variability.  Trivial pericardial effusion is present.   This study was compared with the study from 11/2021. No significant changes noted.    ASSESSMENT AND PLAN:  In summary, patient is a 62 year old male with above past medical history including permanent atrial fibrillation for many years, heart failure with reduced ejection fraction class II symptoms at this point.  I did review most of the prior imaging he had and essentially he had reduced ejection fraction for a very long time at least since 2015 or even earlier.  Please note that he did have the permanent atrial fibrillation for at least 12 years he did get multiple shocks in the past 3 DCCV however these were unsuccessful.  I could not see any ischemic evaluation other than  nuclear medicine stress test which showed some potential infarcts which are small.  At this time we will continue to optimize his heart failure medications.  We will try to see if we can transition him over to Entresto from lisinopril as I think this would give him more survival benefit.  We will try to transition to 97 mg twice daily dosing from the lisinopril.  Transition instructions given that he is to hold lisinopril for 2 days and then stop the Entresto.  We will also add spironolactone 25 mg once a day or side effects discussed including potential gynecomastia and pain and if that is to develop then we will transition over to eplerenone.  He is going to continue maximal dose metoprolol XL as well as the atorvastatin.  In order to complete his ischemic evaluation I do not think stress test is a good idea especially because he had a negative experience with cardiac MRI and that he is in atrial fibrillation and as such we decided to proceed with coronary CTA.  If there is any concerns for significant coronary disease then we will proceed with coronary angiogram.  This was discussed with patient and his wife and they are in agreement.  We will continue to monitor him with this changes and then see him back in about 3 months or sooner as needed.          Follow up:   1 year with labs    I appreciate the opportunity to participate in the care of Stef Mosher . Please do not hesitate to contact me with any further questions.    Sincerely,   Sivakumar Brambila MD     [facility] Division of Cardiology

## 2022-06-17 NOTE — PATIENT INSTRUCTIONS
Thank you for coming to the [facility] [place]; please note the following instructions:    1. STOP taking lisinopril.  2. Wait 36 hours, then START taking Entresto (sacubitril-valsartan)  mg twice daily.  3. Coronary CT - please call [phone] if you need to re-schedule.  4. START taking spironolactone 25 mg once daily.  5. Follow up with Dr. Brambila in 3 months.      If you have any questions regarding your visit please contact your care team:     Cardiology  Telephone Number   Mahnaz MORRIS., RN  Malgorzata MARTINEZ, RN   Laura SKY, RMA  Analisa GORMAN, RMA  Dorcas Zhao., MIKE EDCKER, Visit Facilitator   [phone] (option 1)   For scheduling appts:     [phone] (select option 1)       For the Device Clinic (Pacemakers and ICD's)  RN's :  Vale Cartagena   During business hours: [phone]    *After business hours:  [phone] (select option 4)      Normal test result notifications will be released via [employer] or mailed within 7 business days.  All other test results, will be communicated via telephone once reviewed by your cardiologist.    If you need a medication refill please contact your pharmacy.  Please allow 3 business days for your refill to be completed.    As always, thank you for trusting us with your health care needs!          CT Coronary Angiogram    Computed tomography angiography (CTA) is an imaging test. It uses X-rays and computer technology to make detailed pictures of your arteries. Before the test, an X-ray dye (contrast medium) is shot or injected into your vein. The dye makes it easier to see your blood vessels on the X-ray. Pictures are then taken with the CT scanner. A computer turns the CT images into 2- and 3-dimensional pictures. A CT Coronary Angiogram looks at the arteries in your heart.     You are scheduled for a CT Coronary Angiogram at the [facility] [place].   Please report to the Gold Waiting Room in the main  [facility].    Follow these instructions:  1. The day before the test drink extra water and also on the day of the test (unless you are on a fluid restriction from your doctor).  2. Nothing to eat or drink except water 3 hours prior.  3. No caffeine, smoking, or strenuous activity before test.    4. You will be receiving contrast. You will need pre-treatment if you have allergies to contrast dye or shellfish.   5. You will need to have a current creatinine level within 30 days if you are over the age of 69, diabetic, or have a history of kidney problems.   6. HOLD these medications:  1. Oral diabetic medications the morning of and wait 48 hours before re-starting metformin.     2. Diuretic the day of.   3. NSAIDS (ibuprofen, naproxen) day of.   8. Testing takes about 15 min. Please allow 2 hours for test as you may need medications to slow your heart rate for the test.

## 2022-06-28 DIAGNOSIS — I48.20 CHRONIC ATRIAL FIBRILLATION, UNSPECIFIED (H): ICD-10-CM

## 2022-06-30 RX ORDER — APIXABAN 5 MG/1
TABLET, FILM COATED ORAL
Qty: 60 TABLET | Refills: 3 | Status: SHIPPED | OUTPATIENT
Start: 2022-06-30 | End: 2022-11-02

## 2022-06-30 NOTE — TELEPHONE ENCOUNTER
LCV: 4/4/2022  [facility]([place]), 6-17-22 [place]   NCV: 9-12-22 (Yue)

## 2022-07-08 ENCOUNTER — TELEPHONE (OUTPATIENT)
Dept: CARDIOLOGY | Facility: CLINIC | Age: 62
End: 2022-07-08

## 2022-07-08 DIAGNOSIS — I48.0 PAROXYSMAL ATRIAL FIBRILLATION (H): ICD-10-CM

## 2022-07-08 DIAGNOSIS — I42.9 CARDIOMYOPATHY, UNSPECIFIED TYPE (H): ICD-10-CM

## 2022-07-08 NOTE — TELEPHONE ENCOUNTER
Reason for call:  Medication   If this is a refill request, has the caller requested the refill from the pharmacy already? Yes  Will the patient be using a [facility]? No  Name of the pharmacy and phone number for the current request: [facility] [employer]   [phone]       Pharmacy Address and Hours    Address Hours   [address] [place] [address]          Name of the medication requested: Lisinopril    Other request: patient states that he was told by the pharmacy that there are not refills     Phone number to reach patient:  Cell number on file:    Telephone Information:   Mobile [phone]       Best Time:  anytime    Can we leave a detailed message on this number?  YES    Travel screening: Not Applicable

## 2022-07-11 RX ORDER — LISINOPRIL 40 MG/1
40 TABLET ORAL DAILY
Qty: 90 TABLET | Refills: 1 | Status: SHIPPED | OUTPATIENT
Start: 2022-07-11 | End: 2022-10-11

## 2022-07-11 NOTE — TELEPHONE ENCOUNTER
"Spoke to patient and discussed lisinopril and entresto.  He was advised to stop lisinopril and start entresto 36 hours later during 6/17/22 OV visit with Dr. Brambila.  Patient states that they picked up the prescription and it is covered but states \"my wife did research for 3 days and told me it was a good idea to hold off on starting this new medication due to side effects\".  Bottom line he did not start entresto and has continued to take lisinopril and is now needing a refill.  States he does not want to start a new medication because \"I feel so good\"    Writer again educated patient that if he does plan on starting entresto that he needs to STOP lisinopril 36 hours prior to starting entresto.  Patient verbalized understanding.  Writer will fill lisinopril to get through till he sees Dr. Brambila in October.  Also, advised patient to inform team if he does decide to switch over.    Pt is scheduled for a CT coronary 7/22.  He states he has this on his calendar and plans on going.    MIRELA Brambila-lisinopril was filled and entresto was removed from med list.     Mahnaz Reynoso, RN  Cardiology Care Coordinator  [facility]  [phone] option 1      "

## 2022-07-22 ENCOUNTER — HOSPITAL ENCOUNTER (OUTPATIENT)
Dept: CT IMAGING | Facility: CLINIC | Age: 62
Discharge: HOME | End: 2022-07-22
Attending: INTERNAL MEDICINE
Payer: COMMERCIAL

## 2022-07-22 VITALS
DIASTOLIC BLOOD PRESSURE: 71 MMHG | HEART RATE: 98 BPM | SYSTOLIC BLOOD PRESSURE: 120 MMHG | RESPIRATION RATE: 16 BRPM | OXYGEN SATURATION: 96 %

## 2022-07-22 DIAGNOSIS — I50.22 CHRONIC SYSTOLIC HEART FAILURE (H): ICD-10-CM

## 2022-07-22 PROCEDURE — 250N000013 HC RX MED GY IP 250 OP 250 PS 637: Performed by: STUDENT IN AN ORGANIZED HEALTH CARE EDUCATION/TRAINING PROGRAM

## 2022-07-22 PROCEDURE — 75574 CT ANGIO HRT W/3D IMAGE: CPT

## 2022-07-22 PROCEDURE — 250N000009 HC RX 250: Performed by: STUDENT IN AN ORGANIZED HEALTH CARE EDUCATION/TRAINING PROGRAM

## 2022-07-22 PROCEDURE — 250N000011 HC RX IP 250 OP 636: Performed by: INTERNAL MEDICINE

## 2022-07-22 PROCEDURE — 75574 CT ANGIO HRT W/3D IMAGE: CPT | Mod: 26 | Performed by: STUDENT IN AN ORGANIZED HEALTH CARE EDUCATION/TRAINING PROGRAM

## 2022-07-22 RX ORDER — NITROGLYCERIN 0.4 MG/1
.4-.8 TABLET SUBLINGUAL
Status: DISCONTINUED | OUTPATIENT
Start: 2022-07-22 | End: 2022-07-23 | Stop reason: HOSPADM

## 2022-07-22 RX ORDER — IVABRADINE 5 MG/1
5-15 TABLET, FILM COATED ORAL
Status: COMPLETED | OUTPATIENT
Start: 2022-07-22 | End: 2022-07-22

## 2022-07-22 RX ORDER — ONDANSETRON 2 MG/ML
4 INJECTION INTRAMUSCULAR; INTRAVENOUS
Status: DISCONTINUED | OUTPATIENT
Start: 2022-07-22 | End: 2022-07-23 | Stop reason: HOSPADM

## 2022-07-22 RX ORDER — METHYLPREDNISOLONE SODIUM SUCCINATE 125 MG/2ML
125 INJECTION, POWDER, LYOPHILIZED, FOR SOLUTION INTRAMUSCULAR; INTRAVENOUS
Status: DISCONTINUED | OUTPATIENT
Start: 2022-07-22 | End: 2022-07-23 | Stop reason: HOSPADM

## 2022-07-22 RX ORDER — METOPROLOL TARTRATE 1 MG/ML
5-15 INJECTION, SOLUTION INTRAVENOUS
Status: DISCONTINUED | OUTPATIENT
Start: 2022-07-22 | End: 2022-07-23 | Stop reason: HOSPADM

## 2022-07-22 RX ORDER — METOPROLOL TARTRATE 25 MG/1
25-100 TABLET, FILM COATED ORAL
Status: DISCONTINUED | OUTPATIENT
Start: 2022-07-22 | End: 2022-07-23 | Stop reason: HOSPADM

## 2022-07-22 RX ORDER — DIPHENHYDRAMINE HYDROCHLORIDE 50 MG/ML
25-50 INJECTION INTRAMUSCULAR; INTRAVENOUS
Status: DISCONTINUED | OUTPATIENT
Start: 2022-07-22 | End: 2022-07-23 | Stop reason: HOSPADM

## 2022-07-22 RX ORDER — IOPAMIDOL 755 MG/ML
120 INJECTION, SOLUTION INTRAVASCULAR ONCE
Status: COMPLETED | OUTPATIENT
Start: 2022-07-22 | End: 2022-07-22

## 2022-07-22 RX ORDER — ACYCLOVIR 200 MG/1
0-1 CAPSULE ORAL
Status: DISCONTINUED | OUTPATIENT
Start: 2022-07-22 | End: 2022-07-23 | Stop reason: HOSPADM

## 2022-07-22 RX ORDER — DIPHENHYDRAMINE HCL 25 MG
25 CAPSULE ORAL
Status: DISCONTINUED | OUTPATIENT
Start: 2022-07-22 | End: 2022-07-23 | Stop reason: HOSPADM

## 2022-07-22 RX ADMIN — IVABRADINE 15 MG: 5 TABLET, FILM COATED ORAL at 12:09

## 2022-07-22 RX ADMIN — METOPROLOL TARTRATE 10 MG: 5 INJECTION INTRAVENOUS at 13:20

## 2022-07-22 RX ADMIN — NITROGLYCERIN 0.8 MG: 0.4 TABLET SUBLINGUAL at 13:20

## 2022-07-22 RX ADMIN — IOPAMIDOL 120 ML: 755 INJECTION, SOLUTION INTRAVENOUS at 13:05

## 2022-08-21 ENCOUNTER — HEALTH MAINTENANCE LETTER (OUTPATIENT)
Age: 62
End: 2022-08-21

## 2022-09-12 ENCOUNTER — ANCILLARY PROCEDURE (OUTPATIENT)
Dept: CARDIOLOGY | Facility: CLINIC | Age: 62
End: 2022-09-12
Attending: INTERNAL MEDICINE
Payer: COMMERCIAL

## 2022-09-12 ENCOUNTER — OFFICE VISIT (OUTPATIENT)
Dept: CARDIOLOGY | Facility: CLINIC | Age: 62
End: 2022-09-12
Payer: COMMERCIAL

## 2022-09-12 VITALS
SYSTOLIC BLOOD PRESSURE: 125 MMHG | DIASTOLIC BLOOD PRESSURE: 83 MMHG | HEART RATE: 95 BPM | OXYGEN SATURATION: 95 % | BODY MASS INDEX: 39.13 KG/M2 | WEIGHT: 315 LBS

## 2022-09-12 DIAGNOSIS — I48.21 PERMANENT ATRIAL FIBRILLATION (H): Primary | ICD-10-CM

## 2022-09-12 DIAGNOSIS — I50.22 CHRONIC SYSTOLIC HEART FAILURE (H): ICD-10-CM

## 2022-09-12 DIAGNOSIS — I42.8 NONISCHEMIC CARDIOMYOPATHY (H): ICD-10-CM

## 2022-09-12 DIAGNOSIS — I48.0 PAROXYSMAL ATRIAL FIBRILLATION (H): ICD-10-CM

## 2022-09-12 PROCEDURE — 99214 OFFICE O/P EST MOD 30 MIN: CPT | Mod: GC | Performed by: INTERNAL MEDICINE

## 2022-09-12 PROCEDURE — 93242 EXT ECG>48HR<7D RECORDING: CPT | Performed by: INTERNAL MEDICINE

## 2022-09-12 PROCEDURE — 93244 EXT ECG>48HR<7D REV&INTERPJ: CPT | Performed by: INTERNAL MEDICINE

## 2022-09-12 NOTE — NURSING NOTE
"Chief Complaint   Patient presents with     RECHECK     5 month PAF return.        Initial /83 (BP Location: Left arm, Patient Position: Chair, Cuff Size: Adult Large)   Pulse 95   Wt (!) 155.6 kg (343 lb)   SpO2 95%   BMI 39.13 kg/m   Estimated body mass index is 39.13 kg/m  as calculated from the following:    Height as of 4/15/22: 1.994 m (6' 6.5\").    Weight as of this encounter: 155.6 kg (343 lb)..  BP completed using cuff size: IAM Pedroza  "

## 2022-09-12 NOTE — PROGRESS NOTES
HPI: Reason for Visit: Follow up A-fib    Stef Mosher is a 62 year old man with a history of persistent atrial fibrillation (status post multiple unsuccessful DCCV), non-ischemic cardiomyopathy, HTN, HLD who presents for a follow up.    He reports no changes in health since his last visit. Feels well. Denies any symptoms related to his a-fib. No palpitations, lightheadedness, heart racing. No dyspnea on exertion. No chest pain. His swelling has improved since starting spironolactone. He has no complaints. Denies any bloody or black stools.     PAST MEDICAL HISTORY:  Past Medical History:   Diagnosis Date     Arthritis      Atrial fibrillation (H)      Hayfever      Hypercholesterolemia      Hypertension      Unspecified hypothyroidism        CURRENT MEDICATIONS:  Current Outpatient Medications   Medication Sig Dispense Refill     atorvastatin (LIPITOR) 20 MG tablet Take 1 tablet (20 mg) by mouth daily 90 tablet 3     ELIQUIS ANTICOAGULANT 5 MG tablet TAKE 1 TABLET BY MOUTH TWICE A DAY 60 tablet 3     furosemide (LASIX) 20 MG tablet TAKE 1 TABLET BY MOUTH EVERY DAY 90 tablet 1     levETIRAcetam (KEPPRA) 500 MG tablet Take 500 mg by mouth 2 times daily       metoprolol succinate ER (TOPROL XL) 200 MG 24 hr tablet TAKE 1 TABLET (200 MG) BY MOUTH DAILY 90 tablet 3     omeprazole (PRILOSEC) 40 MG DR capsule Take 1 capsule (40 mg) by mouth 2 times daily 180 capsule 0     sacubitril-valsartan (ENTRESTO)  MG per tablet Take 1 tablet by mouth 2 times daily       spironolactone (ALDACTONE) 25 MG tablet Take 1 tablet (25 mg) by mouth daily 90 tablet 3     lisinopril (ZESTRIL) 40 MG tablet Take 1 tablet (40 mg) by mouth daily (Patient not taking: Reported on 9/12/2022) 90 tablet 1       PAST SURGICAL HISTORY:  Past Surgical History:   Procedure Laterality Date     ANESTHESIA CARDIOVERSION N/A 3/18/2022    Procedure: ANESTHESIA, FOR CARDIOVERSION@1400;  Surgeon: GENERIC ANESTHESIA PROVIDER;  Location:  OR       THYROIDECTOMY         ALLERGIES:   No Known Allergies    FAMILY HISTORY:  - Premature coronary artery disease  - Atrial fibrillation  - Sudden cardiac death     SOCIAL HISTORY:  Social History     Tobacco Use     Smoking status: Never Smoker     Smokeless tobacco: Never Used     Tobacco comment: Lives in smoke free household   Vaping Use     Vaping Use: Never used   Substance Use Topics     Alcohol use: No     Comment: None     Drug use: No     Comment: Never       ROS:   Constitutional: No fever, chills, or sweats. Weight stable.   ENT: No visual disturbance, ear ache, epistaxis, sore throat.   Cardiovascular: As per HPI.   Respiratory: No cough, hemoptysis.    GI: No nausea, vomiting, hematemesis, melena, or hematochezia.   : No hematuria.   Integument: Negative.   Psychiatric: Negative.   Hematologic:  Easy bruising, no easy bleeding.  Neuro: Negative.   Endocrinology: No significant heat or cold intolerance   Musculoskeletal: No myalgia.    Exam:  /83 (BP Location: Left arm, Patient Position: Chair, Cuff Size: Adult Large)   Pulse 95   Wt (!) 155.6 kg (343 lb)   SpO2 95%   BMI 39.13 kg/m    GENERAL APPEARANCE: healthy, alert and no distress  HEENT: no icterus, no xanthelasmas, normal pupil size and reaction, normal palate, mucosa moist, no central cyanosis  NECK: no adenopathy, no asymmetry, masses, or scars, thyroid normal to palpation and no bruits, JVP not elevated  RESPIRATORY: lungs clear to auscultation - no rales, rhonchi or wheezes, no use of accessory muscles, no retractions, respirations are unlabored, normal respiratory rate  CARDIOVASCULAR: Irregular. normal S1 with physiologic split S2, no S3 or S4 and no murmur, click or rub, precordium quiet with normal PMI.  ABDOMEN: soft, non tender, without hepatosplenomegaly, no masses palpable, bowel sounds normal, aorta not enlarged by palpation, no abdominal bruits  EXTREMITIES: peripheral pulses normal. Trace non-pitting edema  NEURO: alert and  oriented to person/place/time, normal speech, gait and affect  VASC: Radial pulses are normal in volumes and symmetric bilaterally.   SKIN: no ecchymoses, no rashes    Labs:  CBC RESULTS:   Lab Results   Component Value Date    WBC 10.2 01/28/2022    WBC 8.1 07/09/2021    RBC 3.13 (L) 01/28/2022    RBC 2.61 (L) 07/09/2021    HGB 14.7 06/15/2022    HGB 6.2 (LL) 07/09/2021    HCT 30.9 (L) 01/28/2022    HCT 21.7 (L) 07/09/2021    MCV 99 01/28/2022    MCV 83 07/09/2021    MCH 29.4 01/28/2022    MCH 23.8 (L) 07/09/2021    MCHC 29.8 (L) 01/28/2022    MCHC 28.6 (L) 07/09/2021    RDW 17.7 (H) 01/28/2022    RDW 16.1 (H) 07/09/2021     01/28/2022     (H) 07/09/2021       BMP RESULTS:  Lab Results   Component Value Date     04/15/2022     07/09/2021    POTASSIUM 4.1 04/15/2022    POTASSIUM 4.5 07/09/2021    CHLORIDE 108 04/15/2022    CHLORIDE 108 07/09/2021    CO2 28 04/15/2022    CO2 24 07/09/2021    ANIONGAP 6 04/15/2022    ANIONGAP 7 07/09/2021     (H) 04/15/2022     (H) 07/09/2021    BUN 20 04/15/2022    BUN 14 07/09/2021    CR 0.76 04/15/2022    CR 0.74 07/09/2021    GFRESTIMATED >90 04/15/2022    GFRESTIMATED >90 07/09/2021    GFRESTBLACK >90 07/09/2021    EUGENIA 8.9 04/15/2022    EUGENIA 8.5 07/09/2021        INR RESULTS:  Lab Results   Component Value Date    INR 1.3 (H) 02/04/2022    INR 1.5 (H) 01/31/2022    INR 1.5 (H) 01/28/2022    INR 1.20 (A) 01/21/2022    INR 2.5 (H) 12/31/2021    INR 2.10 (H) 06/25/2021    INR 2.50 (H) 04/16/2021    INR 2.30 (H) 03/05/2021    INR 2.40 (H) 01/22/2021       Procedures:  NA    Assessment and Plan:   #Permanent Atrial Fibrillation; [id number]   #Non-ischemic Cardiomyopathy  #Hx of GI bleed   #Obesity    Stef Mosher is a 62 year old with a history of permanent atrial fibrillation (status post multiple failed DCCV), NIC who presents for follow up. He has minimal symptoms related to his atrial fibrillation and overall seems well rate controlled on his  current dose of metoprolol. He is appropriately on Apixaban for stroke ppx with no further evidence of bleeding.     We did discuss pursuing a possible ablation given his relatively young age and low EF. However, his LA volume index is 80 ml/m2. Additionally he has not had sinus rhythm with prior DCCV. So an ablation would be unlikely to be successful.     We will place a 3 day event monitor to assess how well rate controlled he is on the current regimen. Additionally we will refer him to weight management clinic as he would like benefit from some weight loss. We will check a repeat CBC and a fasting lipid profile.    No changes to his current medication regimen.    Follow up in person with Dr. Turner in 6 months.     This patient was seen and discussed with Dr. Turner who agrees with the above assessment and plan.     I have seen, interviewed, and examined patient. I have reviewed the laboratory tests, imaging, and other investigations. I have reviewed the management plan with the patient. I discussed with the team and agree with the findings and plan in this resident/fellow/nurse practitioner's note. In addition, changes in the physical examination, assessment and plan have been incorporated into the note by myself, as to make it a single cohesive document.       Leanna Turner MD, MS  Cardiology/Cardiac EP Attending Staff        CC  Patient Care Team:  Leanna Turner MD as PCP - General (Cardiovascular Disease)  Leanna Turner MD as MD (Cardiology)  Stella Allison, RN as Specialty Care Coordinator (Cardiology)  Leanna Turner MD as Assigned Heart and Vascular Provider  Roxane Be MD as Assigned PCP  [facility]

## 2022-09-12 NOTE — LETTER
9/12/2022      RE: Stef Mosher  [address] [place] [address]       Dear Colleague,    Thank you for the opportunity to participate in the care of your patient, Stef Mosher, at the [facility]. Please see a copy of my visit note below.    HPI: Reason for Visit: Follow up A-fib    Stef Mosher is a 62 year old man with a history of persistent atrial fibrillation (status post multiple unsuccessful DCCV), non-ischemic cardiomyopathy, HTN, HLD who presents for a follow up.    He reports no changes in health since his last visit. Feels well. Denies any symptoms related to his a-fib. No palpitations, lightheadedness, heart racing. No dyspnea on exertion. No chest pain. His swelling has improved since starting spironolactone. He has no complaints. Denies any bloody or black stools.     PAST MEDICAL HISTORY:  Past Medical History:   Diagnosis Date     Arthritis      Atrial fibrillation (H)      Hayfever      Hypercholesterolemia      Hypertension      Unspecified hypothyroidism        CURRENT MEDICATIONS:  Current Outpatient Medications   Medication Sig Dispense Refill     atorvastatin (LIPITOR) 20 MG tablet Take 1 tablet (20 mg) by mouth daily 90 tablet 3     ELIQUIS ANTICOAGULANT 5 MG tablet TAKE 1 TABLET BY MOUTH TWICE A DAY 60 tablet 3     furosemide (LASIX) 20 MG tablet TAKE 1 TABLET BY MOUTH EVERY DAY 90 tablet 1     levETIRAcetam (KEPPRA) 500 MG tablet Take 500 mg by mouth 2 times daily       metoprolol succinate ER (TOPROL XL) 200 MG 24 hr tablet TAKE 1 TABLET (200 MG) BY MOUTH DAILY 90 tablet 3     omeprazole (PRILOSEC) 40 MG DR capsule Take 1 capsule (40 mg) by mouth 2 times daily 180 capsule 0     sacubitril-valsartan (ENTRESTO)  MG per tablet Take 1 tablet by mouth 2 times daily       spironolactone (ALDACTONE) 25 MG tablet Take 1 tablet (25 mg) by mouth daily 90 tablet 3     lisinopril (ZESTRIL) 40 MG tablet  Take 1 tablet (40 mg) by mouth daily (Patient not taking: Reported on 9/12/2022) 90 tablet 1       PAST SURGICAL HISTORY:  Past Surgical History:   Procedure Laterality Date     ANESTHESIA CARDIOVERSION N/A 3/18/2022    Procedure: ANESTHESIA, FOR CARDIOVERSION@1400;  Surgeon: GENERIC ANESTHESIA PROVIDER;  Location: [facility]      THYROIDECTOMY         ALLERGIES:   No Known Allergies    FAMILY HISTORY:  - Premature coronary artery disease  - Atrial fibrillation  - Sudden cardiac death     SOCIAL HISTORY:  Social History     Tobacco Use     Smoking status: Never Smoker     Smokeless tobacco: Never Used     Tobacco comment: Lives in smoke free household   Vaping Use     Vaping Use: Never used   Substance Use Topics     Alcohol use: No     Comment: None     Drug use: No     Comment: Never       ROS:   Constitutional: No fever, chills, or sweats. Weight stable.   ENT: No visual disturbance, ear ache, epistaxis, sore throat.   Cardiovascular: As per HPI.   Respiratory: No cough, hemoptysis.    GI: No nausea, vomiting, hematemesis, melena, or hematochezia.   : No hematuria.   Integument: Negative.   Psychiatric: Negative.   Hematologic:  Easy bruising, no easy bleeding.  Neuro: Negative.   Endocrinology: No significant heat or cold intolerance   Musculoskeletal: No myalgia.    Exam:  /83 (BP Location: Left arm, Patient Position: Chair, Cuff Size: Adult Large)   Pulse 95   Wt (!) 155.6 kg (343 lb)   SpO2 95%   BMI 39.13 kg/m    GENERAL APPEARANCE: healthy, alert and no distress  HEENT: no icterus, no xanthelasmas, normal pupil size and reaction, normal palate, mucosa moist, no central cyanosis  NECK: no adenopathy, no asymmetry, masses, or scars, thyroid normal to palpation and no bruits, JVP not elevated  RESPIRATORY: lungs clear to auscultation - no rales, rhonchi or wheezes, no use of accessory muscles, no retractions, respirations are unlabored, normal respiratory rate  CARDIOVASCULAR: Irregular. normal S1  with physiologic split S2, no S3 or S4 and no murmur, click or rub, precordium quiet with normal PMI.  ABDOMEN: soft, non tender, without hepatosplenomegaly, no masses palpable, bowel sounds normal, aorta not enlarged by palpation, no abdominal bruits  EXTREMITIES: peripheral pulses normal. Trace non-pitting edema  NEURO: alert and oriented to person/place/time, normal speech, gait and affect  VASC: Radial pulses are normal in volumes and symmetric bilaterally.   SKIN: no ecchymoses, no rashes    Labs:  CBC RESULTS:   Lab Results   Component Value Date    WBC 10.2 01/28/2022    WBC 8.1 07/09/2021    RBC 3.13 (L) 01/28/2022    RBC 2.61 (L) 07/09/2021    HGB 14.7 06/15/2022    HGB 6.2 (LL) 07/09/2021    HCT 30.9 (L) 01/28/2022    HCT 21.7 (L) 07/09/2021    MCV 99 01/28/2022    MCV 83 07/09/2021    MCH 29.4 01/28/2022    MCH 23.8 (L) 07/09/2021    MCHC 29.8 (L) 01/28/2022    MCHC 28.6 (L) 07/09/2021    RDW 17.7 (H) 01/28/2022    RDW 16.1 (H) 07/09/2021     01/28/2022     (H) 07/09/2021       BMP RESULTS:  Lab Results   Component Value Date     04/15/2022     07/09/2021    POTASSIUM 4.1 04/15/2022    POTASSIUM 4.5 07/09/2021    CHLORIDE 108 04/15/2022    CHLORIDE 108 07/09/2021    CO2 28 04/15/2022    CO2 24 07/09/2021    ANIONGAP 6 04/15/2022    ANIONGAP 7 07/09/2021     (H) 04/15/2022     (H) 07/09/2021    BUN 20 04/15/2022    BUN 14 07/09/2021    CR 0.76 04/15/2022    CR 0.74 07/09/2021    GFRESTIMATED >90 04/15/2022    GFRESTIMATED >90 07/09/2021    GFRESTBLACK >90 07/09/2021    EUGENIA 8.9 04/15/2022    EUGENIA 8.5 07/09/2021        INR RESULTS:  Lab Results   Component Value Date    INR 1.3 (H) 02/04/2022    INR 1.5 (H) 01/31/2022    INR 1.5 (H) 01/28/2022    INR 1.20 (A) 01/21/2022    INR 2.5 (H) 12/31/2021    INR 2.10 (H) 06/25/2021    INR 2.50 (H) 04/16/2021    INR 2.30 (H) 03/05/2021    INR 2.40 (H) 01/22/2021       Procedures:  NA    Assessment and Plan:   #Permanent Atrial  Fibrillation; [id number]   #Non-ischemic Cardiomyopathy  #Hx of GI bleed   #Obesity    Stef Mosher is a 62 year old with a history of permanent atrial fibrillation (status post multiple failed DCCV), NICM who presents for follow up. He has minimal symptoms related to his atrial fibrillation and overall seems well rate controlled on his current dose of metoprolol. He is appropriately on Apixaban for stroke ppx with no further evidence of bleeding.     We did discuss pursuing a possible ablation given his relatively young age and low EF. However, his LA volume index is 80 ml/m2. Additionally he has not had sinus rhythm with prior DCCV. So an ablation would be unlikely to be successful.     We will place a 3 day event monitor to assess how well rate controlled he is on the current regimen. Additionally we will refer him to weight management clinic as he would like benefit from some weight loss. We will check a repeat CBC and a fasting lipid profile.    No changes to his current medication regimen.    Follow up in person with Dr. Turner in 6 months.     This patient was seen and discussed with Dr. Turner who agrees with the above assessment and plan.     I have seen, interviewed, and examined patient. I have reviewed the laboratory tests, imaging, and other investigations. I have reviewed the management plan with the patient. I discussed with the team and agree with the findings and plan in this resident/fellow/nurse practitioner's note. In addition, changes in the physical examination, assessment and plan have been incorporated into the note by myself, as to make it a single cohesive document.       Leanna Turner MD, MS  Cardiology/Cardiac EP Attending Staff      CC  Patient Care Team:  Leanna Turner MD as PCP - General (Cardiovascular Disease)  Stella Allison, RN as Specialty Care Coordinator (Cardiology)  Roxane Be MD as Assigned PCP  [facility]

## 2022-09-12 NOTE — PATIENT INSTRUCTIONS
[facility] [place] [place]; please note the following instructions:    1.  We have applied a holter (heart) monitor for you to wear for 3 days.  You may remove the heart monitor on Thursday, September 15th at 5:00 pm.  Please see the enclosed instructions for further information, as well as instructions for removal of the heart monitor and return mailing directions.  If you should have questions regarding your monitor, please call [employer]. at [phone].  The Cardiology Nurse will contact you with your results (please see result notification details at bottom of summary).    *PLEASE DO NOT SHOWER OR INDUCE EXCESSIVE SWEATING IN THE FIRST 24 HOURS OF WEARING*      2.  Weight management Clinic    3.  FASTING labs to check cholesterol and blood count    4. Follow-up in clinic in 6 months with Dr. Turner          If you have any questions regarding your visit please contact your care team:     Cardiology  Telephone Number   Mahnaz MORRIS., RN  Malgorzata MARTINEZ, RN   Laura SKY, RMJALEN GORMAN, IAM DECKER, Visit Facilitator   [phone] (option 1)   For scheduling appts:     [phone] (select option 1)       For the Device Clinic (Pacemakers and ICD's)  RN's :  Vale Cartagena   During business hours: [phone]    *After business hours:  [phone] (select option 4)      Normal test result notifications will be released via [employer] or mailed within 7 business days.  All other test results, will be communicated via telephone once reviewed by your cardiologist.    If you need a medication refill please contact your pharmacy.  Please allow 3 business days for your refill to be completed.    As always, thank you for trusting us with your health care needs!

## 2022-09-13 NOTE — PROGRESS NOTES
3 day ZioXT applied to patient today. Instructions on use and removal given and mail back instructions discussed. All questions answered. Consent form signed. Device registered.  Device number: [id number].    IAM Delacruz

## 2022-09-23 ENCOUNTER — LAB (OUTPATIENT)
Dept: LAB | Facility: CLINIC | Age: 62
End: 2022-09-23
Payer: COMMERCIAL

## 2022-09-23 DIAGNOSIS — Z13.220 SCREENING FOR HYPERLIPIDEMIA: ICD-10-CM

## 2022-09-23 DIAGNOSIS — E03.9 HYPOTHYROIDISM: ICD-10-CM

## 2022-09-23 DIAGNOSIS — I10 ESSENTIAL HYPERTENSION: Primary | ICD-10-CM

## 2022-09-23 DIAGNOSIS — D64.9 LOW HEMOGLOBIN: ICD-10-CM

## 2022-09-23 LAB
ALT SERPL W P-5'-P-CCNC: 35 U/L (ref 0–70)
ANION GAP SERPL CALCULATED.3IONS-SCNC: 4 MMOL/L (ref 3–14)
BUN SERPL-MCNC: 18 MG/DL (ref 7–30)
CALCIUM SERPL-MCNC: 9.1 MG/DL (ref 8.5–10.1)
CHLORIDE BLD-SCNC: 108 MMOL/L (ref 94–109)
CHOLEST SERPL-MCNC: 147 MG/DL
CO2 SERPL-SCNC: 28 MMOL/L (ref 20–32)
CREAT SERPL-MCNC: 0.73 MG/DL (ref 0.66–1.25)
FASTING STATUS PATIENT QL REPORTED: YES
GFR SERPL CREATININE-BSD FRML MDRD: >90 ML/MIN/1.73M2
GLUCOSE BLD-MCNC: 102 MG/DL (ref 70–99)
HDLC SERPL-MCNC: 41 MG/DL
HGB BLD-MCNC: 15.5 G/DL (ref 13.3–17.7)
LDLC SERPL CALC-MCNC: 90 MG/DL
NONHDLC SERPL-MCNC: 106 MG/DL
POTASSIUM BLD-SCNC: 4.4 MMOL/L (ref 3.4–5.3)
SODIUM SERPL-SCNC: 140 MMOL/L (ref 133–144)
TRIGL SERPL-MCNC: 79 MG/DL
TSH SERPL DL<=0.005 MIU/L-ACNC: 1.03 MU/L (ref 0.4–4)

## 2022-09-23 PROCEDURE — 36415 COLL VENOUS BLD VENIPUNCTURE: CPT

## 2022-09-23 PROCEDURE — 84443 ASSAY THYROID STIM HORMONE: CPT

## 2022-09-23 PROCEDURE — 80048 BASIC METABOLIC PNL TOTAL CA: CPT

## 2022-09-23 PROCEDURE — 84460 ALANINE AMINO (ALT) (SGPT): CPT

## 2022-09-23 PROCEDURE — 85018 HEMOGLOBIN: CPT

## 2022-09-23 PROCEDURE — 80061 LIPID PANEL: CPT

## 2022-10-03 ENCOUNTER — TELEPHONE (OUTPATIENT)
Dept: CARDIOLOGY | Facility: CLINIC | Age: 62
End: 2022-10-03

## 2022-10-03 DIAGNOSIS — I48.0 PAROXYSMAL ATRIAL FIBRILLATION (H): ICD-10-CM

## 2022-10-03 DIAGNOSIS — I50.22 CHRONIC SYSTOLIC HEART FAILURE (H): ICD-10-CM

## 2022-10-03 DIAGNOSIS — I48.21 PERMANENT ATRIAL FIBRILLATION (H): Primary | ICD-10-CM

## 2022-10-03 NOTE — TELEPHONE ENCOUNTER
RN to call patient to review result note.    Mahnaz Reynoso RN  Cardiology Care Coordinator  [facility]  [phone] option 1    Leanna Turner MD   10/3/2022 10:01 AM CDT         Start Cardizem  mg daily to achieve better rate control.  Place a 3-day Zio patch monitor in about 4 weeks.  Schedule a video visit after that.

## 2022-10-04 RX ORDER — DILTIAZEM HYDROCHLORIDE 120 MG/1
120 CAPSULE, COATED, EXTENDED RELEASE ORAL DAILY
Qty: 90 CAPSULE | Refills: 1 | Status: SHIPPED | OUTPATIENT
Start: 2022-10-04 | End: 2023-03-07

## 2022-10-04 NOTE — TELEPHONE ENCOUNTER
[facility] Call Center    Phone Message    May a detailed message be left on voicemail: yes     Reason for Call: Other: Patient returning Mahnaz's call. Unable to reach [place] via phone. Please call him back.     Thank you!  [facility]      Action Taken: Other: Cardiology    Travel Screening: Not Applicable

## 2022-10-04 NOTE — TELEPHONE ENCOUNTER
Registered patient for 3 day Zio to arrive Nov. 7. Called patient and confirmed home address.    JANIS Grimm

## 2022-10-04 NOTE — TELEPHONE ENCOUNTER
Spoke with patient.  He is agreeable to start cardizem 120 mg daily.  He prefers to have the zio mailed to him the week of Nov 7th.    New Medication: Patient was educated regarding newly prescribed medication, including discussion of  the indication, administration, side effects, and when to report to MD or RN. Patient demonstrated understanding of this information and agreed to call with further questions or concerns.    Signed Prescriptions:                        Disp   Refills    diltiazem ER COATED BEADS (CARDIZEM CD) 12*90 cap*1        Sig: Take 1 capsule (120 mg) by mouth daily  Authorizing Provider: KAITLYNN STRATTON  Ordering User: MAHNAZ REYNOSO        Please enroll patient for zio 3day monitor to arrive Nov 7th.    Mahnaz Reynoso RN  Cardiology Care Coordinator  [facility]  [phone] option 1

## 2022-10-07 NOTE — TELEPHONE ENCOUNTER
[facility] Call Center    Phone Message    May a detailed message be left on voicemail: yes     Reason for Call: Other: Pt called requesting to speak with Mahnaz in regards to prev messages, please review and call pt back to discuss. Thank you!  [facility]    Action Taken: Other: Cardiology    Travel Screening: Not Applicable

## 2022-10-10 ENCOUNTER — TELEPHONE (OUTPATIENT)
Dept: CARDIOLOGY | Facility: CLINIC | Age: 62
End: 2022-10-10

## 2022-10-10 NOTE — TELEPHONE ENCOUNTER
"Spoke to patient who reports that he needs a refill of Entresto, states that he went to fill it and states the pharmacy informed him that it was marked discontinued.  Writer explained to the patient that it is marked as \"historical\" and will look into this and let him know when the refill will be sent.  Patient reports he is on his way to work and will not be available to talk until after 10:30 am.    Writer looked into patients chart.  Please see 7/8/22 telephone encounter.  Writer will contact patient after 10:30 am to confirm that he is not taking lisinopril.    Mahnaz Reynoso RN  Cardiology Care Coordinator  [facility]  [phone] option 1      "

## 2022-10-10 NOTE — TELEPHONE ENCOUNTER
[facility] Call Center    Phone Message    May a detailed message be left on voicemail: yes     Reason for Call: Other: tim castillo pt to discuss the meds you have been speaking w/the pt about.     Action Taken: Message routed to:  [facility] cardio    Travel Screening: Not Applicable     Thank you!  [facility]

## 2022-10-11 ENCOUNTER — TELEPHONE (OUTPATIENT)
Dept: CARDIOLOGY | Facility: CLINIC | Age: 62
End: 2022-10-11

## 2022-10-11 DIAGNOSIS — I50.22 CHRONIC SYSTOLIC HEART FAILURE (H): Primary | ICD-10-CM

## 2022-10-11 RX ORDER — SACUBITRIL AND VALSARTAN 97; 103 MG/1; MG/1
1 TABLET, FILM COATED ORAL 2 TIMES DAILY
Qty: 60 TABLET | Refills: 0 | Status: SHIPPED | OUTPATIENT
Start: 2022-10-11 | End: 2024-02-23

## 2022-10-11 NOTE — TELEPHONE ENCOUNTER
Please see other encounter from 10/3.  Ok to close this encounter.    Mahnaz Reynoso, HOLLY  Cardiology Care Coordinator  [facility]  [phone] option 1

## 2022-10-11 NOTE — TELEPHONE ENCOUNTER
Writer contacted Dr. Brambila.    Date: 10/11/2022    Time of Call: 7:18 AM     Diagnosis:  HF     [ VORB ] Ordering provider: Dr. Brambila  Order: STOP Lisinopril   Repeat BMP in 10 days     Order received by: Mahnaz Reynoso RN       Follow-up/additional notes: medication list updated.  Entresto refilled.      NOTE: late entry:  10/10/22 approx. 3:00 pm  Pt did report that he was taking Lisinopril 40 mg daily but does report that he gets confused on all the medications that he is taking.  Explained to patient that he was advised on 7/8 and during office visit with Dr. Brambila on 6/17 that he is to stop Lisinopril 3 days prior to starting entresto.  Patient states he does not recall being told this.  This has been thouoghly documented that patient was informed of this very important instruction.  When I look at patient's medication list he did report that he was not taking lisinopril on 9/12/22 during his visit with Dr. Turner.    Patient states that he still feels really good.  Denies any signs of facial edema (angioedema), dizziness, lightheadedness.  Last BMP on 9/23/22 showed a normal creat. And potassium.    Medication list updated.  Lisinopril removed.  entresto filled to preferred pharmacy.    Writer left a message for patient to call clinic again to review all instructions again in detail.      Mahnaz Reynoso RN  Cardiology Care Coordinator  [facility]  [phone] option 1

## 2022-10-11 NOTE — TELEPHONE ENCOUNTER
Left message for patient to call clinic to make sure patient is aware of the plan and to help schedule lab appt.  Mahnaz Reynoso RN

## 2022-10-11 NOTE — TELEPHONE ENCOUNTER
[facility] Call Center    Phone Message    May a detailed message be left on voicemail: yes     Reason for Call: Other: Pt is returning call please return call back at earliest convienience.      Action Taken: Other: Cardiology     Travel Screening: Not Applicable

## 2022-10-11 NOTE — TELEPHONE ENCOUNTER
Spoke to patient who reports that last night he spoke with his wife and reviewed his medications and CONFIRMED that he is NOT taking Lisinopril!  Reports that his wife knew that he was not to take Lisinopril and the need to stop it 3 days prior and this is what they did.  Patient apologized for the confusion.  Writer will inform Dr. Brambila, labs are now not needed.  Patient verbalized understanding.    Mahnaz Reynoso RN  Cardiology Care Coordinator  [facility]  [phone] option 1

## 2022-10-11 NOTE — TELEPHONE ENCOUNTER
Spoke to patient.  Patient reports that his Rx will not be ready until 11 am tomorrow.  He wants to get the medication tonight.  Writer contact  pharmacy and [facility] has a supply available.  rx sent for 30 day supply to  [facility] location.  Pt is agreeable to go to that specific pharmacy to  a small supply to get him through.    Mahnaz Reynoso, RN  Cardiology Care Coordinator  [facility]  [phone] option 1

## 2022-10-11 NOTE — TELEPHONE ENCOUNTER
[facility] Call [facility]    Phone Message    May a detailed message be left on voicemail: yes     Reason for Call: Other: Patient called requesting to speak directly with Mahnaz Reynoso . Please call patient back to further discuss, thank you.     Action Taken: Message routed to:  Other: Cardiology    Travel Screening: Not Applicable     Thank you!  [facility]

## 2022-10-29 DIAGNOSIS — I48.20 CHRONIC ATRIAL FIBRILLATION, UNSPECIFIED (H): ICD-10-CM

## 2022-12-16 DIAGNOSIS — I42.9 CARDIOMYOPATHY, UNSPECIFIED TYPE (H): ICD-10-CM

## 2022-12-16 RX ORDER — FUROSEMIDE 20 MG
TABLET ORAL
Qty: 90 TABLET | Refills: 0 | Status: SHIPPED | OUTPATIENT
Start: 2022-12-16 | End: 2023-03-03

## 2022-12-23 DIAGNOSIS — E78.5 HYPERLIPIDEMIA LDL GOAL <100: ICD-10-CM

## 2022-12-26 ENCOUNTER — HEALTH MAINTENANCE LETTER (OUTPATIENT)
Age: 62
End: 2022-12-26

## 2022-12-26 RX ORDER — ATORVASTATIN CALCIUM 20 MG/1
20 TABLET, FILM COATED ORAL DAILY
Qty: 90 TABLET | Refills: 2 | Status: SHIPPED | OUTPATIENT
Start: 2022-12-26 | End: 2023-09-27

## 2023-01-13 ENCOUNTER — TELEPHONE (OUTPATIENT)
Dept: CARDIOLOGY | Facility: CLINIC | Age: 63
End: 2023-01-13
Payer: COMMERCIAL

## 2023-01-13 DIAGNOSIS — E78.2 MIXED HYPERLIPIDEMIA: ICD-10-CM

## 2023-01-13 DIAGNOSIS — I50.20 HEART FAILURE WITH REDUCED EJECTION FRACTION, NYHA CLASS III (H): Primary | ICD-10-CM

## 2023-01-16 NOTE — TELEPHONE ENCOUNTER
ALEXEIM for pt to return call to clinic scheduler to schedule lab work 2-3 days prior to appointment with Dr. Brambila.    JANIS Grimm

## 2023-01-17 NOTE — TELEPHONE ENCOUNTER
Called patient to schedule lab work. Phone rang many times, then someone answered and hung up immediately. Unable to LVM. Will try again another time.    JANIS Grimm

## 2023-01-18 NOTE — TELEPHONE ENCOUNTER
Called patient to schedule lab appt prior to visit with Dr. Brambila. No answer - Left .     Apple Roland CMA ([facility])

## 2023-01-27 ENCOUNTER — TELEPHONE (OUTPATIENT)
Dept: CARDIOLOGY | Facility: CLINIC | Age: 63
End: 2023-01-27

## 2023-01-27 ENCOUNTER — LAB (OUTPATIENT)
Dept: LAB | Facility: CLINIC | Age: 63
End: 2023-01-27
Payer: COMMERCIAL

## 2023-01-27 DIAGNOSIS — E78.2 MIXED HYPERLIPIDEMIA: ICD-10-CM

## 2023-01-27 DIAGNOSIS — I50.20 HEART FAILURE WITH REDUCED EJECTION FRACTION, NYHA CLASS III (H): ICD-10-CM

## 2023-01-27 LAB
ALBUMIN SERPL-MCNC: 3.8 G/DL (ref 3.4–5)
ALP SERPL-CCNC: 116 U/L (ref 40–150)
ALT SERPL W P-5'-P-CCNC: 47 U/L (ref 0–70)
ANION GAP SERPL CALCULATED.3IONS-SCNC: 5 MMOL/L (ref 3–14)
AST SERPL W P-5'-P-CCNC: 28 U/L (ref 0–45)
BILIRUB SERPL-MCNC: 1.1 MG/DL (ref 0.2–1.3)
BUN SERPL-MCNC: 20 MG/DL (ref 7–30)
CALCIUM SERPL-MCNC: 9.2 MG/DL (ref 8.5–10.1)
CHLORIDE BLD-SCNC: 111 MMOL/L (ref 94–109)
CO2 SERPL-SCNC: 29 MMOL/L (ref 20–32)
CREAT SERPL-MCNC: 0.83 MG/DL (ref 0.66–1.25)
ERYTHROCYTE [DISTWIDTH] IN BLOOD BY AUTOMATED COUNT: 13.6 % (ref 10–15)
GFR SERPL CREATININE-BSD FRML MDRD: >90 ML/MIN/1.73M2
GLUCOSE BLD-MCNC: 106 MG/DL (ref 70–99)
HCT VFR BLD AUTO: 47.4 % (ref 40–53)
HGB BLD-MCNC: 15.6 G/DL (ref 13.3–17.7)
MAGNESIUM SERPL-MCNC: 1.8 MG/DL (ref 1.6–2.3)
MCH RBC QN AUTO: 32.3 PG (ref 26.5–33)
MCHC RBC AUTO-ENTMCNC: 32.9 G/DL (ref 31.5–36.5)
MCV RBC AUTO: 98 FL (ref 78–100)
NT-PROBNP SERPL-MCNC: 157 PG/ML (ref 0–900)
PLATELET # BLD AUTO: 203 10E3/UL (ref 150–450)
POTASSIUM BLD-SCNC: 4.8 MMOL/L (ref 3.4–5.3)
PROT SERPL-MCNC: 7.2 G/DL (ref 6.8–8.8)
RBC # BLD AUTO: 4.83 10E6/UL (ref 4.4–5.9)
SODIUM SERPL-SCNC: 145 MMOL/L (ref 133–144)
WBC # BLD AUTO: 8.1 10E3/UL (ref 4–11)

## 2023-01-27 PROCEDURE — 83880 ASSAY OF NATRIURETIC PEPTIDE: CPT

## 2023-01-27 PROCEDURE — 83735 ASSAY OF MAGNESIUM: CPT

## 2023-01-27 PROCEDURE — 36415 COLL VENOUS BLD VENIPUNCTURE: CPT

## 2023-01-27 PROCEDURE — 80053 COMPREHEN METABOLIC PANEL: CPT

## 2023-01-27 PROCEDURE — 85027 COMPLETE CBC AUTOMATED: CPT

## 2023-01-27 NOTE — TELEPHONE ENCOUNTER
[facility] Call [facility]    Phone Message    May a detailed message be left on voicemail: yes     Reason for Call: Other: Pt's medication Entrsto has jumped up to a 1800$ copay and pt doesn't understand why. Please call him to discuss a generic or what elsse he can do. Thank you     Action Taken: Message routed to:  Other: Cardiology    Travel Screening: Not Applicable       Thank you!  [facility]

## 2023-01-27 NOTE — TELEPHONE ENCOUNTER
Spoke to pt. Stated he is on medica and insurance has not changed, so he is unsure why his co pay changed. Pharmacy tech was unable to help and pharmacist was too busy to discuss.     Pt is schd next week, has 2 weeks left of his script, will discuss options at follow up appt. Pt agreed, verbalized understanding, reviewed lab results with pt

## 2023-02-02 NOTE — TELEPHONE ENCOUNTER
Spoke with pharmacist. Stated that pt has high deductible plan. (still 5100 to meet).   entresto is 652.48 for 30 day supply. ([facility]).     -will try coupon card

## 2023-02-03 ENCOUNTER — OFFICE VISIT (OUTPATIENT)
Dept: CARDIOLOGY | Facility: CLINIC | Age: 63
End: 2023-02-03
Payer: COMMERCIAL

## 2023-02-03 VITALS
OXYGEN SATURATION: 99 % | HEART RATE: 75 BPM | HEIGHT: 78 IN | DIASTOLIC BLOOD PRESSURE: 88 MMHG | BODY MASS INDEX: 36.45 KG/M2 | SYSTOLIC BLOOD PRESSURE: 122 MMHG | WEIGHT: 315 LBS

## 2023-02-03 DIAGNOSIS — I10 BENIGN ESSENTIAL HYPERTENSION: Primary | ICD-10-CM

## 2023-02-03 DIAGNOSIS — I48.0 PAROXYSMAL ATRIAL FIBRILLATION (H): ICD-10-CM

## 2023-02-03 DIAGNOSIS — I42.8 NONISCHEMIC CARDIOMYOPATHY (H): ICD-10-CM

## 2023-02-03 DIAGNOSIS — I50.20 HEART FAILURE WITH REDUCED EJECTION FRACTION, NYHA CLASS III (H): ICD-10-CM

## 2023-02-03 DIAGNOSIS — E78.2 MIXED HYPERLIPIDEMIA: ICD-10-CM

## 2023-02-03 PROCEDURE — 99214 OFFICE O/P EST MOD 30 MIN: CPT | Performed by: INTERNAL MEDICINE

## 2023-02-03 NOTE — PROGRESS NOTES
February 3, 2023     Dear Dr. Turner  I had the pleasure of seeing Stef Mosher  in the [facility] for further evaluation and management of his heart failure. As you know he is a 62-year-old male history of persistent atrial fibrillation, heart failure with reduced ejection fraction, and significant GI bleed requiring blood transfusions. He is here for follow-up.  He saw Dr. Turner in 2/2022 and the decision was made to proceed with WERNER DCCV at the time that was attempted on March 18, 2022, but was unsuccessful.  The WERNER noted that the ejection fraction was 30 to 35%.  He was restarted on apixaban 5 mg twice daily and his weekly hemoglobin levels have remained stable and he has not noted any clinical GI bleed.  A nuclear stress test was done on March 4 that showed no evidence of myocardial ischemia but reported a normal ejection fraction. Coronary CTA showed no significant stenosis. Spironolactone was started that he tolerated well. In addition, we attempted to transition him entresto yet it costs him $600 a month and this was not yet able to transition him yet.  Overall continues to do very well without any complaints. No chest pain, dizziness or lightheadedness, palpitations. No other complaints.     PAST MEDICAL HISTORY:  Past Medical History:   Diagnosis Date     Arthritis      Atrial fibrillation (H)      Hayfever      Hypercholesterolemia      Hypertension      Unspecified hypothyroidism      FAMILY HISTORY:  Family History   Problem Relation Age of Onset     Blood Disease Mother      Cancer Mother      Arthritis Father      Hypertension Father      Breast Cancer Sister      Allergies Sister      Cancer Sister      Thyroid Disease Sister      Blood Disease Sister      Diabetes No family hx of      Cerebrovascular Disease No family hx of      Glaucoma No family hx of      Macular Degeneration No family hx of      SOCIAL HISTORY:  Social History     Socioeconomic History     Marital status:  "     Spouse name: None     Number of children: None     Years of education: None     Highest education level: None   Tobacco Use     Smoking status: Never     Smokeless tobacco: Never     Tobacco comments:     Lives in smoke free household   Vaping Use     Vaping Use: Never used   Substance and Sexual Activity     Alcohol use: No     Comment: None     Drug use: No     Comment: Never     Sexual activity: Not Currently     Partners: Female     CURRENT MEDICATIONS:  Current Outpatient Medications   Medication     apixaban ANTICOAGULANT (ELIQUIS ANTICOAGULANT) 5 MG tablet     atorvastatin (LIPITOR) 20 MG tablet     diltiazem ER COATED BEADS (CARDIZEM CD) 120 MG 24 hr capsule     furosemide (LASIX) 20 MG tablet     levETIRAcetam (KEPPRA) 500 MG tablet     metoprolol succinate ER (TOPROL XL) 200 MG 24 hr tablet     omeprazole (PRILOSEC) 40 MG DR capsule     sacubitril-valsartan (ENTRESTO)  MG per tablet     sacubitril-valsartan (ENTRESTO)  MG per tablet     spironolactone (ALDACTONE) 25 MG tablet     No current facility-administered medications for this visit.     ROS:   Constitutional: No fever, chills, or sweats. Weight is 351 lbs 0 oz  ENT: No visual disturbance, ear ache, epistaxis, sore throat.   Allergies/Immunologic: Negative.   Respiratory: No cough, hemoptysis.   Cardiovascular: As per HPI.   GI: No nausea, vomiting, hematemesis, melena, or hematochezia.   : No urinary frequency, dysuria, or hematuria.   Integrument: Negative.   Psychiatric: No evidence of major depression  Neuro: No new neurological complaints at this time. Non focal  Endocrinology: Negative.   Musculoskeletal: As per HPI.      EXAM:  /88   Pulse 75   Ht 1.981 m (6' 6\")   Wt (!) 159.2 kg (351 lb)   SpO2 99%   BMI 40.56 kg/m    General: appears comfortable, alert and oriented  Head: normocephalic, atraumatic  Eyes: anicteric sclera, EOMI , PERRL  Neck: no adenopathy  Orophyarynx: moist mucosa, no lesions " noted  Heart: regular, S1/S2, no murmurs, rubs or gallop. Estimated JVP at 5 cmH2O  Lungs: CTAB, No wheezing.   Abdomen: soft, non-tender, bowel sounds present, no hepatosplenomegaly  Extremities: No LE edema today  Skin: no open lesions noted  Neuro: grossly non-focal  Psych: no evidence of depression noted     Labs:  Lab Results   Component Value Date    WBC 8.1 01/27/2023    HGB 15.6 01/27/2023    HCT 47.4 01/27/2023     01/27/2023     (H) 01/27/2023    POTASSIUM 4.8 01/27/2023    CHLORIDE 111 (H) 01/27/2023    CO2 29 01/27/2023    BUN 20 01/27/2023    CR 0.83 01/27/2023     (H) 01/27/2023    NTBNP 157 01/27/2023    AST 28 01/27/2023    ALT 47 01/27/2023    ALKPHOS 116 01/27/2023    BILITOTAL 1.1 01/27/2023    INR 1.3 (H) 02/04/2022     TTE 2015:  Mildly reduced left ventricular systolic function. LVEF estimate 40-45%  (Traced 44%). Normal LV function and size. LV filling is restrictive. Normal   RV function and size. No significant valvular abnormalities. IVC cannot be assessed. No major changes compared to prior study in February 2011.     TTE 7/2021:  Difficult to assess ejection fraction due to evbf-pl-jezh variability, the LVEF in the 40% range. Mild left ventricular dilation is present.  Moderate right ventricular dilation is present. Global right ventricular function is mildly reduced.  Mild dilatation of the aorta is present. Sinuses of Valsalva 4.4 cm (1.63 cm//m2).  No pericardial effusion is present.   This study was compared with the study from 9/22/2017. No significant changes noted.     TTE 11/2021:  Left ventricular function is decreased. The ejection fraction is 35-40% (moderately reduced).  Global right ventricular function is mildly reduced. The inferior vena cava cannot be assessed.  No pericardial effusion is present. There has been no change.     WERNER 2/8/22:  Left ventricular function is decreased. The ejection fraction is 30-35% (moderately reduced). Moderate diffuse  hypokinesis is present.  Global right ventricular function is mildly reduced.  The left atrial appendage is free of thrombus. There is spontaneous echo contrast with a severely dilated left atrium.  Mild functional mitral insufficiency is present. No pericardial effusion is present.     NM stress 3/4/2022:     The nuclear stress test is abnormal.     No evidence of myocardial ischemia. There is a small area of a mild degree of infarction in the distal septal segment(s) of the left ventricle. This appears to be in the left anterior descending distribution.     Left ventricular function is normal.     A prior study was conducted on 11/13/2017.     Nuclear Study Quality: Final image quality is satisfactory.     TTE 6/3/22:  Left ventricular function is decreased. The ejection fraction is 35-40% (moderately reduced). Moderate left ventricular dilation is present.  Global right ventricular function is mildly reduced.   The inferior vena cava was normal in size with preserved respiratory variability.  Trivial pericardial effusion is present.   This study was compared with the study from 11/2021. No significant changes noted.     CTA 7/22/22:  1.  There are no high-grade stenoses in the left main, proximal left anterior descending, proximal left circumflex, or right coronary arteries.  2.  The remaining segments of the major epicardial vessels are non-diagnostic due to significant cardiac motion and photon starvation artifacts.  3.  Total Agatston score 58 placing the patient in the 54th percentile when compared to age and gender matched control group.  4.  Please review the separate Radiology report for incidental noncardiac findings.    ASSESSMENT AND PLAN:  In summary, patient is a 62 year old male with above past medical history including permanent atrial fibrillation for many years, heart failure with reduced ejection fraction class II symptoms at this point.  I did review most of the prior imaging he had and  essentially he had reduced ejection fraction for a very long time at least since 2015 or even earlier.  Please note that he did have the permanent atrial fibrillation for at least 12 years he did get multiple shocks in the past 3 DCCV however these were unsuccessful.  I could not see any ischemic evaluation other than nuclear medicine stress test which showed some potential infarcts which are small.  Coronary CT looked without severe sdisease suggestion. He continues the entresto high dose but again may not be covered and thus may need to transition back to losartan 100mg daily. Will continue all other medications, no changes ar needed at this time. Will see him back in 1 year or sooner as needed. Echo and labs to be obtained at the time.      Follow up:   1 year with labs     I appreciate the opportunity to participate in the care of Stef Mosher . Please do not hesitate to contact me with any further questions.     Sincerely,   Sivakumar Brambila MD  [facility] Division of Cardiology

## 2023-02-03 NOTE — LETTER
2/3/2023      RE: Stef Mosher  [address] [place] [address]       Dear Colleague,    Thank you for the opportunity to participate in the care of your patient, Stef Mosher, at the [facility]. Please see a copy of my visit note below.    February 3, 2023     Dear Dr. Turner  I had the pleasure of seeing Stef Mosher  in the [facility] for further evaluation and management of his heart failure. As you know he is a 62-year-old male history of persistent atrial fibrillation, heart failure with reduced ejection fraction, and significant GI bleed requiring blood transfusions. He is here for follow-up.  He saw Dr. Turner in 2/2022 and the decision was made to proceed with WERNER DCCV at the time that was attempted on March 18, 2022, but was unsuccessful.  The WERNER noted that the ejection fraction was 30 to 35%.  He was restarted on apixaban 5 mg twice daily and his weekly hemoglobin levels have remained stable and he has not noted any clinical GI bleed.  A nuclear stress test was done on March 4 that showed no evidence of myocardial ischemia but reported a normal ejection fraction. Coronary CTA showed no significant stenosis. Spironolactone was started that he tolerated well. In addition, we attempted to transition him entresto yet it costs him $600 a month and this was not yet able to transition him yet.  Overall continues to do very well without any complaints. No chest pain, dizziness or lightheadedness, palpitations. No other complaints.     PAST MEDICAL HISTORY:  Past Medical History:   Diagnosis Date     Arthritis      Atrial fibrillation (H)      Hayfever      Hypercholesterolemia      Hypertension      Unspecified hypothyroidism      FAMILY HISTORY:  Family History   Problem Relation Age of Onset     Blood Disease Mother      Cancer Mother      Arthritis Father      Hypertension Father      Breast Cancer  Sister      Allergies Sister      Cancer Sister      Thyroid Disease Sister      Blood Disease Sister      Diabetes No family hx of      Cerebrovascular Disease No family hx of      Glaucoma No family hx of      Macular Degeneration No family hx of      SOCIAL HISTORY:  Social History     Socioeconomic History     Marital status:      Spouse name: None     Number of children: None     Years of education: None     Highest education level: None   Tobacco Use     Smoking status: Never     Smokeless tobacco: Never     Tobacco comments:     Lives in smoke free household   Vaping Use     Vaping Use: Never used   Substance and Sexual Activity     Alcohol use: No     Comment: None     Drug use: No     Comment: Never     Sexual activity: Not Currently     Partners: Female     CURRENT MEDICATIONS:  Current Outpatient Medications   Medication     apixaban ANTICOAGULANT (ELIQUIS ANTICOAGULANT) 5 MG tablet     atorvastatin (LIPITOR) 20 MG tablet     diltiazem ER COATED BEADS (CARDIZEM CD) 120 MG 24 hr capsule     furosemide (LASIX) 20 MG tablet     levETIRAcetam (KEPPRA) 500 MG tablet     metoprolol succinate ER (TOPROL XL) 200 MG 24 hr tablet     omeprazole (PRILOSEC) 40 MG DR capsule     sacubitril-valsartan (ENTRESTO)  MG per tablet     sacubitril-valsartan (ENTRESTO)  MG per tablet     spironolactone (ALDACTONE) 25 MG tablet     No current facility-administered medications for this visit.     ROS:   Constitutional: No fever, chills, or sweats. Weight is 351 lbs 0 oz  ENT: No visual disturbance, ear ache, epistaxis, sore throat.   Allergies/Immunologic: Negative.   Respiratory: No cough, hemoptysis.   Cardiovascular: As per HPI.   GI: No nausea, vomiting, hematemesis, melena, or hematochezia.   : No urinary frequency, dysuria, or hematuria.   Integrument: Negative.   Psychiatric: No evidence of major depression  Neuro: No new neurological complaints at this time. Non focal  Endocrinology: Negative.  "  Musculoskeletal: As per HPI.      EXAM:  /88   Pulse 75   Ht 1.981 m (6' 6\")   Wt (!) 159.2 kg (351 lb)   SpO2 99%   BMI 40.56 kg/m    General: appears comfortable, alert and oriented  Head: normocephalic, atraumatic  Eyes: anicteric sclera, EOMI , PERRL  Neck: no adenopathy  Orophyarynx: moist mucosa, no lesions noted  Heart: regular, S1/S2, no murmurs, rubs or gallop. Estimated JVP at 5 cmH2O  Lungs: CTAB, No wheezing.   Abdomen: soft, non-tender, bowel sounds present, no hepatosplenomegaly  Extremities: No LE edema today  Skin: no open lesions noted  Neuro: grossly non-focal  Psych: no evidence of depression noted     Labs:  Lab Results   Component Value Date    WBC 8.1 01/27/2023    HGB 15.6 01/27/2023    HCT 47.4 01/27/2023     01/27/2023     (H) 01/27/2023    POTASSIUM 4.8 01/27/2023    CHLORIDE 111 (H) 01/27/2023    CO2 29 01/27/2023    BUN 20 01/27/2023    CR 0.83 01/27/2023     (H) 01/27/2023    NTBNP 157 01/27/2023    AST 28 01/27/2023    ALT 47 01/27/2023    ALKPHOS 116 01/27/2023    BILITOTAL 1.1 01/27/2023    INR 1.3 (H) 02/04/2022     TTE 2015:  Mildly reduced left ventricular systolic function. LVEF estimate 40-45%  (Traced 44%). Normal LV function and size. LV filling is restrictive. Normal   RV function and size. No significant valvular abnormalities. IVC cannot be assessed. No major changes compared to prior study in February 2011.     TTE 7/2021:  Difficult to assess ejection fraction due to [id number]-xe-hsmm variability, the LVEF in the 40% range. Mild left ventricular dilation is present.  Moderate right ventricular dilation is present. Global right ventricular function is mildly reduced.  Mild dilatation of the aorta is present. Sinuses of Valsalva 4.4 cm (1.63 cm//m2).  No pericardial effusion is present.   This study was compared with the study from 9/22/2017. No significant changes noted.     TTE 11/2021:  Left ventricular function is decreased. The ejection " fraction is 35-40% (moderately reduced).  Global right ventricular function is mildly reduced. The inferior vena cava cannot be assessed.  No pericardial effusion is present. There has been no change.     WERNER 2/8/22:  Left ventricular function is decreased. The ejection fraction is 30-35% (moderately reduced). Moderate diffuse hypokinesis is present.  Global right ventricular function is mildly reduced.  The left atrial appendage is free of thrombus. There is spontaneous echo contrast with a severely dilated left atrium.  Mild functional mitral insufficiency is present. No pericardial effusion is present.     NM stress 3/4/2022:     The nuclear stress test is abnormal.     No evidence of myocardial ischemia. There is a small area of a mild degree of infarction in the distal septal segment(s) of the left ventricle. This appears to be in the left anterior descending distribution.     Left ventricular function is normal.     A prior study was conducted on 11/13/2017.     Nuclear Study Quality: Final image quality is satisfactory.     TTE 6/3/22:  Left ventricular function is decreased. The ejection fraction is 35-40% (moderately reduced). Moderate left ventricular dilation is present.  Global right ventricular function is mildly reduced.   The inferior vena cava was normal in size with preserved respiratory variability.  Trivial pericardial effusion is present.   This study was compared with the study from 11/2021. No significant changes noted.     CTA 7/22/22:  1.  There are no high-grade stenoses in the left main, proximal left anterior descending, proximal left circumflex, or right coronary arteries.  2.  The remaining segments of the major epicardial vessels are non-diagnostic due to significant cardiac motion and photon starvation artifacts.  3.  Total Agatston score 58 placing the patient in the 54th percentile when compared to age and gender matched control group.  4.  Please review the separate Radiology report  for incidental noncardiac findings.    ASSESSMENT AND PLAN:  In summary, patient is a 62 year old male with above past medical history including permanent atrial fibrillation for many years, heart failure with reduced ejection fraction class II symptoms at this point.  I did review most of the prior imaging he had and essentially he had reduced ejection fraction for a very long time at least since 2015 or even earlier.  Please note that he did have the permanent atrial fibrillation for at least 12 years he did get multiple shocks in the past 3 DCCV however these were unsuccessful.  I could not see any ischemic evaluation other than nuclear medicine stress test which showed some potential infarcts which are small.  Coronary CT looked without severe sdisease suggestion. He continues the entresto high dose but again may not be covered and thus may need to transition back to losartan 100mg daily. Will continue all other medications, no changes ar needed at this time. Will see him back in 1 year or sooner as needed. Echo and labs to be obtained at the time.      Follow up:   1 year with labs     I appreciate the opportunity to participate in the care of Stefcedric Leetino . Please do not hesitate to contact me with any further questions.         Please do not hesitate to contact me if you have any questions/concerns.     Sincerely,     Sivakumar Brambila MD

## 2023-02-03 NOTE — NURSING NOTE
"Chief Complaint   Patient presents with     FU Cardiac testing     S/P CTA       Initial /88   Pulse 75   Ht 1.981 m (6' 6\")   Wt (!) 159.2 kg (351 lb)   SpO2 99%   BMI 40.56 kg/m   Estimated body mass index is 40.56 kg/m  as calculated from the following:    Height as of this encounter: 1.981 m (6' 6\").    Weight as of this encounter: 159.2 kg (351 lb)..  BP completed using cuff size: shell Roland CMA (AAMA)  "

## 2023-02-03 NOTE — PATIENT INSTRUCTIONS
Thank you for coming to the [facility] at [place]; please note the following instructions:    1. If entresto is still too expensive, call and let us know. We will change you to a medication called losartan 100 mg daily    2. Follow  up in 1 year with an echocardiogram and labs prior        If you have any questions regarding your visit, please contact your care team:     CARDIOLOGY  TELEPHONE NUMBER   Mahnaz MORRIS., Registered Nurse  Malgorzata MARTINEZ, Registered Nurse  Belle KING, Registered Nurse  Analisa GORMAN, Registered Medical Assistant  Apple SUTTON, Certified Medical Assistant  Sandee DECKER, Visit Facilitator [phone] (select option 1)    *After hours: [phone]   For Scheduling Appts:     [phone] (select option 1)    *After hours: [phone]   For the Device Clinic (Pacemakers and ICD's)  Vale BRIDGES, Registered Nurse   During business hours: [phone]    *After business hours:  [phone] (select option 4)      Normal test result notifications will be released via [facility] or mailed within 7 business days.  All other test results, will be communicated via telephone once reviewed by your cardiologist.    If you need a medication refill, please contact your pharmacy.  Please allow 3 business days for your refill to be completed.    As always, thank you for trusting us with your health care needs!

## 2023-03-03 DIAGNOSIS — I42.9 CARDIOMYOPATHY, UNSPECIFIED TYPE (H): ICD-10-CM

## 2023-03-03 DIAGNOSIS — I50.22 CHRONIC SYSTOLIC HEART FAILURE (H): Primary | ICD-10-CM

## 2023-03-03 DIAGNOSIS — I48.21 PERMANENT ATRIAL FIBRILLATION (H): ICD-10-CM

## 2023-03-03 DIAGNOSIS — I48.0 PAROXYSMAL ATRIAL FIBRILLATION (H): ICD-10-CM

## 2023-03-03 RX ORDER — FUROSEMIDE 20 MG
TABLET ORAL
Qty: 90 TABLET | Refills: 0 | Status: SHIPPED | OUTPATIENT
Start: 2023-03-03 | End: 2023-03-27

## 2023-03-07 RX ORDER — DILTIAZEM HYDROCHLORIDE 120 MG/1
120 CAPSULE, COATED, EXTENDED RELEASE ORAL DAILY
Qty: 90 CAPSULE | Refills: 3 | Status: SHIPPED | OUTPATIENT
Start: 2023-03-07 | End: 2024-03-01

## 2023-03-07 RX ORDER — SACUBITRIL AND VALSARTAN 97; 103 MG/1; MG/1
1 TABLET, FILM COATED ORAL 2 TIMES DAILY
Qty: 180 TABLET | Refills: 3 | Status: SHIPPED | OUTPATIENT
Start: 2023-03-07 | End: 2024-02-23

## 2023-03-07 NOTE — TELEPHONE ENCOUNTER
Last Clinic Visit: 2/3/2023 [facility]

## 2023-03-25 DIAGNOSIS — I50.22 CHRONIC SYSTOLIC HEART FAILURE (H): ICD-10-CM

## 2023-03-27 ENCOUNTER — TELEPHONE (OUTPATIENT)
Dept: CARDIOLOGY | Facility: CLINIC | Age: 63
End: 2023-03-27
Payer: COMMERCIAL

## 2023-03-27 DIAGNOSIS — I42.9 CARDIOMYOPATHY, UNSPECIFIED TYPE (H): ICD-10-CM

## 2023-03-27 RX ORDER — FUROSEMIDE 20 MG
20 TABLET ORAL DAILY
Qty: 90 TABLET | Refills: 3 | Status: SHIPPED | OUTPATIENT
Start: 2023-03-27 | End: 2024-02-23

## 2023-03-27 NOTE — TELEPHONE ENCOUNTER
Spoke to patient who reports that he was just seen by Dr. Brambila and labs were completed and now he needs a refill of lasix.  RN reviewed chart.  Ok to fill.  Patient informed and verbalized understanding.  Melody Reynoso RN  Signed Prescriptions:                        Disp   Refills    furosemide (LASIX) 20 MG tablet            90 tab*3        Sig: Take 1 tablet (20 mg) by mouth daily  Authorizing Provider: WHITNEY BRAMBILA  Ordering User: MELODY REYNOSO    Last Comprehensive Metabolic Panel:  Lab Results   Component Value Date     (H) 01/27/2023    POTASSIUM 4.8 01/27/2023    CHLORIDE 111 (H) 01/27/2023    CO2 29 01/27/2023    ANIONGAP 5 01/27/2023     (H) 01/27/2023    BUN 20 01/27/2023    CR 0.83 01/27/2023    GFRESTIMATED >90 01/27/2023    EUGENIA 9.2 01/27/2023

## 2023-03-27 NOTE — TELEPHONE ENCOUNTER
[facility] Call [facility]    Phone Message    May a detailed message be left on voicemail: yes     Reason for Call: Other: Pt would like a call back to clarify why his prescription was denied as the pharmacy stated his refill was denied, he did not leave detail on which medication as he was not sure which medication was denied     Action Taken: Message routed to:  [facility] Cardio    Travel Screening: Not Applicable

## 2023-03-28 RX ORDER — SPIRONOLACTONE 25 MG/1
TABLET ORAL
Qty: 90 TABLET | Refills: 3 | OUTPATIENT
Start: 2023-03-28

## 2023-04-10 ENCOUNTER — OFFICE VISIT (OUTPATIENT)
Dept: CARDIOLOGY | Facility: CLINIC | Age: 63
End: 2023-04-10
Payer: COMMERCIAL

## 2023-04-10 VITALS — SYSTOLIC BLOOD PRESSURE: 122 MMHG | BODY MASS INDEX: 40.56 KG/M2 | DIASTOLIC BLOOD PRESSURE: 83 MMHG | HEIGHT: 78 IN

## 2023-04-10 DIAGNOSIS — E78.5 HYPERLIPIDEMIA LDL GOAL <100: ICD-10-CM

## 2023-04-10 DIAGNOSIS — I50.20 HEART FAILURE WITH REDUCED EJECTION FRACTION, NYHA CLASS III (H): ICD-10-CM

## 2023-04-10 DIAGNOSIS — I48.21 PERMANENT ATRIAL FIBRILLATION (H): Primary | ICD-10-CM

## 2023-04-10 DIAGNOSIS — I42.9 CARDIOMYOPATHY, UNSPECIFIED TYPE (H): ICD-10-CM

## 2023-04-10 DIAGNOSIS — I48.0 PAROXYSMAL ATRIAL FIBRILLATION (H): ICD-10-CM

## 2023-04-10 PROCEDURE — 99214 OFFICE O/P EST MOD 30 MIN: CPT | Performed by: INTERNAL MEDICINE

## 2023-04-10 NOTE — NURSING NOTE
"Chief Complaint   Patient presents with     FU Cardiac testing     S/P  Zio       Initial /83 (BP Location: Right arm, Patient Position: Chair, Cuff Size: Adult Large)   Ht 1.981 m (6' 6\")   BMI 40.56 kg/m   Estimated body mass index is 40.56 kg/m  as calculated from the following:    Height as of this encounter: 1.981 m (6' 6\").    Weight as of 2/3/23: 159.2 kg (351 lb)..  BP completed using cuff size: shell Roland CMA (AAMA)  "

## 2023-04-10 NOTE — LETTER
4/10/2023      RE: Stef Mosher  [address] [place] [address]       Dear Colleague,    Thank you for the opportunity to participate in the care of your patient, Stef Mosher, at the [facility]. Please see a copy of my visit note below.    HPI: Purpose of visit: Atrial fibrillation and nonischemic cardiomyopathy    Stef Mosher is a 63 year old man with a history of persistent atrial fibrillation (status post multiple unsuccessful DCCV), non-ischemic cardiomyopathy, HTN, HLD who presents for a follow up.    Patient's last visit with me was in September 2022.  In the last 6 months, patient's energy level has increased and still has a stamina.  He did not report any symptoms of irregular heartbeat sensation, palpitations, exertional dyspnea, exertional angina, frequent lightheadedness, presyncope or syncope.  Patient has not noticed any melena or blood in his stools.    His medications include apixaban 5 mg twice daily for stroke prophylaxis, Toprol- mg once daily and Cardizem  mg once daily for rate control, Lasix 20 mg once daily and Entresto  1 tablet twice daily, and spironolactone 25 mg once daily.    PAST MEDICAL HISTORY:  Past Medical History:   Diagnosis Date     Arthritis      Atrial fibrillation (H)      Hayfever      Hypercholesterolemia      Hypertension      Unspecified hypothyroidism        CURRENT MEDICATIONS:  Current Outpatient Medications   Medication Sig Dispense Refill     apixaban ANTICOAGULANT (ELIQUIS ANTICOAGULANT) 5 MG tablet Take 1 tablet (5 mg) by mouth 2 times daily 60 tablet 10     atorvastatin (LIPITOR) 20 MG tablet Take 1 tablet (20 mg) by mouth daily 90 tablet 2     diltiazem ER COATED BEADS (CARDIZEM CD/CARTIA XT) 120 MG 24 hr capsule Take 1 capsule (120 mg) by mouth daily 90 capsule 3     furosemide (LASIX) 20 MG tablet Take 1 tablet (20 mg) by mouth daily 90 tablet 3      "levETIRAcetam (KEPPRA) 500 MG tablet Take 500 mg by mouth 2 times daily       metoprolol succinate ER (TOPROL XL) 200 MG 24 hr tablet TAKE 1 TABLET (200 MG) BY MOUTH DAILY 90 tablet 3     omeprazole (PRILOSEC) 40 MG DR capsule Take 1 capsule (40 mg) by mouth 2 times daily 180 capsule 0     sacubitril-valsartan (ENTRESTO)  MG per tablet Take 1 tablet by mouth 2 times daily 180 tablet 3     sacubitril-valsartan (ENTRESTO)  MG per tablet Take 1 tablet by mouth 2 times daily 60 tablet 0     spironolactone (ALDACTONE) 25 MG tablet Take 1 tablet (25 mg) by mouth daily 90 tablet 3       PAST SURGICAL HISTORY:  Past Surgical History:   Procedure Laterality Date     ANESTHESIA CARDIOVERSION N/A 3/18/2022    Procedure: ANESTHESIA, FOR CARDIOVERSION@1400;  Surgeon: GENERIC ANESTHESIA PROVIDER;  Location: [facility] OR      THYROIDECTOMY         ALLERGIES:   No Known Allergies    FAMILY HISTORY:  - Premature coronary artery disease  - Atrial fibrillation  - Sudden cardiac death     SOCIAL HISTORY:  Social History     Tobacco Use     Smoking status: Never     Smokeless tobacco: Never     Tobacco comments:     Lives in smoke free household   Vaping Use     Vaping status: Never Used   Substance Use Topics     Alcohol use: No     Comment: None     Drug use: No     Comment: Never       ROS:   Constitutional: No fever, chills, or sweats. Weight stable.   ENT: No visual disturbance, ear ache, epistaxis, sore throat.   Cardiovascular: As per HPI.   Respiratory: No cough, hemoptysis.    GI: No nausea, vomiting, hematemesis, melena, or hematochezia.   : No hematuria.   Integument: Negative.   Psychiatric: Negative.   Hematologic:  Easy bruising, no easy bleeding.  Neuro: Negative.   Endocrinology: No significant heat or cold intolerance   Musculoskeletal: No myalgia.    Exam:  /83 (BP Location: Right arm, Patient Position: Chair, Cuff Size: Adult Large)   Ht 1.981 m (6' 6\")   BMI 40.56 kg/m    GENERAL APPEARANCE: healthy, " alert and no distress  HEENT: no icterus, no xanthelasmas, normal pupil size and reaction, normal palate, mucosa moist, no central cyanosis  NECK: no adenopathy, no asymmetry, masses, or scars, thyroid normal to palpation and no bruits, JVP not elevated  RESPIRATORY: lungs clear to auscultation - no rales, rhonchi or wheezes, no use of accessory muscles, no retractions, respirations are unlabored, normal respiratory rate  CARDIOVASCULAR: irregular rhythm, normal S1 with physiologic split S2, no S3 or S4 and no murmur, click or rub, precordium quiet with normal PMI.  ABDOMEN: soft, non tender, without hepatosplenomegaly, no masses palpable, bowel sounds normal, aorta not enlarged by palpation, no abdominal bruits  EXTREMITIES: peripheral pulses normal, no edema, no bruits  NEURO: alert and oriented to person/place/time, normal speech, gait and affect  VASC: Radial, femoral, dorsalis pedis and posterior tibialis pulses are normal in volumes and symmetric bilaterally. No bruits are heard.  SKIN: no ecchymoses, no rashes    Labs:  CBC RESULTS:   Lab Results   Component Value Date    WBC 8.1 01/27/2023    WBC 8.1 07/09/2021    RBC 4.83 01/27/2023    RBC 2.61 (L) 07/09/2021    HGB 15.6 01/27/2023    HGB 6.2 (LL) 07/09/2021    HCT 47.4 01/27/2023    HCT 21.7 (L) 07/09/2021    MCV 98 01/27/2023    MCV 83 07/09/2021    MCH 32.3 01/27/2023    MCH 23.8 (L) 07/09/2021    MCHC 32.9 01/27/2023    MCHC 28.6 (L) 07/09/2021    RDW 13.6 01/27/2023    RDW 16.1 (H) 07/09/2021     01/27/2023     (H) 07/09/2021       BMP RESULTS:  Lab Results   Component Value Date     (H) 01/27/2023     07/09/2021    POTASSIUM 4.8 01/27/2023    POTASSIUM 4.5 07/09/2021    CHLORIDE 111 (H) 01/27/2023    CHLORIDE 108 07/09/2021    CO2 29 01/27/2023    CO2 24 07/09/2021    ANIONGAP 5 01/27/2023    ANIONGAP 7 07/09/2021     (H) 01/27/2023     (H) 07/09/2021    BUN 20 01/27/2023    BUN 14 07/09/2021    CR 0.83  01/27/2023    CR 0.74 07/09/2021    GFRESTIMATED >90 01/27/2023    GFRESTIMATED >90 07/09/2021    GFRESTBLACK >90 07/09/2021    EUGENIA 9.2 01/27/2023    EUGENIA 8.5 07/09/2021        INR RESULTS:  Lab Results   Component Value Date    INR 1.3 (H) 02/04/2022    INR 1.5 (H) 01/31/2022    INR 1.5 (H) 01/28/2022    INR 1.20 (A) 01/21/2022    INR 2.5 (H) 12/31/2021    INR 2.10 (H) 06/25/2021    INR 2.50 (H) 04/16/2021    INR 2.30 (H) 03/05/2021    INR 2.40 (H) 01/22/2021       Procedures:      Assessment and Plan:     Permanent atrial fibrillation  Nonischemic cardiomyopathy    It is encouraging the patient has been doing very well clinically in the last 6 months.  We will continue current medications and check patient's fasting lipids, CBC, and comprehensive metabolic panel.  We will see patient by video clinic to review the results.    Thereafter, we will see patient in 6 months and prior to that visit, patient will undergo a transthoracic echocardiogram to define the left ventricular ejection fraction.  All questions and concerns were addressed and patient and his wife are happy with the plan.      CC  Patient Care Team:  Leanna Turner MD as PCP - General (Cardiovascular Disease)  Leanna Turner MD as MD (Cardiology)  Stella Allison, RN as Specialty Care Coordinator (Cardiology)  Leanna Turner MD as Assigned Heart and Vascular Provider  Aliza Murphy MD as Assigned PCP          Please do not hesitate to contact me if you have any questions/concerns.     Sincerely,     Leanna Turner MD

## 2023-04-10 NOTE — PATIENT INSTRUCTIONS
Thank you for coming to the [facility] at [place]; please note the following instructions:    1. Labs to be done at [facility] - please complete in the next couple of days - please note that you will need to fast prior to labs    2. Follow up with Dr. Turner in 1 month - video visit        If you have any questions regarding your visit, please contact your care team:     CARDIOLOGY  TELEPHONE NUMBER   Mahnaz MORRIS., Registered Nurse  Malgorzata MARTINEZ, Registered Nurse  Belle KING, Registered Nurse  Analisa GORMAN, Registered Medical Assistant  Apple SUTTON, Certified Medical Assistant  Sandee DECKER, Visit Facilitator [phone] (select option 1)    *After hours: [phone]   For Scheduling Appts:     [phone] (select option 1)    *After hours: [phone]   For the Device Clinic (Pacemakers and ICD's)  Vale BRIDGES, Registered Nurse   During business hours: [phone]    *After business hours:  [phone] (select option 4)      Normal test result notifications will be released via [employer] or mailed within 7 business days.  All other test results, will be communicated via telephone once reviewed by your cardiologist.    If you need a medication refill, please contact your pharmacy.  Please allow 3 business days for your refill to be completed.    As always, thank you for trusting us with your health care needs!

## 2023-04-10 NOTE — NURSING NOTE
Med Reconcile: Reviewed and verified all current medications with the patient. The updated medication list was printed and given to the patient. Patient reports that there have been no changes to his mediation list.    Patient's BP today was 122/83.     Patient to have labs done in the next couple of days. Patient would like to have labs done at [facility].     Return Appointment: Patient given instructions regarding scheduling next clinic visit. Patient demonstrated understanding of this information and agreed to call with further questions or concerns. Video visit in 1 month.    Patient stated he understood all health information given and agreed to call with further questions or concerns.    Belle Vincent, RN, BSN  Cardiology RN Care Coordinator   [place]   Phone: [phone]  Fax: [phone] ([place]) [phone] ([place])

## 2023-04-11 NOTE — PROGRESS NOTES
HPI: Purpose of visit: Atrial fibrillation and nonischemic cardiomyopathy    Stef Mosher is a 63 year old man with a history of persistent atrial fibrillation (status post multiple unsuccessful DCCV), non-ischemic cardiomyopathy, HTN, HLD who presents for a follow up.    Patient's last visit with me was in September 2022.  In the last 6 months, patient's energy level has increased and still has a stamina.  He did not report any symptoms of irregular heartbeat sensation, palpitations, exertional dyspnea, exertional angina, frequent lightheadedness, presyncope or syncope.  Patient has not noticed any melena or blood in his stools.    His medications include apixaban 5 mg twice daily for stroke prophylaxis, Toprol- mg once daily and Cardizem  mg once daily for rate control, Lasix 20 mg once daily and Entresto  1 tablet twice daily, and spironolactone 25 mg once daily.    PAST MEDICAL HISTORY:  Past Medical History:   Diagnosis Date     Arthritis      Atrial fibrillation (H)      Hayfever      Hypercholesterolemia      Hypertension      Unspecified hypothyroidism        CURRENT MEDICATIONS:  Current Outpatient Medications   Medication Sig Dispense Refill     apixaban ANTICOAGULANT (ELIQUIS ANTICOAGULANT) 5 MG tablet Take 1 tablet (5 mg) by mouth 2 times daily 60 tablet 10     atorvastatin (LIPITOR) 20 MG tablet Take 1 tablet (20 mg) by mouth daily 90 tablet 2     diltiazem ER COATED BEADS (CARDIZEM CD/CARTIA XT) 120 MG 24 hr capsule Take 1 capsule (120 mg) by mouth daily 90 capsule 3     furosemide (LASIX) 20 MG tablet Take 1 tablet (20 mg) by mouth daily 90 tablet 3     levETIRAcetam (KEPPRA) 500 MG tablet Take 500 mg by mouth 2 times daily       metoprolol succinate ER (TOPROL XL) 200 MG 24 hr tablet TAKE 1 TABLET (200 MG) BY MOUTH DAILY 90 tablet 3     omeprazole (PRILOSEC) 40 MG DR capsule Take 1 capsule (40 mg) by mouth 2 times daily 180 capsule 0     sacubitril-valsartan (ENTRESTO)  MG per  "tablet Take 1 tablet by mouth 2 times daily 180 tablet 3     sacubitril-valsartan (ENTRESTO)  MG per tablet Take 1 tablet by mouth 2 times daily 60 tablet 0     spironolactone (ALDACTONE) 25 MG tablet Take 1 tablet (25 mg) by mouth daily 90 tablet 3       PAST SURGICAL HISTORY:  Past Surgical History:   Procedure Laterality Date     ANESTHESIA CARDIOVERSION N/A 3/18/2022    Procedure: ANESTHESIA, FOR CARDIOVERSION@1400;  Surgeon: GENERIC ANESTHESIA PROVIDER;  Location: [facility] OR      THYROIDECTOMY         ALLERGIES:   No Known Allergies    FAMILY HISTORY:  - Premature coronary artery disease  - Atrial fibrillation  - Sudden cardiac death     SOCIAL HISTORY:  Social History     Tobacco Use     Smoking status: Never     Smokeless tobacco: Never     Tobacco comments:     Lives in smoke free household   Vaping Use     Vaping status: Never Used   Substance Use Topics     Alcohol use: No     Comment: None     Drug use: No     Comment: Never       ROS:   Constitutional: No fever, chills, or sweats. Weight stable.   ENT: No visual disturbance, ear ache, epistaxis, sore throat.   Cardiovascular: As per HPI.   Respiratory: No cough, hemoptysis.    GI: No nausea, vomiting, hematemesis, melena, or hematochezia.   : No hematuria.   Integument: Negative.   Psychiatric: Negative.   Hematologic:  Easy bruising, no easy bleeding.  Neuro: Negative.   Endocrinology: No significant heat or cold intolerance   Musculoskeletal: No myalgia.    Exam:  /83 (BP Location: Right arm, Patient Position: Chair, Cuff Size: Adult Large)   Ht 1.981 m (6' 6\")   BMI 40.56 kg/m    GENERAL APPEARANCE: healthy, alert and no distress  HEENT: no icterus, no xanthelasmas, normal pupil size and reaction, normal palate, mucosa moist, no central cyanosis  NECK: no adenopathy, no asymmetry, masses, or scars, thyroid normal to palpation and no bruits, JVP not elevated  RESPIRATORY: lungs clear to auscultation - no rales, rhonchi or wheezes, no use " of accessory muscles, no retractions, respirations are unlabored, normal respiratory rate  CARDIOVASCULAR: irregular rhythm, normal S1 with physiologic split S2, no S3 or S4 and no murmur, click or rub, precordium quiet with normal PMI.  ABDOMEN: soft, non tender, without hepatosplenomegaly, no masses palpable, bowel sounds normal, aorta not enlarged by palpation, no abdominal bruits  EXTREMITIES: peripheral pulses normal, no edema, no bruits  NEURO: alert and oriented to person/place/time, normal speech, gait and affect  VASC: Radial, femoral, dorsalis pedis and posterior tibialis pulses are normal in volumes and symmetric bilaterally. No bruits are heard.  SKIN: no ecchymoses, no rashes    Labs:  CBC RESULTS:   Lab Results   Component Value Date    WBC 8.1 01/27/2023    WBC 8.1 07/09/2021    RBC 4.83 01/27/2023    RBC 2.61 (L) 07/09/2021    HGB 15.6 01/27/2023    HGB 6.2 (LL) 07/09/2021    HCT 47.4 01/27/2023    HCT 21.7 (L) 07/09/2021    MCV 98 01/27/2023    MCV 83 07/09/2021    MCH 32.3 01/27/2023    MCH 23.8 (L) 07/09/2021    MCHC 32.9 01/27/2023    MCHC 28.6 (L) 07/09/2021    RDW 13.6 01/27/2023    RDW 16.1 (H) 07/09/2021     01/27/2023     (H) 07/09/2021       BMP RESULTS:  Lab Results   Component Value Date     (H) 01/27/2023     07/09/2021    POTASSIUM 4.8 01/27/2023    POTASSIUM 4.5 07/09/2021    CHLORIDE 111 (H) 01/27/2023    CHLORIDE 108 07/09/2021    CO2 29 01/27/2023    CO2 24 07/09/2021    ANIONGAP 5 01/27/2023    ANIONGAP 7 07/09/2021     (H) 01/27/2023     (H) 07/09/2021    BUN 20 01/27/2023    BUN 14 07/09/2021    CR 0.83 01/27/2023    CR 0.74 07/09/2021    GFRESTIMATED >90 01/27/2023    GFRESTIMATED >90 07/09/2021    GFRESTBLACK >90 07/09/2021    EUGENIA 9.2 01/27/2023    EUGENIA 8.5 07/09/2021        INR RESULTS:  Lab Results   Component Value Date    INR 1.3 (H) 02/04/2022    INR 1.5 (H) 01/31/2022    INR 1.5 (H) 01/28/2022    INR 1.20 (A) 01/21/2022    INR 2.5  (H) 12/31/2021    INR 2.10 (H) 06/25/2021    INR 2.50 (H) 04/16/2021    INR 2.30 (H) 03/05/2021    INR 2.40 (H) 01/22/2021       Procedures:      Assessment and Plan:     Permanent atrial fibrillation  Nonischemic cardiomyopathy    It is encouraging the patient has been doing very well clinically in the last 6 months.  We will continue current medications and check patient's fasting lipids, CBC, and comprehensive metabolic panel.  We will see patient by video clinic to review the results.    Thereafter, we will see patient in 6 months and prior to that visit, patient will undergo a transthoracic echocardiogram to define the left ventricular ejection fraction.  All questions and concerns were addressed and patient and his wife are happy with the plan.      CC  Patient Care Team:  Leanna Turner MD as PCP - General (Cardiovascular Disease)  Leanna Turner MD as MD (Cardiology)  Stella Allison, RN as Specialty Care Coordinator (Cardiology)  Leanna Turner MD as Assigned Heart and Vascular Provider  Aliza Murphy MD as Assigned PCP

## 2023-04-21 ENCOUNTER — LAB (OUTPATIENT)
Dept: LAB | Facility: CLINIC | Age: 63
End: 2023-04-21
Payer: COMMERCIAL

## 2023-04-21 DIAGNOSIS — I48.21 PERMANENT ATRIAL FIBRILLATION (H): ICD-10-CM

## 2023-04-21 DIAGNOSIS — I42.9 CARDIOMYOPATHY, UNSPECIFIED TYPE (H): ICD-10-CM

## 2023-04-21 DIAGNOSIS — I48.0 PAROXYSMAL ATRIAL FIBRILLATION (H): ICD-10-CM

## 2023-04-21 DIAGNOSIS — I50.20 HEART FAILURE WITH REDUCED EJECTION FRACTION, NYHA CLASS III (H): ICD-10-CM

## 2023-04-21 DIAGNOSIS — E78.5 HYPERLIPIDEMIA LDL GOAL <100: ICD-10-CM

## 2023-04-21 LAB
ALBUMIN SERPL-MCNC: 3.7 G/DL (ref 3.4–5)
ALP SERPL-CCNC: 108 U/L (ref 40–150)
ALT SERPL W P-5'-P-CCNC: 41 U/L (ref 0–70)
ANION GAP SERPL CALCULATED.3IONS-SCNC: 1 MMOL/L (ref 3–14)
AST SERPL W P-5'-P-CCNC: 24 U/L (ref 0–45)
BILIRUB SERPL-MCNC: 0.9 MG/DL (ref 0.2–1.3)
BUN SERPL-MCNC: 20 MG/DL (ref 7–30)
CALCIUM SERPL-MCNC: 8.9 MG/DL (ref 8.5–10.1)
CHLORIDE BLD-SCNC: 109 MMOL/L (ref 94–109)
CHOLEST SERPL-MCNC: 132 MG/DL
CO2 SERPL-SCNC: 31 MMOL/L (ref 20–32)
CREAT SERPL-MCNC: 0.84 MG/DL (ref 0.66–1.25)
ERYTHROCYTE [DISTWIDTH] IN BLOOD BY AUTOMATED COUNT: 13.1 % (ref 10–15)
FASTING STATUS PATIENT QL REPORTED: YES
GFR SERPL CREATININE-BSD FRML MDRD: >90 ML/MIN/1.73M2
GLUCOSE BLD-MCNC: 101 MG/DL (ref 70–99)
HCT VFR BLD AUTO: 46.5 % (ref 40–53)
HDLC SERPL-MCNC: 43 MG/DL
HGB BLD-MCNC: 15.5 G/DL (ref 13.3–17.7)
LDLC SERPL CALC-MCNC: 70 MG/DL
MCH RBC QN AUTO: 32.3 PG (ref 26.5–33)
MCHC RBC AUTO-ENTMCNC: 33.3 G/DL (ref 31.5–36.5)
MCV RBC AUTO: 97 FL (ref 78–100)
NONHDLC SERPL-MCNC: 89 MG/DL
PLATELET # BLD AUTO: 198 10E3/UL (ref 150–450)
POTASSIUM BLD-SCNC: 4.6 MMOL/L (ref 3.4–5.3)
PROT SERPL-MCNC: 7.2 G/DL (ref 6.8–8.8)
RBC # BLD AUTO: 4.8 10E6/UL (ref 4.4–5.9)
SODIUM SERPL-SCNC: 141 MMOL/L (ref 133–144)
TRIGL SERPL-MCNC: 94 MG/DL
WBC # BLD AUTO: 7.4 10E3/UL (ref 4–11)

## 2023-04-21 PROCEDURE — 36415 COLL VENOUS BLD VENIPUNCTURE: CPT

## 2023-04-21 PROCEDURE — 85027 COMPLETE CBC AUTOMATED: CPT

## 2023-04-21 PROCEDURE — 80061 LIPID PANEL: CPT

## 2023-04-21 PROCEDURE — 80053 COMPREHEN METABOLIC PANEL: CPT

## 2023-05-15 ENCOUNTER — TELEPHONE (OUTPATIENT)
Dept: CARDIOLOGY | Facility: CLINIC | Age: 63
End: 2023-05-15

## 2023-05-15 ENCOUNTER — VIRTUAL VISIT (OUTPATIENT)
Dept: CARDIOLOGY | Facility: CLINIC | Age: 63
End: 2023-05-15
Payer: COMMERCIAL

## 2023-05-15 ENCOUNTER — MYC MEDICAL ADVICE (OUTPATIENT)
Dept: CARDIOLOGY | Facility: CLINIC | Age: 63
End: 2023-05-15

## 2023-05-15 DIAGNOSIS — I42.8 NONISCHEMIC CARDIOMYOPATHY (H): ICD-10-CM

## 2023-05-15 DIAGNOSIS — I48.19 PERSISTENT ATRIAL FIBRILLATION (H): Primary | ICD-10-CM

## 2023-05-15 PROCEDURE — 99213 OFFICE O/P EST LOW 20 MIN: CPT | Mod: VID | Performed by: INTERNAL MEDICINE

## 2023-05-15 NOTE — PROGRESS NOTES
Stef is a 63 year old who is being evaluated via a billable video visit.      How would you like to obtain your AVS? MyChart  If the video visit is dropped, the invitation should be resent by: Text to cell phone: [phone]  Will anyone else be joining your video visit? No      Video-Visit Details    Type of service:  Video Visit     Originating Location (pt. Location): Other Work  Distant Location (provider location):  Off-site  Platform used for Video Visit: [employer]   Video start time: 11:15 AM  Video end time: 11:25 AM    HPI: Purpose of visit: Atrial fibrillation and nonischemic cardiomyopathy     Stef Mosher is a 63 year old man with a history of persistent atrial fibrillation (status post multiple unsuccessful DCCV), non-ischemic cardiomyopathy, HTN, HLD who presents for a follow up.     Patient's last visit with me was in April 2023.  Since the last visit, patient has been doing well.  He continues to remain physically active and has noted his energy level and stamina to be improved. He did not report any symptoms of irregular heartbeat sensation, palpitations, exertional dyspnea, exertional angina, frequent lightheadedness, presyncope or syncope.  Patient has not noticed any melena or blood in his stools.    A recent comprehensive metabolic panel and complete blood count were all within normal limits.    PAST MEDICAL HISTORY:  Past Medical History:   Diagnosis Date     Arthritis      Atrial fibrillation (H)      Hayfever      Hypercholesterolemia      Hypertension      Unspecified hypothyroidism        CURRENT MEDICATIONS:  Current Outpatient Medications   Medication Sig Dispense Refill     apixaban ANTICOAGULANT (ELIQUIS ANTICOAGULANT) 5 MG tablet Take 1 tablet (5 mg) by mouth 2 times daily 60 tablet 10     atorvastatin (LIPITOR) 20 MG tablet Take 1 tablet (20 mg) by mouth daily 90 tablet 2     diltiazem ER COATED BEADS (CARDIZEM CD/CARTIA XT) 120 MG 24 hr capsule Take 1 capsule (120 mg) by mouth daily  90 capsule 3     furosemide (LASIX) 20 MG tablet Take 1 tablet (20 mg) by mouth daily 90 tablet 3     levETIRAcetam (KEPPRA) 500 MG tablet Take 500 mg by mouth 2 times daily       metoprolol succinate ER (TOPROL XL) 200 MG 24 hr tablet TAKE 1 TABLET (200 MG) BY MOUTH DAILY 90 tablet 3     omeprazole (PRILOSEC) 40 MG DR capsule Take 1 capsule (40 mg) by mouth 2 times daily 180 capsule 0     sacubitril-valsartan (ENTRESTO)  MG per tablet Take 1 tablet by mouth 2 times daily 180 tablet 3     sacubitril-valsartan (ENTRESTO)  MG per tablet Take 1 tablet by mouth 2 times daily 60 tablet 0     spironolactone (ALDACTONE) 25 MG tablet Take 1 tablet (25 mg) by mouth daily 90 tablet 3       PAST SURGICAL HISTORY:  Past Surgical History:   Procedure Laterality Date     ANESTHESIA CARDIOVERSION N/A 3/18/2022    Procedure: ANESTHESIA, FOR CARDIOVERSION@1400;  Surgeon: GENERIC ANESTHESIA PROVIDER;  Location: [facility] OR      THYROIDECTOMY         ALLERGIES:   No Known Allergies    FAMILY HISTORY:  - Premature coronary artery disease  - Atrial fibrillation  - Sudden cardiac death     SOCIAL HISTORY:  Social History     Tobacco Use     Smoking status: Never     Smokeless tobacco: Never     Tobacco comments:     Lives in smoke free household   Vaping Use     Vaping status: Never Used   Substance Use Topics     Alcohol use: No     Comment: None     Drug use: No     Comment: Never       ROS:   Constitutional: No fever, chills, or sweats. Weight stable.   ENT: No visual disturbance, ear ache, epistaxis, sore throat.   Cardiovascular: As per HPI.   Respiratory: No cough, hemoptysis.    GI: No nausea, vomiting, hematemesis, melena, or hematochezia.   : No hematuria.   Integument: Negative.   Psychiatric: Negative.   Hematologic:  Easy bruising, no easy bleeding.  Neuro: Negative.   Endocrinology: No significant heat or cold intolerance   Musculoskeletal: No myalgia.    Exam:  There were no vitals taken for this visit.  GENERAL  APPEARANCE: healthy, alert and no distress      Labs:  CBC RESULTS:   Lab Results   Component Value Date    WBC 7.4 04/21/2023    WBC 8.1 07/09/2021    RBC 4.80 04/21/2023    RBC 2.61 (L) 07/09/2021    HGB 15.5 04/21/2023    HGB 6.2 (LL) 07/09/2021    HCT 46.5 04/21/2023    HCT 21.7 (L) 07/09/2021    MCV 97 04/21/2023    MCV 83 07/09/2021    MCH 32.3 04/21/2023    MCH 23.8 (L) 07/09/2021    MCHC 33.3 04/21/2023    MCHC 28.6 (L) 07/09/2021    RDW 13.1 04/21/2023    RDW 16.1 (H) 07/09/2021     04/21/2023     (H) 07/09/2021       BMP RESULTS:  Lab Results   Component Value Date     04/21/2023     07/09/2021    POTASSIUM 4.6 04/21/2023    POTASSIUM 4.5 07/09/2021    CHLORIDE 109 04/21/2023    CHLORIDE 108 07/09/2021    CO2 31 04/21/2023    CO2 24 07/09/2021    ANIONGAP 1 (L) 04/21/2023    ANIONGAP 7 07/09/2021     (H) 04/21/2023     (H) 07/09/2021    BUN 20 04/21/2023    BUN 14 07/09/2021    CR 0.84 04/21/2023    CR 0.74 07/09/2021    GFRESTIMATED >90 04/21/2023    GFRESTIMATED >90 07/09/2021    GFRESTBLACK >90 07/09/2021    EUGENIA 8.9 04/21/2023    EUGENIA 8.5 07/09/2021        INR RESULTS:  Lab Results   Component Value Date    INR 1.3 (H) 02/04/2022    INR 1.5 (H) 01/31/2022    INR 1.5 (H) 01/28/2022    INR 1.20 (A) 01/21/2022    INR 2.5 (H) 12/31/2021    INR 2.10 (H) 06/25/2021    INR 2.50 (H) 04/16/2021    INR 2.30 (H) 03/05/2021    INR 2.40 (H) 01/22/2021       Procedures:      Assessment and Plan:   Persistent atrial fibrillation  Nonischemic cardiomyopathy     It is encouraging the patient has been doing very well clinically.  We will continue current medications and see patient again by video visit in approximately 6 months.  Prior to that visit, patient will undergo a transthoracic echocardiogram to evaluate the left ventricular ejection fraction.  All questions and concerns were addressed and the patient was happy with the plan.      CC  Patient Care Team:  Leanna Turner MD  as PCP - General (Cardiovascular Disease)  Leanna Turner MD as MD (Cardiology)  Stella Allison, RN as Specialty Care Coordinator (Cardiology)  Leanna Turner MD as Assigned Heart and Vascular Provider  Aliza Murphy MD as Assigned PCP

## 2023-05-15 NOTE — PATIENT INSTRUCTIONS
Thank you for coming to the [facility] [place]; please note the following instructions:    1. Follow-up in 6 months with an echo prior      If you have any questions regarding your visit please contact your care team:     Cardiology  Telephone Number   Mahnaz ARTUROGiovani, RN  Malgorzata MARTINEZ, RN   Laura SKY, RMJALEN GORMAN, IAM DECKER, Visit Facilitator  Apple SUTTON [facility] [phone] (option 1)   For scheduling appts:     [phone] (select option 1)       For the Device Clinic (Pacemakers and ICD's)  RN's :  Vale Cartagena   During business hours: [phone]    *After business hours:  [phone] (select option 4)      Normal test result notifications will be released via [employer] or mailed within 7 business days.  All other test results, will be communicated via telephone once reviewed by your cardiologist.    If you need a medication refill please contact your pharmacy.  Please allow 3 business days for your refill to be completed.    As always, thank you for trusting us with your health care needs!

## 2023-05-15 NOTE — TELEPHONE ENCOUNTER
Called patient to schedule 6 month follow up with echo prior. Patient asked to call Wednesday morning around 9:30am. Set reminder to call him on 5/17/23.    Apple Roland CMA (MA)'

## 2023-05-15 NOTE — LETTER
5/15/2023      RE: Stef Mosher  [address] [facility]  [place] [address]       Dear Colleague,    Thank you for the opportunity to participate in the care of your patient, Stef Mosher, at the [facility] HEART CLINIC [facility]. Please see a copy of my visit note below.    Stef is a 63 year old who is being evaluated via a billable video visit.      How would you like to obtain your AVS? MyChart  If the video visit is dropped, the invitation should be resent by: Text to cell phone: [phone]  Will anyone else be joining your video visit? No      Video-Visit Details    Type of service:  Video Visit     Originating Location (pt. Location): Other Work  Distant Location (provider location):  Off-site  Platform used for Video Visit: Happlink   Video start time: 11:15 AM  Video end time: 11:25 AM    HPI: Purpose of visit: Atrial fibrillation and nonischemic cardiomyopathy     Stef Mosher is a 63 year old man with a history of persistent atrial fibrillation (status post multiple unsuccessful DCCV), non-ischemic cardiomyopathy, HTN, HLD who presents for a follow up.     Patient's last visit with me was in April 2023.  Since the last visit, patient has been doing well.  He continues to remain physically active and has noted his energy level and stamina to be improved. He did not report any symptoms of irregular heartbeat sensation, palpitations, exertional dyspnea, exertional angina, frequent lightheadedness, presyncope or syncope.  Patient has not noticed any melena or blood in his stools.    A recent comprehensive metabolic panel and complete blood count were all within normal limits.    PAST MEDICAL HISTORY:  Past Medical History:   Diagnosis Date    Arthritis     Atrial fibrillation (H)     Hayfever     Hypercholesterolemia     Hypertension     Unspecified hypothyroidism        CURRENT MEDICATIONS:  Current Outpatient Medications   Medication Sig  Dispense Refill    apixaban ANTICOAGULANT (ELIQUIS ANTICOAGULANT) 5 MG tablet Take 1 tablet (5 mg) by mouth 2 times daily 60 tablet 10    atorvastatin (LIPITOR) 20 MG tablet Take 1 tablet (20 mg) by mouth daily 90 tablet 2    diltiazem ER COATED BEADS (CARDIZEM CD/CARTIA XT) 120 MG 24 hr capsule Take 1 capsule (120 mg) by mouth daily 90 capsule 3    furosemide (LASIX) 20 MG tablet Take 1 tablet (20 mg) by mouth daily 90 tablet 3    levETIRAcetam (KEPPRA) 500 MG tablet Take 500 mg by mouth 2 times daily      metoprolol succinate ER (TOPROL XL) 200 MG 24 hr tablet TAKE 1 TABLET (200 MG) BY MOUTH DAILY 90 tablet 3    omeprazole (PRILOSEC) 40 MG DR capsule Take 1 capsule (40 mg) by mouth 2 times daily 180 capsule 0    sacubitril-valsartan (ENTRESTO)  MG per tablet Take 1 tablet by mouth 2 times daily 180 tablet 3    sacubitril-valsartan (ENTRESTO)  MG per tablet Take 1 tablet by mouth 2 times daily 60 tablet 0    spironolactone (ALDACTONE) 25 MG tablet Take 1 tablet (25 mg) by mouth daily 90 tablet 3       PAST SURGICAL HISTORY:  Past Surgical History:   Procedure Laterality Date    ANESTHESIA CARDIOVERSION N/A 3/18/2022    Procedure: ANESTHESIA, FOR CARDIOVERSION@1400;  Surgeon: GENERIC ANESTHESIA PROVIDER;  Location:  OR     THYROIDECTOMY         ALLERGIES:   No Known Allergies    FAMILY HISTORY:  - Premature coronary artery disease  - Atrial fibrillation  - Sudden cardiac death     SOCIAL HISTORY:  Social History     Tobacco Use    Smoking status: Never    Smokeless tobacco: Never    Tobacco comments:     Lives in smoke free household   Vaping Use    Vaping status: Never Used   Substance Use Topics    Alcohol use: No     Comment: None    Drug use: No     Comment: Never       ROS:   Constitutional: No fever, chills, or sweats. Weight stable.   ENT: No visual disturbance, ear ache, epistaxis, sore throat.   Cardiovascular: As per HPI.   Respiratory: No cough, hemoptysis.    GI: No nausea, vomiting,  hematemesis, melena, or hematochezia.   : No hematuria.   Integument: Negative.   Psychiatric: Negative.   Hematologic:  Easy bruising, no easy bleeding.  Neuro: Negative.   Endocrinology: No significant heat or cold intolerance   Musculoskeletal: No myalgia.    Exam:  There were no vitals taken for this visit.  GENERAL APPEARANCE: healthy, alert and no distress      Labs:  CBC RESULTS:   Lab Results   Component Value Date    WBC 7.4 04/21/2023    WBC 8.1 07/09/2021    RBC 4.80 04/21/2023    RBC 2.61 (L) 07/09/2021    HGB 15.5 04/21/2023    HGB 6.2 (LL) 07/09/2021    HCT 46.5 04/21/2023    HCT 21.7 (L) 07/09/2021    MCV 97 04/21/2023    MCV 83 07/09/2021    MCH 32.3 04/21/2023    MCH 23.8 (L) 07/09/2021    MCHC 33.3 04/21/2023    MCHC 28.6 (L) 07/09/2021    RDW 13.1 04/21/2023    RDW 16.1 (H) 07/09/2021     04/21/2023     (H) 07/09/2021       BMP RESULTS:  Lab Results   Component Value Date     04/21/2023     07/09/2021    POTASSIUM 4.6 04/21/2023    POTASSIUM 4.5 07/09/2021    CHLORIDE 109 04/21/2023    CHLORIDE 108 07/09/2021    CO2 31 04/21/2023    CO2 24 07/09/2021    ANIONGAP 1 (L) 04/21/2023    ANIONGAP 7 07/09/2021     (H) 04/21/2023     (H) 07/09/2021    BUN 20 04/21/2023    BUN 14 07/09/2021    CR 0.84 04/21/2023    CR 0.74 07/09/2021    GFRESTIMATED >90 04/21/2023    GFRESTIMATED >90 07/09/2021    GFRESTBLACK >90 07/09/2021    EUGENIA 8.9 04/21/2023    EUGENIA 8.5 07/09/2021        INR RESULTS:  Lab Results   Component Value Date    INR 1.3 (H) 02/04/2022    INR 1.5 (H) 01/31/2022    INR 1.5 (H) 01/28/2022    INR 1.20 (A) 01/21/2022    INR 2.5 (H) 12/31/2021    INR 2.10 (H) 06/25/2021    INR 2.50 (H) 04/16/2021    INR 2.30 (H) 03/05/2021    INR 2.40 (H) 01/22/2021       Procedures:      Assessment and Plan:   Persistent atrial fibrillation  Nonischemic cardiomyopathy     It is encouraging the patient has been doing very well clinically.  We will continue current  medications and see patient again by video visit in approximately 6 months.  Prior to that visit, patient will undergo a transthoracic echocardiogram to evaluate the left ventricular ejection fraction.  All questions and concerns were addressed and the patient was happy with the plan.      CC  Patient Care Team:  Leanna Turner MD as PCP - General (Cardiovascular Disease)  Leanna Turner MD as MD (Cardiology)  Stella Allison RN as Specialty Care Coordinator (Cardiology)  Leanna Turner MD as Assigned Heart and Vascular Provider  Aliza Murphy MD as Assigned PCP            Please do not hesitate to contact me if you have any questions/concerns.     Sincerely,     Leanna Turner MD

## 2023-05-17 NOTE — TELEPHONE ENCOUNTER
Called patient to schedule follow up appt with Dr. Turner in 6 months and echo prior. No answer - LVM.    Apple Roland CMA ([facility])

## 2023-05-18 NOTE — TELEPHONE ENCOUNTER
No response via phone to schedule 6 month follow up with echo . Sent Applits message to schedule.    Apple Roland CMA ([facility])

## 2023-05-19 NOTE — TELEPHONE ENCOUNTER
Called to schedule 6 month follow up and echo per Dr. Turner. No answer - LVM. Max attempts reached. No further attempts will be made.    Apple Roland CMA ([facility])

## 2023-06-16 DIAGNOSIS — I48.0 PAROXYSMAL ATRIAL FIBRILLATION (H): ICD-10-CM

## 2023-06-16 DIAGNOSIS — I42.9 CARDIOMYOPATHY, UNSPECIFIED TYPE (H): ICD-10-CM

## 2023-06-16 DIAGNOSIS — I50.22 CHRONIC SYSTOLIC HEART FAILURE (H): ICD-10-CM

## 2023-06-20 RX ORDER — METOPROLOL SUCCINATE 200 MG/1
TABLET, EXTENDED RELEASE ORAL
Qty: 90 TABLET | Refills: 3 | Status: SHIPPED | OUTPATIENT
Start: 2023-06-20 | End: 2024-02-23

## 2023-06-20 RX ORDER — SPIRONOLACTONE 25 MG/1
TABLET ORAL
Qty: 90 TABLET | Refills: 3 | Status: SHIPPED | OUTPATIENT
Start: 2023-06-20 | End: 2024-02-23

## 2023-06-20 NOTE — TELEPHONE ENCOUNTER
5/15/2023  [facility] Leanna Rueda MD  Cardiovascular Disease

## 2023-09-17 ENCOUNTER — HEALTH MAINTENANCE LETTER (OUTPATIENT)
Age: 63
End: 2023-09-17

## 2023-09-25 DIAGNOSIS — E78.5 HYPERLIPIDEMIA LDL GOAL <100: ICD-10-CM

## 2023-09-25 DIAGNOSIS — I48.20 CHRONIC ATRIAL FIBRILLATION, UNSPECIFIED (H): ICD-10-CM

## 2023-09-27 RX ORDER — ATORVASTATIN CALCIUM 20 MG/1
20 TABLET, FILM COATED ORAL DAILY
Qty: 90 TABLET | Refills: 3 | Status: SHIPPED | OUTPATIENT
Start: 2023-09-27 | End: 2024-10-03

## 2023-09-27 NOTE — TELEPHONE ENCOUNTER
ELIQUIS 5 MG TABLET   Last Written Prescription Date:   11/2/2022  Last Fill Quantity: 60,   # refills: 10  Last Office Visit :  5/15/2023  Future Office visit:   11/6/2023  60 Tabs, 11 Refill sent to pharm     Direct Oral Anticoagulant Agents [id number]/25/2023 09:55 AM   Protocol Details Normal Platelets on file in past 12 months    Medication is active on med list    Patient is 18-79 years of age    Serum creatinine less than or equal to 1.4 on file in past 12 mos    Weight is greater than 60 kg for the past year    Recent (6 mo) or future (30 days) visit within the authorizing provider's specialty        ATORVASTATIN 20 MG TABLET   Last Written Prescription Date:   11/2/2022  Last Fill Quantity: 60,   # refills: 10  Last Office Visit :  5/15/2023  Future Office visit:   11/6/2023  90 Tabs, 3 Refill sent to pharm       Statins Protocol [id number]/25/2023 09:55 AM   Protocol Details LDL on file in past 12 months    No abnormal creatine kinase in past 12 months    Recent (12 mo) or future (30 days) visit within the authorizing provider's specialty    Medication is active on med list    Patient is age 18 or older       To be filled at: [facility] [id number] [facility] [employer]  GEGE RODRIGUEZ  [address]

## 2023-11-02 ENCOUNTER — HOSPITAL ENCOUNTER (OUTPATIENT)
Dept: CARDIOLOGY | Facility: CLINIC | Age: 63
Discharge: HOME | End: 2023-11-02
Attending: INTERNAL MEDICINE | Admitting: INTERNAL MEDICINE
Payer: COMMERCIAL

## 2023-11-02 DIAGNOSIS — I48.19 PERSISTENT ATRIAL FIBRILLATION (H): ICD-10-CM

## 2023-11-02 DIAGNOSIS — I42.8 NONISCHEMIC CARDIOMYOPATHY (H): ICD-10-CM

## 2023-11-02 LAB — LVEF ECHO: NORMAL

## 2023-11-02 PROCEDURE — 999N000208 ECHOCARDIOGRAM COMPLETE

## 2023-11-02 PROCEDURE — 93306 TTE W/DOPPLER COMPLETE: CPT | Mod: 26 | Performed by: INTERNAL MEDICINE

## 2023-11-02 PROCEDURE — 255N000002 HC RX 255 OP 636: Performed by: STUDENT IN AN ORGANIZED HEALTH CARE EDUCATION/TRAINING PROGRAM

## 2023-11-02 RX ADMIN — HUMAN ALBUMIN MICROSPHERES AND PERFLUTREN 6 ML: 10; .22 INJECTION, SOLUTION INTRAVENOUS at 10:18

## 2023-11-06 ENCOUNTER — VIRTUAL VISIT (OUTPATIENT)
Dept: CARDIOLOGY | Facility: CLINIC | Age: 63
End: 2023-11-06
Payer: COMMERCIAL

## 2023-11-06 VITALS — BODY MASS INDEX: 35.87 KG/M2 | HEIGHT: 78 IN | WEIGHT: 310 LBS

## 2023-11-06 DIAGNOSIS — I42.8 NONISCHEMIC CARDIOMYOPATHY (H): ICD-10-CM

## 2023-11-06 DIAGNOSIS — I48.19 PERSISTENT ATRIAL FIBRILLATION (H): Primary | ICD-10-CM

## 2023-11-06 DIAGNOSIS — E78.5 HYPERLIPIDEMIA LDL GOAL <100: ICD-10-CM

## 2023-11-06 PROCEDURE — 99214 OFFICE O/P EST MOD 30 MIN: CPT | Mod: VID | Performed by: INTERNAL MEDICINE

## 2023-11-06 ASSESSMENT — PAIN SCALES - GENERAL: PAINLEVEL: NO PAIN (0)

## 2023-11-06 NOTE — PROGRESS NOTES
Virtual Visit Details    Type of service:  Video Visit     Originating Location (pt. Location): Other Work  Distant Location (provider location):  Off-site  Platform used for Video Visit: Flaviar   Video start time: 8:45 AM  Video end time: 8:55 AM    HPI: Purpose of visit: Follow-up of atrial fibrillation and nonischemic cardiomyopathy    Stef Mosher is a 63 year old man with a history of persistent atrial fibrillation (status post multiple unsuccessful DCCV), non-ischemic cardiomyopathy, HTN, HLD who presents for a follow up.      Patient's last visit with me was in May 2023.  Since the last visit, patient has been doing well.  He continues to remain physically active and has noted his energy level and stamina to be improved. He did not report any symptoms of irregular heartbeat sensation, palpitations, exertional dyspnea, exertional angina, frequent lightheadedness, presyncope or syncope.  Patient has not noticed any melena or blood in his stools.    A recent transthoracic echocardiogram showed an improvement in the left ventricular ejection fraction to 40 to 45%.  An echocardiogram in June 2022 showed left ventricular ejection fraction of 35 to 40%.        PAST MEDICAL HISTORY:  Past Medical History:   Diagnosis Date     Arthritis      Atrial fibrillation (H)      Hayfever      Hypercholesterolemia      Hypertension      Unspecified hypothyroidism        CURRENT MEDICATIONS:  Current Outpatient Medications   Medication Sig Dispense Refill     apixaban ANTICOAGULANT (ELIQUIS ANTICOAGULANT) 5 MG tablet Take 1 tablet (5 mg) by mouth 2 times daily 60 tablet 11     atorvastatin (LIPITOR) 20 MG tablet Take 1 tablet (20 mg) by mouth daily 90 tablet 3     diltiazem ER COATED BEADS (CARDIZEM CD/CARTIA XT) 120 MG 24 hr capsule Take 1 capsule (120 mg) by mouth daily 90 capsule 3     furosemide (LASIX) 20 MG tablet Take 1 tablet (20 mg) by mouth daily 90 tablet 3     levETIRAcetam (KEPPRA) 500 MG tablet Take 500 mg by mouth 2  "times daily       metoprolol succinate ER (TOPROL XL) 200 MG 24 hr tablet TAKE 1 TABLET BY MOUTH EVERY DAY 90 tablet 3     omeprazole (PRILOSEC) 40 MG DR capsule Take 1 capsule (40 mg) by mouth 2 times daily 180 capsule 0     sacubitril-valsartan (ENTRESTO)  MG per tablet Take 1 tablet by mouth 2 times daily 180 tablet 3     sacubitril-valsartan (ENTRESTO)  MG per tablet Take 1 tablet by mouth 2 times daily 60 tablet 0     spironolactone (ALDACTONE) 25 MG tablet TAKE 1 TABLET BY MOUTH EVERY DAY 90 tablet 3       PAST SURGICAL HISTORY:  Past Surgical History:   Procedure Laterality Date     ANESTHESIA CARDIOVERSION N/A 3/18/2022    Procedure: ANESTHESIA, FOR CARDIOVERSION@1400;  Surgeon: GENERIC ANESTHESIA PROVIDER;  Location: [facility] OR      THYROIDECTOMY         ALLERGIES:   No Known Allergies    FAMILY HISTORY:  - Premature coronary artery disease  - Atrial fibrillation  - Sudden cardiac death     SOCIAL HISTORY:  Social History     Tobacco Use     Smoking status: Never     Smokeless tobacco: Never     Tobacco comments:     Lives in smoke free household   Vaping Use     Vaping Use: Never used   Substance Use Topics     Alcohol use: No     Comment: None     Drug use: No     Comment: Never       ROS:   Constitutional: No fever, chills, or sweats. Weight stable.   ENT: No visual disturbance, ear ache, epistaxis, sore throat.   Cardiovascular: As per HPI.   Respiratory: No cough, hemoptysis.    GI: No nausea, vomiting, hematemesis, melena, or hematochezia.   : No hematuria.   Integument: Negative.   Psychiatric: Negative.   Hematologic:  Easy bruising, no easy bleeding.  Neuro: Negative.   Endocrinology: No significant heat or cold intolerance   Musculoskeletal: No myalgia.    Exam:  Ht 1.969 m (6' 5.5\")   Wt 140.6 kg (310 lb)   BMI 36.29 kg/m    GENERAL APPEARANCE: healthy, alert and no distress    Labs:  CBC RESULTS:   Lab Results   Component Value Date    WBC 7.4 04/21/2023    WBC 8.1 07/09/2021    RBC " 4.80 04/21/2023    RBC 2.61 (L) 07/09/2021    HGB 15.5 04/21/2023    HGB 6.2 (LL) 07/09/2021    HCT 46.5 04/21/2023    HCT 21.7 (L) 07/09/2021    MCV 97 04/21/2023    MCV 83 07/09/2021    MCH 32.3 04/21/2023    MCH 23.8 (L) 07/09/2021    MCHC 33.3 04/21/2023    MCHC 28.6 (L) 07/09/2021    RDW 13.1 04/21/2023    RDW 16.1 (H) 07/09/2021     04/21/2023     (H) 07/09/2021       BMP RESULTS:  Lab Results   Component Value Date     04/21/2023     07/09/2021    POTASSIUM 4.6 04/21/2023    POTASSIUM 4.5 07/09/2021    CHLORIDE 109 04/21/2023    CHLORIDE 108 07/09/2021    CO2 31 04/21/2023    CO2 24 07/09/2021    ANIONGAP 1 (L) 04/21/2023    ANIONGAP 7 07/09/2021     (H) 04/21/2023     (H) 07/09/2021    BUN 20 04/21/2023    BUN 14 07/09/2021    CR 0.84 04/21/2023    CR 0.74 07/09/2021    GFRESTIMATED >90 04/21/2023    GFRESTIMATED >90 07/09/2021    GFRESTBLACK >90 07/09/2021    EUGENIA 8.9 04/21/2023    EUGENIA 8.5 07/09/2021        INR RESULTS:  Lab Results   Component Value Date    INR 1.3 (H) 02/04/2022    INR 1.5 (H) 01/31/2022    INR 1.5 (H) 01/28/2022    INR 1.20 (A) 01/21/2022    INR 2.5 (H) 12/31/2021    INR 2.10 (H) 06/25/2021    INR 2.50 (H) 04/16/2021    INR 2.30 (H) 03/05/2021    INR 2.40 (H) 01/22/2021       Procedures:      Assessment and Plan:     Persistent atrial fibrillation  Nonischemic cardiomyopathy     It is encouraging the patient has been doing very well clinically.      Because of excellent lipid profile, patient discontinue atorvastatin in April 2023.  We will recheck a fasting lipid.    We will also schedule a follow-up with the heart failure section.    We will continue current medications and see patient again by in person visit in approximately 12 months.  Prior to that visit, patient will undergo a transthoracic echocardiogram to evaluate the left ventricular ejection fraction.  All questions and concerns were addressed and the patient was happy with the  plan.      CC  Patient Care Team:  Leanna Turner MD as PCP - General (Cardiovascular Disease)  Leanna Turner MD as MD (Cardiology)  Stella Allison RN as Specialty Care Coordinator (Cardiology)  Leanna Turner MD as Assigned Heart and Vascular Provider  Roxane Be MD as Assigned PCP

## 2023-11-06 NOTE — NURSING NOTE
Patient confirms medications and allergies are accurate via patients echeck in completion, and or denies any changes since last reviewed/verified.     Patient does not know what medications he is currently taking. Did not verify    Katia Srivastava, Den Facilitator  Is the patient currently in the state of [place]? YES    Visit mode:VIDEO    If the visit is dropped, the patient can be reconnected by: VIDEO VISIT: Text to cell phone:   Telephone Information:   Mobile [phone]       Will anyone else be joining the visit? YES: How would they like to receive their invitation? Text to cell phone: [phone] [place]  (If patient encounters technical issues they should call [phone] :[phone])    How would you like to obtain your AVS? MyChart    Are changes needed to the allergy or medication list? No    Reason for visit: RECHECK    Katia Srivastava VVF

## 2023-11-06 NOTE — Clinical Note
11/6/2023      RE: Stef Mosher  [address] [place] [address]       Dear Colleague,    Thank you for the opportunity to participate in the care of your patient, Stef Mosher, at the [facility] HEART CLINIC [facility]. Please see a copy of my visit note below.    Virtual Visit Details    Type of service:  Video Visit     Originating Location (pt. Location): Other Work  Distant Location (provider location):  Off-site  Platform used for Video Visit: DarkWorks   Video start time: 8:45 AM  Video end time: 8:55 AM    HPI: Purpose of visit: Follow-up of atrial fibrillation and nonischemic cardiomyopathy    Stef Mosher is a 63 year old man with a history of persistent atrial fibrillation (status post multiple unsuccessful DCCV), non-ischemic cardiomyopathy, HTN, HLD who presents for a follow up.      Patient's last visit with me was in May 2023.  Since the last visit, patient has been doing well.  He continues to remain physically active and has noted his energy level and stamina to be improved. He did not report any symptoms of irregular heartbeat sensation, palpitations, exertional dyspnea, exertional angina, frequent lightheadedness, presyncope or syncope.  Patient has not noticed any melena or blood in his stools.    A recent transthoracic echocardiogram showed an improvement in the left ventricular ejection fraction to 40 to 45%.  An echocardiogram in June 2022 showed left ventricular ejection fraction of 35 to 40%.        PAST MEDICAL HISTORY:  Past Medical History:   Diagnosis Date     Arthritis      Atrial fibrillation (H)      Hayfever      Hypercholesterolemia      Hypertension      Unspecified hypothyroidism        CURRENT MEDICATIONS:  Current Outpatient Medications   Medication Sig Dispense Refill     apixaban ANTICOAGULANT (ELIQUIS ANTICOAGULANT) 5 MG tablet Take 1 tablet (5 mg) by mouth 2 times daily 60 tablet 11     atorvastatin  (LIPITOR) 20 MG tablet Take 1 tablet (20 mg) by mouth daily 90 tablet 3     diltiazem ER COATED BEADS (CARDIZEM CD/CARTIA XT) 120 MG 24 hr capsule Take 1 capsule (120 mg) by mouth daily 90 capsule 3     furosemide (LASIX) 20 MG tablet Take 1 tablet (20 mg) by mouth daily 90 tablet 3     levETIRAcetam (KEPPRA) 500 MG tablet Take 500 mg by mouth 2 times daily       metoprolol succinate ER (TOPROL XL) 200 MG 24 hr tablet TAKE 1 TABLET BY MOUTH EVERY DAY 90 tablet 3     omeprazole (PRILOSEC) 40 MG DR capsule Take 1 capsule (40 mg) by mouth 2 times daily 180 capsule 0     sacubitril-valsartan (ENTRESTO)  MG per tablet Take 1 tablet by mouth 2 times daily 180 tablet 3     sacubitril-valsartan (ENTRESTO)  MG per tablet Take 1 tablet by mouth 2 times daily 60 tablet 0     spironolactone (ALDACTONE) 25 MG tablet TAKE 1 TABLET BY MOUTH EVERY DAY 90 tablet 3       PAST SURGICAL HISTORY:  Past Surgical History:   Procedure Laterality Date     ANESTHESIA CARDIOVERSION N/A 3/18/2022    Procedure: ANESTHESIA, FOR CARDIOVERSION@1400;  Surgeon: GENERIC ANESTHESIA PROVIDER;  Location: [facility] OR      THYROIDECTOMY         ALLERGIES:   No Known Allergies    FAMILY HISTORY:  - Premature coronary artery disease  - Atrial fibrillation  - Sudden cardiac death     SOCIAL HISTORY:  Social History     Tobacco Use     Smoking status: Never     Smokeless tobacco: Never     Tobacco comments:     Lives in smoke free household   Vaping Use     Vaping Use: Never used   Substance Use Topics     Alcohol use: No     Comment: None     Drug use: No     Comment: Never       ROS:   Constitutional: No fever, chills, or sweats. Weight stable.   ENT: No visual disturbance, ear ache, epistaxis, sore throat.   Cardiovascular: As per HPI.   Respiratory: No cough, hemoptysis.    GI: No nausea, vomiting, hematemesis, melena, or hematochezia.   : No hematuria.   Integument: Negative.   Psychiatric: Negative.   Hematologic:  Easy bruising, no easy  "bleeding.  Neuro: Negative.   Endocrinology: No significant heat or cold intolerance   Musculoskeletal: No myalgia.    Exam:  Ht 1.969 m (6' 5.5\")   Wt 140.6 kg (310 lb)   BMI 36.29 kg/m    GENERAL APPEARANCE: healthy, alert and no distress    Labs:  CBC RESULTS:   Lab Results   Component Value Date    WBC 7.4 04/21/2023    WBC 8.1 07/09/2021    RBC 4.80 04/21/2023    RBC 2.61 (L) 07/09/2021    HGB 15.5 04/21/2023    HGB 6.2 (LL) 07/09/2021    HCT 46.5 04/21/2023    HCT 21.7 (L) 07/09/2021    MCV 97 04/21/2023    MCV 83 07/09/2021    MCH 32.3 04/21/2023    MCH 23.8 (L) 07/09/2021    MCHC 33.3 04/21/2023    MCHC 28.6 (L) 07/09/2021    RDW 13.1 04/21/2023    RDW 16.1 (H) 07/09/2021     04/21/2023     (H) 07/09/2021       BMP RESULTS:  Lab Results   Component Value Date     04/21/2023     07/09/2021    POTASSIUM 4.6 04/21/2023    POTASSIUM 4.5 07/09/2021    CHLORIDE 109 04/21/2023    CHLORIDE 108 07/09/2021    CO2 31 04/21/2023    CO2 24 07/09/2021    ANIONGAP 1 (L) 04/21/2023    ANIONGAP 7 07/09/2021     (H) 04/21/2023     (H) 07/09/2021    BUN 20 04/21/2023    BUN 14 07/09/2021    CR 0.84 04/21/2023    CR 0.74 07/09/2021    GFRESTIMATED >90 04/21/2023    GFRESTIMATED >90 07/09/2021    GFRESTBLACK >90 07/09/2021    EUGENIA 8.9 04/21/2023    EUGENIA 8.5 07/09/2021        INR RESULTS:  Lab Results   Component Value Date    INR 1.3 (H) 02/04/2022    INR 1.5 (H) 01/31/2022    INR 1.5 (H) 01/28/2022    INR 1.20 (A) 01/21/2022    INR 2.5 (H) 12/31/2021    INR 2.10 (H) 06/25/2021    INR 2.50 (H) 04/16/2021    INR 2.30 (H) 03/05/2021    INR 2.40 (H) 01/22/2021       Procedures:      Assessment and Plan:     Persistent atrial fibrillation  Nonischemic cardiomyopathy     It is encouraging the patient has been doing very well clinically.      Because of excellent lipid profile, patient discontinue atorvastatin in April 2023.  We will recheck a fasting lipid.    We will also schedule a follow-up " with the heart failure section.    We will continue current medications and see patient again by in person visit in approximately 12 months.  Prior to that visit, patient will undergo a transthoracic echocardiogram to evaluate the left ventricular ejection fraction.  All questions and concerns were addressed and the patient was happy with the plan.      CC  Patient Care Team:  Leanna Turner MD as PCP - General (Cardiovascular Disease)  Leanna Turner MD as MD (Cardiology)  Stella Allison RN as Specialty Care Coordinator (Cardiology)  Leanna Turner MD as Assigned Heart and Vascular Provider  Roxane Be MD as Assigned PCP            Please do not hesitate to contact me if you have any questions/concerns.     Sincerely,     Leanna Turner MD

## 2023-11-06 NOTE — PATIENT INSTRUCTIONS
Thank you for coming to the [facility] at [place]; please note the following instructions:    1. Dr. Leanna Turner recommends to follow up in 1 year with an echo prior.  The cardiology team will contact you to schedule when the time gets closer.    2. Establish care with heart failure provider at [place] in Feb/March 2024    3. Fasting labs now to check cholesterol         If you have any questions regarding your visit, please contact your care team:     CARDIOLOGY  TELEPHONE NUMBER   Mahnaz MCKEON, Registered Nurse  Belle KING, Registered Nurse  Analisa GORMAN, Registered Medical Assistant  Apple SUTTON, Certified Medical Assistant  Sandee DECKER, Visit Facilitator [phone] (select option 1)    *After hours: [phone]   For Scheduling Appts:     [phone] (select option [phone])    *After hours: [phone]   For the Device Clinic (Pacemakers and ICD's)  Vale BRIDGES, Registered Nurse   During business hours: [phone]    *After business hours:  [phone] (select option 4)      Normal test result notifications will be released via PeerMe or mailed within 7 business days.  All other test results, will be communicated via telephone once reviewed by your cardiologist.    If you need a medication refill, please contact your pharmacy.  Please allow 3 business days for your refill to be completed.    As always, thank you for trusting us with your health care needs!

## 2023-11-17 ENCOUNTER — LAB (OUTPATIENT)
Dept: LAB | Facility: CLINIC | Age: 63
End: 2023-11-17
Payer: COMMERCIAL

## 2023-11-17 DIAGNOSIS — E78.5 HYPERLIPIDEMIA LDL GOAL <100: ICD-10-CM

## 2023-11-17 LAB
CHOLEST SERPL-MCNC: 215 MG/DL
HDLC SERPL-MCNC: 40 MG/DL
LDLC SERPL CALC-MCNC: 155 MG/DL
NONHDLC SERPL-MCNC: 175 MG/DL
TRIGL SERPL-MCNC: 98 MG/DL

## 2023-11-17 PROCEDURE — 36415 COLL VENOUS BLD VENIPUNCTURE: CPT

## 2023-11-17 PROCEDURE — 80061 LIPID PANEL: CPT

## 2024-02-07 ENCOUNTER — OFFICE VISIT (OUTPATIENT)
Dept: OPTOMETRY | Facility: CLINIC | Age: 64
End: 2024-02-07
Payer: COMMERCIAL

## 2024-02-07 DIAGNOSIS — H52.223 REGULAR ASTIGMATISM OF BOTH EYES: ICD-10-CM

## 2024-02-07 DIAGNOSIS — Z01.00 ROUTINE EYE EXAM: Primary | ICD-10-CM

## 2024-02-07 DIAGNOSIS — H52.4 PRESBYOPIA: ICD-10-CM

## 2024-02-07 DIAGNOSIS — H52.03 HYPEROPIA, BILATERAL: ICD-10-CM

## 2024-02-07 PROCEDURE — 92004 COMPRE OPH EXAM NEW PT 1/>: CPT | Performed by: OPTOMETRIST

## 2024-02-07 PROCEDURE — 92015 DETERMINE REFRACTIVE STATE: CPT | Performed by: OPTOMETRIST

## 2024-02-07 ASSESSMENT — REFRACTION_WEARINGRX
OD_CYLINDER: +3.00
OS_SPHERE: -1.75
OS_AXIS: 010
OD_ADD: +2.50
OD_SPHERE: -1.50
OS_CYLINDER: +3.00
SPECS_TYPE: PAL
OD_AXIS: 175
OS_ADD: +2.50

## 2024-02-07 ASSESSMENT — VISUAL ACUITY
CORRECTION_TYPE: GLASSES
OD_CC: J1
OD_CC+: -2
OS_CC: 20/30
OD_CC: 20/30
OS_CC: J1
METHOD: SNELLEN - LINEAR

## 2024-02-07 ASSESSMENT — CONF VISUAL FIELD
OS_SUPERIOR_NASAL_RESTRICTION: 0
OD_INFERIOR_TEMPORAL_RESTRICTION: 0
OS_NORMAL: 1
OD_SUPERIOR_NASAL_RESTRICTION: 0
OD_SUPERIOR_TEMPORAL_RESTRICTION: 0
OD_INFERIOR_NASAL_RESTRICTION: 0
OS_SUPERIOR_TEMPORAL_RESTRICTION: 0
OD_NORMAL: 1
OS_INFERIOR_NASAL_RESTRICTION: 0
METHOD: COUNTING FINGERS
OS_INFERIOR_TEMPORAL_RESTRICTION: 0

## 2024-02-07 ASSESSMENT — REFRACTION_MANIFEST
OD_SPHERE: -2.25
OD_AXIS: 176
OD_ADD: +2.50
OD_CYLINDER: +3.75
OS_CYLINDER: +3.00
OS_AXIS: 178
OS_SPHERE: -1.25
OS_ADD: +2.50
OS_CYLINDER: +2.75
OD_SPHERE: -2.00
OD_CYLINDER: +4.50
OS_SPHERE: -1.75
OD_AXIS: 180
OS_AXIS: 010

## 2024-02-07 ASSESSMENT — CUP TO DISC RATIO
OD_RATIO: 0.2
OS_RATIO: 0.2

## 2024-02-07 ASSESSMENT — KERATOMETRY
OD_K1POWER_DIOPTERS: 43.75
OS_K2POWER_DIOPTERS: 41.50
OD_K2POWER_DIOPTERS: 42.00
OD_AXISANGLE_DEGREES: 069
OD_AXISANGLE2_DEGREES: 159
OS_AXISANGLE2_DEGREES: 002
OS_AXISANGLE_DEGREES: 092
OS_K1POWER_DIOPTERS: 43.75

## 2024-02-07 ASSESSMENT — TONOMETRY
IOP_UNABLETOASSESS: 1
IOP_METHOD: APPLANATION
IOP_METHOD: BOTH EYES NORMAL BY PALPATION

## 2024-02-07 ASSESSMENT — SLIT LAMP EXAM - LIDS
COMMENTS: NORMAL
COMMENTS: NORMAL

## 2024-02-07 NOTE — LETTER
2/7/2024         RE: Stef Mosher  [address] [place] [address]        Dear Colleague,    Thank you for referring your patient, Stef Mosher, to the [facility]. Please see a copy of my visit note below.    Chief Complaint   Patient presents with     Annual Eye Exam         Last Eye Exam: Nov 2019 with Dr Washington  Dilated Previously: Yes, side effects of dilation explained today    What are you currently using to see?  glasses       Distance Vision Acuity: Satisfied with vision    Near Vision Acuity: Satisfied with vision while reading  with glasses    Eye Comfort: good  Do you use eye drops? : No  Occupation or Hobbies: multi camera director     Mei Horn, RADHA             Medical, surgical and family histories reviewed and updated 2/7/2024.       OBJECTIVE: See Ophthalmology exam    ASSESSMENT:    ICD-10-CM    1. Routine eye exam  Z01.00       2. Presbyopia  H52.4           PLAN:     There are no Patient Instructions on file for this visit.     Again, thank you for allowing me to participate in the care of your patient.        Sincerely,        Parul Moffett, OD

## 2024-02-07 NOTE — PATIENT INSTRUCTIONS
Fill glasses prescription  Allow 2 weeks to adapt to change in glasses  Return in 1 -2 year for eye exam    Parul Moffett O.D.  [facility]  [address] [place] [address] [phone]

## 2024-02-07 NOTE — PROGRESS NOTES
Chief Complaint   Patient presents with    Annual Eye Exam         Last Eye Exam: Nov 2019 with Dr Washington  Dilated Previously: Yes, side effects of dilation explained today    What are you currently using to see?  glasses       Distance Vision Acuity: Satisfied with vision    Near Vision Acuity: Satisfied with vision while reading  with glasses    Eye Comfort: good  Do you use eye drops? : No  Occupation or Hobbies: multi camera director     RADHA Larsen             Medical, surgical and family histories reviewed and updated 2/7/2024.       OBJECTIVE: See Ophthalmology exam    ASSESSMENT:    ICD-10-CM    1. Routine eye exam  Z01.00       2. Presbyopia  H52.4           PLAN:     There are no Patient Instructions on file for this visit.

## 2024-02-19 DIAGNOSIS — I42.8 NONISCHEMIC CARDIOMYOPATHY (H): Primary | ICD-10-CM

## 2024-02-23 ENCOUNTER — MYC MEDICAL ADVICE (OUTPATIENT)
Dept: CARDIOLOGY | Facility: CLINIC | Age: 64
End: 2024-02-23

## 2024-02-23 ENCOUNTER — OFFICE VISIT (OUTPATIENT)
Dept: CARDIOLOGY | Facility: CLINIC | Age: 64
End: 2024-02-23
Payer: COMMERCIAL

## 2024-02-23 ENCOUNTER — LAB (OUTPATIENT)
Dept: LAB | Facility: CLINIC | Age: 64
End: 2024-02-23
Payer: COMMERCIAL

## 2024-02-23 ENCOUNTER — TELEPHONE (OUTPATIENT)
Dept: CARDIOLOGY | Facility: CLINIC | Age: 64
End: 2024-02-23

## 2024-02-23 VITALS
DIASTOLIC BLOOD PRESSURE: 71 MMHG | SYSTOLIC BLOOD PRESSURE: 100 MMHG | OXYGEN SATURATION: 94 % | WEIGHT: 315 LBS | HEART RATE: 61 BPM | BODY MASS INDEX: 41.45 KG/M2

## 2024-02-23 DIAGNOSIS — I48.0 PAROXYSMAL ATRIAL FIBRILLATION (H): ICD-10-CM

## 2024-02-23 DIAGNOSIS — Z79.01 LONG TERM CURRENT USE OF ANTICOAGULANT THERAPY: ICD-10-CM

## 2024-02-23 DIAGNOSIS — I48.20 CHRONIC ATRIAL FIBRILLATION (H): ICD-10-CM

## 2024-02-23 DIAGNOSIS — I50.22 CHRONIC SYSTOLIC HEART FAILURE (H): ICD-10-CM

## 2024-02-23 DIAGNOSIS — I42.8 NONISCHEMIC CARDIOMYOPATHY (H): ICD-10-CM

## 2024-02-23 DIAGNOSIS — I50.22 CHRONIC SYSTOLIC HEART FAILURE (H): Primary | ICD-10-CM

## 2024-02-23 DIAGNOSIS — I42.8 NONISCHEMIC CARDIOMYOPATHY (H): Primary | ICD-10-CM

## 2024-02-23 DIAGNOSIS — E78.5 HYPERLIPIDEMIA LDL GOAL <100: ICD-10-CM

## 2024-02-23 DIAGNOSIS — I42.9 CARDIOMYOPATHY, UNSPECIFIED TYPE (H): ICD-10-CM

## 2024-02-23 LAB
ANION GAP SERPL CALCULATED.3IONS-SCNC: 9 MMOL/L (ref 7–15)
BUN SERPL-MCNC: 20 MG/DL (ref 8–23)
CALCIUM SERPL-MCNC: 9.2 MG/DL (ref 8.8–10.2)
CHLORIDE SERPL-SCNC: 105 MMOL/L (ref 98–107)
CHOLEST SERPL-MCNC: 227 MG/DL
CREAT SERPL-MCNC: 0.94 MG/DL (ref 0.67–1.17)
DEPRECATED HCO3 PLAS-SCNC: 28 MMOL/L (ref 22–29)
EGFRCR SERPLBLD CKD-EPI 2021: >90 ML/MIN/1.73M2
GLUCOSE SERPL-MCNC: 104 MG/DL (ref 70–99)
HBA1C MFR BLD: 5.7 % (ref 0–5.6)
HDLC SERPL-MCNC: 41 MG/DL
HGB BLD-MCNC: 16.2 G/DL (ref 13.3–17.7)
LDLC SERPL CALC-MCNC: 164 MG/DL
MAGNESIUM SERPL-MCNC: 2 MG/DL (ref 1.7–2.3)
NONHDLC SERPL-MCNC: 186 MG/DL
NT-PROBNP SERPL-MCNC: 158 PG/ML (ref 0–900)
POTASSIUM SERPL-SCNC: 4.6 MMOL/L (ref 3.4–5.3)
SODIUM SERPL-SCNC: 142 MMOL/L (ref 135–145)
TRIGL SERPL-MCNC: 110 MG/DL

## 2024-02-23 PROCEDURE — 83735 ASSAY OF MAGNESIUM: CPT

## 2024-02-23 PROCEDURE — 83036 HEMOGLOBIN GLYCOSYLATED A1C: CPT | Performed by: INTERNAL MEDICINE

## 2024-02-23 PROCEDURE — 80061 LIPID PANEL: CPT

## 2024-02-23 PROCEDURE — 83880 ASSAY OF NATRIURETIC PEPTIDE: CPT

## 2024-02-23 PROCEDURE — 99417 PROLNG OP E/M EACH 15 MIN: CPT | Performed by: INTERNAL MEDICINE

## 2024-02-23 PROCEDURE — 85018 HEMOGLOBIN: CPT | Performed by: INTERNAL MEDICINE

## 2024-02-23 PROCEDURE — G2211 COMPLEX E/M VISIT ADD ON: HCPCS | Performed by: INTERNAL MEDICINE

## 2024-02-23 PROCEDURE — 80048 BASIC METABOLIC PNL TOTAL CA: CPT

## 2024-02-23 PROCEDURE — 99215 OFFICE O/P EST HI 40 MIN: CPT | Performed by: INTERNAL MEDICINE

## 2024-02-23 PROCEDURE — 36415 COLL VENOUS BLD VENIPUNCTURE: CPT

## 2024-02-23 RX ORDER — SACUBITRIL AND VALSARTAN 97; 103 MG/1; MG/1
1 TABLET, FILM COATED ORAL 2 TIMES DAILY
Qty: 180 TABLET | Refills: 3 | Status: SHIPPED | OUTPATIENT
Start: 2024-02-23

## 2024-02-23 RX ORDER — METOPROLOL SUCCINATE 200 MG/1
200 TABLET, EXTENDED RELEASE ORAL DAILY
Qty: 90 TABLET | Refills: 3 | Status: SHIPPED | OUTPATIENT
Start: 2024-02-23 | End: 2025-02-17

## 2024-02-23 RX ORDER — SPIRONOLACTONE 25 MG/1
25 TABLET ORAL DAILY
Qty: 90 TABLET | Refills: 3 | Status: SHIPPED | OUTPATIENT
Start: 2024-02-23 | End: 2025-02-17

## 2024-02-23 RX ORDER — FUROSEMIDE 20 MG
20 TABLET ORAL DAILY
Qty: 90 TABLET | Refills: 3 | Status: SHIPPED | OUTPATIENT
Start: 2024-02-23

## 2024-02-23 NOTE — TELEPHONE ENCOUNTER
Prior Authorization Approval    Authorization Effective Date: 1/24/2024  Authorization Expiration Date: 2/22/2025  Medication: Farxiga 10mg    Approved Dose/Quantity:   Reference #:     Insurance Company: [employer] Non-Specialty PA's - Phone [phone] Fax [phone]  Expected CoPay:       CoPay Card Available:      Foundation Assistance Needed:    Which Pharmacy is filling the prescription (Not needed for infusion/clinic administered): [facility] - [place], [place] - [address]

## 2024-02-23 NOTE — TELEPHONE ENCOUNTER
Central Prior Authorization Team   Phone: [phone]    PA Initiation    Medication: Farxiga 10mg    Insurance Company: [employer] Non-Specialty PA's - Phone [phone] Fax [phone]  Pharmacy Filling the Rx: [facility] - [place], [place] - [facility] [address]  Filling Pharmacy Phone: [phone]  Filling Pharmacy Fax:    Start Date: 2/23/2024

## 2024-02-23 NOTE — NURSING NOTE
"Chief Complaint   Patient presents with    New Patient     Persistent atrial fibrillation and Nonischemic cardiomyopathy and Hyperlipidemia LDL goal <100       Initial /71 (BP Location: Right arm, Patient Position: Chair, Cuff Size: Adult Large)   Pulse 61   Wt (!) 160.6 kg (354 lb 1.6 oz)   SpO2 94%   BMI 41.45 kg/m   Estimated body mass index is 41.45 kg/m  as calculated from the following:    Height as of 11/6/23: 1.969 m (6' 5.5\").    Weight as of this encounter: 160.6 kg (354 lb 1.6 oz)..  BP completed using cuff size: large    Анна P., VF     "

## 2024-02-23 NOTE — PROGRESS NOTES
[facility]  Advanced Heart Failure Clinic    Patient Name: Stef Mosher   : 1960   Date of Visit: 2024    Primary Care Physician: None listed     Referring Physician: Leanna Turner MD  Reason for Visit: Cardiomyopathy    Dear Dr. Turner,      I had the pleasure of seeing Stef Mosher today in the [facility]. As you know Stef Mosher is a pleasant 64 year old year old male, who presents today for further evaluation of cardiomyopathy.    Stef has a history of persistent atrial fibrillation diagnosed  (status post multiple unsuccessful cardioversion), nonischemic cardiomyopathy, hypertension, hyperlipidemia and history of significant GI bleed requiring blood transfusions .  He follows with Dr. Leanna Turner for A-fib and has previously seen Dr. Sivakumar Brambila for his cardiomyopathy.    He is here for an annual follow up. He works as a TV director/ , some walking at work around 3500 - 4000 steps a day. He has arthritis in his knees and hips. He denies chest pain, shortness of breath, PND/orthopnea, palpitations, lightheadedness, syncope. His leg swelling is improved with the medications.  Compliant with medications and notes no problems taking them.    He has been off the atorvastatin 6-9 months as he was changing his diet and wanted to see if cholesterol is improved.     Home BPs - not checking  Home HRs - not checking  Home weights - not checking    ROS:  A complete 10-point ROS was negative except as above.    PAST MEDICAL HISTORY:  Past Medical History:   Diagnosis Date    Arthritis     Atrial fibrillation (H)     Hayfever     Hypercholesterolemia     Hypertension     Unspecified hypothyroidism        PAST SURGICAL HISTORY:  Past Surgical History:   Procedure Laterality Date    ANESTHESIA CARDIOVERSION N/A 3/18/2022    Procedure: ANESTHESIA, FOR CARDIOVERSION@1400;  Surgeon: GENERIC ANESTHESIA PROVIDER;  Location:  OR     THYROIDECTOMY          FAMILY HISTORY:  Family History   Problem Relation Age of Onset    Blood Disease Mother     Cancer Mother     Arthritis Father     Hypertension Father     Breast Cancer Sister     Allergies Sister     Cancer Sister     Thyroid Disease Sister     Blood Disease Sister     Diabetes No family hx of     Cerebrovascular Disease No family hx of     Glaucoma No family hx of     Macular Degeneration No family hx of    Brother - arrhyhtmia  Older brother - Afib,  from non-cardiac issues    SOCIAL HISTORY:  Social History     Socioeconomic History    Marital status:    Tobacco Use    Smoking status: Never    Smokeless tobacco: Never    Tobacco comments:     Lives in smoke free household   Vaping Use    Vaping Use: Never used   Substance and Sexual Activity    Alcohol use: No     Comment: None    Drug use: No     Comment: Never    Sexual activity: Not Currently     Partners: Female       MEDICATIONS:  He is off the lipitor 6-9 months  Current Outpatient Medications   Medication Sig    apixaban ANTICOAGULANT (ELIQUIS ANTICOAGULANT) 5 MG tablet Take 1 tablet (5 mg) by mouth 2 times daily    atorvastatin (LIPITOR) 20 MG tablet Take 1 tablet (20 mg) by mouth daily    diltiazem ER COATED BEADS (CARDIZEM CD/CARTIA XT) 120 MG 24 hr capsule Take 1 capsule (120 mg) by mouth daily    furosemide (LASIX) 20 MG tablet Take 1 tablet (20 mg) by mouth daily    levETIRAcetam (KEPPRA) 500 MG tablet Take 500 mg by mouth 2 times daily    metoprolol succinate ER (TOPROL XL) 200 MG 24 hr tablet TAKE 1 TABLET BY MOUTH EVERY DAY    omeprazole (PRILOSEC) 40 MG DR capsule Take 1 capsule (40 mg) by mouth 2 times daily    sacubitril-valsartan (ENTRESTO)  MG per tablet Take 1 tablet by mouth 2 times daily    sacubitril-valsartan (ENTRESTO)  MG per tablet Take 1 tablet by mouth 2 times daily    spironolactone (ALDACTONE) 25 MG tablet TAKE 1 TABLET BY MOUTH EVERY DAY     No current facility-administered medications for this  visit.        ALLERGIES:   No Known Allergies    PHYSICAL EXAM:  /71 (BP Location: Right arm, Patient Position: Chair, Cuff Size: Adult Large)   Pulse 61   Wt (!) 160.6 kg (354 lb 1.6 oz)   SpO2 94%   BMI 41.45 kg/m    Gen: alert, interactive, NAD  HEENT: atraumatic, EOMI, MMM  Neck: supple, no JVD  CV: irregular, no m/r/g  Chest: CTAB, no wheezes or crackles  Abd: soft, NT, ND, +BS  Ext: no LE edema, 2+ peripheral pulses  Skin: warm and dry, no rashes on exposed surfaces  Neuro: alert and oriented, no focal deficits    LABS:    CMP  Last Comprehensive Metabolic Panel:  Sodium   Date Value Ref Range Status   04/21/2023 141 133 - 144 mmol/L Final   07/09/2021 139 133 - 144 mmol/L Final     Potassium   Date Value Ref Range Status   04/21/2023 4.6 3.4 - 5.3 mmol/L Final   07/09/2021 4.5 3.4 - 5.3 mmol/L Final     Chloride   Date Value Ref Range Status   04/21/2023 109 94 - 109 mmol/L Final   07/09/2021 108 94 - 109 mmol/L Final     Carbon Dioxide   Date Value Ref Range Status   07/09/2021 24 20 - 32 mmol/L Final     Carbon Dioxide (CO2)   Date Value Ref Range Status   04/21/2023 31 20 - 32 mmol/L Final     Anion Gap   Date Value Ref Range Status   04/21/2023 1 (L) 3 - 14 mmol/L Final   07/09/2021 7 3 - 14 mmol/L Final     Glucose   Date Value Ref Range Status   04/21/2023 101 (H) 70 - 99 mg/dL Final   07/09/2021 100 (H) 70 - 99 mg/dL Final     Comment:     Fasting specimen     Urea Nitrogen   Date Value Ref Range Status   04/21/2023 20 7 - 30 mg/dL Final   07/09/2021 14 7 - 30 mg/dL Final     Creatinine   Date Value Ref Range Status   04/21/2023 0.84 0.66 - 1.25 mg/dL Final   07/09/2021 0.74 0.66 - 1.25 mg/dL Final     GFR Estimate   Date Value Ref Range Status   04/21/2023 >90 >60 mL/min/1.73m2 Final     Comment:     eGFR calculated using 2021 CKD-EPI equation.   07/09/2021 >90 >60 mL/min/[1.73_m2] Final     Comment:     Non  GFR Calc  Starting 12/18/2018, serum creatinine based estimated GFR  "(eGFR) will be   calculated using the Chronic Kidney Disease Epidemiology Collaboration   (CKD-EPI) equation.       Calcium   Date Value Ref Range Status   2023 8.9 8.5 - 10.1 mg/dL Final   2021 8.5 8.5 - 10.1 mg/dL Final     Bilirubin Total   Date Value Ref Range Status   2023 0.9 0.2 - 1.3 mg/dL Final   2021 0.8 0.2 - 1.3 mg/dL Final     Alkaline Phosphatase   Date Value Ref Range Status   2023 108 40 - 150 U/L Final   2021 145 40 - 150 U/L Final     ALT   Date Value Ref Range Status   2023 41 0 - 70 U/L Final   2021 25 0 - 70 U/L Final     AST   Date Value Ref Range Status   2023 24 0 - 45 U/L Final   2021 16 0 - 45 U/L Final       NT-proBNP       TROP  No results found for: \"TROPI\", \"TROPONIN\", \"TROPR\", \"TROPN\"    CBC  CBC RESULTS:   Recent Labs   Lab Test 23  1045   WBC 7.4   RBC 4.80   HGB 15.5   HCT 46.5   MCV 97   MCH 32.3   MCHC 33.3   RDW 13.1          LIPIDS  Recent Labs   Lab Test 23  1128 23  1045   CHOL 215* 132   HDL 40 43   * 70   TRIG 98 94       TSH  TSH   Date Value Ref Range Status   2022 1.03 0.40 - 4.00 mU/L Final   2021 1.23 0.40 - 4.00 mU/L Final       HBA1C  Lab Results   Component Value Date    A1C 5.4 2018         CARDIAC DATA:    EK: Atrial fibrillation, Incomplete left bundle branch block    Echo:  :  Mildly reduced left ventricular systolic function. LVEF estimate 40-45%  (Traced 44%). Normal LV function and size. LV filling is restrictive. Normal   RV function and size. No significant valvular abnormalities. IVC cannot be assessed. No major changes compared to prior study in 2011.     2021:  Difficult to assess ejection fraction due to fogj-fn-sggy variability, the LVEF in the 40% range. Mild left ventricular dilation is present.  Moderate right ventricular dilation is present. Global right ventricular function is mildly reduced.  Mild dilatation of the " aorta is present. Sinuses of Valsalva 4.4 cm (1.63 cm//m2).  No pericardial effusion is present.   This study was compared with the study from 9/22/2017. No significant changes noted.     11/2021:  Left ventricular function is decreased. The ejection fraction is 35-40% (moderately reduced).  Global right ventricular function is mildly reduced. The inferior vena cava cannot be assessed.  No pericardial effusion is present. There has been no change.     2/8/22:  Left ventricular function is decreased. The ejection fraction is 30-35% (moderately reduced). Moderate diffuse hypokinesis is present.  Global right ventricular function is mildly reduced.  The left atrial appendage is free of thrombus. There is spontaneous echo contrast with a severely dilated left atrium.  Mild functional mitral insufficiency is present. No pericardial effusion is present.      6/3/22:  Left ventricular function is decreased. The ejection fraction is 35-40% (moderately reduced). Moderate left ventricular dilation is present.  Global right ventricular function is mildly reduced.   The inferior vena cava was normal in size with preserved respiratory variability.  Trivial pericardial effusion is present.   This study was compared with the study from 11/2021. No significant changes noted.    11/2023:  Left ventricular function is mildly reduced. The ejection fraction is 40-45%. LVIDd: 6.0 cm   Mild diffuse hypokinesis is present  Global right ventricular function is normal.  This study was compared with the study from 6/3/22. LVEF is higher.     NM stress 3/4/2022:    The nuclear stress test is abnormal.    No evidence of myocardial ischemia. There is a small area of a mild degree of infarction in the distal septal segment(s) of the left ventricle. This appears to be in the left anterior descending distribution.    Left ventricular function is normal.    A prior study was conducted on 11/13/2017.    Nuclear Study Quality: Final image quality is  satisfactory.    CTA 7/22/22:  1.  There are no high-grade stenoses in the left main, proximal left anterior descending, proximal left circumflex, or right coronary arteries.  2.  The remaining segments of the major epicardial vessels are non-diagnostic due to significant cardiac motion and photon starvation artifacts.  3.  Total Agatston score 58 placing the patient in the 54th percentile when compared to age and gender matched control group. Mild (25-49%) stenosis composed of mixed plaque.   4.  Please review the separate Radiology report for incidental noncardiac findings.    Cardiac MRI:  None available    Cardiac Catheterization:  None available      ASSESSMENT/PLAN:  In summary, Stef Mosher is here today with the following -      1.  Nonischemic cardiomyopathy, chronic combined systolic and diastolic heart failure, ACC/AHA stage C, NYHA class II, EF 40 to 45%  2.  Persistent atrial fibrillation, chronic anticoagulation  3.  Hypertension  4.  Hyperlipidemia    Plans -     NICM:  - Goal Directed Medical Therapies for Heart Failure:     These are indicated irrespective of the etiology of heart failure.  We discussed the primary medications, their side effects, the care plan including frequent follow-ups  for optimization of therapies with monitoring of blood pressures, weights and lab results     Beta blocker: continue Toprol  mg once daily  ACE/ARB/ARNI: continue sacubitril/ valsartan  mg BID  MRA: continue spironolactone 25 mg daily  SGLT2 inhibitor: discussed benefits, he is feeling well and he wants hold off. His wife would like us to send a test script to figure out costs.     Diuretics: lasix 20 mg once daily  Cardiac Rehab: not indicated  ICD/ CRT-D: not indicated  Labs: at follow up  Follow up: 1 year  CV Genetic testing: discussed, they will think about it and let us know  Obstructive sleep apnea: he denies concerns, but is willing to be tested. Will refer to sleep medicine    Advised on  daily home BP and weight monitoring  Advised on observing caution with salt and fluid intake    Non-obstructive CAD: Mild LAD disease in 2022. Advised on aspirin (stopped with GI bleeding) and statin    Hypertension: Well-controlled    Persistent A-fib: Managed by Dr. Turner,  on chronic anticoagulation, beta-blocker and diltiazem with    Hyperlipidemia: off statin per his wish as they are monitoring lipid panel With modifications to his diet, follow up lipid panel    Elevated fasting blood glucose: His fasting blood sugar is borderline high, advised to follow up with PCP for further monitoring.  Ordered a hemoglobin A1c for today and further follow-up with his primary care physician.    History of GI bleed: No issues in the last 2 years but they have requested that hemoglobin to be checked today that we have ordered    The longitudinal plan of care for the diagnosis(es)/condition(s) as documented were addressed during this visit. Due to the added complexity in care, I will continue to support Stef in the subsequent management and with ongoing continuity of care.   Chronic combined systolic and diastolic heart failure    I spent 61 minutes minutes in care of the patient today including obtaining recent medical history, personally reviewing recent cardiac testing and/or lab results, today's examination, discussion of testing results and care recommendations with patient.       Thank you for the opportunity to participate in this patient's care. Please feel free to reach out with any questions or concerns.      Francesco Hernandez MD, FACC  Associate Professor of Medicine  Advanced Heart Failure, LVAD & Cardiac Transplant  Director, Cardio-Obstetrics  Cardio-Oncology  [facility]

## 2024-02-23 NOTE — TELEPHONE ENCOUNTER
30 days supply test claims requested for the drugs below:  Jardiance 10mg daily, Farxiga 10mg daily     PA required for both drugs:

## 2024-02-23 NOTE — NURSING NOTE
Return in one year- we will call to set that up  Referral to sleep medicine  No med changes  He may start sglt2i if he wishes, zero dollar co-pay- will wait to hear back  Tami Koo RN

## 2024-02-23 NOTE — TELEPHONE ENCOUNTER
Central Prior Authorization Team   Phone: [phone]    PA Initiation    Medication: Jardiance 10mg daily   Insurance Company: [employer] Non-Specialty PA's - Phone [phone] Fax [phone]  Pharmacy Filling the Rx: [facility] - [place], [place] - [address]  Filling Pharmacy Phone: [phone]  Filling Pharmacy Fax:    Start Date: 2/23/2024

## 2024-02-23 NOTE — PATIENT INSTRUCTIONS
Take your medicines every day, as directed     Changes made today:  None made today       Cardiology Care Coordinators:      Belle FINLEY RN     Cardiology Rooming staff:  Анна SUTTON CNA    Phone  [phone]      Fax [phone]    To Contact us     During Business Hours:  [phone]     If you are needing refills please contact your pharmacy.     For urgent after hour care please call the [facility] at [phone] or the [facility] at [phone] and ask to speak to the cardiologist on call.            HOW TO CHECK YOUR BLOOD PRESSURE AT HOME:     Avoid eating, smoking, and exercising for at least 30 minutes before taking a reading.     Be sure you have taken your BP medication at least 2-3 hours before you check it.      Sit quietly for 10 minutes before a reading.      Sit in a chair with your feet flat on the floor. Rest your  arm on a table so that the arm cuff is at the same level as your heart.     Remain still during the reading.  Record your blood pressure and pulse in a log and bring to your next appointment.       Use [employer] allows you to communicate directly with your heart team through secure messaging.  [employer] can be accessed any time on your phone, computer, or tablet.  If you need assistance, we'd be happy to help!             Keep your Heart Appointments:     Return in one year- we will call to set that up  Referral to sleep medicine

## 2024-02-23 NOTE — TELEPHONE ENCOUNTER
Prior Authorization Approval    Authorization Effective Date: 1/24/2024  Authorization Expiration Date: 2/22/2025  Medication: Jardiance 10mg daily   Approved Dose/Quantity:   Reference #:     Insurance Company: [employer] Non-Specialty PA's - Phone [phone] Fax [phone]  Expected CoPay:       CoPay Card Available:      Foundation Assistance Needed:    Which Pharmacy is filling the prescription (Not needed for infusion/clinic administered): [facility] - [place], [place] - [address]

## 2024-02-26 DIAGNOSIS — I48.21 PERMANENT ATRIAL FIBRILLATION (H): ICD-10-CM

## 2024-03-01 RX ORDER — DILTIAZEM HYDROCHLORIDE 120 MG/1
120 CAPSULE, COATED, EXTENDED RELEASE ORAL DAILY
Qty: 90 CAPSULE | Refills: 0 | Status: SHIPPED | OUTPATIENT
Start: 2024-03-01 | End: 2024-05-23

## 2024-03-01 NOTE — TELEPHONE ENCOUNTER
diltiazem ER COATED BEADS (CARDIZEM CD/CARTIA XT) 120 MG 24 hr capsule 90 capsule 3 3/7/2023  ----------------------  Last Office Visit : 2/23/2024  [facility] Francesco Garcia MD     Future Office visit:  0  ----------------------      Routing refill request to provider for review/approval because:  ALT recheck due 4/21/24.  #90 capsules, no refills given to [id number] - [place], [place] - [address] [phone]     BP Readings from Last 3 Encounters:   02/23/24 100/71   04/10/23 122/83   02/03/23 122/88     Lab Results   Component Value Date    ALT 41 04/21/2023    ALT 25 07/09/2021     Creatinine   Date Value Ref Range Status   02/23/2024 0.94 0.67 - 1.17 mg/dL Final   07/09/2021 0.74 0.66 - 1.25 mg/dL Final     Pass/Fail Protocol Criteria:    Calcium Channel Blockers Protocol  [id number]/01/2024 11:35 AM   Protocol Details Blood pressure under 140/90 in past 12 months    Normal ALT in past 12 months    Recent (12 mo) or future (30 days) visit within the authorizing provider's specialty    Medication is active on med list    Patient is age 18 or older    Normal serum creatinine on file in past 12 months

## 2024-03-08 NOTE — TELEPHONE ENCOUNTER
Patient saw Dr. Turner on 11/6/23. Patient was to continue medication and follow up in 1 year with echo prior. Patient due for ALT lab per refill protocol. Dorcas refill already sent to patient. Lab order in place.    Belle Vincent RN, BSN  Cardiology RN Care Coordinator   [place]   Phone: [phone]  Fax: [phone] ([place]) [phone] ([place])

## 2024-03-08 NOTE — TELEPHONE ENCOUNTER
Sent [employer] message for patient to schedule labs for future refills of diltiazem ER COATED BEADS (CARDIZEM CD/CARTIA XT) 120 MG 24 hr capsule.     Apple Roland CMA ([facility])

## 2024-03-08 NOTE — CONFIDENTIAL NOTE
Patient saw Dr. Turner on 11/6/23. Patient was to continue medication and follow up in 1 year with echo prior. Patient due for ALT lab per refill protocol. Dorcas refill already sent to patient. Lab order in place.    Belle Vincent RN, BSN  Cardiology RN Care Coordinator   [place]   Phone: [phone]  Fax: [phone] ([place]) [phone] ([place])

## 2024-03-15 NOTE — TELEPHONE ENCOUNTER
Called and LVM for patient to return call to clinic scheduler to schedule lab work for future medication refills.    WILLIE Grimm

## 2024-04-05 ENCOUNTER — LAB (OUTPATIENT)
Dept: LAB | Facility: CLINIC | Age: 64
End: 2024-04-05
Payer: COMMERCIAL

## 2024-04-05 DIAGNOSIS — I48.21 PERMANENT ATRIAL FIBRILLATION (H): ICD-10-CM

## 2024-04-05 PROCEDURE — 36415 COLL VENOUS BLD VENIPUNCTURE: CPT

## 2024-04-05 PROCEDURE — 84460 ALANINE AMINO (ALT) (SGPT): CPT

## 2024-04-06 LAB — ALT SERPL W P-5'-P-CCNC: 36 U/L (ref 0–70)

## 2024-05-02 NOTE — TELEPHONE ENCOUNTER
Called pt's daughter and scheduled pt for office on 05/13.    Spoke with pt. He asked we call on Tuesday to help him lisa Dan MA      Patient is due/failing the following:   COLONOSCOPY

## 2024-05-22 DIAGNOSIS — I48.21 PERMANENT ATRIAL FIBRILLATION (H): ICD-10-CM

## 2024-05-23 RX ORDER — DILTIAZEM HYDROCHLORIDE 120 MG/1
120 CAPSULE, COATED, EXTENDED RELEASE ORAL DAILY
Qty: 90 CAPSULE | Refills: 1 | Status: SHIPPED | OUTPATIENT
Start: 2024-05-23 | End: 2024-10-03

## 2024-05-24 NOTE — TELEPHONE ENCOUNTER
Prescription approved per [facility] Refill Protocol.    Requested Prescriptions   Pending Prescriptions Disp Refills    diltiazem ER COATED BEADS (CARDIZEM CD/CARTIA XT) 120 MG 24 hr capsule [Pharmacy Med Name: DILTIAZEM 24H ER(CD) 120 MG CP] 90 capsule 1     Sig: TAKE 1 CAPSULE BY MOUTH EVERY DAY       Calcium Channel Blockers Protocol  Passed - 5/22/2024 10:14 PM        Passed - Blood pressure under 140/90 in past 12 months     BP Readings from Last 3 Encounters:   02/23/24 100/71   04/10/23 122/83   02/03/23 122/88       No data recorded            Passed - Normal ALT in past 12 months     Recent Labs   Lab Test 04/05/24  1202   ALT 36             Passed - Medication is active on med list        Passed - GFR is on file in the past 12 months and most recent GFR is normal        Passed - Recent (12 mo) or future (90 days) visit within the authorizing provider's specialty     The patient must have completed an in-person or virtual visit within the past 12 months or has a future visit scheduled within the next 90 days with the authorizing provider s specialty.  Urgent care and e-visits do not quality as an office visit for this protocol.          Passed - Patient is age 18 or older             Maricel Jay RN   [facility]

## 2024-09-27 ENCOUNTER — TELEPHONE (OUTPATIENT)
Dept: CARDIOLOGY | Facility: CLINIC | Age: 64
End: 2024-09-27
Payer: COMMERCIAL

## 2024-09-27 DIAGNOSIS — I48.20 CHRONIC ATRIAL FIBRILLATION, UNSPECIFIED (H): ICD-10-CM

## 2024-09-27 DIAGNOSIS — I48.21 PERMANENT ATRIAL FIBRILLATION (H): ICD-10-CM

## 2024-09-27 DIAGNOSIS — E78.5 HYPERLIPIDEMIA LDL GOAL <100: ICD-10-CM

## 2024-09-27 NOTE — TELEPHONE ENCOUNTER
[facility] Call Center    Phone Message    May a detailed message be left on voicemail: yes     Reason for Call: Medication Refill Request    Has the patient contacted the pharmacy for the refill? Yes   Name of medication being requested: Eliquis  Provider who prescribed the medication: Dr. Turner  Pharmacy:    [facility] [id number] [address] [address]        Date medication is needed: 09/27/24       Action Taken: Other: cardiology    Travel Screening: Not Applicable  Thank you!  [facility]       Date of Service:

## 2024-09-30 RX ORDER — APIXABAN 5 MG/1
5 TABLET, FILM COATED ORAL 2 TIMES DAILY
Qty: 60 TABLET | Refills: 5 | Status: SHIPPED | OUTPATIENT
Start: 2024-09-30

## 2024-09-30 NOTE — TELEPHONE ENCOUNTER
Signed Prescriptions:                        Disp   Refills    apixaban ANTICOAGULANT (ELIQUIS ANTICOAGUL*60 tab*5        Sig: TAKE 1 TABLET BY MOUTH TWICE A DAY  Authorizing Provider: KAITLYNN STRATTON  Ordering User: MELODY YOUNG RN  Cardiology Care Coordinator  [facility]  [phone] option 1

## 2024-10-03 ENCOUNTER — OFFICE VISIT (OUTPATIENT)
Dept: FAMILY MEDICINE | Facility: CLINIC | Age: 64
End: 2024-10-03
Payer: COMMERCIAL

## 2024-10-03 VITALS
RESPIRATION RATE: 24 BRPM | OXYGEN SATURATION: 95 % | TEMPERATURE: 97.2 F | HEIGHT: 78 IN | HEART RATE: 124 BPM | SYSTOLIC BLOOD PRESSURE: 128 MMHG | WEIGHT: 315 LBS | BODY MASS INDEX: 36.45 KG/M2 | DIASTOLIC BLOOD PRESSURE: 82 MMHG

## 2024-10-03 DIAGNOSIS — R00.0 TACHYCARDIA: ICD-10-CM

## 2024-10-03 DIAGNOSIS — L03.115 CELLULITIS OF RIGHT LOWER EXTREMITY: Primary | ICD-10-CM

## 2024-10-03 PROCEDURE — 99215 OFFICE O/P EST HI 40 MIN: CPT | Performed by: PHYSICIAN ASSISTANT

## 2024-10-03 RX ORDER — APIXABAN 5 MG/1
5 TABLET, FILM COATED ORAL 2 TIMES DAILY
Qty: 60 TABLET | Refills: 11 | OUTPATIENT
Start: 2024-10-03

## 2024-10-03 RX ORDER — ATORVASTATIN CALCIUM 20 MG/1
20 TABLET, FILM COATED ORAL DAILY
Qty: 90 TABLET | Refills: 0 | Status: SHIPPED | OUTPATIENT
Start: 2024-10-03

## 2024-10-03 RX ORDER — DILTIAZEM HYDROCHLORIDE 120 MG/1
120 CAPSULE, COATED, EXTENDED RELEASE ORAL DAILY
Qty: 90 CAPSULE | Refills: 0 | Status: SHIPPED | OUTPATIENT
Start: 2024-10-03

## 2024-10-03 ASSESSMENT — ENCOUNTER SYMPTOMS
FEVER: 0
LIGHT-HEADEDNESS: 0
DIARRHEA: 1
ABDOMINAL PAIN: 0
DIAPHORESIS: 0
VOMITING: 0
COLOR CHANGE: 1
DIZZINESS: 0
COUGH: 0
SHORTNESS OF BREATH: 0
FATIGUE: 0
NAUSEA: 0

## 2024-10-03 ASSESSMENT — PAIN SCALES - GENERAL: PAINLEVEL: MODERATE PAIN (4)

## 2024-10-03 NOTE — PATIENT INSTRUCTIONS
Based on the severity of your infection/cellulitis and your rapid heart rate, I think you need reevaluation in the emergency department to make sure this infection is not spreading throughout your body.  Please have your wife drive you directly to the emergency department.

## 2024-10-03 NOTE — PROGRESS NOTES
Assessment & Plan     Cellulitis of right lower extremity  Tachycardia  Patient is a 64-year-old male  with past medical history significant for atrial fibrillation-anticoagulation with warfarin, and CHF, who presents to clinic with wife due to severe pain, swelling, and redness of right lower extremity.  Patient was evaluated at the emergency department for cellulitis 10/1/2024 and treated with IM Rocephin.  Keflex was documented in note, but does not appear to have been prescribed.  Since discharge, symptoms have worsened.  Vital signs significant for tachycardia.  Physical exam findings are consistent with severe cellulitis.  Given severity of pain, severity of cellulitis, as well as tachycardia, recommended emergency department evaluation/treatment and further workup for systemic infection.  Patient/wife verbalized understanding and elect to have wife transport patient to ED.        MED REC REQUIRED  Post Medication Reconciliation Status:  Unable to reconcile discharge medications        See Patient Instructions    Yrn Finch is a 64 year old, presenting for the following health issues:  ER F/U and Cellulitis      10/3/2024     9:51 AM   Additional Questions   Roomed by Laura DECKER MA   Accompanied by Wife- Lore         10/3/2024     9:51 AM   Patient Reported Additional Medications   Patient reports taking the following new medications None     HPI       ED/UC Followup:    Facility:  [facility]  Date of visit: 10/01/2024  Reason for visit: Cellulitis of right leg  Current Status: Worsen. Patient notes severe pain. He did not get any antibiotics when discharged. Symptoms initially started with right leg pain, Chills 10/27/24. Pain is 7.5/10 and feels like a vice .       Per chart review, patient evaluated in emergency department 10/1/2024 and diagnosed with cellulitis of right leg.  At that time heart rate 93 and patient afebrile.  Symptoms had started 3 days prior with leg pain, redness, and  "swelling.  Ultrasound negative for DVT.  WBC normal.  Patient treated with IM Rocephin and oral Tylenol.      Review of Systems   Constitutional:  Negative for diaphoresis, fatigue and fever.   Respiratory:  Negative for cough and shortness of breath.    Cardiovascular:  Negative for chest pain.   Gastrointestinal:  Positive for diarrhea. Negative for abdominal pain, nausea and vomiting.   Skin:  Positive for color change.   Neurological:  Negative for dizziness and light-headedness.           Objective    /82   Pulse (!) 124   Temp 97.2  F (36.2  C) (Temporal)   Resp 24   Ht 1.969 m (6' 5.5\")   Wt (!) 156.5 kg (345 lb)   SpO2 95%   BMI 40.38 kg/m    Body mass index is 40.38 kg/m .  Physical Exam  Vitals and nursing note reviewed.   Constitutional:       General: He is in acute distress.      Appearance: Normal appearance. He is not toxic-appearing or diaphoretic.   HENT:      Head: Normocephalic and atraumatic.   Eyes:      Extraocular Movements: Extraocular movements intact.      Pupils: Pupils are equal, round, and reactive to light.   Cardiovascular:      Rate and Rhythm: Regular rhythm. Tachycardia present.      Heart sounds: Normal heart sounds.   Pulmonary:      Effort: Pulmonary effort is normal.      Breath sounds: Normal breath sounds.   Musculoskeletal:         General: Normal range of motion.      Cervical back: Normal range of motion.   Skin:     General: Skin is warm and dry.      Comments: Right lower extremity with beefy red erythema, swelling, and increased warmth extending from toes to knee diffusely.   Neurological:      General: No focal deficit present.      Mental Status: He is alert.   Psychiatric:         Mood and Affect: Mood normal.         Behavior: Behavior normal.                    Signed Electronically by: Ngoc Burns PA-C    "

## 2024-10-03 NOTE — TELEPHONE ENCOUNTER
Medication Requested:  diltiazem ER COATED BEADS (CARDIZEM CD/CARTIA XT) 120 MG 24 hr capsule 90 capsule 1 5/23/2024 -- No   Sig - Route: TAKE 1 CAPSULE BY MOUTH EVERY DAY - Oral     atorvastatin (LIPITOR) 20 MG tablet 90 tablet 3 9/27/2023 -- No   Sig - Route: Take 1 tablet (20 mg) by mouth daily - Oral     ----------------------  Last Office Visit : 2/23/2024  [facility] Office visit:     12/2/2024 4:30 PM (30 min)  [place]   Arrive by:  4:15 PM   RETURN EP   [facility] ([facility])   Leanna Turner MD     ----------------------      Refill decision: Medication refilled per protocol.      Pass/Fail Protocol Criteria:  BP Readings from Last 3 Encounters:   10/03/24 128/82   02/23/24 100/71   04/10/23 122/83   Last Comprehensive Metabolic Panel:  Lab Results   Component Value Date     02/23/2024    POTASSIUM 4.6 02/23/2024    CHLORIDE 105 02/23/2024    CO2 28 02/23/2024    ANIONGAP 9 02/23/2024     (H) 02/23/2024    BUN 20.0 02/23/2024    CR 0.94 02/23/2024    GFRESTIMATED >90 02/23/2024    EUGENIA 9.2 02/23/2024     LDL Cholesterol Calculated   Date Value Ref Range Status   02/23/2024 164 (H) <=100 mg/dL Final   07/09/2021 74 <100 mg/dL Final     Comment:     Desirable:       <100 mg/dl     Signed 3 days ago (9/30/2024):   apixaban ANTICOAGULANT (ELIQUIS ANTICOAGULANT) 5 MG tablet   Sig: TAKE 1 TABLET BY MOUTH TWICE A DAY   Disp: 60 tablet    Refills: 5   Signed by: Leanna Turner MD

## 2024-10-17 ENCOUNTER — PATIENT OUTREACH (OUTPATIENT)
Dept: CARE COORDINATION | Facility: CLINIC | Age: 64
End: 2024-10-17
Payer: COMMERCIAL

## 2024-10-17 NOTE — PROGRESS NOTES
Clinic Care Coordination Contact  Transitions of Care Outreach  Chief Complaint   Patient presents with    Clinic Care Coordination - Initial       Most Recent Admission Date: 10/12/24  Most Recent Admission Diagnosis: cellulitis of right leg    Most Recent Discharge Date: 10/13/24  Most Recent Discharge Diagnosis: cellulitis of right leg    Transitions of Care Assessment    Discharge Assessment  How are you doing now that you are home?: Patient reports his right lower extremity cellulitis is improving, however, he notes peeling skin which has been bothersome.  He has been applying coconut oil to the skin to improve the dryness.  No new or worsening symptoms reported.  Patient was not able to talk further and said he knew to call if needed prior to his upcoming appointment.  How are your symptoms? (Red Flag symptoms escalate to triage hotline per guidelines): Improved  Do you know how to contact your clinic care team if you have future questions or changes to your health status? : Yes  Does the patient have their discharge instructions? : Yes  Does the patient have questions regarding their discharge instructions? : No  Were you started on any new medications or were there changes to any of your previous medications? : Yes  Does the patient have all of their medications?: Yes  Do you have questions regarding any of your medications? : No             Follow up Plan     Discharge Follow-Up  Discharge follow up appointment scheduled in alignment with recommended follow up timeframe or Transitions of Risk Category? (Low = within 30 days; Moderate= within 14 days; High= within 7 days): Yes  Discharge Follow Up Appointment Date: 10/21/24  Discharge Follow Up Appointment Scheduled with?: Primary Care Provider    Future Appointments   Date Time Provider Department Center   10/21/2024  8:00 AM Ngoc Burns PA-C [facility]   12/2/2024  3:00 PM [id number]   12/2/2024  4:30 PM Leanna Turner MD [facility]  [facility]       Outpatient Plan as outlined on AVS reviewed with patient.    For any urgent concerns, please contact our 24 hour nurse triage line: [phone] ([phone])       Melissa Behl BSN, RN, PHN, [facility]  RN Clinical Product Navigator  [phone]

## 2024-10-25 ENCOUNTER — OFFICE VISIT (OUTPATIENT)
Dept: FAMILY MEDICINE | Facility: CLINIC | Age: 64
End: 2024-10-25
Payer: COMMERCIAL

## 2024-10-25 VITALS
RESPIRATION RATE: 20 BRPM | WEIGHT: 315 LBS | HEIGHT: 78 IN | BODY MASS INDEX: 36.45 KG/M2 | OXYGEN SATURATION: 97 % | HEART RATE: 62 BPM | SYSTOLIC BLOOD PRESSURE: 122 MMHG | DIASTOLIC BLOOD PRESSURE: 78 MMHG | TEMPERATURE: 97.1 F

## 2024-10-25 DIAGNOSIS — L03.115 CELLULITIS OF RIGHT LOWER EXTREMITY: ICD-10-CM

## 2024-10-25 DIAGNOSIS — Z09 HOSPITAL DISCHARGE FOLLOW-UP: Primary | ICD-10-CM

## 2024-10-25 LAB
CRP SERPL-MCNC: 33.5 MG/L
ERYTHROCYTE [DISTWIDTH] IN BLOOD BY AUTOMATED COUNT: 12.8 % (ref 10–15)
ERYTHROCYTE [SEDIMENTATION RATE] IN BLOOD BY WESTERGREN METHOD: 54 MM/HR (ref 0–20)
HCT VFR BLD AUTO: 41.8 % (ref 40–53)
HGB BLD-MCNC: 13.8 G/DL (ref 13.3–17.7)
MCH RBC QN AUTO: 31.7 PG (ref 26.5–33)
MCHC RBC AUTO-ENTMCNC: 33 G/DL (ref 31.5–36.5)
MCV RBC AUTO: 96 FL (ref 78–100)
PLATELET # BLD AUTO: 333 10E3/UL (ref 150–450)
RBC # BLD AUTO: 4.35 10E6/UL (ref 4.4–5.9)
WBC # BLD AUTO: 9.2 10E3/UL (ref 4–11)

## 2024-10-25 PROCEDURE — 85027 COMPLETE CBC AUTOMATED: CPT | Performed by: PHYSICIAN ASSISTANT

## 2024-10-25 PROCEDURE — 99495 TRANSJ CARE MGMT MOD F2F 14D: CPT | Performed by: PHYSICIAN ASSISTANT

## 2024-10-25 PROCEDURE — 86618 LYME DISEASE ANTIBODY: CPT | Performed by: PHYSICIAN ASSISTANT

## 2024-10-25 PROCEDURE — 85652 RBC SED RATE AUTOMATED: CPT | Performed by: PHYSICIAN ASSISTANT

## 2024-10-25 PROCEDURE — 86140 C-REACTIVE PROTEIN: CPT | Performed by: PHYSICIAN ASSISTANT

## 2024-10-25 PROCEDURE — 36415 COLL VENOUS BLD VENIPUNCTURE: CPT | Performed by: PHYSICIAN ASSISTANT

## 2024-10-25 RX ORDER — SULFAMETHOXAZOLE AND TRIMETHOPRIM 800; 160 MG/1; MG/1
1 TABLET ORAL 2 TIMES DAILY
Qty: 20 TABLET | Refills: 0 | Status: SHIPPED | OUTPATIENT
Start: 2024-10-25 | End: 2024-11-04

## 2024-10-25 ASSESSMENT — ENCOUNTER SYMPTOMS
WEAKNESS: 0
COLOR CHANGE: 1
FATIGUE: 0
CHILLS: 0
LIGHT-HEADEDNESS: 0
FEVER: 0
NUMBNESS: 0
WOUND: 0
DIAPHORESIS: 0
DIZZINESS: 0
SHORTNESS OF BREATH: 0

## 2024-10-25 ASSESSMENT — PAIN SCALES - GENERAL: PAINLEVEL_OUTOF10: NO PAIN (0)

## 2024-10-25 NOTE — TELEPHONE ENCOUNTER
5/15/2023  [facility] Leanna Rueda MD  Cardiovascular Disease     Labs 4/21/23  
No

## 2024-10-25 NOTE — PATIENT INSTRUCTIONS
Your leg appears to have ongoing cellulitis/infection.  For treatment you have been prescribed Bactrim, an antibiotic x 10 days.  I would like you to resume wearing compression socks during the day and at night to help with swelling.  This will also help your infection resolve more quickly.  We are completing lab work and results will be sent to [facility].  You have been scheduled for a follow-up visit with me November 4.  If your symptoms worsen while you are on vacation, please be seen at a clinic/urgent care there.  Reach out with questions/concerns.

## 2024-10-25 NOTE — PROGRESS NOTES
Assessment & Plan     Hospital discharge follow-up  Cellulitis of right lower extremity  Patient is a 64-year-old male with past medical history significant for CVA, HTN, HLD, atrial fibrillation-Eliquis, CHF, seizure disorder, who presents to clinic for hospital discharge follow-up/ED follow-up for cellulitis of right leg.  Vital signs normal.  Physical exam significant for ongoing cellulitis.  No systemic symptoms to suggest sepsis.  All prior treatments reviewed.  Will treat with Bactrim x 10 days.  Will complete blood work to look for signs of leukocytosis, inflammation, and will also test for Lyme disease.  Patient scheduled for repeat evaluation following return from trip to [place] 11/4/24.  Patient to follow-up locally for any new or worsening symptoms while on trip.  Follow-up precautions provided.  - sulfamethoxazole-trimethoprim (BACTRIM DS) 800-160 MG tablet; Take 1 tablet by mouth 2 times daily for 10 days.  - LYME DISEASE TOTAL ANTIBODIES WITH REFLEX TO CONFIRMATION; Future  - CBC with platelets; Future  - ESR: Erythrocyte sedimentation rate; Future  - CRP, inflammation; Future      MED REC REQUIRED  Post Medication Reconciliation Status:  Discharge medications reconciled and changed, see notes/orders        See Patient Instructions    Yrn Finch is a 64 year old, presenting for the following health issues:  Wound Check      10/25/2024     8:12 AM   Additional Questions   Roomed by Laura DECKER MA   Accompanied by Lore         10/25/2024     8:12 AM   Patient Reported Additional Medications   Patient reports taking the following new medications None             Hospital Follow-up Visit:    Hospital/Nursing Home/IP Rehab Facility: [facility]  Date of Admission: 10/12/2024  Date of Discharge: 10/13/2024  Reason(s) for Admission: Cellulitis of right leg   Was the patient in the ICU or did the patient experience delirium during hospitalization?  No  Do you have any other stressors you would like to  "discuss with your provider? No    Problems taking medications regularly:  None  Medication changes since discharge: None  Problems adhering to non-medication therapy:  None    Summary of hospitalization:  CareEverywhere information obtained and reviewed  Diagnostic Tests/Treatments reviewed.  Follow up needed: PCP  Other Healthcare Providers Involved in Patient s Care:         None  Update since discharge: Improving. Patient completed antibiotics 5 days ago. He has been wearing compression socks until yesterday. No fever, body aches. Patient notes some tenderness where the skin is flaking.       Plan of care communicated with patient and wife     Per chart review, patient admitted to hospital 10/12/2024 for cellulitis of right leg.  Patient failed outpatient therapy with Keflex.  Patient treated with vancomycin and Rocephin during hospital stay and discharged with 7 days of amoxicillin and doxycycline.  Patient then reevaluated in emergency department 10/19/2024 for right leg swelling.  Ultrasound completed without signs of DVT.  Labs without leukocytosis.  Compression stockings recommended as well as follow-up with PCP.    Review of Systems   Constitutional:  Negative for chills, diaphoresis, fatigue and fever.   Respiratory:  Negative for shortness of breath.    Cardiovascular:  Positive for leg swelling. Negative for chest pain.   Skin:  Positive for color change. Negative for wound.   Neurological:  Negative for dizziness, weakness, light-headedness and numbness.           Objective    /78   Pulse 62   Temp 97.1  F (36.2  C) (Temporal)   Resp 20   Ht 1.969 m (6' 5.5\")   Wt (!) 154.5 kg (340 lb 9.6 oz)   SpO2 97%   BMI 39.87 kg/m    Body mass index is 39.87 kg/m .  Physical Exam  Vitals and nursing note reviewed.   Constitutional:       General: He is not in acute distress.     Appearance: Normal appearance.   HENT:      Head: Normocephalic and atraumatic.   Eyes:      Extraocular Movements: " Extraocular movements intact.      Pupils: Pupils are equal, round, and reactive to light.   Cardiovascular:      Rate and Rhythm: Normal rate.   Pulmonary:      Effort: Pulmonary effort is normal.   Musculoskeletal:         General: Normal range of motion.      Cervical back: Normal range of motion.      Comments: Right lower extremity with diffuse erythema, induration/firmness, increased warmth distal to right knee.    Skin:     General: Skin is warm and dry.   Neurological:      General: No focal deficit present.      Mental Status: He is alert.   Psychiatric:         Mood and Affect: Mood normal.         Behavior: Behavior normal.                          Signed Electronically by: Ngoc Burns PA-C

## 2024-10-28 LAB — B BURGDOR IGG+IGM SER QL: 0.22

## 2024-11-04 ENCOUNTER — OFFICE VISIT (OUTPATIENT)
Dept: FAMILY MEDICINE | Facility: CLINIC | Age: 64
End: 2024-11-04
Payer: COMMERCIAL

## 2024-11-04 VITALS
DIASTOLIC BLOOD PRESSURE: 66 MMHG | OXYGEN SATURATION: 94 % | WEIGHT: 315 LBS | RESPIRATION RATE: 20 BRPM | BODY MASS INDEX: 36.45 KG/M2 | HEART RATE: 103 BPM | HEIGHT: 78 IN | SYSTOLIC BLOOD PRESSURE: 114 MMHG | TEMPERATURE: 97.7 F

## 2024-11-04 DIAGNOSIS — Z87.11 HISTORY OF GASTRIC ULCER: ICD-10-CM

## 2024-11-04 DIAGNOSIS — L03.90 CELLULITIS, UNSPECIFIED CELLULITIS SITE: ICD-10-CM

## 2024-11-04 DIAGNOSIS — L81.9 POST-INFLAMMATORY PIGMENTARY CHANGES: Primary | ICD-10-CM

## 2024-11-04 LAB
ERYTHROCYTE [SEDIMENTATION RATE] IN BLOOD BY WESTERGREN METHOD: 35 MM/HR (ref 0–20)
HGB BLD-MCNC: 14.1 G/DL (ref 13.3–17.7)

## 2024-11-04 PROCEDURE — 99213 OFFICE O/P EST LOW 20 MIN: CPT | Performed by: PHYSICIAN ASSISTANT

## 2024-11-04 PROCEDURE — 36415 COLL VENOUS BLD VENIPUNCTURE: CPT | Performed by: PHYSICIAN ASSISTANT

## 2024-11-04 PROCEDURE — 85652 RBC SED RATE AUTOMATED: CPT | Performed by: PHYSICIAN ASSISTANT

## 2024-11-04 PROCEDURE — 85018 HEMOGLOBIN: CPT | Performed by: PHYSICIAN ASSISTANT

## 2024-11-04 ASSESSMENT — ENCOUNTER SYMPTOMS
DIAPHORESIS: 0
FEVER: 0
COLOR CHANGE: 1
LIGHT-HEADEDNESS: 0
WEAKNESS: 0
WOUND: 0
CHILLS: 0
COUGH: 0
SHORTNESS OF BREATH: 0
DIZZINESS: 0

## 2024-11-04 NOTE — PROGRESS NOTES
Assessment & Plan     Post-inflammatory pigmentary changes  Cellulitis, unspecified cellulitis site  Patient is a 64-year-old male who presents to clinic with wife due to follow-up regarding cellulitis of right lower extremity.  Vital signs significant for mild tachycardia.  Physical exam significant for dusky discoloration to right lower extremity.  No tenderness to palpation, increased warmth, induration, fever, or bodyaches to suggest ongoing cellulitis.  Exam findings are concerning for vascular changes.  Reviewed prior ultrasounds x 3 negative for DVT.  Low suspicion for complete arterial occlusion as extremity is warm, no pallor, no pain, no poikilothermia.  Will complete SIA for further evaluation of arterial flow.  Referral placed to vascular surgery for further evaluation.  Follow-up precautions discussed/provided.  - US SIA Doppler with Exercise Bilateral; Future  - ESR: Erythrocyte sedimentation rate; Future  - ESR: Erythrocyte sedimentation rate  -Vascular surgery referral     History of gastric ulcer  Patient has history of gastric ulcer.  At last check, hemoglobin was on the lower end of normal.  Would like to recheck this today.  No lightheadedness/dizziness.  - Hemoglobin; Future  - Hemoglobin        See Patient Instructions    Yrn Finch is a 64 year old, presenting for the following health issues:  RECHECK      11/4/2024     8:13 AM   Additional Questions   Roomed by MP   Accompanied by spouse         11/4/2024     8:13 AM   Patient Reported Additional Medications   Patient reports taking the following new medications None per patient     History of Present Illness       Reason for visit:  Followup on cellulitus  Symptom progression:  Improving    He eats 2-3 servings of fruits and vegetables daily.He consumes 0 sweetened beverage(s) daily.He exercises with enough effort to increase his heart rate 20 to 29 minutes per day.  He exercises with enough effort to increase his heart rate 4 days  "per week.   He is taking medications regularly.       Patient notes He will be completing prescribed bactrim today. Leg swelling has improved. It is no longer warm. No numbness, tingling, cold feeling. Prior to symptoms starting, no color change to leg. Ongoing color change to leg.     Wife notes patient has history of stomach ulcers. No bloody/black stools. Patient was switched from warfarin to Apixaban which helped, but HgB was on the lower end at last check. Would like to recheck today.     Per chart review, patient last evaluated in clinic 10/25/2024 for cellulitis of right lower extremity/hospital discharge follow-up.  At that time ongoing cellulitis.  Patient treated with Bactrim x 10 days.  Lab work completed without leukocytosis, but did show elevated ESR.    Review of Systems   Constitutional:  Negative for chills, diaphoresis and fever.   Respiratory:  Negative for cough and shortness of breath.    Cardiovascular:  Negative for chest pain.   Skin:  Positive for color change. Negative for wound.   Neurological:  Negative for dizziness, weakness and light-headedness.           Objective    /66   Pulse 103   Temp 97.7  F (36.5  C) (Temporal)   Resp 20   Ht 1.969 m (6' 5.5\")   Wt (!) 155 kg (341 lb 12.8 oz)   SpO2 94%   BMI 40.01 kg/m    Body mass index is 40.01 kg/m .  Physical Exam  Vitals and nursing note reviewed.   Constitutional:       General: He is not in acute distress.     Appearance: Normal appearance.   HENT:      Head: Normocephalic and atraumatic.   Eyes:      Extraocular Movements: Extraocular movements intact.      Pupils: Pupils are equal, round, and reactive to light.   Cardiovascular:      Rate and Rhythm: Normal rate.   Pulmonary:      Effort: Pulmonary effort is normal.   Musculoskeletal:         General: Normal range of motion.      Cervical back: Normal range of motion.   Skin:     General: Skin is warm and dry.      Comments: Right lower extremity with diffuse dusky " discoloration extending from inferior aspect of knee distally.  Capillary refill decreases inferiorly to approximately 3 seconds at foot/toes.  Unable to find DP or PT pulses, likely secondary to edema.  No pallor, poikilothermia.    Neurological:      General: No focal deficit present.      Mental Status: He is alert.   Psychiatric:         Mood and Affect: Mood normal.         Behavior: Behavior normal.                      Signed Electronically by: Ngoc Burns PA-C

## 2024-11-04 NOTE — PATIENT INSTRUCTIONS
For further evaluation of the color change of your leg, we are completing an ultrasound with treadmill exercise. We will be in contact with results.     We are retesting your hemoglobin as well as ESR-inflammatory marker, to ensure these numbers are improving.    If you develop a pale, dark purple, numb, or very painful lower leg, be seen in the emergency department right away.

## 2024-11-06 ENCOUNTER — TELEPHONE (OUTPATIENT)
Dept: VASCULAR SURGERY | Facility: CLINIC | Age: 64
End: 2024-11-06

## 2024-11-06 ENCOUNTER — ANCILLARY PROCEDURE (OUTPATIENT)
Dept: ULTRASOUND IMAGING | Facility: CLINIC | Age: 64
End: 2024-11-06
Attending: PHYSICIAN ASSISTANT
Payer: COMMERCIAL

## 2024-11-06 DIAGNOSIS — L81.9 POST-INFLAMMATORY PIGMENTARY CHANGES: ICD-10-CM

## 2024-11-06 PROCEDURE — 93922 UPR/L XTREMITY ART 2 LEVELS: CPT | Performed by: STUDENT IN AN ORGANIZED HEALTH CARE EDUCATION/TRAINING PROGRAM

## 2024-11-06 NOTE — TELEPHONE ENCOUNTER
"Vascular Referral Intake    Referred by Ngoc Burns PA-C  for L81.9 (ICD-10-CM) - Post-inflammatory pigmentary changes     SIA's done and were WNL.      Called and spoke to patient to clarify symptoms.  He does have some \"mild\" leg swelling.  Offered to schedule to see vascular medicine for this, patient declined.  Cancelling referral for now and routing to referring provider to update.                   "

## 2024-11-10 ENCOUNTER — HEALTH MAINTENANCE LETTER (OUTPATIENT)
Age: 64
End: 2024-11-10

## 2024-12-02 ENCOUNTER — OFFICE VISIT (OUTPATIENT)
Dept: CARDIOLOGY | Facility: CLINIC | Age: 64
End: 2024-12-02
Payer: COMMERCIAL

## 2024-12-02 ENCOUNTER — ANCILLARY PROCEDURE (OUTPATIENT)
Dept: CARDIOLOGY | Facility: CLINIC | Age: 64
End: 2024-12-02
Attending: INTERNAL MEDICINE
Payer: COMMERCIAL

## 2024-12-02 VITALS
SYSTOLIC BLOOD PRESSURE: 110 MMHG | OXYGEN SATURATION: 98 % | DIASTOLIC BLOOD PRESSURE: 74 MMHG | HEART RATE: 85 BPM | WEIGHT: 315 LBS | BODY MASS INDEX: 40.5 KG/M2

## 2024-12-02 DIAGNOSIS — I42.8 NONISCHEMIC CARDIOMYOPATHY (H): ICD-10-CM

## 2024-12-02 DIAGNOSIS — I48.19 PERSISTENT ATRIAL FIBRILLATION (H): Primary | ICD-10-CM

## 2024-12-02 DIAGNOSIS — I48.19 PERSISTENT ATRIAL FIBRILLATION (H): ICD-10-CM

## 2024-12-02 DIAGNOSIS — E78.5 HYPERLIPIDEMIA LDL GOAL <100: ICD-10-CM

## 2024-12-02 LAB — LVEF ECHO: NORMAL

## 2024-12-02 PROCEDURE — 99207 PR STATISTIC IV PUSH SINGLE INITIAL SUBSTANCE: CPT | Performed by: INTERNAL MEDICINE

## 2024-12-02 PROCEDURE — 93306 TTE W/DOPPLER COMPLETE: CPT | Performed by: INTERNAL MEDICINE

## 2024-12-02 RX ADMIN — Medication 10 ML: at 15:11

## 2024-12-02 NOTE — LETTER
12/2/2024      RE: Stef Mosher  [address] [place] [address]       Dear Colleague,    Thank you for the opportunity to participate in the care of your patient, Stef Mosher, at the [facility]. Please see a copy of my visit note below.    HPI: Purpose of visit: Follow-up of atrial fibrillation and nonischemic cardiomyopathy     Stef Mosher is a 64 year old man with a history of persistent atrial fibrillation (status post multiple unsuccessful DCCV), non-ischemic cardiomyopathy, HTN, HLD who presents for a follow up.      Patient's last visit with me was in November 2023.  Since the last visit, patient has been doing well.  He continues to remain physically active and has noted his energy level and stamina to be improved. He did not report any symptoms of irregular heartbeat sensation, palpitations, exertional dyspnea, exertional angina, frequent lightheadedness, presyncope or syncope.  Patient has not noticed any melena or blood in his stools.     A recent transthoracic echocardiogram showed an improvement in the left ventricular ejection fraction to 40 to 45%.      PAST MEDICAL HISTORY:  Past Medical History:   Diagnosis Date     Arthritis      Atrial fibrillation (H)      Hayfever      Hypercholesterolemia      Hypertension      Unspecified hypothyroidism        CURRENT MEDICATIONS:  Current Outpatient Medications   Medication Sig Dispense Refill     apixaban ANTICOAGULANT (ELIQUIS ANTICOAGULANT) 5 MG tablet TAKE 1 TABLET BY MOUTH TWICE A DAY 60 tablet 5     atorvastatin (LIPITOR) 20 MG tablet Take 1 tablet (20 mg) by mouth daily. 90 tablet 0     diltiazem ER COATED BEADS (CARDIZEM CD/CARTIA XT) 120 MG 24 hr capsule Take 1 capsule (120 mg) by mouth daily. 90 capsule 0     furosemide (LASIX) 20 MG tablet Take 1 tablet (20 mg) by mouth daily 90 tablet 3     levETIRAcetam (KEPPRA) 500 MG tablet Take 750 mg by mouth 2  times daily       metoprolol succinate ER (TOPROL XL) 200 MG 24 hr tablet Take 1 tablet (200 mg) by mouth daily for 360 days 90 tablet 3     omeprazole (PRILOSEC) 40 MG DR capsule Take 40 mg by mouth 2 times daily. 180 capsule 0     sacubitril-valsartan (ENTRESTO)  MG per tablet Take 1 tablet by mouth 2 times daily 180 tablet 3     spironolactone (ALDACTONE) 25 MG tablet Take 1 tablet (25 mg) by mouth daily for 360 days 90 tablet 3     empagliflozin (JARDIANCE) 10 MG TABS tablet Take 1 tablet (10 mg) by mouth daily (Patient not taking: Reported on 12/2/2024) 90 tablet 3       PAST SURGICAL HISTORY:  Past Surgical History:   Procedure Laterality Date     ANESTHESIA CARDIOVERSION N/A 3/18/2022    Procedure: ANESTHESIA, FOR CARDIOVERSION@1400;  Surgeon: GENERIC ANESTHESIA PROVIDER;  Location: [facility]      THYROIDECTOMY         ALLERGIES:   No Known Allergies    FAMILY HISTORY:  - Premature coronary artery disease  - Atrial fibrillation  - Sudden cardiac death     SOCIAL HISTORY:  Social History     Tobacco Use     Smoking status: Never     Passive exposure: Never     Smokeless tobacco: Never     Tobacco comments:     Lives in smoke free household   Vaping Use     Vaping status: Never Used   Substance Use Topics     Alcohol use: No     Comment: None     Drug use: No     Comment: Never       ROS:   Constitutional: No fever, chills, or sweats. Weight stable.   ENT: No visual disturbance, ear ache, epistaxis, sore throat.   Cardiovascular: As per HPI.   Respiratory: No cough, hemoptysis.    GI: No nausea, vomiting, hematemesis, melena, or hematochezia.   : No hematuria.   Integument: Negative.   Psychiatric: Negative.   Hematologic:  Easy bruising, no easy bleeding.  Neuro: Negative.   Endocrinology: No significant heat or cold intolerance   Musculoskeletal: No myalgia.    Exam:  /74 (BP Location: Left arm, Patient Position: Chair, Cuff Size: Adult Large)   Pulse 85   Wt (!) 156.9 kg (346 lb)   SpO2 98%    BMI 40.50 kg/m    GENERAL APPEARANCE: healthy, alert and no distress  HEENT: no icterus, no xanthelasmas, normal pupil size and reaction, normal palate, mucosa moist, no central cyanosis  NECK: no adenopathy, no asymmetry, masses, or scars, thyroid normal to palpation and no bruits, JVP not elevated  RESPIRATORY: lungs clear to auscultation - no rales, rhonchi or wheezes, no use of accessory muscles, no retractions, respirations are unlabored, normal respiratory rate  CARDIOVASCULAR: irregular rhythm,   ABDOMEN: soft, non tender, without hepatosplenomegaly, no masses palpable, bowel sounds normal, aorta not enlarged by palpation, no abdominal bruits  EXTREMITIES: peripheral pulses normal, no edema, no bruits  NEURO: alert and oriented to person/place/time, normal speech, gait and affect  VASC: Radial, femoral, dorsalis pedis and posterior tibialis pulses are normal in volumes and symmetric bilaterally. No bruits are heard.  SKIN: no ecchymoses, no rashes    Labs:  CBC RESULTS:   Lab Results   Component Value Date    WBC 9.2 10/25/2024    WBC 8.1 07/09/2021    RBC 4.35 (L) 10/25/2024    RBC 2.61 (L) 07/09/2021    HGB 14.1 11/04/2024    HGB 6.2 (LL) 07/09/2021    HCT 41.8 10/25/2024    HCT 21.7 (L) 07/09/2021    MCV 96 10/25/2024    MCV 83 07/09/2021    MCH 31.7 10/25/2024    MCH 23.8 (L) 07/09/2021    MCHC 33.0 10/25/2024    MCHC 28.6 (L) 07/09/2021    RDW 12.8 10/25/2024    RDW 16.1 (H) 07/09/2021     10/25/2024     (H) 07/09/2021       BMP RESULTS:  Lab Results   Component Value Date     02/23/2024     07/09/2021    POTASSIUM 4.6 02/23/2024    POTASSIUM 4.6 04/21/2023    POTASSIUM 4.5 07/09/2021    CHLORIDE 105 02/23/2024    CHLORIDE 109 04/21/2023    CHLORIDE 108 07/09/2021    CO2 28 02/23/2024    CO2 31 04/21/2023    CO2 24 07/09/2021    ANIONGAP 9 02/23/2024    ANIONGAP 1 (L) 04/21/2023    ANIONGAP 7 07/09/2021     (H) 02/23/2024     (H) 04/21/2023     (H)  07/09/2021    BUN 20.0 02/23/2024    BUN 20 04/21/2023    BUN 14 07/09/2021    CR 0.94 02/23/2024    CR 0.74 07/09/2021    GFRESTIMATED >90 02/23/2024    GFRESTIMATED >90 07/09/2021    GFRESTBLACK >90 07/09/2021    EUGENIA 9.2 02/23/2024    EUGENIA 8.5 07/09/2021        INR RESULTS:  Lab Results   Component Value Date    INR 1.3 (H) 02/04/2022    INR 1.5 (H) 01/31/2022    INR 1.5 (H) 01/28/2022    INR 1.20 (A) 01/21/2022    INR 2.5 (H) 12/31/2021    INR 2.10 (H) 06/25/2021    INR 2.50 (H) 04/16/2021    INR 2.30 (H) 03/05/2021    INR 2.40 (H) 01/22/2021       Procedures:      Assessment and Plan:     Persistent atrial fibrillation  Nonischemic cardiomyopathy     It is encouraging that patient is doing clinically well.  Patient's atrial fibrillation rate is well-controlled.  Patient will continue follow-up with the core clinic.  I will see patient in person in approximately 1 year.  All questions and concerns were addressed and patient was happy with the plan.    CC  Patient Care Team:  Leanna Turner MD as PCP - General (Cardiovascular Disease)  Leanna Turner MD as MD (Cardiology)  Stella Allison, RN as Specialty Care Coordinator (Cardiology)  Parul Moffett OD (Optometry)  Parul Moffett OD as Assigned Surgical Provider  Aliza Murphy MD as Assigned PCP  Francesco Hernandez MD as Assigned Heart and Vascular Provider  SELF, REFERRED        Please do not hesitate to contact me if you have any questions/concerns.     Sincerely,     Leanna Turner MD

## 2024-12-02 NOTE — PATIENT INSTRUCTIONS
Thank you for coming to the [facility] [place]; please note the following instructions:    1. Follow-up in 1 year.  We will call you to schedule        If you have any questions regarding your visit please contact your care team:     Cardiology  Telephone Number   Mahnaz MORRIS., RN  Belle KING, RN  Tami FINLEY, RN  Laura SKY, RMJALEN GORMAN, RMJALEN DECKER, Visit Facilitator  Apple SUTTON [facility] [phone] (option 1)   For scheduling appts:     [phone] (select option 1)       For the Device Clinic (Pacemakers and ICD's)  RN's :  Vale Cartagena   During business hours: [phone]    *After business hours:  [phone] (select option 4)      Normal test result notifications will be released via Minubo or mailed within 7 business days.  All other test results, will be communicated via telephone once reviewed by your cardiologist.    If you need a medication refill please contact your pharmacy.  Please allow 3 business days for your refill to be completed.    As always, thank you for trusting us with your health care needs!

## 2024-12-02 NOTE — NURSING NOTE
"Chief Complaint   Patient presents with    RECHECK     Return EP cardiology for annual EP follow-up       Initial /74 (BP Location: Left arm, Patient Position: Chair, Cuff Size: Adult Large)   Pulse 85   Wt (!) 156.9 kg (346 lb)   SpO2 98%   BMI 40.50 kg/m   Estimated body mass index is 40.5 kg/m  as calculated from the following:    Height as of 11/4/24: 1.969 m (6' 5.5\").    Weight as of this encounter: 156.9 kg (346 lb)..  BP completed using cuff size: IAM Pedroza    "

## 2025-02-28 DIAGNOSIS — I48.20 CHRONIC ATRIAL FIBRILLATION, UNSPECIFIED (H): ICD-10-CM

## 2025-02-28 DIAGNOSIS — I48.21 PERMANENT ATRIAL FIBRILLATION (H): ICD-10-CM

## 2025-03-05 RX ORDER — DILTIAZEM HYDROCHLORIDE 120 MG/1
120 CAPSULE, COATED, EXTENDED RELEASE ORAL DAILY
Qty: 90 CAPSULE | Refills: 1 | Status: SHIPPED | OUTPATIENT
Start: 2025-03-05

## 2025-03-05 NOTE — TELEPHONE ENCOUNTER
Last Written Prescription:  diltiazem ER COATED BEADS (CARDIZEM CD/CARTIA XT) 120 MG 24 hr capsule 90 capsule 0 10/3/2024     apixaban ANTICOAGULANT (ELIQUIS ANTICOAGULANT) 5 MG tablet 60 tablet 5 9/30/2024     ----------------------  Last Visit Date: 12/2/24  Future Visit Date: 7/28/25  ----------------------  GFR done 10/18/24  >60    Refill decision: Medication refilled per  Medication Refill in Ambulatory Care  policy.    Request from pharmacy:  Requested Prescriptions   Pending Prescriptions Disp Refills    diltiazem ER COATED BEADS (CARDIZEM CD/CARTIA XT) 120 MG 24 hr capsule [Pharmacy Med Name: DILTIAZEM 24H ER(CD) 120 MG CP] 90 capsule 0     Sig: TAKE 1 CAPSULE BY MOUTH EVERY DAY       Calcium Channel Blockers Protocol  Failed - 3/5/2025  2:24 PM        Failed - GFR is on file in the past 12 months and most recent GFR is normal        Passed - Most recent blood pressure under 140/90 in past 12 months     BP Readings from Last 3 Encounters:   12/02/24 110/74   11/04/24 114/66   10/25/24 122/78       No data recorded            Passed - Medication is active on med list and the sig matches. RN to manually verify dose and sig if red X/fail.     If the protocol passes (green check), you do not need to verify med dose and sig.    A prescription matches if they are the same clinical intention.    For Example: once daily and every morning are the same.    The protocol can not identify upper and lower case letters as matching and will fail.     For Example: Take 1 tablet (50 mg) by mouth daily     TAKE 1 TABLET (50 MG) BY MOUTH DAILY    For all fails (red x), verify dose and sig.    If the refill does match what is on file, the RN can still proceed to approve the refill request.       If they do not match, route to the appropriate provider.             Passed - Recent (12 mo) or future (90 days) visit within the authorizing provider's specialty     The patient must have completed an in-person or virtual visit within  the past 12 months or has a future visit scheduled within the next 90 days with the authorizing provider s specialty.  Urgent care and e-visits do not qualify as an office visit for this protocol.          Passed - Patient is age 18 or older          ELIQUIS ANTICOAGULANT 5 MG tablet [Pharmacy Med Name: ELIQUIS 5 MG TABLET] 60 tablet 5     Sig: TAKE 1 TABLET BY MOUTH TWICE A DAY       Anticoagulant Agents Failed - 3/5/2025  2:24 PM        Failed - GFR on file in the past 12 months and most recent GFR is normal        Passed - Normal Platelets on file in past 12 months     Recent Labs   Lab Test 10/25/24  0901                  Passed - Medication is active on med list and the sig matches. RN to manually verify dose and sig if red X/fail.     If the protocol passes (green check), you do not need to verify med dose and sig.    A prescription matches if they are the same clinical intention.    For Example: once daily and every morning are the same.    The protocol can not identify upper and lower case letters as matching and will fail.     For Example: Take 1 tablet (50 mg) by mouth daily     TAKE 1 TABLET (50 MG) BY MOUTH DAILY    For all fails (red x), verify dose and sig.    If the refill does match what is on file, the RN can still proceed to approve the refill request.       If they do not match, route to the appropriate provider.             Passed - Patient is 18-79 years of age        Passed - Weight is greater than 60 kg for the past year     Wt Readings from Last 3 Encounters:   12/02/24 (!) 156.9 kg (346 lb)   11/04/24 (!) 155 kg (341 lb 12.8 oz)   10/25/24 (!) 154.5 kg (340 lb 9.6 oz)             Passed - Recent (6 mo) or future (90 days) visit within the authorizing provider's specialty        Passed - Medication indicated for associated diagnosis     Medication is associated with one or more of the following diagnoses:  Atrial fibrillation  Deep venous thrombosis  Heparin-induced  thrombocytopenia  Pulmonary embolism  Venous thromboembolism  H/O: Pulmonary embolus  Atrial flutter  Coronary artery finding  Transient cerebral ischemia  Percutaneous transluminal coronary angioplasty  Stable angina  Intermittent claudication  Arteriosclerotic vascular disease

## 2025-03-07 DIAGNOSIS — I42.9 CARDIOMYOPATHY, UNSPECIFIED TYPE (H): ICD-10-CM

## 2025-03-07 DIAGNOSIS — I48.0 PAROXYSMAL ATRIAL FIBRILLATION (H): ICD-10-CM

## 2025-03-11 ENCOUNTER — TELEPHONE (OUTPATIENT)
Dept: CARDIOLOGY | Facility: CLINIC | Age: 65
End: 2025-03-11
Payer: COMMERCIAL

## 2025-03-11 DIAGNOSIS — I50.22 CHRONIC SYSTOLIC HEART FAILURE (H): ICD-10-CM

## 2025-03-11 RX ORDER — SPIRONOLACTONE 25 MG/1
25 TABLET ORAL DAILY
Qty: 90 TABLET | Refills: 1 | Status: SHIPPED | OUTPATIENT
Start: 2025-03-11

## 2025-03-11 RX ORDER — METOPROLOL SUCCINATE 200 MG/1
200 TABLET, EXTENDED RELEASE ORAL DAILY
Qty: 90 TABLET | Refills: 3 | Status: SHIPPED | OUTPATIENT
Start: 2025-03-11

## 2025-03-11 NOTE — TELEPHONE ENCOUNTER
[facility] Call Center    Phone Message    May a detailed message be left on voicemail: yes     Reason for Call: Medication Refill Request    Has the patient contacted the pharmacy for the refill? Yes   Name of medication being requested: spironolactone (ALDACTONE) 25 MG tablet   Provider who prescribed the medication: Dr Hernandez  Pharmacy: [facility]/PHARMACY #[id number] - [place], [place] - [address] [facility] [address]    Date medication is needed: 3/13   The patient will be traveling for the weekend and needs the refill before that     Action Taken: Other: Cardiology    Travel Screening: Not Applicable    Thank you!  Specialty Access Center       Date of Service:

## 2025-03-11 NOTE — TELEPHONE ENCOUNTER
Last Visit: 12/2/2024 [facility] *no med changes    metoprolol succinate ER (TOPROL XL) 200 MG 24 hr tablet : passed medication protocol  - refills sent         Request from pharmacy:  Requested Prescriptions   Pending Prescriptions Disp Refills    metoprolol succinate ER (TOPROL XL) 200 MG 24 hr tablet [Pharmacy Med Name: METOPROLOL SUCC  MG TAB] 90 tablet 3     Sig: TAKE 1 TABLET (200 MG) BY MOUTH DAILY       Beta-Blockers Protocol Passed - 3/11/2025  3:38 PM        Passed - Most recent blood pressure under 140/90 in past 12 months     BP Readings from Last 3 Encounters:   12/02/24 110/74   11/04/24 114/66   10/25/24 122/78       No data recorded            Passed - Patient is age 6 or older        Passed - Medication is active on med list and the sig matches. RN to manually verify dose and sig if red X/fail.     If the protocol passes (green check), you do not need to verify med dose and sig.    A prescription matches if they are the same clinical intention.    For Example: once daily and every morning are the same.    The protocol can not identify upper and lower case letters as matching and will fail.     For Example: Take 1 tablet (50 mg) by mouth daily     TAKE 1 TABLET (50 MG) BY MOUTH DAILY    For all fails (red x), verify dose and sig.    If the refill does match what is on file, the RN can still proceed to approve the refill request.       If they do not match, route to the appropriate provider.             Passed - Medication indicated for associated diagnosis     Medication is associated with one or more of the following diagnoses:     Hypertension (HTN)   Atrial fibrillation/flutter   Angina   ASCVD   Migraine   Heart Failure   Tremor   Anxiety   Ocular hypertension   Glaucoma   PTSD   Obstructive hypertrophic cardiomyopathy   History of myocarditis   Palpitations   POTS (postural orthostatic tachycardia syndrome)   SVT (supraventricular  tachycardia)   Hyperthyroidism   Tachycardia   Acute non-st segment elevation myocardial infarction   Subsequent non-st segment elevation myocardial infarction   Ischemic myocardial dysfunction          Passed - Recent (12 mo) or future (90 days) visit within the authorizing provider's specialty     The patient must have completed an in-person or virtual visit within the past 12 months or has a future visit scheduled within the next 90 days with the authorizing provider s specialty.  Urgent care and e-visits do not qualify as an office visit for this protocol.

## 2025-03-29 ENCOUNTER — HEALTH MAINTENANCE LETTER (OUTPATIENT)
Age: 65
End: 2025-03-29

## 2025-04-29 DIAGNOSIS — I48.0 PAROXYSMAL ATRIAL FIBRILLATION (H): ICD-10-CM

## 2025-04-29 DIAGNOSIS — I50.22 CHRONIC SYSTOLIC HEART FAILURE (H): ICD-10-CM

## 2025-04-29 RX ORDER — SACUBITRIL AND VALSARTAN 97; 103 MG/1; MG/1
1 TABLET, FILM COATED ORAL 2 TIMES DAILY
Qty: 180 TABLET | Refills: 3 | Status: SHIPPED | OUTPATIENT
Start: 2025-04-29

## 2025-04-30 NOTE — TELEPHONE ENCOUNTER
Patient called  requesting refill of Entresto. Last seen in Feb 2024 with recommendations for 1 year follow up. Is scheduled in July, which is next available. Refill approved, was sent yesterday.

## 2025-06-20 DIAGNOSIS — I48.21 PERMANENT ATRIAL FIBRILLATION (H): ICD-10-CM

## 2025-06-23 ENCOUNTER — MYC MEDICAL ADVICE (OUTPATIENT)
Dept: CARDIOLOGY | Facility: CLINIC | Age: 65
End: 2025-06-23
Payer: COMMERCIAL

## 2025-06-23 DIAGNOSIS — E78.5 HYPERLIPIDEMIA LDL GOAL <130: ICD-10-CM

## 2025-06-23 DIAGNOSIS — I50.22 CHRONIC SYSTOLIC HEART FAILURE (H): Primary | ICD-10-CM

## 2025-06-23 NOTE — TELEPHONE ENCOUNTER
Last Written Prescription:   Disp Refills Start End ADAMS   diltiazem ER COATED BEADS (CARDIZEM CD/CARTIA XT) 120 MG 24 hr capsule 90 capsule 1 3/5/2025 -- No   Sig - Route: Take 1 capsule (120 mg) by mouth daily. - Oral     ----------------------  Last Visit Date:   12/2/2024  [facility]    Future Visit Date:   7/7/2025 8:15 AM (30 min)  [place]    Arrive by:  8:00 AM   RETURN HEART FAILURE   [facility] ([facility])   Francesco Hernandez MD     ----------------------      Refill decision: Medication unable to be refilled by RN due to: Overdue labs/test:  BMP    Last Comprehensive Metabolic Panel:  Lab Results   Component Value Date     02/23/2024    POTASSIUM 4.6 02/23/2024    CHLORIDE 105 02/23/2024    CO2 28 02/23/2024    ANIONGAP 9 02/23/2024     (H) 02/23/2024    BUN 20.0 02/23/2024    CR 0.94 02/23/2024    GFRESTIMATED >90 02/23/2024    EUGENIA 9.2 02/23/2024     BP Readings from Last 3 Encounters:   12/02/24 110/74   11/04/24 114/66   10/25/24 122/78       Request from pharmacy:  Requested Prescriptions   Pending Prescriptions Disp Refills    diltiazem ER COATED BEADS (CARDIZEM CD/CARTIA XT) 120 MG 24 hr capsule [Pharmacy Med Name: DILTIAZEM 24H ER(CD) 120 MG CP] 90 capsule 1     Sig: TAKE 1 CAPSULE BY MOUTH EVERY DAY       Calcium Channel Blockers Protocol  Failed - 6/23/2025 11:12 AM        Failed - GFR is on file in the past 12 months and most recent GFR is normal        Passed - Most recent blood pressure under 140/90 in past 12 months     BP Readings from Last 3 Encounters:   12/02/24 110/74   11/04/24 114/66   10/25/24 122/78       No data recorded            Passed - Medication is active on med list and the sig matches. RN to manually verify dose and sig if red X/fail.     If the protocol passes (green check), you do not need to verify med dose and sig.    A prescription matches if they are the same clinical intention.    For Example: once daily and every morning are the  same.    The protocol can not identify upper and lower case letters as matching and will fail.     For Example: Take 1 tablet (50 mg) by mouth daily     TAKE 1 TABLET (50 MG) BY MOUTH DAILY    For all fails (red x), verify dose and sig.    If the refill does match what is on file, the RN can still proceed to approve the refill request.       If they do not match, route to the appropriate provider.             Passed - Recent (12 month) or future (90 days) visit with authorizing provider's specialty (provided they have been seen in the past 15 months)     The patient must have completed an in-person or virtual visit within the past 12 months or has a future visit scheduled within the next 90 days with the authorizing provider s specialty.  Urgent care and e-visits do not qualify as an office visit for this protocol.          Passed - Patient is age 18 or older

## 2025-06-25 DIAGNOSIS — I50.22 CHRONIC SYSTOLIC HEART FAILURE (H): ICD-10-CM

## 2025-06-25 RX ORDER — SPIRONOLACTONE 25 MG/1
25 TABLET ORAL DAILY
Qty: 90 TABLET | Refills: 0 | Status: SHIPPED | OUTPATIENT
Start: 2025-06-25

## 2025-06-25 NOTE — TELEPHONE ENCOUNTER
[facility] Call Center    Phone Message    May a detailed message be left on voicemail: yes     Reason for Call: Medication Refill Request    Has the patient contacted the pharmacy for the refill? Yes   Name of medication being requested:   spironolactone (ALDACTONE) 25 MG tablet   Provider who prescribed the medication: Francesco Hernandez MD   Pharmacy:   [facility]/PHARMACY #[id number] - [place], [place] - [address] [place] [address] [address]     Date medication is needed: ASAP, pt is out       Action Taken: Other: cardio    Travel Screening: Not Applicable    Thank you!  Specialty Access Center       Date of Service:

## 2025-06-25 NOTE — TELEPHONE ENCOUNTER
Last Written Prescription:     spironolactone (ALDACTONE) 25 MG tablet 90 tablet 1 3/11/2025 -- No   Sig - Route: Take 1 tablet (25 mg) by mouth daily. - Oral     ----------------------  Last Visit Date:   12/2/2024  [facility]     Future Visit Date:   7/7/2025 8:15 AM (30 min)                Francesco Hernandez MD      ----------------------     [facility]/PHARMACY #[id number] - [place], [place] - [address] [place] [address] [address]   Remaining refills sent to requested pharmacy.

## 2025-07-01 RX ORDER — DILTIAZEM HYDROCHLORIDE 120 MG/1
120 CAPSULE, COATED, EXTENDED RELEASE ORAL DAILY
Qty: 90 CAPSULE | Refills: 0 | Status: SHIPPED | OUTPATIENT
Start: 2025-07-01

## 2025-07-01 NOTE — TELEPHONE ENCOUNTER
Name from pharmacy: DILTIAZEM 24H ER(CD) 120 MG CP         Will file in chart as: diltiazem ER COATED BEADS (CARDIZEM CD/CARTIA XT) 120 MG 24 hr capsule    Sig: TAKE 1 CAPSULE BY MOUTH EVERY DAY    Disp: 90 capsule    Refills: Not specified (Pharmacy requested: 1)    Start: 6/23/2025    Class: E-Prescribe    For: Permanent atrial fibrillation (H)    Last ordered: 3 months ago (3/5/2025) by Leanna Turner MD    Last refill: 6/14/2025    Rx #: [id number]    Calcium Channel Blockers Protocol  [id number]/2025 11:14 AM   Protocol Details GFR is on file in the past 12 months and most recent GFR is normal    Most recent blood pressure under 140/90 in past 12 months    Medication is active on med list and the sig matches. RN to manually verify dose and sig if red X/fail.    Recent (12 month) or future (90 days) visit with authorizing provider's specialty (provided they have been seen in the past 15 months)    Patient is age 18 or older      To be filled at: [facility]/pharmacy #[id number] - [place], [place] - [address] [place] [address] AT [address]     Patient scheduled for labs on 7/7/25. Patient saw Dr. Turner on 12/2/24. Patient to follow up in 1 year. No medication changes at last appointment. Dorcas refill sent in for patient.    Belle Vincent, RN, BSN  Cardiology RN Care Coordinator   [place]   Phone: [phone]  Fax: [phone] ([place]) [phone] ([place])

## 2025-07-07 ENCOUNTER — LAB (OUTPATIENT)
Dept: LAB | Facility: CLINIC | Age: 65
End: 2025-07-07
Attending: INTERNAL MEDICINE
Payer: COMMERCIAL

## 2025-07-07 ENCOUNTER — OFFICE VISIT (OUTPATIENT)
Dept: CARDIOLOGY | Facility: CLINIC | Age: 65
End: 2025-07-07
Attending: INTERNAL MEDICINE
Payer: COMMERCIAL

## 2025-07-07 VITALS
SYSTOLIC BLOOD PRESSURE: 115 MMHG | DIASTOLIC BLOOD PRESSURE: 74 MMHG | OXYGEN SATURATION: 97 % | WEIGHT: 315 LBS | HEART RATE: 80 BPM | BODY MASS INDEX: 41.44 KG/M2

## 2025-07-07 DIAGNOSIS — I10 ESSENTIAL HYPERTENSION: ICD-10-CM

## 2025-07-07 DIAGNOSIS — E78.5 HYPERLIPIDEMIA LDL GOAL <130: ICD-10-CM

## 2025-07-07 DIAGNOSIS — I42.8 NICM (NONISCHEMIC CARDIOMYOPATHY) (H): ICD-10-CM

## 2025-07-07 DIAGNOSIS — Z78.9 STATIN INTOLERANCE: ICD-10-CM

## 2025-07-07 DIAGNOSIS — I50.22 CHRONIC SYSTOLIC HEART FAILURE (H): ICD-10-CM

## 2025-07-07 DIAGNOSIS — Z79.01 CHRONIC ANTICOAGULATION: ICD-10-CM

## 2025-07-07 DIAGNOSIS — E78.2 MIXED HYPERLIPIDEMIA: ICD-10-CM

## 2025-07-07 DIAGNOSIS — I50.42 CHRONIC COMBINED SYSTOLIC AND DIASTOLIC HEART FAILURE (H): Primary | ICD-10-CM

## 2025-07-07 DIAGNOSIS — I48.21 PERMANENT ATRIAL FIBRILLATION (H): ICD-10-CM

## 2025-07-07 LAB
ANION GAP SERPL CALCULATED.3IONS-SCNC: 11 MMOL/L (ref 7–15)
BUN SERPL-MCNC: 17.9 MG/DL (ref 8–23)
CALCIUM SERPL-MCNC: 9.1 MG/DL (ref 8.8–10.4)
CHLORIDE SERPL-SCNC: 106 MMOL/L (ref 98–107)
CHOLEST SERPL-MCNC: 188 MG/DL
CREAT SERPL-MCNC: 0.85 MG/DL (ref 0.67–1.17)
EGFRCR SERPLBLD CKD-EPI 2021: >90 ML/MIN/1.73M2
FASTING STATUS PATIENT QL REPORTED: YES
FASTING STATUS PATIENT QL REPORTED: YES
GLUCOSE SERPL-MCNC: 101 MG/DL (ref 70–99)
HCO3 SERPL-SCNC: 23 MMOL/L (ref 22–29)
HDLC SERPL-MCNC: 39 MG/DL
HGB BLD-MCNC: 15.4 G/DL (ref 13.3–17.7)
LDLC SERPL CALC-MCNC: 129 MG/DL
MAGNESIUM SERPL-MCNC: 2 MG/DL (ref 1.7–2.3)
MCV RBC AUTO: 95 FL (ref 78–100)
NONHDLC SERPL-MCNC: 149 MG/DL
NT-PROBNP SERPL-MCNC: 334 PG/ML (ref 0–229)
POTASSIUM SERPL-SCNC: 4.3 MMOL/L (ref 3.4–5.3)
SODIUM SERPL-SCNC: 140 MMOL/L (ref 135–145)
TRIGL SERPL-MCNC: 99 MG/DL

## 2025-07-07 PROCEDURE — 85018 HEMOGLOBIN: CPT | Performed by: PATHOLOGY

## 2025-07-07 PROCEDURE — 83735 ASSAY OF MAGNESIUM: CPT | Performed by: PATHOLOGY

## 2025-07-07 PROCEDURE — 99215 OFFICE O/P EST HI 40 MIN: CPT | Performed by: INTERNAL MEDICINE

## 2025-07-07 PROCEDURE — G2211 COMPLEX E/M VISIT ADD ON: HCPCS | Performed by: INTERNAL MEDICINE

## 2025-07-07 PROCEDURE — 83880 ASSAY OF NATRIURETIC PEPTIDE: CPT | Performed by: PATHOLOGY

## 2025-07-07 PROCEDURE — 99213 OFFICE O/P EST LOW 20 MIN: CPT | Performed by: INTERNAL MEDICINE

## 2025-07-07 PROCEDURE — 80048 BASIC METABOLIC PNL TOTAL CA: CPT | Performed by: PATHOLOGY

## 2025-07-07 PROCEDURE — 3074F SYST BP LT 130 MM HG: CPT | Performed by: INTERNAL MEDICINE

## 2025-07-07 PROCEDURE — 1126F AMNT PAIN NOTED NONE PRSNT: CPT | Performed by: INTERNAL MEDICINE

## 2025-07-07 PROCEDURE — 36415 COLL VENOUS BLD VENIPUNCTURE: CPT | Performed by: PATHOLOGY

## 2025-07-07 PROCEDURE — 80061 LIPID PANEL: CPT | Performed by: PATHOLOGY

## 2025-07-07 PROCEDURE — 3078F DIAST BP <80 MM HG: CPT | Performed by: INTERNAL MEDICINE

## 2025-07-07 ASSESSMENT — PAIN SCALES - GENERAL: PAINLEVEL_OUTOF10: NO PAIN (0)

## 2025-07-07 NOTE — LETTER
2025      RE: Stef Mosher  [id number] [place] [address]       Dear Colleague,    Thank you for the opportunity to participate in the care of your patient, Stef Mosher, at the [facility] [place] [facility]. Please see a copy of my visit note below.    [facility]  Advanced Heart Failure Clinic    Patient Name: Stef Mosher   : 1960   Date of Visit: 2025    Primary Care Physician: None listed     Referring Physician: Leanna Turner MD  Reason for Visit: Cardiomyopathy    Dear Dr. Turner,      I had the pleasure of seeing Stef Mosher today in the [facility]. As you know Stef Mosher is a pleasant 64 year old year old male, who presents today for further evaluation of cardiomyopathy.    Stef has a history of persistent atrial fibrillation diagnosed  (status post multiple unsuccessful cardioversion), nonischemic cardiomyopathy, hypertension, hyperlipidemia and history of significant GI bleed requiring blood transfusions .  He follows with Dr. Leanna Turner for A-fib and has previously seen Dr. Sivakumar Brambila for his cardiomyopathy.    He first established care with me in 2024.    He is here for his annual follow up. He works as a TV director/ , some walking at work around 3500 - 4000 steps a day. He has arthritis in his knees and hips. He gets tired if working outside for a long time. He denies chest pain, shortness of breath, PND/orthopnea, palpitations, lightheadedness, syncope. He had cellulitis last year and is recovered - at that time he was hospitalized and needed to hold cardiac meds briefly for low BP.  His wife notes that sometimes when he is sick which is not that often, he has had lower blood pressures and lower heart rates.    Compliant with medications and notes no problems taking some of them. He has been off the atorvastatin since about  when  he was working on changing his diet and wanted to see if cholesterol improved. He tried resuming it and it caused short term memory issues and so stopped it again. He stopped Jardiance due to abdominal discomfort/ gas after taking it for about a month.    Home BPs - not checking  Home HRs - not checking  Home weights - not checking    ROS:  A complete 10-point ROS was negative except as above.    PAST MEDICAL HISTORY:  Past Medical History:   Diagnosis Date     Arthritis      Atrial fibrillation (H)      Hayfever      Hypercholesterolemia      Hypertension      Unspecified hypothyroidism        PAST SURGICAL HISTORY:  Past Surgical History:   Procedure Laterality Date     ANESTHESIA CARDIOVERSION N/A 3/18/2022    Procedure: ANESTHESIA, FOR CARDIOVERSION@1400;  Surgeon: GENERIC ANESTHESIA PROVIDER;  Location:  OR      THYROIDECTOMY         FAMILY HISTORY:  Family History   Problem Relation Age of Onset     Blood Disease Mother      Cancer Mother      Arthritis Father      Hypertension Father      Breast Cancer Sister      Allergies Sister      Cancer Sister      Thyroid Disease Sister      Blood Disease Sister      Diabetes No family hx of      Cerebrovascular Disease No family hx of      Glaucoma No family hx of      Macular Degeneration No family hx of    Brother - arrhyhtmia  Older brother - Afib,  from non-cardiac issues    SOCIAL HISTORY:  Social History     Socioeconomic History     Marital status:    Tobacco Use     Smoking status: Never     Smokeless tobacco: Never     Tobacco comments:     Lives in smoke free household   Vaping Use     Vaping Use: Never used   Substance and Sexual Activity     Alcohol use: No     Comment: None     Drug use: No     Comment: Never     Sexual activity: Not Currently     Partners: Female       MEDICATIONS:  He is off the lipitor since   He is off Jardiance after 1-2 months of taking it due to abdominal discomfort, gas  Current Outpatient Medications    Medication Sig Dispense Refill     apixaban ANTICOAGULANT (ELIQUIS ANTICOAGULANT) 5 MG tablet Take 1 tablet (5 mg) by mouth 2 times daily. 60 tablet 5     diltiazem ER COATED BEADS (CARDIZEM CD/CARTIA XT) 120 MG 24 hr capsule TAKE 1 CAPSULE BY MOUTH EVERY DAY 90 capsule 0     furosemide (LASIX) 20 MG tablet Take 1 tablet (20 mg) by mouth daily. 90 tablet 1     levETIRAcetam (KEPPRA) 500 MG tablet Take 750 mg by mouth 2 times daily       metoprolol succinate ER (TOPROL XL) 200 MG 24 hr tablet Take 1 tablet (200 mg) by mouth daily. 90 tablet 3     sacubitril-valsartan (ENTRESTO)  MG per tablet Take 1 tablet by mouth 2 times daily. 180 tablet 3     spironolactone (ALDACTONE) 25 MG tablet Take 1 tablet (25 mg) by mouth daily. 90 tablet 0     atorvastatin (LIPITOR) 20 MG tablet Take 1 tablet (20 mg) by mouth daily. (Patient not taking: Reported on 7/7/2025) 90 tablet 0     empagliflozin (JARDIANCE) 10 MG TABS tablet Take 1 tablet (10 mg) by mouth daily (Patient not taking: Reported on 7/7/2025) 90 tablet 3     omeprazole (PRILOSEC) 40 MG DR capsule Take 40 mg by mouth 2 times daily. (Patient not taking: Reported on 7/7/2025) 180 capsule 0     No current facility-administered medications for this visit.        ALLERGIES:   No Known Allergies    PHYSICAL EXAM:  /74 (BP Location: Right arm, Patient Position: Chair, Cuff Size: Adult Large)   Pulse 80   Wt (!) 160.6 kg (354 lb)   SpO2 97%   BMI 41.44 kg/m    Gen: alert, interactive, NAD  Neck: supple, no obvious JVD  CV: irregular, no m/r/g  Chest: CTAB, no wheezes or crackles  Abd: soft, NT, ND, +BS  Ext: trace LE edema, 2+ peripheral pulses    LABS:    CMP  Last Comprehensive Metabolic Panel:  Sodium   Date Value Ref Range Status   07/07/2025 140 135 - 145 mmol/L Final   07/09/2021 139 133 - 144 mmol/L Final     Potassium   Date Value Ref Range Status   07/07/2025 4.3 3.4 - 5.3 mmol/L Final   04/21/2023 4.6 3.4 - 5.3 mmol/L Final   07/09/2021 4.5 3.4 - 5.3  mmol/L Final     Chloride   Date Value Ref Range Status   07/07/2025 106 98 - 107 mmol/L Final   04/21/2023 109 94 - 109 mmol/L Final   07/09/2021 108 94 - 109 mmol/L Final     Carbon Dioxide   Date Value Ref Range Status   07/09/2021 24 20 - 32 mmol/L Final     Carbon Dioxide (CO2)   Date Value Ref Range Status   07/07/2025 23 22 - 29 mmol/L Final   04/21/2023 31 20 - 32 mmol/L Final     Anion Gap   Date Value Ref Range Status   07/07/2025 11 7 - 15 mmol/L Final   04/21/2023 1 (L) 3 - 14 mmol/L Final   07/09/2021 7 3 - 14 mmol/L Final     Glucose   Date Value Ref Range Status   07/07/2025 101 (H) 70 - 99 mg/dL Final   04/21/2023 101 (H) 70 - 99 mg/dL Final   07/09/2021 100 (H) 70 - 99 mg/dL Final     Comment:     Fasting specimen     Urea Nitrogen   Date Value Ref Range Status   07/07/2025 17.9 8.0 - 23.0 mg/dL Final   04/21/2023 20 7 - 30 mg/dL Final   07/09/2021 14 7 - 30 mg/dL Final     Creatinine   Date Value Ref Range Status   07/07/2025 0.85 0.67 - 1.17 mg/dL Final   07/09/2021 0.74 0.66 - 1.25 mg/dL Final     GFR Estimate   Date Value Ref Range Status   07/07/2025 >90 >60 mL/min/1.73m2 Final     Comment:     eGFR calculated using 2021 CKD-EPI equation.   07/09/2021 >90 >60 mL/min/[1.73_m2] Final     Comment:     Non  GFR Calc  Starting 12/18/2018, serum creatinine based estimated GFR (eGFR) will be   calculated using the Chronic Kidney Disease Epidemiology Collaboration   (CKD-EPI) equation.       Calcium   Date Value Ref Range Status   07/07/2025 9.1 8.8 - 10.4 mg/dL Final   07/09/2021 8.5 8.5 - 10.1 mg/dL Final     Bilirubin Total   Date Value Ref Range Status   04/21/2023 0.9 0.2 - 1.3 mg/dL Final   07/09/2021 0.8 0.2 - 1.3 mg/dL Final     Alkaline Phosphatase   Date Value Ref Range Status   04/21/2023 108 40 - 150 U/L Final   07/09/2021 145 40 - 150 U/L Final     ALT   Date Value Ref Range Status   04/05/2024 36 0 - 70 U/L Final   07/09/2021 25 0 - 70 U/L Final     AST   Date Value Ref  "Range Status   2023 24 0 - 45 U/L Final   2021 16 0 - 45 U/L Final       NT-proBNP       TROP  No results found for: \"TROPI\", \"TROPONIN\", \"TROPR\", \"TROPN\"    CBC  CBC RESULTS:   Recent Labs   Lab Test 23  1045   WBC 7.4   RBC 4.80   HGB 15.5   HCT 46.5   MCV 97   MCH 32.3   MCHC 33.3   RDW 13.1          LIPIDS  Recent Labs   Lab Test 23  1128 23  1045   CHOL 215* 132   HDL 40 43   * 70   TRIG 98 94       TSH  TSH   Date Value Ref Range Status   2022 1.03 0.40 - 4.00 mU/L Final   2021 1.23 0.40 - 4.00 mU/L Final       HBA1C  Lab Results   Component Value Date    A1C 5.4 2018         CARDIAC DATA:    EK: Atrial fibrillation, Incomplete left bundle branch block    Echo:  :  Mildly reduced left ventricular systolic function. LVEF estimate 40-45%  (Traced 44%). Normal LV function and size. LV filling is restrictive. Normal   RV function and size. No significant valvular abnormalities. IVC cannot be assessed. No major changes compared to prior study in 2011.     2021:  Difficult to assess ejection fraction due to kzol-eo-ludc variability, the LVEF in the 40% range. Mild left ventricular dilation is present.  Moderate right ventricular dilation is present. Global right ventricular function is mildly reduced.  Mild dilatation of the aorta is present. Sinuses of Valsalva 4.4 cm (1.63 cm//m2).  No pericardial effusion is present.   This study was compared with the study from 2017. No significant changes noted.     2021:  Left ventricular function is decreased. The ejection fraction is 35-40% (moderately reduced).  Global right ventricular function is mildly reduced. The inferior vena cava cannot be assessed.  No pericardial effusion is present. There has been no change.     22:  Left ventricular function is decreased. The ejection fraction is 30-35% (moderately reduced). Moderate diffuse hypokinesis is present.  Global right " ventricular function is mildly reduced.  The left atrial appendage is free of thrombus. There is spontaneous echo contrast with a severely dilated left atrium.  Mild functional mitral insufficiency is present. No pericardial effusion is present.      6/3/22:  Left ventricular function is decreased. The ejection fraction is 35-40% (moderately reduced). Moderate left ventricular dilation is present.  Global right ventricular function is mildly reduced.   The inferior vena cava was normal in size with preserved respiratory variability.  Trivial pericardial effusion is present.   This study was compared with the study from 11/2021. No significant changes noted.    11/2023:  Left ventricular function is mildly reduced. The ejection fraction is 40-45%. LVIDd: 6.0 cm   Mild diffuse hypokinesis is present  Global right ventricular function is normal.  This study was compared with the study from 6/3/22. LVEF is higher.    Dec 2024:  Left ventricular function is decreased. The ejection fraction is 40-45%  (mildly reduced).  Global right ventricular function is normal.  No pericardial effusion is present.  Poor image quality limits valvular evaluation.  This study was compared with the study from 11/12/2023. There has been no  significant change in biventricular function.     NM stress 3/4/2022:     The nuclear stress test is abnormal.     No evidence of myocardial ischemia. There is a small area of a mild degree of infarction in the distal septal segment(s) of the left ventricle. This appears to be in the left anterior descending distribution.     Left ventricular function is normal.     A prior study was conducted on 11/13/2017.     Nuclear Study Quality: Final image quality is satisfactory.    CTA 7/22/22:  1.  There are no high-grade stenoses in the left main, proximal left anterior descending, proximal left circumflex, or right coronary arteries.  2.  The remaining segments of the major epicardial vessels are  non-diagnostic due to significant cardiac motion and photon starvation artifacts.  3.  Total Agatston score 58 placing the patient in the 54th percentile when compared to age and gender matched control group. Mild (25-49%) stenosis composed of mixed plaque.   4.  Please review the separate Radiology report for incidental noncardiac findings.    Cardiac MRI:  None available    Cardiac Catheterization:  None available      ASSESSMENT/PLAN:  In summary, Stef Mosher is here today with the following -      1.  Nonischemic cardiomyopathy, chronic combined systolic and diastolic heart failure, ACC/AHA stage C, NYHA class II, EF 40 to 45%  2.  Persistent atrial fibrillation, chronic anticoagulation  3.  Hypertension  4.  Hyperlipidemia  5. Mild nonobstructive coronary artery disease    Plans -     NICM:  - Goal Directed Medical Therapies for Heart Failure:     These are indicated irrespective of the etiology of heart failure.  We discussed the primary medications, their side effects, the care plan including frequent follow-ups  for optimization of therapies with monitoring of blood pressures, weights and lab results     Beta blocker: continue Toprol  mg once daily  ACE/ARB/ARNI: continue sacubitril/ valsartan  mg BID  MRA: continue spironolactone 25 mg daily  SGLT2 inhibitor: Jardiance - did not tolerate due to abdominal discomfort/ gas.  Offered to try the alternative of dapagliflozin but they prefer to hold off for now, discussed benefits of being on this agent.    Diuretics: lasix 20 mg once daily, not required any additional doses  Cardiac Rehab: not indicated, advised to increase exercise on their own with at least moderate intensity aerobic activity of at least 30 minutes a day and at least 5 times a week  ICD/ CRT-D: not indicated  Labs: at follow up  Follow up: 1 year.  He has annual echocardiograms in December when he sees Dr. Turner and could consider a cardiac MRI as an alternative at next  follow-up  CV Genetic testing: discussed, they are currently not interested  Obstructive sleep apnea: he denies concerns, and declines testing    Advised on daily home BP and weight monitoring  Advised on observing caution with salt intake    Non-obstructive CAD: Mild LAD disease in 2022. Was previously on aspirin (stopped with GI bleeding) and statin (stopped due to short-term memory issues).    Hypertension: Well-controlled    Persistent A-fib: Managed by Dr. Turner,  on chronic anticoagulation, beta-blocker and diltiazem with some concern for noted lower heart rates when sick.  However his wife notes that he is not sick but often will today in clinic his heart rate is in the 80s.  Advised to monitor this and let us know if he has any further concerns    Hyperlipidemia: off statin per his wish due to short-term memory issues and they are monitoring lipid panel. With modifications to his diet, follow up lipid panel is slightly improved but still above goal based on known mild nonobstructive CAD.  I suggested consultation with preventive cardiology experts to discuss nonstatin alternatives and they will think about this.    Elevated fasting blood glucose: His fasting blood sugar is borderline high, advised to follow up with PCP for further monitoring.      History of GI bleed: No issues in the last 3 years but they have requested that hemoglobin to be checked today and was normal    The longitudinal plan of care for the diagnosis(es)/condition(s) as documented were addressed during this visit. Due to the added complexity in care, I will continue to support Stef in the subsequent management and with ongoing continuity of care.   Chronic combined systolic and diastolic heart failure    I spent 42 minutes minutes in care of the patient today including obtaining recent medical history, personally reviewing recent cardiac testing and/or lab results, today's examination, discussion of testing results and care  recommendations with patient.       Thank you for the opportunity to participate in this patient's care. Please feel free to reach out with any questions or concerns.      Francesco Hernandez MD, [facility]  Associate Professor of Medicine  Advanced Heart Failure, LVAD & Cardiac Transplant  Director, Cardio-Obstetrics  Cardio-Oncology  [facility]      Please do not hesitate to contact me if you have any questions/concerns.     Sincerely,     Francesco Hernandez MD

## 2025-07-07 NOTE — NURSING NOTE
Chief Complaint   Patient presents with    Follow Up     Return HF; 65 year old with HFmrEF here for follow up with labs prior       Vitals were taken, medications reconciled.    Polina Gaspar, Visit Facilitator

## 2025-07-07 NOTE — PATIENT INSTRUCTIONS
Cardiology Providers you saw during your visit:  Dr. Hernandez     Medication changes:  1- no changes        Follow up:  1- Follow up with Dr. Hernandez in 1 year.   2- Would recommend a Cardiac MRI if willing next time instead of an echo. Let us know if you'd like to do this and we can order it.        Please call if you have:  1. Weight gain of more than 2 pounds in a day or 5 pounds in a week  2. Increased shortness of breath, swelling or bloating  3. Dizziness, lightheadedness   4. Any questions or concerns.        Follow the American Heart Association Diet and Lifestyle recommendations:  Limit saturated fat, trans fat, sodium, red meat, sweets and sugar-sweetened beverages. If you choose to eat red meat, compare labels and select the leanest cuts available.  Aim for at least 150 minutes of moderate physical activity or 75 minutes of vigorous physical activity - or an equal combination of both - each week.     During business hours: [phone], press option # 1 to schedule an appointment or send a message to your care team     After hours, weekends or holidays: On Call Cardiologist- [phone]   option #4 and ask to speak to the on-call Cardiologist. Inform them you are a CORE/heart failure patient at the [facility].     Frances Olmos RN BSN CHFN  Cardiology Nurse Care Coordinator (Heart Failure / C.O.R.E.)

## 2025-07-07 NOTE — PROGRESS NOTES
[facility]  Advanced Heart Failure Clinic    Patient Name: Stef Mosher   : 1960   Date of Visit: 2025    Primary Care Physician: None listed     Referring Physician: Leanna Turner MD  Reason for Visit: Cardiomyopathy    Dear Dr. Turner,      I had the pleasure of seeing Stef Mosher today in the [facility]. As you know Stef Mosher is a pleasant 64 year old year old male, who presents today for further evaluation of cardiomyopathy.    Stef has a history of persistent atrial fibrillation diagnosed  (status post multiple unsuccessful cardioversion), nonischemic cardiomyopathy, hypertension, hyperlipidemia and history of significant GI bleed requiring blood transfusions .  He follows with Dr. Leanna Turner for A-fib and has previously seen Dr. Sivakumar Brambila for his cardiomyopathy.    He first established care with me in 2024.    He is here for his annual follow up. He works as a TV director/ , some walking at work around 3500 - 4000 steps a day. He has arthritis in his knees and hips. He gets tired if working outside for a long time. He denies chest pain, shortness of breath, PND/orthopnea, palpitations, lightheadedness, syncope. He had cellulitis last year and is recovered - at that time he was hospitalized and needed to hold cardiac meds briefly for low BP.  His wife notes that sometimes when he is sick which is not that often, he has had lower blood pressures and lower heart rates.    Compliant with medications and notes no problems taking some of them. He has been off the atorvastatin since about  when he was working on changing his diet and wanted to see if cholesterol improved. He tried resuming it and it caused short term memory issues and so stopped it again. He stopped Jardiance due to abdominal discomfort/ gas after taking it for about a month.    Home BPs - not checking  Home HRs - not checking  Home weights - not  checking    ROS:  A complete 10-point ROS was negative except as above.    PAST MEDICAL HISTORY:  Past Medical History:   Diagnosis Date    Arthritis     Atrial fibrillation (H)     Hayfever     Hypercholesterolemia     Hypertension     Unspecified hypothyroidism        PAST SURGICAL HISTORY:  Past Surgical History:   Procedure Laterality Date    ANESTHESIA CARDIOVERSION N/A 3/18/2022    Procedure: ANESTHESIA, FOR CARDIOVERSION@1400;  Surgeon: GENERIC ANESTHESIA PROVIDER;  Location:  OR     THYROIDECTOMY         FAMILY HISTORY:  Family History   Problem Relation Age of Onset    Blood Disease Mother     Cancer Mother     Arthritis Father     Hypertension Father     Breast Cancer Sister     Allergies Sister     Cancer Sister     Thyroid Disease Sister     Blood Disease Sister     Diabetes No family hx of     Cerebrovascular Disease No family hx of     Glaucoma No family hx of     Macular Degeneration No family hx of    Brother - arrhyhtmia  Older brother - Afib,  from non-cardiac issues    SOCIAL HISTORY:  Social History     Socioeconomic History    Marital status:    Tobacco Use    Smoking status: Never    Smokeless tobacco: Never    Tobacco comments:     Lives in smoke free household   Vaping Use    Vaping Use: Never used   Substance and Sexual Activity    Alcohol use: No     Comment: None    Drug use: No     Comment: Never    Sexual activity: Not Currently     Partners: Female       MEDICATIONS:  He is off the lipitor since   He is off Jardiance after 1-2 months of taking it due to abdominal discomfort, gas  Current Outpatient Medications   Medication Sig Dispense Refill    apixaban ANTICOAGULANT (ELIQUIS ANTICOAGULANT) 5 MG tablet Take 1 tablet (5 mg) by mouth 2 times daily. 60 tablet 5    diltiazem ER COATED BEADS (CARDIZEM CD/CARTIA XT) 120 MG 24 hr capsule TAKE 1 CAPSULE BY MOUTH EVERY DAY 90 capsule 0    furosemide (LASIX) 20 MG tablet Take 1 tablet (20 mg) by mouth daily. 90 tablet 1     levETIRAcetam (KEPPRA) 500 MG tablet Take 750 mg by mouth 2 times daily      metoprolol succinate ER (TOPROL XL) 200 MG 24 hr tablet Take 1 tablet (200 mg) by mouth daily. 90 tablet 3    sacubitril-valsartan (ENTRESTO)  MG per tablet Take 1 tablet by mouth 2 times daily. 180 tablet 3    spironolactone (ALDACTONE) 25 MG tablet Take 1 tablet (25 mg) by mouth daily. 90 tablet 0    atorvastatin (LIPITOR) 20 MG tablet Take 1 tablet (20 mg) by mouth daily. (Patient not taking: Reported on 7/7/2025) 90 tablet 0    empagliflozin (JARDIANCE) 10 MG TABS tablet Take 1 tablet (10 mg) by mouth daily (Patient not taking: Reported on 7/7/2025) 90 tablet 3    omeprazole (PRILOSEC) 40 MG DR capsule Take 40 mg by mouth 2 times daily. (Patient not taking: Reported on 7/7/2025) 180 capsule 0     No current facility-administered medications for this visit.        ALLERGIES:   No Known Allergies    PHYSICAL EXAM:  /74 (BP Location: Right arm, Patient Position: Chair, Cuff Size: Adult Large)   Pulse 80   Wt (!) 160.6 kg (354 lb)   SpO2 97%   BMI 41.44 kg/m    Gen: alert, interactive, NAD  Neck: supple, no obvious JVD  CV: irregular, no m/r/g  Chest: CTAB, no wheezes or crackles  Abd: soft, NT, ND, +BS  Ext: trace LE edema, 2+ peripheral pulses    LABS:    CMP  Last Comprehensive Metabolic Panel:  Sodium   Date Value Ref Range Status   07/07/2025 140 135 - 145 mmol/L Final   07/09/2021 139 133 - 144 mmol/L Final     Potassium   Date Value Ref Range Status   07/07/2025 4.3 3.4 - 5.3 mmol/L Final   04/21/2023 4.6 3.4 - 5.3 mmol/L Final   07/09/2021 4.5 3.4 - 5.3 mmol/L Final     Chloride   Date Value Ref Range Status   07/07/2025 106 98 - 107 mmol/L Final   04/21/2023 109 94 - 109 mmol/L Final   07/09/2021 108 94 - 109 mmol/L Final     Carbon Dioxide   Date Value Ref Range Status   07/09/2021 24 20 - 32 mmol/L Final     Carbon Dioxide (CO2)   Date Value Ref Range Status   07/07/2025 23 22 - 29 mmol/L Final   04/21/2023 31 20  "- 32 mmol/L Final     Anion Gap   Date Value Ref Range Status   07/07/2025 11 7 - 15 mmol/L Final   04/21/2023 1 (L) 3 - 14 mmol/L Final   07/09/2021 7 3 - 14 mmol/L Final     Glucose   Date Value Ref Range Status   07/07/2025 101 (H) 70 - 99 mg/dL Final   04/21/2023 101 (H) 70 - 99 mg/dL Final   07/09/2021 100 (H) 70 - 99 mg/dL Final     Comment:     Fasting specimen     Urea Nitrogen   Date Value Ref Range Status   07/07/2025 17.9 8.0 - 23.0 mg/dL Final   04/21/2023 20 7 - 30 mg/dL Final   07/09/2021 14 7 - 30 mg/dL Final     Creatinine   Date Value Ref Range Status   07/07/2025 0.85 0.67 - 1.17 mg/dL Final   07/09/2021 0.74 0.66 - 1.25 mg/dL Final     GFR Estimate   Date Value Ref Range Status   07/07/2025 >90 >60 mL/min/1.73m2 Final     Comment:     eGFR calculated using 2021 CKD-EPI equation.   07/09/2021 >90 >60 mL/min/[1.73_m2] Final     Comment:     Non  GFR Calc  Starting 12/18/2018, serum creatinine based estimated GFR (eGFR) will be   calculated using the Chronic Kidney Disease Epidemiology Collaboration   (CKD-EPI) equation.       Calcium   Date Value Ref Range Status   07/07/2025 9.1 8.8 - 10.4 mg/dL Final   07/09/2021 8.5 8.5 - 10.1 mg/dL Final     Bilirubin Total   Date Value Ref Range Status   04/21/2023 0.9 0.2 - 1.3 mg/dL Final   07/09/2021 0.8 0.2 - 1.3 mg/dL Final     Alkaline Phosphatase   Date Value Ref Range Status   04/21/2023 108 40 - 150 U/L Final   07/09/2021 145 40 - 150 U/L Final     ALT   Date Value Ref Range Status   04/05/2024 36 0 - 70 U/L Final   07/09/2021 25 0 - 70 U/L Final     AST   Date Value Ref Range Status   04/21/2023 24 0 - 45 U/L Final   07/09/2021 16 0 - 45 U/L Final       NT-proBNP       TROP  No results found for: \"TROPI\", \"TROPONIN\", \"TROPR\", \"TROPN\"    CBC  CBC RESULTS:   Recent Labs   Lab Test 04/21/23  1045   WBC 7.4   RBC 4.80   HGB 15.5   HCT 46.5   MCV 97   MCH 32.3   MCHC 33.3   RDW 13.1          LIPIDS  Recent Labs   Lab Test " 23  1128 23  1045   CHOL 215* 132   HDL 40 43   * 70   TRIG 98 94       TSH  TSH   Date Value Ref Range Status   2022 1.03 0.40 - 4.00 mU/L Final   2021 1.23 0.40 - 4.00 mU/L Final       HBA1C  Lab Results   Component Value Date    A1C 5.4 2018         CARDIAC DATA:    EK: Atrial fibrillation, Incomplete left bundle branch block    Echo:  :  Mildly reduced left ventricular systolic function. LVEF estimate 40-45%  (Traced 44%). Normal LV function and size. LV filling is restrictive. Normal   RV function and size. No significant valvular abnormalities. IVC cannot be assessed. No major changes compared to prior study in 2011.     2021:  Difficult to assess ejection fraction due to onre-sr-sirg variability, the LVEF in the 40% range. Mild left ventricular dilation is present.  Moderate right ventricular dilation is present. Global right ventricular function is mildly reduced.  Mild dilatation of the aorta is present. Sinuses of Valsalva 4.4 cm (1.63 cm//m2).  No pericardial effusion is present.   This study was compared with the study from 2017. No significant changes noted.     2021:  Left ventricular function is decreased. The ejection fraction is 35-40% (moderately reduced).  Global right ventricular function is mildly reduced. The inferior vena cava cannot be assessed.  No pericardial effusion is present. There has been no change.     22:  Left ventricular function is decreased. The ejection fraction is 30-35% (moderately reduced). Moderate diffuse hypokinesis is present.  Global right ventricular function is mildly reduced.  The left atrial appendage is free of thrombus. There is spontaneous echo contrast with a severely dilated left atrium.  Mild functional mitral insufficiency is present. No pericardial effusion is present.      6/3/22:  Left ventricular function is decreased. The ejection fraction is 35-40% (moderately reduced). Moderate  left ventricular dilation is present.  Global right ventricular function is mildly reduced.   The inferior vena cava was normal in size with preserved respiratory variability.  Trivial pericardial effusion is present.   This study was compared with the study from 11/2021. No significant changes noted.    11/2023:  Left ventricular function is mildly reduced. The ejection fraction is 40-45%. LVIDd: 6.0 cm   Mild diffuse hypokinesis is present  Global right ventricular function is normal.  This study was compared with the study from 6/3/22. LVEF is higher.    Dec 2024:  Left ventricular function is decreased. The ejection fraction is 40-45%  (mildly reduced).  Global right ventricular function is normal.  No pericardial effusion is present.  Poor image quality limits valvular evaluation.  This study was compared with the study from 11/12/2023. There has been no  significant change in biventricular function.     NM stress 3/4/2022:    The nuclear stress test is abnormal.    No evidence of myocardial ischemia. There is a small area of a mild degree of infarction in the distal septal segment(s) of the left ventricle. This appears to be in the left anterior descending distribution.    Left ventricular function is normal.    A prior study was conducted on 11/13/2017.    Nuclear Study Quality: Final image quality is satisfactory.    CTA 7/22/22:  1.  There are no high-grade stenoses in the left main, proximal left anterior descending, proximal left circumflex, or right coronary arteries.  2.  The remaining segments of the major epicardial vessels are non-diagnostic due to significant cardiac motion and photon starvation artifacts.  3.  Total Agatston score 58 placing the patient in the 54th percentile when compared to age and gender matched control group. Mild (25-49%) stenosis composed of mixed plaque.   4.  Please review the separate Radiology report for incidental noncardiac findings.    Cardiac MRI:  None  available    Cardiac Catheterization:  None available      ASSESSMENT/PLAN:  In summary, Stef Mosher is here today with the following -      1.  Nonischemic cardiomyopathy, chronic combined systolic and diastolic heart failure, ACC/AHA stage C, NYHA class II, EF 40 to 45%  2.  Persistent atrial fibrillation, chronic anticoagulation  3.  Hypertension  4.  Hyperlipidemia  5. Mild nonobstructive coronary artery disease    Plans -     NICM:  - Goal Directed Medical Therapies for Heart Failure:     These are indicated irrespective of the etiology of heart failure.  We discussed the primary medications, their side effects, the care plan including frequent follow-ups  for optimization of therapies with monitoring of blood pressures, weights and lab results     Beta blocker: continue Toprol  mg once daily  ACE/ARB/ARNI: continue sacubitril/ valsartan  mg BID  MRA: continue spironolactone 25 mg daily  SGLT2 inhibitor: Jardiance - did not tolerate due to abdominal discomfort/ gas.  Offered to try the alternative of dapagliflozin but they prefer to hold off for now, discussed benefits of being on this agent.    Diuretics: lasix 20 mg once daily, not required any additional doses  Cardiac Rehab: not indicated, advised to increase exercise on their own with at least moderate intensity aerobic activity of at least 30 minutes a day and at least 5 times a week  ICD/ CRT-D: not indicated  Labs: at follow up  Follow up: 1 year.  He has annual echocardiograms in December when he sees Dr. Turner and could consider a cardiac MRI as an alternative at next follow-up  CV Genetic testing: discussed, they are currently not interested  Obstructive sleep apnea: he denies concerns, and declines testing    Advised on daily home BP and weight monitoring  Advised on observing caution with salt intake    Non-obstructive CAD: Mild LAD disease in 2022. Was previously on aspirin (stopped with GI bleeding) and statin (stopped due to  short-term memory issues).    Hypertension: Well-controlled    Persistent A-fib: Managed by Dr. Turner,  on chronic anticoagulation, beta-blocker and diltiazem with some concern for noted lower heart rates when sick.  However his wife notes that he is not sick but often will today in clinic his heart rate is in the 80s.  Advised to monitor this and let us know if he has any further concerns    Hyperlipidemia: off statin per his wish due to short-term memory issues and they are monitoring lipid panel. With modifications to his diet, follow up lipid panel is slightly improved but still above goal based on known mild nonobstructive CAD.  I suggested consultation with preventive cardiology experts to discuss nonstatin alternatives and they will think about this.    Elevated fasting blood glucose: His fasting blood sugar is borderline high, advised to follow up with PCP for further monitoring.      History of GI bleed: No issues in the last 3 years but they have requested that hemoglobin to be checked today and was normal    The longitudinal plan of care for the diagnosis(es)/condition(s) as documented were addressed during this visit. Due to the added complexity in care, I will continue to support Stef in the subsequent management and with ongoing continuity of care.   Chronic combined systolic and diastolic heart failure    I spent 42 minutes minutes in care of the patient today including obtaining recent medical history, personally reviewing recent cardiac testing and/or lab results, today's examination, discussion of testing results and care recommendations with patient.       Thank you for the opportunity to participate in this patient's care. Please feel free to reach out with any questions or concerns.      Francesco Hernandez MD, FACC  Associate Professor of Medicine  Advanced Heart Failure, LVAD & Cardiac Transplant  Director, Cardio-Obstetrics  Cardio-Oncology  [facility]

## 2025-08-17 ENCOUNTER — PATIENT OUTREACH (OUTPATIENT)
Dept: CARE COORDINATION | Facility: CLINIC | Age: 65
End: 2025-08-17
Payer: COMMERCIAL

## 2025-08-26 ENCOUNTER — TELEPHONE (OUTPATIENT)
Dept: CARDIOLOGY | Facility: CLINIC | Age: 65
End: 2025-08-26
Payer: COMMERCIAL

## 2025-08-26 DIAGNOSIS — I42.9 CARDIOMYOPATHY, UNSPECIFIED TYPE (H): ICD-10-CM

## 2025-08-27 ENCOUNTER — TELEPHONE (OUTPATIENT)
Dept: CARDIOLOGY | Facility: CLINIC | Age: 65
End: 2025-08-27
Payer: COMMERCIAL

## 2025-08-27 DIAGNOSIS — I48.20 CHRONIC ATRIAL FIBRILLATION, UNSPECIFIED (H): Primary | ICD-10-CM

## 2025-08-27 RX ORDER — FUROSEMIDE 20 MG/1
20 TABLET ORAL DAILY
Qty: 90 TABLET | Refills: 0 | Status: SHIPPED | OUTPATIENT
Start: 2025-08-27

## (undated) RX ORDER — FENTANYL CITRATE 50 UG/ML
INJECTION, SOLUTION INTRAMUSCULAR; INTRAVENOUS
Status: DISPENSED
Start: 2022-03-18

## (undated) RX ORDER — NITROGLYCERIN 0.4 MG/1
TABLET SUBLINGUAL
Status: DISPENSED
Start: 2022-07-22

## (undated) RX ORDER — LIDOCAINE HYDROCHLORIDE 20 MG/ML
SOLUTION OROPHARYNGEAL
Status: DISPENSED
Start: 2022-03-18

## (undated) RX ORDER — REGADENOSON 0.08 MG/ML
INJECTION, SOLUTION INTRAVENOUS
Status: DISPENSED
Start: 2017-11-13

## (undated) RX ORDER — IVABRADINE 5 MG/1
TABLET, FILM COATED ORAL
Status: DISPENSED
Start: 2022-07-22

## (undated) RX ORDER — REGADENOSON 0.08 MG/ML
INJECTION, SOLUTION INTRAVENOUS
Status: DISPENSED
Start: 2022-03-04

## (undated) RX ORDER — METOPROLOL TARTRATE 1 MG/ML
INJECTION, SOLUTION INTRAVENOUS
Status: DISPENSED
Start: 2022-07-22